# Patient Record
Sex: FEMALE | Race: WHITE | NOT HISPANIC OR LATINO | Employment: UNEMPLOYED | URBAN - METROPOLITAN AREA
[De-identification: names, ages, dates, MRNs, and addresses within clinical notes are randomized per-mention and may not be internally consistent; named-entity substitution may affect disease eponyms.]

---

## 2017-01-24 ENCOUNTER — OFFICE VISIT (OUTPATIENT)
Dept: GASTROENTEROLOGY | Facility: CLINIC | Age: 42
End: 2017-01-24

## 2017-01-24 VITALS
SYSTOLIC BLOOD PRESSURE: 122 MMHG | DIASTOLIC BLOOD PRESSURE: 80 MMHG | BODY MASS INDEX: 30.08 KG/M2 | WEIGHT: 176.2 LBS | HEIGHT: 64 IN

## 2017-01-24 DIAGNOSIS — Z72.0 TOBACCO ABUSE: ICD-10-CM

## 2017-01-24 DIAGNOSIS — I85.01 BLEEDING ESOPHAGEAL VARICES, UNSPECIFIED ESOPHAGEAL VARICES TYPE (HCC): ICD-10-CM

## 2017-01-24 DIAGNOSIS — D50.9 IRON DEFICIENCY ANEMIA, UNSPECIFIED IRON DEFICIENCY ANEMIA TYPE: ICD-10-CM

## 2017-01-24 DIAGNOSIS — K75.81 NASH (NONALCOHOLIC STEATOHEPATITIS): Primary | ICD-10-CM

## 2017-01-24 DIAGNOSIS — K76.82 HEPATIC ENCEPHALOPATHY (HCC): ICD-10-CM

## 2017-01-24 DIAGNOSIS — E87.6 HYPOKALEMIA: ICD-10-CM

## 2017-01-24 DIAGNOSIS — K74.60 ESOPHAGEAL VARICES IN CIRRHOSIS (HCC): ICD-10-CM

## 2017-01-24 DIAGNOSIS — I85.10 ESOPHAGEAL VARICES IN CIRRHOSIS (HCC): ICD-10-CM

## 2017-01-24 DIAGNOSIS — K74.60 HEPATIC CIRRHOSIS, UNSPECIFIED HEPATIC CIRRHOSIS TYPE (HCC): ICD-10-CM

## 2017-01-24 DIAGNOSIS — Z87.19 H/O: UGI BLEED: ICD-10-CM

## 2017-01-24 PROCEDURE — 99214 OFFICE O/P EST MOD 30 MIN: CPT | Performed by: NURSE PRACTITIONER

## 2017-01-25 LAB
ALBUMIN SERPL-MCNC: 2.9 G/DL (ref 3.5–5.2)
ALBUMIN/GLOB SERPL: 0.5 G/DL
ALP SERPL-CCNC: 146 U/L (ref 39–117)
ALT SERPL-CCNC: 14 U/L (ref 1–33)
AST SERPL-CCNC: 36 U/L (ref 1–32)
BASOPHILS # BLD AUTO: 0.02 10*3/MM3 (ref 0–0.2)
BASOPHILS NFR BLD AUTO: 0.8 % (ref 0–1.5)
BILIRUB SERPL-MCNC: 1.6 MG/DL (ref 0.1–1.2)
BUN SERPL-MCNC: 7 MG/DL (ref 6–20)
BUN/CREAT SERPL: 10.6 (ref 7–25)
CALCIUM SERPL-MCNC: 8.1 MG/DL (ref 8.6–10.5)
CHLORIDE SERPL-SCNC: 100 MMOL/L (ref 98–107)
CO2 SERPL-SCNC: 29.7 MMOL/L (ref 22–29)
CREAT SERPL-MCNC: 0.66 MG/DL (ref 0.57–1)
DIFFERENTIAL COMMENT: NORMAL
EOSINOPHIL # BLD AUTO: 0.1 10*3/MM3 (ref 0–0.7)
EOSINOPHIL NFR BLD AUTO: 3.9 % (ref 0.3–6.2)
ERYTHROCYTE [DISTWIDTH] IN BLOOD BY AUTOMATED COUNT: 17.8 % (ref 11.7–13)
GLOBULIN SER CALC-MCNC: 6.4 GM/DL
GLUCOSE SERPL-MCNC: 105 MG/DL (ref 65–99)
HCT VFR BLD AUTO: 32 % (ref 35.6–45.5)
HGB BLD-MCNC: 9.3 G/DL (ref 11.9–15.5)
IMM GRANULOCYTES # BLD: 0 10*3/MM3 (ref 0–0.03)
IMM GRANULOCYTES NFR BLD: 0 % (ref 0–0.5)
LYMPHOCYTES # BLD AUTO: 0.9 10*3/MM3 (ref 0.9–4.8)
LYMPHOCYTES NFR BLD AUTO: 34.7 % (ref 19.6–45.3)
MCH RBC QN AUTO: 27.8 PG (ref 26.9–32)
MCHC RBC AUTO-ENTMCNC: 29.1 G/DL (ref 32.4–36.3)
MCV RBC AUTO: 95.5 FL (ref 80.5–98.2)
MONOCYTES # BLD AUTO: 0.51 10*3/MM3 (ref 0.2–1.2)
MONOCYTES NFR BLD AUTO: 19.7 % (ref 5–12)
NEUTROPHILS # BLD AUTO: 1.06 10*3/MM3 (ref 1.9–8.1)
NEUTROPHILS NFR BLD AUTO: 40.9 % (ref 42.7–76)
PLATELET # BLD AUTO: 45 10*3/MM3 (ref 140–500)
PLATELET BLD QL SMEAR: NORMAL
POTASSIUM SERPL-SCNC: 3.4 MMOL/L (ref 3.5–5.2)
PROT SERPL-MCNC: 9.3 G/DL (ref 6–8.5)
RBC # BLD AUTO: 3.35 10*6/MM3 (ref 3.9–5.2)
RBC MORPH BLD: NORMAL
SODIUM SERPL-SCNC: 137 MMOL/L (ref 136–145)
WBC # BLD AUTO: 2.59 10*3/MM3 (ref 4.5–10.7)

## 2017-01-25 NOTE — PROGRESS NOTES
"Subjective     Jaz Gipson is a 42 y.o. female, patient of Dr. Tierney's.  History of liver cirrhosis secondary to PIEDRA.  Was recently in the hospital on 2 different occasions for acute upper GI bleed due to esophageal varices.  On first admission, she checked out AMA.  Shortly after being home her mother found her to be increasingly more confused and brought her back to the emergency room.  Found to have a markedly elevated ammonia level.  While in the ER, she vomited up blood.  There was concern of aspiration and that she wasn't known to be able to protect her airway.  She was intubated by the ER physician.  Presents today for hospital follow-up.  Admitted from 12/24/16-1/1/17 for hepatic encephalopathy, acute GI bleed secondary to esophageal varices and acute respiratory failure with mechanical ventilation.  Doing well at present.  Has had a misunderstanding of home medication schedule.  Has not taken lactulose as prescribed, taking less.  No confusion per patient and family.  Aldactone was being taken but half of what was prescribed.  Denies weight gain, NSAID and alcohol use, pedal edema, shortness of breath and chest pain.  Feels like she's \"bigger\" around her abdomen.  Liver ultrasound, while in the hospital, revealed minimal to mild abdominal ascites,  cirrhotic changes and enlargement of the liver.  No discrete liver lesion identified.  EGDs performed while in the hospital, revealed Grade II esophageal varices that were banded, and moderately severe portal gastropathy.  AFP=4.2.   Reports her sister-in-law, in [place], passed away yesterday.  Was told she had a good chance of receiving sister-in-law's liver.  Has been seen by the transplant team [place] more than 1 year ago.  Patient is requesting to talk with the physician in charge of her care with the transplant team.  She is not currently on the transplant list.  MELD score today=13.    Cirrhosis   This is a chronic problem. The current episode started " more than 1 year ago. The problem occurs constantly. The problem has been gradually worsening. Pertinent negatives include no abdominal pain, anorexia, change in bowel habit, chest pain, chills, congestion, coughing, fever, headaches, joint swelling, nausea, rash, sore throat, vertigo or vomiting. Nothing aggravates the symptoms.        Vitals:    01/24/17 1410   BP: 122/80         Current Outpatient Prescriptions:   •  calcium carbonate (OS-JARED) 600 MG tablet, Take 600 mg by mouth daily., Disp: , Rfl:   •  ferrous sulfate 325 (65 FE) MG tablet, Take 1 tablet by mouth Daily With Breakfast., Disp: 30 tablet, Rfl: 0  •  furosemide (LASIX) 40 MG tablet, TAKE ONE TABLET BY MOUTH DAILY, Disp: 30 tablet, Rfl: 3  •  lactulose (CHRONULAC) 10 GM/15ML solution, Take 45 mL by mouth Every 12 (Twelve) Hours., Disp: , Rfl:   •  levothyroxine (SYNTHROID, LEVOTHROID) 100 MCG tablet, TAKE ONE TABLET BY MOUTH DAILY, Disp: 30 tablet, Rfl: 3  •  levothyroxine (SYNTHROID, LEVOTHROID) 125 MCG tablet, TAKE TWO TABLETS BY MOUTH DAILY, Disp: 60 tablet, Rfl: 1  •  nadolol (CORGARD) 20 MG tablet, TAKE ONE TABLET BY MOUTH DAILY, Disp: 30 tablet, Rfl: 2  •  pantoprazole (PROTONIX) 40 MG EC tablet, Take 1 tablet by mouth 2 (Two) Times a Day Before Meals., Disp: 60 tablet, Rfl: 2  •  potassium chloride (K-DUR,KLOR-CON) 20 MEQ CR tablet, Take 20 mEq by mouth 2 (Two) Times a Day. Take 1 tablet 2 times a day., Disp: , Rfl:   •  sertraline (ZOLOFT) 50 MG tablet, Take 1 tablet by mouth daily., Disp: , Rfl:   •  spironolactone (ALDACTONE) 50 MG tablet, Take 2 tablets by mouth daily., Disp: 90 tablet, Rfl: 0  •  vitamin D (ERGOCALCIFEROL) 62877 UNITS capsule capsule, Take 1 capsule by mouth 1 (one) time per week., Disp: 12 capsule, Rfl: 1    Allergies   Allergen Reactions   • Latex    • Penicillins    • Tomato        Past Medical History   Diagnosis Date   • Abnormal cervical Papanicolaou smear      remote   • Acute gastric mucosal erosion      11/12  EGD   • Allergic rhinitis    • Asthma    • Cirrhosis of liver    • Duodenitis      11/12 EGD   • Enlarged lymph node      retroperitoneal lymph node enlarged, 5/12 PET CT consistent with low grade lymphoproliferative disorder (  [facility])  9/12Bone marrow bx negative    • Esophageal varices    • Fatty liver    • Folic acid deficiency    • Graves disease    • Leukocytosis    • PIEDRA (nonalcoholic steatohepatitis)    • Obstructive sleep apnea      4/10 dx, prescribed CPAP at 12 cm H2O (did not tolerate)       Past Surgical History   Procedure Laterality Date   • Liver biopsy       9/12 Liver Bx done with Mariza and Appy (cirrhosis)   • Cholecystectomy       10/9/12 Madyson: Lap Cholecystectomy and Appendectomy, Liver Bx:Cirrhosis (Biliary Dyskinesia, Cholecystitis, Chronic Appendicitis)   • Upper gastrointestinal endoscopy       11/12 EGD with Erosive gastritis, duodenitis, and portal hypertensive gastropathy (Dr. Tierney) (no esophageal varices_   • Laparoscopic appendectomy     • Colonoscopy N/A 4/22/2016     Procedure: COLONOSCOPY to cecum with cold biopsy ;  Surgeon: Annie Tierney MD;  Location: [facility] ENDOSCOPY;  Service:    • Endoscopy N/A 4/22/2016     Procedure: ESOPHAGOGASTRODUODENOSCOPY;  Surgeon: Annie Tierney MD;  Location: [facility] ENDOSCOPY;  Service:    • Endoscopy N/A 12/20/2016     Procedure: ESOPHAGOGASTRODUODENOSCOPY AT BEDSIDE;  Surgeon: Haresh Fritz MD;  Location: [facility] ENDOSCOPY;  Service:    • Endoscopy N/A 12/22/2016     Procedure: ESOPHAGOGASTRODUODENOSCOPY WITH BANDING X2;  Surgeon: Chriss Reece MD;  Location: [facility] ENDOSCOPY;  Service:    • Endoscopy N/A 12/26/2016     Procedure: ESOPHAGOGASTRODUODENOSCOPY AT BEDSIDE;  Surgeon: Vidya Norman MD;  Location: [facility] ENDOSCOPY;  Service:        Family History   Problem Relation Age of Onset   • Alcohol abuse Father    • Anxiety disorder Father    • COPD Father    • Depression Father    • Hyperlipidemia Father    •  Hypertension Father    • Asthma Brother    • Coronary artery disease Brother    • Diabetes type II Other        Social History     Social History   • Marital status: Single     Spouse name: N/A   • Number of children: N/A   • Years of education: N/A     Social History Main Topics   • Smoking status: Current Every Day Smoker     Packs/day: 0.25     Types: Cigarettes   • Smokeless tobacco: Never Used   • Alcohol use No   • Drug use: No   • Sexual activity: Defer     Other Topics Concern   • None     Social History Narrative       The following portions of the patient's history were reviewed and updated as appropriate: allergies, current medications, past family history, past medical history, past social history, past surgical history and problem list.    Review of Systems   Constitutional: Negative for appetite change (Has closely been following a low-sodium diet.), chills, fever and unexpected weight change.   HENT: Negative for congestion, sore throat and trouble swallowing.    Eyes: Negative.    Respiratory: Negative for cough and shortness of breath.    Cardiovascular: Negative for chest pain and leg swelling.   Gastrointestinal: Negative for abdominal distention, abdominal pain, anal bleeding, anorexia, blood in stool, change in bowel habit, constipation, diarrhea, nausea, rectal pain and vomiting.   Endocrine:        History of hypothyroidism   Genitourinary: Negative for dysuria.   Musculoskeletal: Negative for gait problem and joint swelling.   Skin: Negative for color change, pallor and rash.   Neurological: Negative for dizziness, vertigo, syncope, light-headedness and headaches.       Objective     Physical Exam   Constitutional: She is oriented to person, place, and time. She appears well-developed and well-nourished. No distress.   HENT:   Head: Normocephalic and atraumatic.   Mouth/Throat: Oropharynx is clear and moist.   Eyes: Conjunctivae are normal. No scleral icterus.   Neck: Neck supple.    Cardiovascular: Normal rate, regular rhythm, normal heart sounds and intact distal pulses.    Pulmonary/Chest: Effort normal and breath sounds normal. No respiratory distress.   Abdominal: Soft. Bowel sounds are normal. She exhibits no distension and no mass. There is no tenderness. There is no rebound and no guarding.   No dullness palpated.   Musculoskeletal: She exhibits no deformity.   Lymphadenopathy:     She has no cervical adenopathy.   Neurological: She is alert and oriented to person, place, and time.   Skin: Skin is warm and dry. No rash noted. No erythema. No pallor.   Psychiatric: She has a normal mood and affect. Her behavior is normal. Judgment and thought content normal.   Nursing note and vitals reviewed.      Assessment/Plan     Jaz was seen today for follow-up.    Diagnoses and all orders for this visit:    PIEDRA (nonalcoholic steatohepatitis)    Hepatic cirrhosis, unspecified hepatic cirrhosis type    H/O: UGI bleed    Bleeding esophageal varices, unspecified esophageal varices type  -     Case request  -     CBC & Differential  -     Comprehensive Metabolic Panel    Esophageal varices in cirrhosis    Iron deficiency anemia, unspecified iron deficiency anemia type    Hepatic encephalopathy    Tobacco abuse    Hypokalemia  -     Cancel: Basic Metabolic Panel; Future  -     Basic Metabolic Panel; Future    Other orders  -     Manual Differential     assessment/plan  1.  PIEDRA/hepatic cirrhosis/history of UGI bleed/esophageal varices in cirrhosis/hepatic encephalopathy - discussed in detailed and reviewed daily medicines with patient and family.  Verbalized understanding.  Schedule EGD with Dr. Tierney in early February.  Check labs and call patient with results.  Contact number for physician at transplant unit [place] given to patient. Signs and symptoms of upper GI bleed reviewed with patient, if condition deteriorates, go to emergency room.    2.  Tobacco abuse - encouraged to avoid smoking.  3.   Hypokalemia - check potassium and call patient with results.  Check BMP in 2 weeks.    Discussed plan of care with patient. Verbalizes understanding and agrees with plan.  Advised to call office for any concerns or questions regarding today's visit and if any GI problems should arise.

## 2017-01-26 RX ORDER — LACTULOSE 10 G/15ML
45 SOLUTION ORAL EVERY 12 HOURS
COMMUNITY
End: 2019-01-24 | Stop reason: SDUPTHER

## 2017-01-26 RX ORDER — POTASSIUM CHLORIDE 20 MEQ/1
20 TABLET, EXTENDED RELEASE ORAL 2 TIMES DAILY
COMMUNITY
End: 2017-02-16 | Stop reason: SDUPTHER

## 2017-02-01 ENCOUNTER — TELEPHONE (OUTPATIENT)
Dept: GASTROENTEROLOGY | Facility: CLINIC | Age: 42
End: 2017-02-01

## 2017-02-01 NOTE — TELEPHONE ENCOUNTER
Call from Oralia with [facility].  She reports that last week they received call for directed donation from sister in law.  Because pt was not on donor list, and recent labs did not reflect dire need - transplant was declined.  Oralia is checking with Dr Tierney to see if they should take another look at pt, or if there is no need for referral at this time.    Advise Oralia that Dr Tierney out of office until next week - Oralia states ok to wait until then for reply.    Oralia's # is [phone] option 3.

## 2017-02-06 LAB
BUN SERPL-MCNC: 7 MG/DL (ref 6–20)
BUN/CREAT SERPL: 12.7 (ref 7–25)
CALCIUM SERPL-MCNC: 8.2 MG/DL (ref 8.6–10.5)
CHLORIDE SERPL-SCNC: 104 MMOL/L (ref 98–107)
CO2 SERPL-SCNC: 24.1 MMOL/L (ref 22–29)
CREAT SERPL-MCNC: 0.55 MG/DL (ref 0.57–1)
GLUCOSE SERPL-MCNC: 96 MG/DL (ref 65–99)
POTASSIUM SERPL-SCNC: 3 MMOL/L (ref 3.5–5.2)
SODIUM SERPL-SCNC: 138 MMOL/L (ref 136–145)

## 2017-02-07 ENCOUNTER — RESULTS ENCOUNTER (OUTPATIENT)
Dept: GASTROENTEROLOGY | Facility: CLINIC | Age: 42
End: 2017-02-07

## 2017-02-07 DIAGNOSIS — E87.6 HYPOKALEMIA: ICD-10-CM

## 2017-02-07 DIAGNOSIS — E87.6 HYPOKALEMIA: Primary | ICD-10-CM

## 2017-02-07 NOTE — PROGRESS NOTES
Informed patient of low potassium at 3.0.  Had been taking potassium 20 meq 1 tab daily.  Wll increase to 1 tab 2 times daily.  Recheck BMP in 2 weeks.

## 2017-02-08 ENCOUNTER — HOSPITAL ENCOUNTER (OUTPATIENT)
Facility: HOSPITAL | Age: 42
Setting detail: HOSPITAL OUTPATIENT SURGERY
Discharge: HOME | End: 2017-02-08
Attending: INTERNAL MEDICINE | Admitting: INTERNAL MEDICINE

## 2017-02-08 ENCOUNTER — ANESTHESIA EVENT (OUTPATIENT)
Dept: GASTROENTEROLOGY | Facility: HOSPITAL | Age: 42
End: 2017-02-08

## 2017-02-08 ENCOUNTER — ANESTHESIA (OUTPATIENT)
Dept: GASTROENTEROLOGY | Facility: HOSPITAL | Age: 42
End: 2017-02-08

## 2017-02-08 VITALS
SYSTOLIC BLOOD PRESSURE: 101 MMHG | OXYGEN SATURATION: 98 % | BODY MASS INDEX: 28.9 KG/M2 | HEIGHT: 64 IN | RESPIRATION RATE: 16 BRPM | WEIGHT: 169.3 LBS | TEMPERATURE: 97.2 F | HEART RATE: 73 BPM | DIASTOLIC BLOOD PRESSURE: 60 MMHG

## 2017-02-08 LAB
B-HCG UR QL: NEGATIVE
INTERNAL NEGATIVE CONTROL: NEGATIVE
INTERNAL POSITIVE CONTROL: POSITIVE
Lab: NORMAL

## 2017-02-08 PROCEDURE — 43235 EGD DIAGNOSTIC BRUSH WASH: CPT | Performed by: INTERNAL MEDICINE

## 2017-02-08 PROCEDURE — 25010000002 PROPOFOL 10 MG/ML EMULSION: Performed by: ANESTHESIOLOGY

## 2017-02-08 RX ORDER — PROPOFOL 10 MG/ML
VIAL (ML) INTRAVENOUS AS NEEDED
Status: DISCONTINUED | OUTPATIENT
Start: 2017-02-08 | End: 2017-02-08 | Stop reason: SURG

## 2017-02-08 RX ORDER — PROPOFOL 10 MG/ML
VIAL (ML) INTRAVENOUS CONTINUOUS PRN
Status: DISCONTINUED | OUTPATIENT
Start: 2017-02-08 | End: 2017-02-08 | Stop reason: SURG

## 2017-02-08 RX ORDER — SODIUM CHLORIDE, SODIUM LACTATE, POTASSIUM CHLORIDE, CALCIUM CHLORIDE 600; 310; 30; 20 MG/100ML; MG/100ML; MG/100ML; MG/100ML
30 INJECTION, SOLUTION INTRAVENOUS CONTINUOUS PRN
Status: DISCONTINUED | OUTPATIENT
Start: 2017-02-08 | End: 2017-02-08 | Stop reason: HOSPADM

## 2017-02-08 RX ORDER — SPIRONOLACTONE 50 MG/1
100 TABLET, FILM COATED ORAL DAILY
Qty: 90 TABLET | Refills: 2 | Status: SHIPPED | OUTPATIENT
Start: 2017-02-08 | End: 2017-10-12 | Stop reason: SDUPTHER

## 2017-02-08 RX ADMIN — PROPOFOL 100 MCG/KG/MIN: 10 INJECTION, EMULSION INTRAVENOUS at 10:55

## 2017-02-08 RX ADMIN — SODIUM CHLORIDE, POTASSIUM CHLORIDE, SODIUM LACTATE AND CALCIUM CHLORIDE 30 ML/HR: 600; 310; 30; 20 INJECTION, SOLUTION INTRAVENOUS at 10:18

## 2017-02-08 RX ADMIN — PROPOFOL 150 MG: 10 INJECTION, EMULSION INTRAVENOUS at 10:55

## 2017-02-08 RX ADMIN — SODIUM CHLORIDE, POTASSIUM CHLORIDE, SODIUM LACTATE AND CALCIUM CHLORIDE: 600; 310; 30; 20 INJECTION, SOLUTION INTRAVENOUS at 11:00

## 2017-02-08 NOTE — H&P
[facility]  Pre Procedure History & Physical    Chief Complaint:   Varices, f/u    Subjective     HPI:   Hx bx proven PIEDRA cirrhosis with recent hospitalization for UGIB due to esophageal varices and PSE.  Banded x 2. NO gastric varices.  No prior bleed.    Past Medical History:   Past Medical History   Diagnosis Date   • Abnormal cervical Papanicolaou smear      remote   • Acute gastric mucosal erosion      11/12 EGD   • Allergic rhinitis    • Asthma    • Cirrhosis of liver    • Duodenitis      11/12 EGD   • Enlarged lymph node      retroperitoneal lymph node enlarged, 5/12 PET CT consistent with low grade lymphoproliferative disorder (  [facility])  9/12Bone marrow bx negative    • Esophageal varices    • Fatty liver    • Folic acid deficiency    • Graves disease    • Leukocytosis    • PIEDRA (nonalcoholic steatohepatitis)    • Obstructive sleep apnea      4/10 dx, prescribed CPAP at 12 cm H2O (did not tolerate)       Past Surgical History:  Past Surgical History   Procedure Laterality Date   • Liver biopsy       9/12 Liver Bx done with Mariza and Appy (cirrhosis)   • Cholecystectomy       10/9/12 Madyson: Lap Cholecystectomy and Appendectomy, Liver Bx:Cirrhosis (Biliary Dyskinesia, Cholecystitis, Chronic Appendicitis)   • Upper gastrointestinal endoscopy       11/12 EGD with Erosive gastritis, duodenitis, and portal hypertensive gastropathy (Dr. Tierney) (no esophageal varices_   • Laparoscopic appendectomy     • Colonoscopy N/A 4/22/2016     Procedure: COLONOSCOPY to cecum with cold biopsy ;  Surgeon: Annie Tierney MD;  Location: [facility] ENDOSCOPY;  Service:    • Endoscopy N/A 4/22/2016     Procedure: ESOPHAGOGASTRODUODENOSCOPY;  Surgeon: Annie Tierney MD;  Location: [facility] ENDOSCOPY;  Service:    • Endoscopy N/A 12/20/2016     Procedure: ESOPHAGOGASTRODUODENOSCOPY AT BEDSIDE;  Surgeon: Haresh Fritz MD;  Location: [facility] ENDOSCOPY;  Service:    • Endoscopy N/A 12/22/2016      Procedure: ESOPHAGOGASTRODUODENOSCOPY WITH BANDING X2;  Surgeon: Chriss Reece MD;  Location: [facility] ENDOSCOPY;  Service:    • Endoscopy N/A 12/26/2016     Procedure: ESOPHAGOGASTRODUODENOSCOPY AT BEDSIDE;  Surgeon: Vidya Norman MD;  Location: [facility] ENDOSCOPY;  Service:        Family History:  Family History   Problem Relation Age of Onset   • Alcohol abuse Father    • Anxiety disorder Father    • COPD Father    • Depression Father    • Hyperlipidemia Father    • Hypertension Father    • Asthma Brother    • Coronary artery disease Brother    • Diabetes type II Other        Social History:   reports that she has been smoking Cigarettes.  She has been smoking about 0.25 packs per day. She has never used smokeless tobacco. She reports that she does not drink alcohol or use illicit drugs.    Medications:   Prescriptions Prior to Admission   Medication Sig Dispense Refill Last Dose   • calcium carbonate (OS-JARED) 600 MG tablet Take 600 mg by mouth daily.   Past Week at Unknown time   • ferrous sulfate 325 (65 FE) MG tablet Take 1 tablet by mouth Daily With Breakfast. 30 tablet 0 2/7/2017 at Unknown time   • furosemide (LASIX) 40 MG tablet TAKE ONE TABLET BY MOUTH DAILY 30 tablet 3 2/7/2017 at Unknown time   • lactulose (CHRONULAC) 10 GM/15ML solution Take 45 mL by mouth Every 12 (Twelve) Hours.   2/7/2017 at Unknown time   • levothyroxine (SYNTHROID, LEVOTHROID) 100 MCG tablet TAKE ONE TABLET BY MOUTH DAILY 30 tablet 3 2/7/2017 at Unknown time   • levothyroxine (SYNTHROID, LEVOTHROID) 125 MCG tablet TAKE TWO TABLETS BY MOUTH DAILY 60 tablet 1 2/7/2017 at Unknown time   • nadolol (CORGARD) 20 MG tablet TAKE ONE TABLET BY MOUTH DAILY 30 tablet 2 2/7/2017 at Unknown time   • pantoprazole (PROTONIX) 40 MG EC tablet Take 1 tablet by mouth 2 (Two) Times a Day Before Meals. 60 tablet 2 2/7/2017 at Unknown time   • potassium chloride (K-DUR,KLOR-CON) 20 MEQ CR tablet Take 20 mEq by mouth 2 (Two) Times a Day. Take 1  "tablet 2 times a day.   2/7/2017 at Unknown time   • sertraline (ZOLOFT) 50 MG tablet Take 1 tablet by mouth daily.   2/7/2017 at Unknown time   • spironolactone (ALDACTONE) 50 MG tablet Take 2 tablets by mouth daily. 90 tablet 0 2/7/2017 at Unknown time   • vitamin D (ERGOCALCIFEROL) 81788 UNITS capsule capsule Take 1 capsule by mouth 1 (one) time per week. 12 capsule 1 2/5/2017 at Unknown time       Allergies:  Latex; Penicillins; and Tomato    ROS:    Pertinent items are noted in HPI, all other systems reviewed and negative     Objective     Blood pressure 112/77, pulse 77, temperature 97.2 °F (36.2 °C), temperature source Oral, resp. rate 14, height 64\" (162.6 cm), weight 169 lb 4.8 oz (76.8 kg), SpO2 97 %.    Physical Exam   Constitutional: Pt is oriented to person, place, and time and well-developed, well-nourished, and in no distress.   Mouth/Throat: Oropharynx is clear and moist.   Neck: Normal range of motion.   Cardiovascular: Normal rate, regular rhythm   Pulmonary/Chest: Effort normal  Abdominal: Soft. Nontender  Skin: Skin is warm and dry.   Psychiatric: Mood, memory, affect and judgment normal.     Assessment/Plan     Diagnosis:  Cirrhosis, f/u varices with recent bleed    Anticipated Surgical Procedure:  egd w/possible variceal banding    The risks, benefits, and alternatives of this procedure have been discussed with the patient or the responsible party- the patient understands and agrees to proceed.                                                            "

## 2017-02-08 NOTE — ANESTHESIA PREPROCEDURE EVALUATION
Anesthesia Evaluation        Airway   Dental      Pulmonary    (+) asthma, sleep apnea,   Cardiovascular         Neuro/Psych  GI/Hepatic/Renal/Endo    (+)  liver disease, hyperthyroidism    Musculoskeletal     Abdominal    Substance History      OB/GYN          Other                                  Anesthesia Plan    ASA 3     MAC     Anesthetic plan and risks discussed with patient.

## 2017-02-08 NOTE — PLAN OF CARE
Problem: Patient Care Overview (Adult)  Goal: Plan of Care Review  Outcome: Ongoing (interventions implemented as appropriate)  Goal: Adult Individualization and Mutuality  Outcome: Ongoing (interventions implemented as appropriate)  Goal: Discharge Needs Assessment  Outcome: Ongoing (interventions implemented as appropriate)    02/08/17 0955   Discharge Needs Assessment   Concerns To Be Addressed no discharge needs identified   Discharge Disposition home or self-care   Living Environment   Transportation Available car         Problem: GI Endoscopy (Adult)  Goal: Signs and Symptoms of Listed Potential Problems Will be Absent or Manageable (GI Endoscopy)  Outcome: Ongoing (interventions implemented as appropriate)

## 2017-02-08 NOTE — ANESTHESIA POSTPROCEDURE EVALUATION
Patient: Jaz Gipson    Procedure Summary     Date Anesthesia Start Anesthesia Stop Room / Location    02/08/17 1058 1114  [facility] /  [facility]       Procedure Diagnosis Surgeon Provider    ESOPHAGOGASTRODUODENOSCOPY (N/A Esophagus) Bleeding esophageal varices, unspecified esophageal varices type  (Bleeding esophageal varices, unspecified esophageal varices type [I85.01]) MD Ford Vasquez MD          Anesthesia Type: MAC  Last vitals  /63 (02/08/17 1119)    Temp      Pulse 86 (02/08/17 1119)   Resp 14 (02/08/17 1119)    SpO2 99 % (02/08/17 1119)      Post Anesthesia Care and Evaluation    Patient location during evaluation: bedside  Level of consciousness: awake  Pain score: 1  Pain management: adequate  Airway patency: patent  Anesthetic complications: No anesthetic complications    Cardiovascular status: acceptable  Respiratory status: acceptable  Hydration status: acceptable

## 2017-02-13 RX ORDER — LEVOTHYROXINE SODIUM 0.12 MG/1
TABLET ORAL
Qty: 60 TABLET | Refills: 0 | OUTPATIENT
Start: 2017-02-13

## 2017-02-13 RX ORDER — POTASSIUM CHLORIDE 1500 MG/1
TABLET, EXTENDED RELEASE ORAL
Qty: 120 TABLET | Refills: 0 | OUTPATIENT
Start: 2017-02-13

## 2017-02-16 ENCOUNTER — OFFICE VISIT (OUTPATIENT)
Dept: FAMILY MEDICINE CLINIC | Facility: CLINIC | Age: 42
End: 2017-02-16

## 2017-02-16 VITALS
DIASTOLIC BLOOD PRESSURE: 62 MMHG | HEIGHT: 64 IN | BODY MASS INDEX: 28.17 KG/M2 | WEIGHT: 165 LBS | SYSTOLIC BLOOD PRESSURE: 112 MMHG | TEMPERATURE: 97.9 F | RESPIRATION RATE: 18 BRPM | HEART RATE: 70 BPM | OXYGEN SATURATION: 99 %

## 2017-02-16 DIAGNOSIS — E03.9 ACQUIRED HYPOTHYROIDISM: ICD-10-CM

## 2017-02-16 DIAGNOSIS — D50.9 IRON DEFICIENCY ANEMIA, UNSPECIFIED IRON DEFICIENCY ANEMIA TYPE: ICD-10-CM

## 2017-02-16 DIAGNOSIS — K75.81 NASH (NONALCOHOLIC STEATOHEPATITIS): Primary | ICD-10-CM

## 2017-02-16 PROCEDURE — 99212 OFFICE O/P EST SF 10 MIN: CPT | Performed by: NURSE PRACTITIONER

## 2017-02-16 RX ORDER — LEVOTHYROXINE SODIUM 0.12 MG/1
125 TABLET ORAL DAILY
Qty: 60 TABLET | Refills: 2 | Status: SHIPPED | OUTPATIENT
Start: 2017-02-16 | End: 2017-10-12 | Stop reason: ALTCHOICE

## 2017-02-16 RX ORDER — POTASSIUM CHLORIDE 20 MEQ/1
20 TABLET, EXTENDED RELEASE ORAL 2 TIMES DAILY
Qty: 60 TABLET | Refills: 2 | Status: SHIPPED | OUTPATIENT
Start: 2017-02-16 | End: 2017-11-09 | Stop reason: SDUPTHER

## 2017-02-16 NOTE — PROGRESS NOTES
Subjective   Jaz Gipson is a 42 y.o. female.   Chief Complaint   Patient presents with   • PIEDRA     Pt was hospitalized from December 24 to January 1st at [facility] for internal bleeding as a result of Piedra. She needs to follow up on that and also medication refills     Vitals:    02/16/17 0756   BP: 112/62   Pulse: 70   Resp: 18   Temp: 97.9 °F (36.6 °C)   SpO2: 99%     Allergies   Allergen Reactions   • Latex    • Penicillins    • Tomato      HPI Comments: Had seen Anju Felder in the past but lost her job so lost insurance for awhile.     In December, had an upper GI bleed from esophageal varices with hepatic encephalopathy due to PIEDRA. Was admitted to ICU for one week. Is being followed by GI now.   Went for an EGD last week- banded varices still looks ok, has another that is being monitored.        The following portions of the patient's history were reviewed and updated as appropriate: allergies, current medications, past family history, past medical history, past social history, past surgical history and problem list.    Review of Systems   All other systems reviewed and are negative.      Objective   Physical Exam   Constitutional: She is oriented to person, place, and time. She appears well-developed and well-nourished.   HENT:   Head: Normocephalic and atraumatic.   Right Ear: External ear normal.   Left Ear: External ear normal.   Neck: Normal range of motion. Neck supple.   Cardiovascular: Normal rate, regular rhythm and normal heart sounds.    Pulmonary/Chest: Effort normal and breath sounds normal.   Abdominal: Soft. Bowel sounds are normal. There is hepatosplenomegaly.   Musculoskeletal: Normal range of motion.   Neurological: She is alert and oriented to person, place, and time.   Skin: Skin is warm and dry.   Psychiatric: She has a normal mood and affect. Her behavior is normal. Judgment and thought content normal.   Nursing note and vitals reviewed.      Assessment/Plan   Problems Addressed  this Visit        Digestive    PIEDRA (nonalcoholic steatohepatitis) - Primary       Endocrine    Hypothyroidism       Hematopoietic and Hemostatic    Iron deficiency anemia

## 2017-02-20 RX ORDER — PROMETHAZINE HYDROCHLORIDE 25 MG/1
TABLET ORAL
Qty: 10 TABLET | Refills: 0 | OUTPATIENT
Start: 2017-02-20

## 2017-02-21 ENCOUNTER — RESULTS ENCOUNTER (OUTPATIENT)
Dept: GASTROENTEROLOGY | Facility: CLINIC | Age: 42
End: 2017-02-21

## 2017-02-21 DIAGNOSIS — E87.6 HYPOKALEMIA: ICD-10-CM

## 2017-05-09 DIAGNOSIS — E55.9 VITAMIN D DEFICIENCY: ICD-10-CM

## 2017-05-09 RX ORDER — ERGOCALCIFEROL 1.25 MG/1
CAPSULE ORAL
Qty: 4 CAPSULE | Refills: 0 | OUTPATIENT
Start: 2017-05-09

## 2017-05-10 RX ORDER — FUROSEMIDE 40 MG/1
TABLET ORAL
Qty: 30 TABLET | Refills: 2 | OUTPATIENT
Start: 2017-05-10

## 2017-05-24 ENCOUNTER — TELEPHONE (OUTPATIENT)
Dept: GASTROENTEROLOGY | Facility: CLINIC | Age: 42
End: 2017-05-24

## 2017-06-05 RX ORDER — LEVOTHYROXINE SODIUM 0.1 MG/1
TABLET ORAL
Qty: 30 TABLET | Refills: 2 | OUTPATIENT
Start: 2017-06-05

## 2017-10-01 ENCOUNTER — APPOINTMENT (OUTPATIENT)
Dept: CT IMAGING | Facility: HOSPITAL | Age: 42
End: 2017-10-01

## 2017-10-01 ENCOUNTER — HOSPITAL ENCOUNTER (INPATIENT)
Facility: HOSPITAL | Age: 42
LOS: 4 days | Discharge: HOME | End: 2017-10-05
Attending: EMERGENCY MEDICINE | Admitting: INTERNAL MEDICINE

## 2017-10-01 ENCOUNTER — APPOINTMENT (OUTPATIENT)
Dept: GENERAL RADIOLOGY | Facility: HOSPITAL | Age: 42
End: 2017-10-01

## 2017-10-01 DIAGNOSIS — K76.82 HEPATIC ENCEPHALOPATHY (HCC): ICD-10-CM

## 2017-10-01 DIAGNOSIS — E87.6 HYPOKALEMIA: ICD-10-CM

## 2017-10-01 DIAGNOSIS — K92.0 GASTROINTESTINAL HEMORRHAGE WITH HEMATEMESIS: Primary | ICD-10-CM

## 2017-10-01 DIAGNOSIS — D68.9 COAGULATION DEFECT (HCC): ICD-10-CM

## 2017-10-01 DIAGNOSIS — I85.11 SECONDARY ESOPHAGEAL VARICES WITH BLEEDING (HCC): ICD-10-CM

## 2017-10-01 DIAGNOSIS — D50.0 BLOOD LOSS ANEMIA: ICD-10-CM

## 2017-10-01 DIAGNOSIS — K75.81 NASH (NONALCOHOLIC STEATOHEPATITIS): ICD-10-CM

## 2017-10-01 LAB
ABO GROUP BLD: NORMAL
ALBUMIN SERPL-MCNC: 2.4 G/DL (ref 3.5–5.2)
ALBUMIN SERPL-MCNC: 2.7 G/DL (ref 3.5–5.2)
ALBUMIN/GLOB SERPL: 0.4 G/DL
ALBUMIN/GLOB SERPL: 0.4 G/DL
ALP SERPL-CCNC: 103 U/L (ref 39–117)
ALP SERPL-CCNC: 109 U/L (ref 39–117)
ALT SERPL W P-5'-P-CCNC: 19 U/L (ref 1–33)
ALT SERPL W P-5'-P-CCNC: 20 U/L (ref 1–33)
AMMONIA BLD-SCNC: 136 UMOL/L (ref 11–51)
ANION GAP SERPL CALCULATED.3IONS-SCNC: 9.4 MMOL/L
ANION GAP SERPL CALCULATED.3IONS-SCNC: 9.8 MMOL/L
ANISOCYTOSIS BLD QL: NORMAL
APTT PPP: 37.3 SECONDS (ref 22.7–35.4)
AST SERPL-CCNC: 43 U/L (ref 1–32)
AST SERPL-CCNC: 46 U/L (ref 1–32)
BASOPHILS # BLD AUTO: 0.02 10*3/MM3 (ref 0–0.2)
BASOPHILS # BLD AUTO: 0.06 10*3/MM3 (ref 0–0.2)
BASOPHILS NFR BLD AUTO: 0.6 % (ref 0–1.5)
BASOPHILS NFR BLD AUTO: 1.3 % (ref 0–1.5)
BILIRUB SERPL-MCNC: 1.4 MG/DL (ref 0.1–1.2)
BILIRUB SERPL-MCNC: 1.5 MG/DL (ref 0.1–1.2)
BLD GP AB SCN SERPL QL: NEGATIVE
BUN BLD-MCNC: 13 MG/DL (ref 6–20)
BUN BLD-MCNC: 14 MG/DL (ref 6–20)
BUN/CREAT SERPL: 20.6 (ref 7–25)
BUN/CREAT SERPL: 21 (ref 7–25)
CALCIUM SPEC-SCNC: 7.7 MG/DL (ref 8.6–10.5)
CALCIUM SPEC-SCNC: 7.7 MG/DL (ref 8.6–10.5)
CHLORIDE SERPL-SCNC: 105 MMOL/L (ref 98–107)
CHLORIDE SERPL-SCNC: 108 MMOL/L (ref 98–107)
CO2 SERPL-SCNC: 23.2 MMOL/L (ref 22–29)
CO2 SERPL-SCNC: 24.6 MMOL/L (ref 22–29)
CREAT BLD-MCNC: 0.62 MG/DL (ref 0.57–1)
CREAT BLD-MCNC: 0.68 MG/DL (ref 0.57–1)
D-LACTATE SERPL-SCNC: 1.7 MMOL/L (ref 0.5–2)
DEPRECATED RDW RBC AUTO: 67.1 FL (ref 37–54)
DEPRECATED RDW RBC AUTO: 68.2 FL (ref 37–54)
ELLIPTOCYTES BLD QL SMEAR: NORMAL
EOSINOPHIL # BLD AUTO: 0.03 10*3/MM3 (ref 0–0.7)
EOSINOPHIL # BLD AUTO: 0.09 10*3/MM3 (ref 0–0.7)
EOSINOPHIL NFR BLD AUTO: 0.9 % (ref 0.3–6.2)
EOSINOPHIL NFR BLD AUTO: 1.9 % (ref 0.3–6.2)
ERYTHROCYTE [DISTWIDTH] IN BLOOD BY AUTOMATED COUNT: 20.5 % (ref 11.7–13)
ERYTHROCYTE [DISTWIDTH] IN BLOOD BY AUTOMATED COUNT: 21 % (ref 11.7–13)
GFR SERPL CREATININE-BSD FRML MDRD: 107 ML/MIN/1.73
GFR SERPL CREATININE-BSD FRML MDRD: 96 ML/MIN/1.73
GLOBULIN UR ELPH-MCNC: 6.6 GM/DL
GLOBULIN UR ELPH-MCNC: 6.9 GM/DL
GLUCOSE BLD-MCNC: 133 MG/DL (ref 65–99)
GLUCOSE BLD-MCNC: 84 MG/DL (ref 65–99)
GLUCOSE BLDC GLUCOMTR-MCNC: 82 MG/DL (ref 70–130)
HCG SERPL QL: NEGATIVE
HCT VFR BLD AUTO: 22.9 % (ref 35.6–45.5)
HCT VFR BLD AUTO: 25.4 % (ref 35.6–45.5)
HGB BLD-MCNC: 7 G/DL (ref 11.9–15.5)
HGB BLD-MCNC: 7.9 G/DL (ref 11.9–15.5)
HOLD SPECIMEN: NORMAL
HOLD SPECIMEN: NORMAL
HYPOCHROMIA BLD QL: NORMAL
IMM GRANULOCYTES # BLD: 0 10*3/MM3 (ref 0–0.03)
IMM GRANULOCYTES # BLD: 0 10*3/MM3 (ref 0–0.03)
IMM GRANULOCYTES NFR BLD: 0 % (ref 0–0.5)
IMM GRANULOCYTES NFR BLD: 0 % (ref 0–0.5)
INR PPP: 1.91 (ref 0.9–1.1)
LYMPHOCYTES # BLD AUTO: 0.96 10*3/MM3 (ref 0.9–4.8)
LYMPHOCYTES # BLD AUTO: 1.25 10*3/MM3 (ref 0.9–4.8)
LYMPHOCYTES NFR BLD AUTO: 27 % (ref 19.6–45.3)
LYMPHOCYTES NFR BLD AUTO: 27.4 % (ref 19.6–45.3)
MAGNESIUM SERPL-MCNC: 2.1 MG/DL (ref 1.6–2.6)
MCH RBC QN AUTO: 27.2 PG (ref 26.9–32)
MCH RBC QN AUTO: 27.7 PG (ref 26.9–32)
MCHC RBC AUTO-ENTMCNC: 30.6 G/DL (ref 32.4–36.3)
MCHC RBC AUTO-ENTMCNC: 31.1 G/DL (ref 32.4–36.3)
MCV RBC AUTO: 89.1 FL (ref 80.5–98.2)
MCV RBC AUTO: 89.1 FL (ref 80.5–98.2)
MONOCYTES # BLD AUTO: 0.34 10*3/MM3 (ref 0.2–1.2)
MONOCYTES # BLD AUTO: 0.35 10*3/MM3 (ref 0.2–1.2)
MONOCYTES NFR BLD AUTO: 7.6 % (ref 5–12)
MONOCYTES NFR BLD AUTO: 9.7 % (ref 5–12)
NEUTROPHILS # BLD AUTO: 2.16 10*3/MM3 (ref 1.9–8.1)
NEUTROPHILS # BLD AUTO: 2.88 10*3/MM3 (ref 1.9–8.1)
NEUTROPHILS NFR BLD AUTO: 61.4 % (ref 42.7–76)
NEUTROPHILS NFR BLD AUTO: 62.2 % (ref 42.7–76)
NONSPECIFIC GEL REACTION: NORMAL
NRBC BLD MANUAL-RTO: 0 /100 WBC (ref 0–0)
NRBC BLD MANUAL-RTO: 0 /100 WBC (ref 0–0)
PHOSPHATE SERPL-MCNC: 3.1 MG/DL (ref 2.5–4.5)
PLAT MORPH BLD: NORMAL
PLATELET # BLD AUTO: 34 10*3/MM3 (ref 140–500)
PLATELET # BLD AUTO: 43 10*3/MM3 (ref 140–500)
PMV BLD AUTO: ABNORMAL FL (ref 6–12)
POLYCHROMASIA BLD QL SMEAR: NORMAL
POTASSIUM BLD-SCNC: 2.4 MMOL/L (ref 3.5–5.2)
POTASSIUM BLD-SCNC: 2.7 MMOL/L (ref 3.5–5.2)
PROT SERPL-MCNC: 9 G/DL (ref 6–8.5)
PROT SERPL-MCNC: 9.6 G/DL (ref 6–8.5)
PROTHROMBIN TIME: 21.2 SECONDS (ref 11.7–14.2)
RBC # BLD AUTO: 2.57 10*6/MM3 (ref 3.9–5.2)
RBC # BLD AUTO: 2.85 10*6/MM3 (ref 3.9–5.2)
RH BLD: POSITIVE
SODIUM BLD-SCNC: 139 MMOL/L (ref 136–145)
SODIUM BLD-SCNC: 141 MMOL/L (ref 136–145)
TARGETS BLD QL SMEAR: NORMAL
WBC MORPH BLD: NORMAL
WBC NRBC COR # BLD: 3.51 10*3/MM3 (ref 4.5–10.7)
WBC NRBC COR # BLD: 4.63 10*3/MM3 (ref 4.5–10.7)
WHOLE BLOOD HOLD SPECIMEN: NORMAL
WHOLE BLOOD HOLD SPECIMEN: NORMAL

## 2017-10-01 PROCEDURE — 71010 HC CHEST PA OR AP: CPT

## 2017-10-01 PROCEDURE — 86870 RBC ANTIBODY IDENTIFICATION: CPT | Performed by: EMERGENCY MEDICINE

## 2017-10-01 PROCEDURE — 74177 CT ABD & PELVIS W/CONTRAST: CPT

## 2017-10-01 PROCEDURE — 80053 COMPREHEN METABOLIC PANEL: CPT | Performed by: INTERNAL MEDICINE

## 2017-10-01 PROCEDURE — 86902 BLOOD TYPE ANTIGEN DONOR EA: CPT

## 2017-10-01 PROCEDURE — 86900 BLOOD TYPING SEROLOGIC ABO: CPT | Performed by: EMERGENCY MEDICINE

## 2017-10-01 PROCEDURE — 86900 BLOOD TYPING SEROLOGIC ABO: CPT

## 2017-10-01 PROCEDURE — 93010 ELECTROCARDIOGRAM REPORT: CPT | Performed by: INTERNAL MEDICINE

## 2017-10-01 PROCEDURE — 86920 COMPATIBILITY TEST SPIN: CPT

## 2017-10-01 PROCEDURE — 86850 RBC ANTIBODY SCREEN: CPT | Performed by: EMERGENCY MEDICINE

## 2017-10-01 PROCEDURE — 99291 CRITICAL CARE FIRST HOUR: CPT

## 2017-10-01 PROCEDURE — 25010000002 OCTREOTIDE PER 25 MCG: Performed by: EMERGENCY MEDICINE

## 2017-10-01 PROCEDURE — 84100 ASSAY OF PHOSPHORUS: CPT | Performed by: INTERNAL MEDICINE

## 2017-10-01 PROCEDURE — 0 IOPAMIDOL 61 % SOLUTION: Performed by: EMERGENCY MEDICINE

## 2017-10-01 PROCEDURE — 86901 BLOOD TYPING SEROLOGIC RH(D): CPT | Performed by: EMERGENCY MEDICINE

## 2017-10-01 PROCEDURE — P9016 RBC LEUKOCYTES REDUCED: HCPCS

## 2017-10-01 PROCEDURE — 85730 THROMBOPLASTIN TIME PARTIAL: CPT | Performed by: EMERGENCY MEDICINE

## 2017-10-01 PROCEDURE — 84703 CHORIONIC GONADOTROPIN ASSAY: CPT | Performed by: EMERGENCY MEDICINE

## 2017-10-01 PROCEDURE — 93005 ELECTROCARDIOGRAM TRACING: CPT | Performed by: EMERGENCY MEDICINE

## 2017-10-01 PROCEDURE — 85610 PROTHROMBIN TIME: CPT | Performed by: EMERGENCY MEDICINE

## 2017-10-01 PROCEDURE — 80053 COMPREHEN METABOLIC PANEL: CPT | Performed by: EMERGENCY MEDICINE

## 2017-10-01 PROCEDURE — 86922 COMPATIBILITY TEST ANTIGLOB: CPT

## 2017-10-01 PROCEDURE — 82962 GLUCOSE BLOOD TEST: CPT

## 2017-10-01 PROCEDURE — 82140 ASSAY OF AMMONIA: CPT | Performed by: EMERGENCY MEDICINE

## 2017-10-01 PROCEDURE — 85025 COMPLETE CBC W/AUTO DIFF WBC: CPT | Performed by: EMERGENCY MEDICINE

## 2017-10-01 PROCEDURE — 85025 COMPLETE CBC W/AUTO DIFF WBC: CPT | Performed by: INTERNAL MEDICINE

## 2017-10-01 PROCEDURE — 25010000003 POTASSIUM CHLORIDE 10 MEQ/100ML SOLUTION: Performed by: EMERGENCY MEDICINE

## 2017-10-01 PROCEDURE — 85007 BL SMEAR W/DIFF WBC COUNT: CPT | Performed by: INTERNAL MEDICINE

## 2017-10-01 PROCEDURE — 83735 ASSAY OF MAGNESIUM: CPT | Performed by: INTERNAL MEDICINE

## 2017-10-01 PROCEDURE — 83605 ASSAY OF LACTIC ACID: CPT | Performed by: EMERGENCY MEDICINE

## 2017-10-01 PROCEDURE — 36430 TRANSFUSION BLD/BLD COMPNT: CPT

## 2017-10-01 PROCEDURE — 25010000003 POTASSIUM CHLORIDE 10 MEQ/100ML SOLUTION: Performed by: INTERNAL MEDICINE

## 2017-10-01 RX ORDER — POTASSIUM CHLORIDE 7.45 MG/ML
10 INJECTION INTRAVENOUS ONCE
Status: COMPLETED | OUTPATIENT
Start: 2017-10-01 | End: 2017-10-01

## 2017-10-01 RX ORDER — POTASSIUM CHLORIDE 750 MG/1
40 CAPSULE, EXTENDED RELEASE ORAL AS NEEDED
Status: DISCONTINUED | OUTPATIENT
Start: 2017-10-01 | End: 2017-10-05 | Stop reason: HOSPADM

## 2017-10-01 RX ORDER — SODIUM CHLORIDE 0.9 % (FLUSH) 0.9 %
10 SYRINGE (ML) INJECTION AS NEEDED
Status: DISCONTINUED | OUTPATIENT
Start: 2017-10-01 | End: 2017-10-05 | Stop reason: HOSPADM

## 2017-10-01 RX ORDER — PANTOPRAZOLE SODIUM 40 MG/10ML
80 INJECTION, POWDER, LYOPHILIZED, FOR SOLUTION INTRAVENOUS ONCE
Status: COMPLETED | OUTPATIENT
Start: 2017-10-01 | End: 2017-10-01

## 2017-10-01 RX ORDER — SODIUM CHLORIDE 9 MG/ML
125 INJECTION, SOLUTION INTRAVENOUS CONTINUOUS
Status: DISCONTINUED | OUTPATIENT
Start: 2017-10-01 | End: 2017-10-04

## 2017-10-01 RX ORDER — POTASSIUM CHLORIDE 7.45 MG/ML
10 INJECTION INTRAVENOUS
Status: DISCONTINUED | OUTPATIENT
Start: 2017-10-01 | End: 2017-10-05 | Stop reason: HOSPADM

## 2017-10-01 RX ORDER — OCTREOTIDE ACETATE 50 UG/ML
50 INJECTION, SOLUTION INTRAVENOUS; SUBCUTANEOUS 3 TIMES DAILY
Status: DISCONTINUED | OUTPATIENT
Start: 2017-10-01 | End: 2017-10-01

## 2017-10-01 RX ORDER — OCTREOTIDE ACETATE 50 UG/ML
50 INJECTION, SOLUTION INTRAVENOUS; SUBCUTANEOUS ONCE
Status: COMPLETED | OUTPATIENT
Start: 2017-10-01 | End: 2017-10-01

## 2017-10-01 RX ORDER — POTASSIUM CHLORIDE 1.5 G/1.77G
40 POWDER, FOR SOLUTION ORAL AS NEEDED
Status: DISCONTINUED | OUTPATIENT
Start: 2017-10-01 | End: 2017-10-05 | Stop reason: HOSPADM

## 2017-10-01 RX ADMIN — POTASSIUM CHLORIDE 10 MEQ: 10 INJECTION, SOLUTION INTRAVENOUS at 21:45

## 2017-10-01 RX ADMIN — POTASSIUM CHLORIDE 10 MEQ: 10 INJECTION, SOLUTION INTRAVENOUS at 22:37

## 2017-10-01 RX ADMIN — LACTULOSE: 10 SOLUTION ORAL at 19:19

## 2017-10-01 RX ADMIN — OCTREOTIDE ACETATE 100 MCG/HR: 500 INJECTION, SOLUTION INTRAVENOUS; SUBCUTANEOUS at 20:48

## 2017-10-01 RX ADMIN — PANTOPRAZOLE SODIUM 8 MG/HR: 40 INJECTION, POWDER, FOR SOLUTION INTRAVENOUS at 23:24

## 2017-10-01 RX ADMIN — OCTREOTIDE ACETATE 50 MCG: 50 INJECTION, SOLUTION INTRAVENOUS; SUBCUTANEOUS at 18:47

## 2017-10-01 RX ADMIN — IOPAMIDOL 85 ML: 612 INJECTION, SOLUTION INTRAVENOUS at 20:25

## 2017-10-01 RX ADMIN — SODIUM CHLORIDE 125 ML/HR: 9 INJECTION, SOLUTION INTRAVENOUS at 18:24

## 2017-10-01 RX ADMIN — PANTOPRAZOLE SODIUM 8 MG/HR: 40 INJECTION, POWDER, FOR SOLUTION INTRAVENOUS at 18:28

## 2017-10-01 RX ADMIN — POTASSIUM CHLORIDE 10 MEQ: 7.46 INJECTION, SOLUTION INTRAVENOUS at 19:33

## 2017-10-01 RX ADMIN — PANTOPRAZOLE SODIUM 80 MG: 40 INJECTION, POWDER, FOR SOLUTION INTRAVENOUS at 18:27

## 2017-10-01 RX ADMIN — POTASSIUM CHLORIDE 10 MEQ: 10 INJECTION, SOLUTION INTRAVENOUS at 23:41

## 2017-10-01 NOTE — ED TRIAGE NOTES
Pt reports vomiting blood today, has hx of non alcoholic cirrhosis, denies pain at this time. Pt reports weakness.

## 2017-10-01 NOTE — ED NOTES
Informed consent obtained from patient's mother for administration of blood products.     Sunita Mccall RN  10/01/17 4644

## 2017-10-01 NOTE — ED PROVIDER NOTES
" EMERGENCY DEPARTMENT ENCOUNTER    CHIEF COMPLAINT  Chief Complaint: hematemesis   History given by: pt and pt's family  History limited by: nothing  Room Number: 332/1  PMD: LON Elliott      HPI:  Pt is a 42 y.o. female who presents complaining of vomiting blood (x1)  which occurred around 1600 this afternoon. Pt c/o coughing. Pt denies SOA and cp. Per pt's family the pt \"vomited a lot of blood which was full of clots.\" Pt's family states that the pt was acting \"not normal\" today. They also reported that the pt was less attentive and less alert today, which is not normal.     Duration:  Since 1400 this afternoon  Onset: gradual  Timing: constant  Radiation: none  Quality: hematemesis   Intensity/Severity: moderate  Progression: unchanged  Associated Symptoms: cough  Aggravating Factors: none  Alleviating Factors: none  Previous Episodes: none  Treatment before arrival: none    PAST MEDICAL HISTORY  Active Ambulatory Problems     Diagnosis Date Noted   • PIEDRA (nonalcoholic steatohepatitis) 04/20/2016   • Hypothyroidism 04/26/2016   • Vitamin D deficiency 04/26/2016   • Hyperlipidemia 04/26/2016   • Iron deficiency anemia 04/26/2016   • Esophageal varices in cirrhosis 12/20/2016   • Hepatic encephalopathy 12/24/2016     Resolved Ambulatory Problems     Diagnosis Date Noted   • No Resolved Ambulatory Problems     Past Medical History:   Diagnosis Date   • Abnormal cervical Papanicolaou smear    • Acute gastric mucosal erosion    • Allergic rhinitis    • Asthma    • Cirrhosis of liver    • Duodenitis    • Enlarged lymph node    • Esophageal varices    • Fatty liver    • Folic acid deficiency    • Graves disease    • Leukocytosis    • PIEDRA (nonalcoholic steatohepatitis)    • Obstructive sleep apnea        PAST SURGICAL HISTORY  Past Surgical History:   Procedure Laterality Date   • CHOLECYSTECTOMY      10/9/12 [place]: Lap Cholecystectomy and Appendectomy, Liver Bx:Cirrhosis (Biliary Dyskinesia, Cholecystitis, " Chronic Appendicitis)   • COLONOSCOPY N/A 4/22/2016    Procedure: COLONOSCOPY to cecum with cold biopsy ;  Surgeon: Annie Tierney MD;  Location: [facility] ENDOSCOPY;  Service:    • ENDOSCOPY N/A 4/22/2016    Procedure: ESOPHAGOGASTRODUODENOSCOPY;  Surgeon: nAnie Tierney MD;  Location: [facility] ENDOSCOPY;  Service:    • ENDOSCOPY N/A 12/20/2016    Procedure: ESOPHAGOGASTRODUODENOSCOPY AT BEDSIDE;  Surgeon: Haresh Fritz MD;  Location[facility] END[facility]COPY;  Service:    • ENDOSCOPY N/A 12/22/2016    Procedure: ESOPHAGOGASTRODUODENOSCOPY WITH BANDING X2;  Surgeon: Chriss Reece MD;  Location[facility] ENDOSCOPY;  Service:    • ENDOSCOPY N/A 12/26/2016    Procedure: ESOPHAGOGASTRODUODENOSCOPY AT BEDSIDE;  Surgeon: Vidya Norman MD;  Location: [facility] ENDOSCOPY;  Service:    • ENDOSCOPY N/A 2/8/2017    Procedure: ESOPHAGOGASTRODUODENOSCOPY;  Surgeon: Annie Tierney MD;  Location: [facility] ENDOSCOPY;  Service:    • LAPAROSCOPIC APPENDECTOMY     • LIVER BIOPSY      9/12 Liver Bx done with Mariza and Appy (cirrhosis)   • UPPER GASTROINTESTINAL ENDOSCOPY      11/12 EGD with Erosive gastritis, duodenitis, and portal hypertensive gastropathy (Dr. Tierney) (no esophageal varices_       FAMILY HISTORY  Family History   Problem Relation Age of Onset   • Alcohol abuse Father    • Anxiety disorder Father    • COPD Father    • Depression Father    • Hyperlipidemia Father    • Hypertension Father    • Asthma Brother    • Coronary artery disease Brother    • Diabetes type II Other        SOCIAL HISTORY  Social History     Social History   • Marital status: Single     Spouse name: N/A   • Number of children: N/A   • Years of education: N/A     Occupational History   • Not on file.     Social History Main Topics   • Smoking status: Current Every Day Smoker     Packs/day: 0.25     Types: Cigarettes   • Smokeless tobacco: Never Used   • Alcohol use No   • Drug use: No   • Sexual activity: Defer     Other Topics Concern   • Not on file      Social History Narrative       ALLERGIES  Latex; Penicillins; and Tomato    REVIEW OF SYSTEMS  Review of Systems   Constitutional: Negative for fever.   HENT: Negative for sore throat.    Eyes: Negative.    Respiratory: Positive for cough. Negative for shortness of breath.    Cardiovascular: Negative for chest pain.   Gastrointestinal: Positive for vomiting (with blood). Negative for abdominal pain and diarrhea.   Genitourinary: Negative for dysuria.   Musculoskeletal: Negative for neck pain.   Skin: Negative for rash.   Allergic/Immunologic: Negative.    Neurological: Negative for weakness, numbness and headaches.   Hematological: Negative.    Psychiatric/Behavioral: Negative.    All other systems reviewed and are negative.      PHYSICAL EXAM  ED Triage Vitals   Temp Heart Rate Resp BP SpO2   10/01/17 1653 10/01/17 1653 10/01/17 1653 10/01/17 1739 10/01/17 1653   96.9 °F (36.1 °C) 99 18 111/70 98 %      Temp src Heart Rate Source Patient Position BP Location FiO2 (%)   10/01/17 1653 10/01/17 1653 -- -- --   Tympanic Monitor          Physical Exam   Constitutional: She is oriented to person, place, and time and well-developed, well-nourished, and in no distress. No distress.   Slow to respond but answers questions appropriately    HENT:   Head: Normocephalic and atraumatic.   Mouth/Throat: Oropharynx is clear and moist.   Eyes: EOM are normal. Pupils are equal, round, and reactive to light.   Neck: Normal range of motion. Neck supple.   Cardiovascular: Normal rate, regular rhythm and normal heart sounds.    Pulmonary/Chest: Effort normal and breath sounds normal. No respiratory distress.   Abdominal: Soft. Bowel sounds are normal. She exhibits no distension and no mass. There is no tenderness. There is no rebound and no guarding.   Musculoskeletal: Normal range of motion. She exhibits no edema.   No edema to extremities    Neurological: She is alert and oriented to person, place, and time. She has normal  sensation and normal strength.   Skin: Skin is warm and dry. No rash noted. No pallor.   Psychiatric: Mood and affect normal.   Nursing note and vitals reviewed.      LAB RESULTS  Lab Results (last 24 hours)     Procedure Component Value Units Date/Time    Comprehensive Metabolic Panel [[id number]]  (Abnormal) Collected:  10/01/17 1802    Specimen:  Blood Updated:  10/01/17 1918     Glucose 133 (H) mg/dL      BUN 14 mg/dL      Creatinine 0.68 mg/dL      Sodium 139 mmol/L      Potassium 2.4 (C) mmol/L      Chloride 105 mmol/L      CO2 24.6 mmol/L      Calcium 7.7 (L) mg/dL      Total Protein 9.6 (H) g/dL      Albumin 2.70 (L) g/dL      ALT (SGPT) 20 U/L      AST (SGOT) 46 (H) U/L      Alkaline Phosphatase 109 U/L      Total Bilirubin 1.5 (H) mg/dL      eGFR Non African Amer 96 mL/min/1.73      Globulin 6.9 gm/dL      A/G Ratio 0.4 g/dL      BUN/Creatinine Ratio 20.6     Anion Gap 9.4 mmol/L     hCG, Serum, Qualitative [[id number]]  (Normal) Collected:  10/01/17 1802    Specimen:  Blood Updated:  10/01/17 1844     HCG Qualitative Negative    Magnesium [[id number]] Collected:  10/01/17 1802    Specimen:  Blood Updated:  10/01/17 2112    Phosphorus [[id number]] Collected:  10/01/17 1802    Specimen:  Blood Updated:  10/01/17 2112    CBC & Differential [[id number]] Collected:  10/01/17 1814    Specimen:  Blood Updated:  10/01/17 1823    Narrative:       The following orders were created for panel order CBC & Differential.  Procedure                               Abnormality         Status                     ---------                               -----------         ------                     Scan Slide[[id number]]                                                                  CBC Auto Differential[[id number]]        Abnormal            Final result                 Please view results for these tests on the individual orders.    CBC Auto Differential [[id number]]  (Abnormal) Collected:  10/01/17 1814    Specimen:  Blood  Updated:  10/01/17 1823     WBC 4.63 10*3/mm3      RBC 2.85 (L) 10*6/mm3      Hemoglobin 7.9 (L) g/dL      Hematocrit 25.4 (L) %      MCV 89.1 fL      MCH 27.7 pg      MCHC 31.1 (L) g/dL      RDW 20.5 (H) %      RDW-SD 67.1 (H) fl      Platelets 43 (L) 10*3/mm3      Neutrophil % 62.2 %      Lymphocyte % 27.0 %      Monocyte % 7.6 %      Eosinophil % 1.9 %      Basophil % 1.3 %      Immature Grans % 0.0 %      Neutrophils, Absolute 2.88 10*3/mm3      Lymphocytes, Absolute 1.25 10*3/mm3      Monocytes, Absolute 0.35 10*3/mm3      Eosinophils, Absolute 0.09 10*3/mm3      Basophils, Absolute 0.06 10*3/mm3      Immature Grans, Absolute 0.00 10*3/mm3      nRBC 0.0 /100 WBC     Protime-INR [[id number]]  (Abnormal) Collected:  10/01/17 1817    Specimen:  Blood Updated:  10/01/17 1825     Protime 21.2 (H) Seconds      INR 1.91 (H)    Ammonia [[id number]]  (Abnormal) Collected:  10/01/17 1818    Specimen:  Blood Updated:  10/01/17 1848     Ammonia 136 (H) umol/L     Lactic Acid, Plasma [[id number]]  (Normal) Collected:  10/01/17 1818    Specimen:  Blood Updated:  10/01/17 1845     Lactate 1.7 mmol/L     aPTT [[id number]]  (Abnormal) Collected:  10/01/17 1818    Specimen:  Blood Updated:  10/01/17 1834     PTT 37.3 (H) seconds           I ordered the above labs and reviewed the results    RADIOLOGY  CT Abdomen Pelvis With Contrast   Final Result   IMPRESSION :    1. Combination of findings suggests cirrhosis and portal hypertension.   2. Stable low-density renal lesions, likely cysts.   3. Very mild colonic diverticulosis.   4. Mild ascites, improved           This report was finalized on 10/1/2017 8:39 PM by Avinash Foley MD.          XR Chest 1 View   Final Result   No evidence for active disease in the chest.       This report was finalized on 10/1/2017 6:59 PM by Dr. Jaya Nguyen MD.               I ordered the above noted radiological studies. Interpreted by radiologist.  Reviewed by me in PACS.        PROCEDURES  Critical Care  Performed by: DEVEN BUSCH  Authorized by: DEVEN BUSCH   Total critical care time: 50 minutes  Critical care was necessary to treat or prevent imminent or life-threatening deterioration of the following conditions: CNS failure or compromise, circulatory failure and metabolic crisis.  Critical care was time spent personally by me on the following activities: blood draw for specimens, development of treatment plan with patient or surrogate, discussions with consultants, evaluation of patient's response to treatment, examination of patient, obtaining history from patient or surrogate, ordering and review of laboratory studies, ordering and review of radiographic studies, pulse oximetry, re-evaluation of patient's condition and review of old charts.        EKG           EKG time: 1822  Rhythm/Rate: NSR, rate 90  P waves and MS: normal  QRS, axis: normal   ST and T waves: nonspecific ST changes     Interpreted Contemporaneously by me, independently viewed  similar compared to prior 10/5/2012      PROGRESS AND CONSULTS  ED Course     1807: Ordered labs for further evaluation. Ordered sandoStatin and protonix for GI bleed and esophageal varices.   1808: Ordered XR chest and CT abdomen for further evaluation.   1814: Ordered sandostatin for GI bleed and esophageal varices.   1852: Ordered RBC, 2 units for low Hgb levels.   1922: Rechecked pt, she was resting. Discussed plan to admit pt. She agrees with and understands plan to admit. All questions were addressed at this time.  2003: Discussed pt's case with Dr. Luna (GI) who is on call for Dr. Tierney (GI) who agrees to consult. He would like pt to have a octreotide drip at 100 mcg an hour. Ordered octreotide for GI bleed.  2043: Placed call to pulmonology.  2044: Discussed pt's case with Dr. Rubin (pulmonology) who agrees to admit pt.     MEDICAL DECISION MAKING  Results were reviewed/discussed with the patient and they were also  made aware of online access. Pt also made aware that some labs, such as cultures, will not be resulted during ER visit and follow up with PMD is necessary.     MDM  Number of Diagnoses or Management Options     Amount and/or Complexity of Data Reviewed  Clinical lab tests: ordered and reviewed (Protime: 21.2, INR: 1.91. Ammonia: 136. PTT: 37.3)  Tests in the radiology section of CPT®: ordered and reviewed (XR chest: showed nothing acute CT abd/pelvis: cirrhosis and portal hypertension. Lowe density renal lesion. colonic divertuculosis, mild ascites)  Decide to obtain previous medical records or to obtain history from someone other than the patient: yes  Review and summarize past medical records: yes (EGD in February showed gastric and esophogeal varices )  Discuss the patient with other providers: yes (Dr. Luna (GI), Dr. Rubin (pulmonology) )    Critical Care  Total time providing critical care: 30-74 minutes    Patient Progress  Patient progress: stable         DIAGNOSIS  Final diagnoses:   PIEDRA (nonalcoholic steatohepatitis)   Hepatic encephalopathy   Gastrointestinal hemorrhage with hematemesis   Blood loss anemia   Secondary esophageal varices with bleeding   Coagulation defect   Hypokalemia       DISPOSITION  ADMISSION    Discussed treatment plan and reason for admission with pt/family and admitting physician.  Pt/family voiced understanding of the plan for admission for further testing/treatment as needed.         Latest Documented Vital Signs:  As of 9:21 PM  BP- 98/75 HR- 75 Temp- 96.9 °F (36.1 °C) (Tympanic) O2 sat- 92%    --  Documentation assistance provided by jacqueline Chanel for Dr. Menendez.  Information recorded by the scribe was done at my direction and has been verified and validated by me.     Thea Chanel  10/01/17 1948       Thea Chanel  10/01/17 2121       Roe Menendez MD  10/01/17 8676

## 2017-10-02 ENCOUNTER — ANESTHESIA EVENT (OUTPATIENT)
Dept: GASTROENTEROLOGY | Facility: HOSPITAL | Age: 42
End: 2017-10-02

## 2017-10-02 ENCOUNTER — ANESTHESIA (OUTPATIENT)
Dept: GASTROENTEROLOGY | Facility: HOSPITAL | Age: 42
End: 2017-10-02

## 2017-10-02 LAB
ABO + RH BLD: NORMAL
ABO + RH BLD: NORMAL
BH BB BLOOD EXPIRATION DATE: NORMAL
BH BB BLOOD EXPIRATION DATE: NORMAL
BH BB BLOOD TYPE BARCODE: 6200
BH BB BLOOD TYPE BARCODE: 6200
BH BB DISPENSE STATUS: NORMAL
BH BB DISPENSE STATUS: NORMAL
BH BB PRODUCT CODE: NORMAL
BH BB PRODUCT CODE: NORMAL
BH BB UNIT NUMBER: NORMAL
BH BB UNIT NUMBER: NORMAL
CROSSMATCH INTERPRETATION: NORMAL
CROSSMATCH INTERPRETATION: NORMAL
GLUCOSE BLDC GLUCOMTR-MCNC: 106 MG/DL (ref 70–130)
GLUCOSE BLDC GLUCOMTR-MCNC: 117 MG/DL (ref 70–130)
GLUCOSE BLDC GLUCOMTR-MCNC: 88 MG/DL (ref 70–130)
GLUCOSE BLDC GLUCOMTR-MCNC: 98 MG/DL (ref 70–130)
HCT VFR BLD AUTO: 24.8 % (ref 35.6–45.5)
HCT VFR BLD AUTO: 25.7 % (ref 35.6–45.5)
HCT VFR BLD AUTO: 27.9 % (ref 35.6–45.5)
HGB BLD-MCNC: 7.6 G/DL (ref 11.9–15.5)
HGB BLD-MCNC: 7.9 G/DL (ref 11.9–15.5)
HGB BLD-MCNC: 8.5 G/DL (ref 11.9–15.5)
POTASSIUM BLD-SCNC: 2.7 MMOL/L (ref 3.5–5.2)
UNIT  ABO: NORMAL
UNIT  ABO: NORMAL
UNIT  RH: NORMAL
UNIT  RH: NORMAL

## 2017-10-02 PROCEDURE — 99223 1ST HOSP IP/OBS HIGH 75: CPT | Performed by: INTERNAL MEDICINE

## 2017-10-02 PROCEDURE — 36430 TRANSFUSION BLD/BLD COMPNT: CPT

## 2017-10-02 PROCEDURE — 85014 HEMATOCRIT: CPT | Performed by: INTERNAL MEDICINE

## 2017-10-02 PROCEDURE — 82962 GLUCOSE BLOOD TEST: CPT

## 2017-10-02 PROCEDURE — 25010000003 POTASSIUM CHLORIDE 10 MEQ/100ML SOLUTION: Performed by: INTERNAL MEDICINE

## 2017-10-02 PROCEDURE — 84132 ASSAY OF SERUM POTASSIUM: CPT | Performed by: INTERNAL MEDICINE

## 2017-10-02 PROCEDURE — 25010000002 PROPOFOL 10 MG/ML EMULSION: Performed by: ANESTHESIOLOGY

## 2017-10-02 PROCEDURE — 43235 EGD DIAGNOSTIC BRUSH WASH: CPT | Performed by: INTERNAL MEDICINE

## 2017-10-02 PROCEDURE — 0DJ08ZZ INSPECTION OF UPPER INTESTINAL TRACT, VIA NATURAL OR ARTIFICIAL OPENING ENDOSCOPIC: ICD-10-PCS | Performed by: INTERNAL MEDICINE

## 2017-10-02 PROCEDURE — 85018 HEMOGLOBIN: CPT | Performed by: INTERNAL MEDICINE

## 2017-10-02 PROCEDURE — 86900 BLOOD TYPING SEROLOGIC ABO: CPT

## 2017-10-02 PROCEDURE — 25010000002 METOCLOPRAMIDE PER 10 MG: Performed by: INTERNAL MEDICINE

## 2017-10-02 PROCEDURE — 25010000002 LEVOFLOXACIN PER 250 MG: Performed by: INTERNAL MEDICINE

## 2017-10-02 PROCEDURE — 25010000002 PROPOFOL 1000 MG/ML EMULSION: Performed by: ANESTHESIOLOGY

## 2017-10-02 PROCEDURE — 25010000002 OCTREOTIDE PER 25 MCG: Performed by: EMERGENCY MEDICINE

## 2017-10-02 PROCEDURE — P9016 RBC LEUKOCYTES REDUCED: HCPCS

## 2017-10-02 RX ORDER — SODIUM CHLORIDE, SODIUM LACTATE, POTASSIUM CHLORIDE, CALCIUM CHLORIDE 600; 310; 30; 20 MG/100ML; MG/100ML; MG/100ML; MG/100ML
30 INJECTION, SOLUTION INTRAVENOUS CONTINUOUS
Status: DISCONTINUED | OUTPATIENT
Start: 2017-10-02 | End: 2017-10-05 | Stop reason: HOSPADM

## 2017-10-02 RX ORDER — METOCLOPRAMIDE HYDROCHLORIDE 5 MG/ML
10 INJECTION INTRAMUSCULAR; INTRAVENOUS EVERY 6 HOURS
Status: DISCONTINUED | OUTPATIENT
Start: 2017-10-02 | End: 2017-10-03

## 2017-10-02 RX ORDER — PROPOFOL 10 MG/ML
VIAL (ML) INTRAVENOUS AS NEEDED
Status: DISCONTINUED | OUTPATIENT
Start: 2017-10-02 | End: 2017-10-02 | Stop reason: SURG

## 2017-10-02 RX ORDER — LEVOFLOXACIN 5 MG/ML
500 INJECTION, SOLUTION INTRAVENOUS EVERY 24 HOURS
Status: DISCONTINUED | OUTPATIENT
Start: 2017-10-02 | End: 2017-10-05

## 2017-10-02 RX ADMIN — EPHEDRINE SULFATE 15 MG: 50 INJECTION INTRAMUSCULAR; INTRAVENOUS; SUBCUTANEOUS at 14:24

## 2017-10-02 RX ADMIN — POTASSIUM CHLORIDE 10 MEQ: 10 INJECTION, SOLUTION INTRAVENOUS at 17:04

## 2017-10-02 RX ADMIN — SODIUM CHLORIDE 125 ML/HR: 9 INJECTION, SOLUTION INTRAVENOUS at 20:32

## 2017-10-02 RX ADMIN — OCTREOTIDE ACETATE 100 MCG/HR: 500 INJECTION, SOLUTION INTRAVENOUS; SUBCUTANEOUS at 07:01

## 2017-10-02 RX ADMIN — METOCLOPRAMIDE 10 MG: 5 INJECTION, SOLUTION INTRAMUSCULAR; INTRAVENOUS at 20:14

## 2017-10-02 RX ADMIN — PROPOFOL 140 MCG/KG/MIN: 10 INJECTION, EMULSION INTRAVENOUS at 14:19

## 2017-10-02 RX ADMIN — POTASSIUM CHLORIDE 10 MEQ: 10 INJECTION, SOLUTION INTRAVENOUS at 18:04

## 2017-10-02 RX ADMIN — POTASSIUM CHLORIDE 10 MEQ: 10 INJECTION, SOLUTION INTRAVENOUS at 19:24

## 2017-10-02 RX ADMIN — LEVOFLOXACIN 500 MG: 5 INJECTION, SOLUTION INTRAVENOUS at 10:28

## 2017-10-02 RX ADMIN — POTASSIUM CHLORIDE 10 MEQ: 10 INJECTION, SOLUTION INTRAVENOUS at 13:33

## 2017-10-02 RX ADMIN — OCTREOTIDE ACETATE 100 MCG/HR: 500 INJECTION, SOLUTION INTRAVENOUS; SUBCUTANEOUS at 12:15

## 2017-10-02 RX ADMIN — PANTOPRAZOLE SODIUM 8 MG/HR: 40 INJECTION, POWDER, FOR SOLUTION INTRAVENOUS at 14:47

## 2017-10-02 RX ADMIN — PROPOFOL 150 MG: 10 INJECTION, EMULSION INTRAVENOUS at 14:19

## 2017-10-02 RX ADMIN — OCTREOTIDE ACETATE 100 MCG/HR: 500 INJECTION, SOLUTION INTRAVENOUS; SUBCUTANEOUS at 22:11

## 2017-10-02 RX ADMIN — PANTOPRAZOLE SODIUM 8 MG/HR: 40 INJECTION, POWDER, FOR SOLUTION INTRAVENOUS at 20:13

## 2017-10-02 RX ADMIN — POTASSIUM CHLORIDE 10 MEQ: 10 INJECTION, SOLUTION INTRAVENOUS at 01:09

## 2017-10-02 RX ADMIN — OCTREOTIDE ACETATE 100 MCG/HR: 500 INJECTION, SOLUTION INTRAVENOUS; SUBCUTANEOUS at 00:58

## 2017-10-02 RX ADMIN — POTASSIUM CHLORIDE 10 MEQ: 10 INJECTION, SOLUTION INTRAVENOUS at 14:47

## 2017-10-02 RX ADMIN — OCTREOTIDE ACETATE 100 MCG/HR: 500 INJECTION, SOLUTION INTRAVENOUS; SUBCUTANEOUS at 17:17

## 2017-10-02 RX ADMIN — PANTOPRAZOLE SODIUM 8 MG/HR: 40 INJECTION, POWDER, FOR SOLUTION INTRAVENOUS at 08:41

## 2017-10-02 RX ADMIN — POTASSIUM CHLORIDE 10 MEQ: 10 INJECTION, SOLUTION INTRAVENOUS at 02:07

## 2017-10-02 RX ADMIN — PANTOPRAZOLE SODIUM 8 MG/HR: 40 INJECTION, POWDER, FOR SOLUTION INTRAVENOUS at 04:21

## 2017-10-02 RX ADMIN — POTASSIUM CHLORIDE 10 MEQ: 10 INJECTION, SOLUTION INTRAVENOUS at 16:02

## 2017-10-02 RX ADMIN — SODIUM CHLORIDE 125 ML/HR: 9 INJECTION, SOLUTION INTRAVENOUS at 12:58

## 2017-10-02 NOTE — PROGRESS NOTES
"      [place] [facility]         Dr Morocho Sayied   LOS: 1 day   Patient Care Team:  LON Elliott as PCP - General (Family Medicine)    Chief Complaint: GI bleeding with hematemesis underlying history of [place]    Interval History: Events noted chart reviewed.  No further bleeding reported.  Currently on octreotide and Protonix drip.  Nothing by mouth for EGD later this afternoon.    REVIEW OF SYSTEMS:   CARDIOVASCULAR: No chest pain, chest pressure or chest discomfort. No palpitations or edema.   RESPIRATORY: No shortness of breath, cough or sputum.   GASTROINTESTINAL: No anorexia, nausea, vomiting or diarrhea. No abdominal pain or blood.   HEMATOLOGIC: No bleeding or bruising.     Ventilator/Non-Invasive Ventilation Settings     None            Vital Signs  Temp:  [96.9 °F (36.1 °C)-98 °F (36.7 °C)] 98 °F (36.7 °C)  Heart Rate:  [61-99] 79  Resp:  [12-18] 12  BP: ()/(65-84) 121/78    Intake/Output Summary (Last 24 hours) at 10/02/17 1152  Last data filed at 10/02/17 1028   Gross per 24 hour   Intake              680 ml   Output                0 ml   Net              680 ml     Flowsheet Rows         First Filed Value    Admission Height  67\" (170.2 cm) Documented at 10/01/2017 1653    Admission Weight  166 lb (75.3 kg) Documented at 10/01/2017 1653          Physical Exam:   General Appearance:    Alert, cooperative, in no acute distress   Lungs:     Clear to auscultation,respirations regular, even and                  unlabored    Heart:    Regular rhythm and normal rate, normal S1 and S2, no            murmur, no gallop, no rub, no click   Chest Wall:    No abnormalities observed   Abdomen:     Normal bowel sounds, no masses, no organomegaly, soft        non-tender, non-distended, no guarding, no rebound                tenderness   Extremities:   Moves all extremities well, no edema, no cyanosis, no             redness     Results Review:          Results from last 7 days  Lab Units " 10/01/17  2153 10/01/17  1802   SODIUM mmol/L 141 139   POTASSIUM mmol/L 2.7* 2.4*   CHLORIDE mmol/L 108* 105   CO2 mmol/L 23.2 24.6   BUN mg/dL 13 14   CREATININE mg/dL 0.62 0.68   GLUCOSE mg/dL 84 133*   CALCIUM mg/dL 7.7* 7.7*           Results from last 7 days  Lab Units 10/02/17  0421 10/01/17  2153 10/01/17  1814   WBC 10*3/mm3  --  3.51* 4.63   HEMOGLOBIN g/dL 8.5* 7.0* 7.9*   HEMATOCRIT % 27.9* 22.9* 25.4*   PLATELETS 10*3/mm3  --  34* 43*       Results from last 7 days  Lab Units 10/01/17  1818 10/01/17  1817   INR   --  1.91*   APTT seconds 37.3*  --            Results from last 7 days  Lab Units 10/01/17  1802   MAGNESIUM mg/dL 2.1               I reviewed the patient's new clinical results.  I personally viewed and interpreted the patient's CXR        Medication Review:     levoFLOXacin 500 mg Intravenous Q24H         octreotide (SandoSTATIN) infusion 100 mcg/hr Last Rate: 100 mcg/hr (10/02/17 0701)   pantoprazole 8 mg/hr Last Rate: 8 mg/hr (10/02/17 0841)   sodium chloride 125 mL/hr Last Rate: 125 mL/hr (10/01/17 1824)       ASSESSMENT:   PCCM Problems  Hematemesis, suspect esophageal varices  Liver cirrhosis due to PIEDRA  Hepatic encephalopathy, elevated ammonia  Anemia due to acute blood loss  Acute hypokalemia  Elevated INR from liver cirrhosis  Chronic thrombocytopenia  Relevant Medical Diagnoses  Current cigarette smoker  Obstructive sleep apnea intolerant of CPAP device    PLAN:  Hemoglobin stable post 2 units PRBC.  Continue Protonix and octreotide drip  Monitor hemoglobin closely  Plans for EGD per GI  Apparently history of noncompliance with her medications  Correct electrolytes based on protocol.  Check magnesium  INR mildly elevated but no active bleeding noted currently  Discussed with patient plan of care in detail            Bailee Raines MD  10/02/17  11:52 AM

## 2017-10-02 NOTE — ANESTHESIA PREPROCEDURE EVALUATION
Anesthesia Evaluation     Patient summary reviewed   NPO Solid Status: > 8 hours  NPO Liquid Status: > 8 hours     Airway   Mallampati: II  TM distance: >3 FB  Dental      Pulmonary    (+) asthma,   Cardiovascular     Rhythm: regular  Rate: normal    (+) hyperlipidemia      Neuro/Psych  GI/Hepatic/Renal/Endo    (+)  hepatitis, liver disease, hypothyroidism, hyperthyroidism    ROS Comment: PIEDRA    Musculoskeletal     Abdominal    Substance History      OB/GYN          Other                                    Anesthesia Plan    ASA 3     MAC   total IV anesthesia  Anesthetic plan and risks discussed with patient.

## 2017-10-02 NOTE — PLAN OF CARE
Problem: Patient Care Overview (Adult)  Goal: Plan of Care Review  Outcome: Ongoing (interventions implemented as appropriate)    10/02/17 0459   Coping/Psychosocial Response Interventions   Plan Of Care Reviewed With patient   Outcome Evaluation   Outcome Summary/Follow up Plan Pt admitted to CCU for bleeding esophageal varices. 2 units prbc's given, protonix and octreotide gtts continued. Pt is drowsy, oriented, denies any pain. vitals stable.        Goal: Adult Individualization and Mutuality  Outcome: Ongoing (interventions implemented as appropriate)  Goal: Discharge Needs Assessment  Outcome: Ongoing (interventions implemented as appropriate)    Problem: Gastrointestinal Bleeding (Adult)  Goal: Signs and Symptoms of Listed Potential Problems Will be Absent or Manageable (Gastrointestinal Bleeding)  Outcome: Ongoing (interventions implemented as appropriate)    Problem: Fall Risk (Adult)  Goal: Identify Related Risk Factors and Signs and Symptoms  Outcome: Ongoing (interventions implemented as appropriate)  Goal: Absence of Falls  Outcome: Ongoing (interventions implemented as appropriate)

## 2017-10-02 NOTE — H&P
[facility]  Phone: [phone]  Ghanshyam Rubin MD      Subjective   LOS: 0 days     42-year-old female whom I had previously admitted before.  She has a diagnosis of PIEDRA.  She has been feeling unwell for about 2 days.  She denies any fever or chills.  This morning she woke up from sleep and started vomiting bright red blood.  Upon arrival in the emergency room her hemoglobin is 7.  She has a history of esophageal varices with banding in December last year.  Patient denies taking any antiplatelet or anticoagulants.  She is not on any pain medications that would potentially contribute to bleeding.  She is under the care of GI for hepatic cirrhosis and is on Xifaxan as well as lactulose.  However she is not compliant with these medications for work schedule has been changing frequently.  Patient is somnolent in the ED but is able to onset my questions and wakes up for questions.  She is appropriate otherwise.  She denies any use of alcohol.  However she is a current smoker and smokes 2-3 cigarettes a day.  She also has obstructive sleep apnea but has been intolerant of a CPAP device.  There is no strong family history of liver problems.  Patient denies any other medical issues.  Family confirms no other significant family history.     I have reviewed and edited the Past Medical History, Past Surgical History, Home Medications, Social History and Family History as of 8:50 PM on 10/01/17.      (Not in a hospital admission)  Allergies   Allergen Reactions   • Latex    • Penicillins    • Tomato        Review of Systems   Constitutional: Positive for fatigue. Negative for appetite change, chills and fever.   HENT: Negative for congestion, mouth sores, postnasal drip, rhinorrhea, trouble swallowing and voice change.    Respiratory: Negative for cough, shortness of breath and wheezing.    Cardiovascular: Negative for chest pain, palpitations and leg swelling.   Gastrointestinal: Positive for vomiting  (hematemesis). Negative for abdominal distention, abdominal pain, constipation, diarrhea and nausea.   Endocrine: Negative for cold intolerance and heat intolerance.   Genitourinary: Negative for difficulty urinating, dysuria, frequency and urgency.   Musculoskeletal: Negative for arthralgias and back pain.   Skin: Negative for pallor and rash.   Neurological: Negative for dizziness, weakness and headaches.        Somnolent   Psychiatric/Behavioral: Negative for agitation and confusion. The patient is not nervous/anxious.        Vital Signs past 24hrs  BP range: BP: ()/(65-82) 113/82  Pulse range: Heart Rate:  [77-99] 77  Resp rate range: Resp:  [18] 18  Temp range: Temp (24hrs), Av.9 °F (36.1 °C), Min:96.9 °F (36.1 °C), Max:96.9 °F (36.1 °C)    Oxygen range: SpO2:  [91 %-98 %] 94 %; Flow (L/min):  [2] 2;   O2 Device: nasal cannula  166 lb (75.3 kg); Body mass index is 26 kg/(m^2).       Yony adult female lying in bed.  She is somnolent but arouses to onset questions and ounces and appropriately.  Pupils equal reactive to light.  There is some dried blood around her mouth.  Oropharynx shows a class IV Mallampati airway.  Moist oropharynx.  Posterior pharyngeal discharge.  Nasopharynx without discharge septum midline.  JVP not elevated.  Trachea midline thyroid not enlarged.  Neck is large.  Lungs reveal bilateral air entry.  Clear to auscultation with no rales rhonchi or wheeze.  Percussion note is resonant.  No chest wall deformity or tenderness.  Heart examination S1-S2 present.  Rhythm regular no murmurs.  Patient is not tachycardic.  No edema lower extremities.  Abdomen is obese.  Cannot rule out ascites.  Soft.  Nontender.  Bowel Present.  Splenomegaly noted.  Cannot truly appreciate hepatomegaly.  No peripheral cyanosis clubbing.  Patient moves all 4 extremities and sensory motor intact somnolent as stated before.  No cervical, axillary, inguinal adenopathy.    Results Review:    I have reviewed the  laboratory and imaging data from current admission. My annotations are as noted in assessment and plan.    Medication Review:  I have reviewed the current MAR. My annotations are as noted in assessment and plan.    Plan   PCCM Problems  Hematemesis, suspect esophageal varices  Liver cirrhosis due to PIEDRA  Hepatic encephalopathy, elevated ammonia  Anemia due to acute blood loss  Acute hypokalemia  Elevated INR from liver cirrhosis  Chronic thrombocytopenia  Relevant Medical Diagnoses  Current cigarette smoker  Obstructive sleep apnea intolerant of CPAP device    Plan of Treatment  ER has discussed with GI.  Plan for ICU admission with monitoring of hemoglobin, octreotide drip, pantoprazole drip.  Will require EGD in the morning.  Keep nothing by mouth.  Continue IV fluids for now though may need to back down occurs of portal hypertension.  Patient is going to receive PRBC.  INR is only slightly elevated.  Does not require FFP per ER discussion with GI.    Hepatic encephalopathy due to elevated ammonia.  Concerned about giving by mouth medications especially with acute GI bleed from varices.  Patient received lactulose enema earlier.  She has been noncompliant with her Xifaxan and lactulose.  Will require these to be administered enterally once the GI bleed issue gets sorted.  She may need feeding tube for the same.    Acute hypokalemia possibly from vomiting.  We'll replace per protocol.  Check magnesium and replace same also.    Chronic thrombocytopenia is stable.  Likely also from liver cirrhosis and splenomegaly with sequestration.  No intervention for same at the present time.    Patient was counseled to quit smoking.  Currently not wheezing and does not require bronchodilator therapy.  Consider adding same if patient develops respiratory symptoms.    Patient will be advised to use a CPAP machine as an outpatient.    I spoke with patient's mother at bedside.    I spent +35 mins critical care time in care of this  patient outside of any procedures.     Ghanshyam Rubin MD  10/01/17  8:50 PM    Part of this note may be an electronic transcription/translation of spoken language to printed text using the Dragon Dictation System.

## 2017-10-02 NOTE — PAYOR COMM NOTE
"UR CONTACT:     EZIO    P:  [phone]  F:  [phone]        Jaz Gipson (42 y.o. Female)     Date of Birth Social Security Number Address Home Phone MRN    [id number]  [id number] [phone] [id number]    Mandaeism Marital Status          [facility] Single       Admission Date Admission Type Admitting Provider Attending Provider Department, Room/Bed    10/1/17 Emergency Ghanshyam Rubin MD Jain, Subin, MD [facility] CORONARY CARE, 332/1    Discharge Date Discharge Disposition Discharge Destination                      Attending Provider: Ghanshyam Rubin MD     Allergies:  Latex, Penicillins, Tomato    Isolation:  None   Infection:  None   Code Status:  Prior    Ht:  64\" (162.6 cm)   Wt:  166 lb (75.3 kg)    Admission Cmt:  None   Principal Problem:  None                Active Insurance as of 10/1/2017     Primary Coverage     Payor Plan Insurance Group Employer/Plan Group    ANTHEM MEDICAID ANTHEM MEDICAID [id number]     Payor Plan Address Payor Plan Phone Number Effective From Effective To    [address] [address] [phone] 12/20/2016     [place] [address]       Subscriber Name Subscriber Birth Date Member ID       JAZ GIPSON 1975 [id number]                 Emergency Contacts      (Rel.) Home Phone Work Phone Mobile Phone    Genet Gipson (Mother) [phone] -- [phone]               History & Physical      Ghanshyam Rubin MD at 10/1/2017  8:50 PM              [facility]  Phone: [phone]  Ghanshyam Rubin MD      Subjective   LOS: 0 days     42-year-old female whom I had previously admitted before.  She has a diagnosis of PIEDRA.  She has been feeling unwell for about 2 days.  She denies any fever or chills.  This morning she woke up from sleep and started vomiting bright red blood.  Upon arrival in the emergency room her hemoglobin is 7.  She has a history of esophageal varices with banding in December last " year.  Patient denies taking any antiplatelet or anticoagulants.  She is not on any pain medications that would potentially contribute to bleeding.  She is under the care of GI for hepatic cirrhosis and is on Xifaxan as well as lactulose.  However she is not compliant with these medications for work schedule has been changing frequently.  Patient is somnolent in the ED but is able to onset my questions and wakes up for questions.  She is appropriate otherwise.  She denies any use of alcohol.  However she is a current smoker and smokes 2-3 cigarettes a day.  She also has obstructive sleep apnea but has been intolerant of a CPAP device.  There is no strong family history of liver problems.  Patient denies any other medical issues.  Family confirms no other significant family history.     I have reviewed and edited the Past Medical History, Past Surgical History, Home Medications, Social History and Family History as of 8:50 PM on 10/01/17.      (Not in a hospital admission)  Allergies   Allergen Reactions   • Latex    • Penicillins    • Tomato        Review of Systems   Constitutional: Positive for fatigue. Negative for appetite change, chills and fever.   HENT: Negative for congestion, mouth sores, postnasal drip, rhinorrhea, trouble swallowing and voice change.    Respiratory: Negative for cough, shortness of breath and wheezing.    Cardiovascular: Negative for chest pain, palpitations and leg swelling.   Gastrointestinal: Positive for vomiting (hematemesis). Negative for abdominal distention, abdominal pain, constipation, diarrhea and nausea.   Endocrine: Negative for cold intolerance and heat intolerance.   Genitourinary: Negative for difficulty urinating, dysuria, frequency and urgency.   Musculoskeletal: Negative for arthralgias and back pain.   Skin: Negative for pallor and rash.   Neurological: Negative for dizziness, weakness and headaches.        Somnolent   Psychiatric/Behavioral: Negative for agitation and  confusion. The patient is not nervous/anxious.        Vital Signs past 24hrs  BP range: BP: ()/(65-82) 113/82  Pulse range: Heart Rate:  [77-99] 77  Resp rate range: Resp:  [18] 18  Temp range: Temp (24hrs), Av.9 °F (36.1 °C), Min:96.9 °F (36.1 °C), Max:96.9 °F (36.1 °C)    Oxygen range: SpO2:  [91 %-98 %] 94 %; Flow (L/min):  [2] 2;   O2 Device: nasal cannula  166 lb (75.3 kg); Body mass index is 26 kg/(m^2).       Yony adult female lying in bed.  She is somnolent but arouses to onset questions and ounces and appropriately.  Pupils equal reactive to light.  There is some dried blood around her mouth.  Oropharynx shows a class IV Mallampati airway.  Moist oropharynx.  Posterior pharyngeal discharge.  Nasopharynx without discharge septum midline.  JVP not elevated.  Trachea midline thyroid not enlarged.  Neck is large.  Lungs reveal bilateral air entry.  Clear to auscultation with no rales rhonchi or wheeze.  Percussion note is resonant.  No chest wall deformity or tenderness.  Heart examination S1-S2 present.  Rhythm regular no murmurs.  Patient is not tachycardic.  No edema lower extremities.  Abdomen is obese.  Cannot rule out ascites.  Soft.  Nontender.  Bowel Present.  Splenomegaly noted.  Cannot truly appreciate hepatomegaly.  No peripheral cyanosis clubbing.  Patient moves all 4 extremities and sensory motor intact somnolent as stated before.  No cervical, axillary, inguinal adenopathy.    Results Review:    I have reviewed the laboratory and imaging data from current admission. My annotations are as noted in assessment and plan.    Medication Review:  I have reviewed the current MAR. My annotations are as noted in assessment and plan.    Plan   PCCM Problems  Hematemesis, suspect esophageal varices  Liver cirrhosis due to PIEDRA  Hepatic encephalopathy, elevated ammonia  Anemia due to acute blood loss  Acute hypokalemia  Elevated INR from liver cirrhosis  Chronic thrombocytopenia  Relevant Medical  Diagnoses  Current cigarette smoker  Obstructive sleep apnea intolerant of CPAP device    Plan of Treatment  ER has discussed with GI.  Plan for ICU admission with monitoring of hemoglobin, octreotide drip, pantoprazole drip.  Will require EGD in the morning.  Keep nothing by mouth.  Continue IV fluids for now though may need to back down occurs of portal hypertension.  Patient is going to receive PRBC.  INR is only slightly elevated.  Does not require FFP per ER discussion with GI.    Hepatic encephalopathy due to elevated ammonia.  Concerned about giving by mouth medications especially with acute GI bleed from varices.  Patient received lactulose enema earlier.  She has been noncompliant with her Xifaxan and lactulose.  Will require these to be administered enterally once the GI bleed issue gets sorted.  She may need feeding tube for the same.    Acute hypokalemia possibly from vomiting.  We'll replace per protocol.  Check magnesium and replace same also.    Chronic thrombocytopenia is stable.  Likely also from liver cirrhosis and splenomegaly with sequestration.  No intervention for same at the present time.    Patient was counseled to quit smoking.  Currently not wheezing and does not require bronchodilator therapy.  Consider adding same if patient develops respiratory symptoms.    Patient will be advised to use a CPAP machine as an outpatient.    I spoke with patient's mother at bedside.    I spent +35 mins critical care time in care of this patient outside of any procedures.     Ghanshyam Rubin MD  10/01/17  8:50 PM    Part of this note may be an electronic transcription/translation of spoken language to printed text using the Dragon Dictation System.       Electronically signed by Ghanshyam Rubin MD at 10/1/2017  9:05 PM           Emergency Department Notes      Les Sibley RN at 10/1/2017  5:39 PM          Pt reports vomiting blood today, has hx of non alcoholic cirrhosis, denies pain at this time. Pt reports  "weakness.      Electronically signed by Les Sibley RN at 10/1/2017  5:40 PM      Roe Menendez MD at 10/1/2017  5:57 PM      Procedure Orders:    1. Critical Care [[id number]] ordered by Roe Menendez MD at 10/01/17 2046                 EMERGENCY DEPARTMENT ENCOUNTER    CHIEF COMPLAINT  Chief Complaint: hematemesis   History given by: pt and pt's family  History limited by: nothing  Room Number: 332/1  PMD: LON Elliott      HPI:  Pt is a 42 y.o. female who presents complaining of vomiting blood (x1)  which occurred around 1600 this afternoon. Pt c/o coughing. Pt denies SOA and cp. Per pt's family the pt \"vomited a lot of blood which was full of clots.\" Pt's family states that the pt was acting \"not normal\" today. They also reported that the pt was less attentive and less alert today, which is not normal.     Duration:  Since 1400 this afternoon  Onset: gradual  Timing: constant  Radiation: none  Quality: hematemesis   Intensity/Severity: moderate  Progression: unchanged  Associated Symptoms: cough  Aggravating Factors: none  Alleviating Factors: none  Previous Episodes: none  Treatment before arrival: none    PAST MEDICAL HISTORY  Active Ambulatory Problems     Diagnosis Date Noted   • PIEDRA (nonalcoholic steatohepatitis) 04/20/2016   • Hypothyroidism 04/26/2016   • Vitamin D deficiency 04/26/2016   • Hyperlipidemia 04/26/2016   • Iron deficiency anemia 04/26/2016   • Esophageal varices in cirrhosis 12/20/2016   • Hepatic encephalopathy 12/24/2016     Resolved Ambulatory Problems     Diagnosis Date Noted   • No Resolved Ambulatory Problems     Past Medical History:   Diagnosis Date   • Abnormal cervical Papanicolaou smear    • Acute gastric mucosal erosion    • Allergic rhinitis    • Asthma    • Cirrhosis of liver    • Duodenitis    • Enlarged lymph node    • Esophageal varices    • Fatty liver    • Folic acid deficiency    • Graves disease    • Leukocytosis    • PIEDRA (nonalcoholic steatohepatitis)    • " Obstructive sleep apnea        PAST SURGICAL HISTORY  Past Surgical History:   Procedure Laterality Date   • CHOLECYSTECTOMY      10/9/12 Madyson: Lap Cholecystectomy and Appendectomy, Liver Bx:Cirrhosis (Biliary Dyskinesia, Cholecystitis, Chronic Appendicitis)   • COLONOSCOPY N/A 4/22/2016    Procedure: COLONOSCOPY to cecum with cold biopsy ;  Surgeon: Annie Tierney MD;  Location: [facility] ENDOSCOPY;  Service:    • ENDOSCOPY N/A 4/22/2016    Procedure: ESOPHAGOGASTRODUODENOSCOPY;  Surgeon: Annie Tierney MD;  Location: [facility] ENDOSCOPY;  Service:    • ENDOSCOPY N/A 12/20/2016    Procedure: ESOPHAGOGASTRODUODENOSCOPY AT BEDSIDE;  Surgeon: Haresh Fritz MD;  Location: [facility] ENDOSCOPY;  Service:    • ENDOSCOPY N/A 12/22/2016    Procedure: ESOPHAGOGASTRODUODENOSCOPY WITH BANDING X2;  Surgeon: Chriss Reece MD;  Location: [facility] ENDOSCOPY;  Service:    • ENDOSCOPY N/A 12/26/2016    Procedure: ESOPHAGOGASTRODUODENOSCOPY AT BEDSIDE;  Surgeon: Vidya Norman MD;  Location: [facility] ENDOSCOPY;  Service:    • ENDOSCOPY N/A 2/8/2017    Procedure: ESOPHAGOGASTRODUODENOSCOPY;  Surgeon: Annie Tierney MD;  Location: [facility] ENDOSCOPY;  Service:    • LAPAROSCOPIC APPENDECTOMY     • LIVER BIOPSY      9/12 Liver Bx done with Mariza and Appy (cirrhosis)   • UPPER GASTROINTESTINAL ENDOSCOPY      11/12 EGD with Erosive gastritis, duodenitis, and portal hypertensive gastropathy (Dr. Tierney) (no esophageal varices_       FAMILY HISTORY  Family History   Problem Relation Age of Onset   • Alcohol abuse Father    • Anxiety disorder Father    • COPD Father    • Depression Father    • Hyperlipidemia Father    • Hypertension Father    • Asthma Brother    • Coronary artery disease Brother    • Diabetes type II Other        SOCIAL HISTORY  Social History     Social History   • Marital status: Single     Spouse name: N/A   • Number of children: N/A   • Years of education: N/A     Occupational History   • Not on file.     Social  History Main Topics   • Smoking status: Current Every Day Smoker     Packs/day: 0.25     Types: Cigarettes   • Smokeless tobacco: Never Used   • Alcohol use No   • Drug use: No   • Sexual activity: Defer     Other Topics Concern   • Not on file     Social History Narrative       ALLERGIES  Latex; Penicillins; and Tomato    REVIEW OF SYSTEMS  Review of Systems   Constitutional: Negative for fever.   HENT: Negative for sore throat.    Eyes: Negative.    Respiratory: Positive for cough. Negative for shortness of breath.    Cardiovascular: Negative for chest pain.   Gastrointestinal: Positive for vomiting (with blood). Negative for abdominal pain and diarrhea.   Genitourinary: Negative for dysuria.   Musculoskeletal: Negative for neck pain.   Skin: Negative for rash.   Allergic/Immunologic: Negative.    Neurological: Negative for weakness, numbness and headaches.   Hematological: Negative.    Psychiatric/Behavioral: Negative.    All other systems reviewed and are negative.      PHYSICAL EXAM  ED Triage Vitals   Temp Heart Rate Resp BP SpO2   10/01/17 1653 10/01/17 1653 10/01/17 1653 10/01/17 1739 10/01/17 1653   96.9 °F (36.1 °C) 99 18 111/70 98 %      Temp src Heart Rate Source Patient Position BP Location FiO2 (%)   10/01/17 1653 10/01/17 1653 -- -- --   Tympanic Monitor          Physical Exam   Constitutional: She is oriented to person, place, and time and well-developed, well-nourished, and in no distress. No distress.   Slow to respond but answers questions appropriately    HENT:   Head: Normocephalic and atraumatic.   Mouth/Throat: Oropharynx is clear and moist.   Eyes: EOM are normal. Pupils are equal, round, and reactive to light.   Neck: Normal range of motion. Neck supple.   Cardiovascular: Normal rate, regular rhythm and normal heart sounds.    Pulmonary/Chest: Effort normal and breath sounds normal. No respiratory distress.   Abdominal: Soft. Bowel sounds are normal. She exhibits no distension and no mass.  There is no tenderness. There is no rebound and no guarding.   Musculoskeletal: Normal range of motion. She exhibits no edema.   No edema to extremities    Neurological: She is alert and oriented to person, place, and time. She has normal sensation and normal strength.   Skin: Skin is warm and dry. No rash noted. No pallor.   Psychiatric: Mood and affect normal.   Nursing note and vitals reviewed.      LAB RESULTS  Lab Results (last 24 hours)     Procedure Component Value Units Date/Time    Comprehensive Metabolic Panel [[id number]]  (Abnormal) Collected:  10/01/17 1802    Specimen:  Blood Updated:  10/01/17 1918     Glucose 133 (H) mg/dL      BUN 14 mg/dL      Creatinine 0.68 mg/dL      Sodium 139 mmol/L      Potassium 2.4 (C) mmol/L      Chloride 105 mmol/L      CO2 24.6 mmol/L      Calcium 7.7 (L) mg/dL      Total Protein 9.6 (H) g/dL      Albumin 2.70 (L) g/dL      ALT (SGPT) 20 U/L      AST (SGOT) 46 (H) U/L      Alkaline Phosphatase 109 U/L      Total Bilirubin 1.5 (H) mg/dL      eGFR Non African Amer 96 mL/min/1.73      Globulin 6.9 gm/dL      A/G Ratio 0.4 g/dL      BUN/Creatinine Ratio 20.6     Anion Gap 9.4 mmol/L     hCG, Serum, Qualitative [[id number]]  (Normal) Collected:  10/01/17 1802    Specimen:  Blood Updated:  10/01/17 1844     HCG Qualitative Negative    Magnesium [[id number]] Collected:  10/01/17 1802    Specimen:  Blood Updated:  10/01/17 2112    Phosphorus [[id number]] Collected:  10/01/17 1802    Specimen:  Blood Updated:  10/01/17 2112    CBC & Differential [[id number]] Collected:  10/01/17 1814    Specimen:  Blood Updated:  10/01/17 1823    Narrative:       The following orders were created for panel order CBC & Differential.  Procedure                               Abnormality         Status                     ---------                               -----------         ------                     Scan Slide[[id number]]                                                                  CBC Auto  Differential[[id number]]        Abnormal            Final result                 Please view results for these tests on the individual orders.    CBC Auto Differential [[id number]]  (Abnormal) Collected:  10/01/17 1814    Specimen:  Blood Updated:  10/01/17 1823     WBC 4.63 10*3/mm3      RBC 2.85 (L) 10*6/mm3      Hemoglobin 7.9 (L) g/dL      Hematocrit 25.4 (L) %      MCV 89.1 fL      MCH 27.7 pg      MCHC 31.1 (L) g/dL      RDW 20.5 (H) %      RDW-SD 67.1 (H) fl      Platelets 43 (L) 10*3/mm3      Neutrophil % 62.2 %      Lymphocyte % 27.0 %      Monocyte % 7.6 %      Eosinophil % 1.9 %      Basophil % 1.3 %      Immature Grans % 0.0 %      Neutrophils, Absolute 2.88 10*3/mm3      Lymphocytes, Absolute 1.25 10*3/mm3      Monocytes, Absolute 0.35 10*3/mm3      Eosinophils, Absolute 0.09 10*3/mm3      Basophils, Absolute 0.06 10*3/mm3      Immature Grans, Absolute 0.00 10*3/mm3      nRBC 0.0 /100 WBC     Protime-INR [[id number]]  (Abnormal) Collected:  10/01/17 1817    Specimen:  Blood Updated:  10/01/17 1825     Protime 21.2 (H) Seconds      INR 1.91 (H)    Ammonia [[id number]]  (Abnormal) Collected:  10/01/17 1818    Specimen:  Blood Updated:  10/01/17 1848     Ammonia 136 (H) umol/L     Lactic Acid, Plasma [[id number]]  (Normal) Collected:  10/01/17 1818    Specimen:  Blood Updated:  10/01/17 1845     Lactate 1.7 mmol/L     aPTT [[id number]]  (Abnormal) Collected:  10/01/17 1818    Specimen:  Blood Updated:  10/01/17 1834     PTT 37.3 (H) seconds           I ordered the above labs and reviewed the results    RADIOLOGY  CT Abdomen Pelvis With Contrast   Final Result   IMPRESSION :    1. Combination of findings suggests cirrhosis and portal hypertension.   2. Stable low-density renal lesions, likely cysts.   3. Very mild colonic diverticulosis.   4. Mild ascites, improved           This report was finalized on 10/1/2017 8:39 PM by Avinash Foley MD.          XR Chest 1 View   Final Result   No evidence for active  disease in the chest.       This report was finalized on 10/1/2017 6:59 PM by Dr. Jaya Nguyen MD.               I ordered the above noted radiological studies. Interpreted by radiologist.  Reviewed by me in PACS.       PROCEDURES  Critical Care  Performed by: DEVEN BUSCH  Authorized by: DEVEN BUSCH   Total critical care time: 50 minutes  Critical care was necessary to treat or prevent imminent or life-threatening deterioration of the following conditions: CNS failure or compromise, circulatory failure and metabolic crisis.  Critical care was time spent personally by me on the following activities: blood draw for specimens, development of treatment plan with patient or surrogate, discussions with consultants, evaluation of patient's response to treatment, examination of patient, obtaining history from patient or surrogate, ordering and review of laboratory studies, ordering and review of radiographic studies, pulse oximetry, re-evaluation of patient's condition and review of old charts.        EKG           EKG time: 1822  Rhythm/Rate: NSR, rate 90  P waves and NJ: normal  QRS, axis: normal   ST and T waves: nonspecific ST changes     Interpreted Contemporaneously by me, independently viewed  similar compared to prior 10/5/2012      PROGRESS AND CONSULTS  ED Course     1807: Ordered labs for further evaluation. Ordered sandoStatin and protonix for GI bleed and esophageal varices.   1808: Ordered XR chest and CT abdomen for further evaluation.   1814: Ordered sandostatin for GI bleed and esophageal varices.   1852: Ordered RBC, 2 units for low Hgb levels.   1922: Rechecked pt, she was resting. Discussed plan to admit pt. She agrees with and understands plan to admit. All questions were addressed at this time.  2003: Discussed pt's case with Dr. Luna (GI) who is on call for Dr. Tierney (GI) who agrees to consult. He would like pt to have a octreotide drip at 100 mcg an hour. Ordered octreotide for GI  bleed.  2043: Placed call to pulmonology.  2044: Discussed pt's case with Dr. Rubin (pulmonology) who agrees to admit pt.     MEDICAL DECISION MAKING  Results were reviewed/discussed with the patient and they were also made aware of online access. Pt also made aware that some labs, such as cultures, will not be resulted during ER visit and follow up with PMD is necessary.     MDM  Number of Diagnoses or Management Options     Amount and/or Complexity of Data Reviewed  Clinical lab tests: ordered and reviewed (Protime: 21.2, INR: 1.91. Ammonia: 136. PTT: 37.3)  Tests in the radiology section of CPT®:  ordered and reviewed (XR chest: showed nothing acute CT abd/pelvis: cirrhosis and portal hypertension. Lowe density renal lesion. colonic divertuculosis, mild ascites)  Decide to obtain previous medical records or to obtain history from someone other than the patient: yes  Review and summarize past medical records: yes (EGD in February showed gastric and esophogeal varices )  Discuss the patient with other providers: yes (Dr. Luna (GI), Dr. Rubin (pulmonology) )    Critical Care  Total time providing critical care: 30-74 minutes    Patient Progress  Patient progress: stable         DIAGNOSIS  Final diagnoses:   PIEDRA (nonalcoholic steatohepatitis)   Hepatic encephalopathy   Gastrointestinal hemorrhage with hematemesis   Blood loss anemia   Secondary esophageal varices with bleeding   Coagulation defect   Hypokalemia       DISPOSITION  ADMISSION    Discussed treatment plan and reason for admission with pt/family and admitting physician.  Pt/family voiced understanding of the plan for admission for further testing/treatment as needed.         Latest Documented Vital Signs:  As of 9:21 PM  BP- 98/75 HR- 75 Temp- 96.9 °F (36.1 °C) (Tympanic) O2 sat- 92%    --  Documentation assistance provided by jacqueline Chanel for Dr. Menendez.  Information recorded by the screliana was done at my direction and has been verified  and validated by me.     Thea Chanel  10/01/17 1948       Thea Chanel  10/01/17 2121       Roe Menendez MD  10/01/17 2306       Electronically signed by Roe Menendez MD at 10/1/2017 11:06 PM      Sunita Mccall RN at 10/1/2017  6:59 PM          Informed consent obtained from patient's mother for administration of blood products.     Sunita Mccall RN  10/01/17 1859       Electronically signed by Sunita Mccall RN at 10/1/2017  6:59 PM      Sunita Mccall RN at 10/1/2017  8:06 PM          Pt done with lactulose enema. Tolerated well.     Sunita Mccall RN  10/01/17 2006       Electronically signed by Sunita Mccall RN at 10/1/2017  8:06 PM      Sunita Mccall RN at 10/1/2017  8:51 PM          Dr. Rubin at bedside.     Sunita Mccall RN  10/01/17 2051       Electronically signed by Sunita Mccall RN at 10/1/2017  8:51 PM        Lines, Drains & Airways    Active LDAs     Name:   Placement date:   Placement time:   Site:   Days:    Peripheral IV Line - Single Lumen 10/01/17 1800  20 gauge  10/01/17    1800      less than 1    Peripheral IV Line - Single Lumen 10/01/17 1801  20 gauge  10/01/17    1801      less than 1    Peripheral IV Line - Single Lumen 10/01/17 2048  20 gauge  10/01/17    2048      less than 1         Inactive LDAs     None                Hospital Medications (all)       Dose Frequency Start End    iopamidol (ISOVUE-300) 61 % injection 100 mL 100 mL Once in Imaging 10/1/2017 10/1/2017    Sig - Route: Infuse 100 mL into a venous catheter Once. - Intravenous    lactulose enema  Once 10/1/2017 10/1/2017    Sig - Route: Insert  into the rectum 1 (One) Time. - Rectal    levoFLOXacin (LEVAQUIN) 500 mg/100 mL D5W (premix) (LEVAQUIN) 500 mg 500 mg Every 24 Hours 10/2/2017     Sig - Route: Infuse 100 mL into a venous catheter Daily. - Intravenous    octreotide (sandoSTATIN) 500 mcg in sodium chloride 0.9 % 100 mL (5 mcg/mL) infusion 100 mcg/hr Continuous 10/1/2017     Sig - Route: Infuse 100 mcg/hr into a venous  "catheter Continuous. - Intravenous    octreotide (sandoSTATIN) injection 50 mcg 50 mcg Once 10/1/2017 10/1/2017    Sig - Route: Infuse 1 mL into a venous catheter 1 (One) Time. - Intravenous    pantoprazole (PROTONIX) 40 mg/100 mL (0.4 mg/mL) in 0.9% NS IVPB 8 mg/hr Continuous 10/1/2017     Sig - Route: Infuse 8 mg/hr into a venous catheter Continuous. - Intravenous    Linked Group 1:  \"And\" Linked Group Details        pantoprazole (PROTONIX) injection 80 mg 80 mg Once 10/1/2017 10/1/2017    Sig - Route: Infuse 20 mL into a venous catheter 1 (One) Time. - Intravenous    Linked Group 1:  \"And\" Linked Group Details        potassium chloride (KLOR-CON) packet 40 mEq 40 mEq As Needed 10/1/2017     Sig - Route: Take 40 mEq by mouth As Needed (potassium replacement, see admin instructions). - Oral    Linked Group 2:  \"Or\" Linked Group Details        potassium chloride (MICRO-K) CR capsule 40 mEq 40 mEq As Needed 10/1/2017     Sig - Route: Take 4 capsules by mouth As Needed (potassium replacement.  see admin instructions). - Oral    Linked Group 2:  \"Or\" Linked Group Details        potassium chloride 10 mEq in 100 mL IVPB 10 mEq Once 10/1/2017 10/1/2017    Sig - Route: Infuse 100 mL into a venous catheter 1 (One) Time. - Intravenous    potassium chloride 10 mEq in 100 mL IVPB 10 mEq Every 1 Hour PRN 10/1/2017     Sig - Route: Infuse 100 mL into a venous catheter Every 1 (One) Hour As Needed (potassium protocol PERIPHERAL - see admin instructions). - Intravenous    Linked Group 2:  \"Or\" Linked Group Details        sodium chloride 0.9 % flush 10 mL 10 mL As Needed 10/1/2017     Sig - Route: Infuse 10 mL into a venous catheter As Needed for Line Care. - Intravenous    Cosign for Ordering: Accepted by Roe Menendez MD on 10/1/2017  6:04 PM    sodium chloride 0.9 % flush 10 mL 10 mL As Needed 10/1/2017     Sig - Route: Infuse 10 mL into a venous catheter As Needed for Line Care. - Intravenous    sodium chloride 0.9 % infusion " 125 mL/hr Continuous 10/1/2017     Sig - Route: Infuse 125 mL/hr into a venous catheter Continuous. - Intravenous    octreotide (sandoSTATIN) injection 50 mcg (Discontinued) 50 mcg 3 Times Daily 10/1/2017 10/1/2017    Sig - Route: Infuse 1 mL into a venous catheter 3 (Three) Times a Day. - Intravenous           Consult Notes       Vidya Norman MD at 10/2/2017  7:56 AM  Version 2 of 2     Consult Orders:    1. Gastroenterology (on-call MD unless specified) [[id number]] ordered by Roe Menendez MD at 10/01/17 1947                [facility], [place], operated by [facility]  Initial Inpatient Consult Note    Referring Provider: Dr Menendez    Reason for Consultation: GI bleed    Subjective     History of present illness:  43 yo W with a history of PIEDRA cirrhosis complicated by a history of bleeding esophageal varices and encephalopathy admitted for hematemesis occurring yesterday.  She reports dark maroon emesis with blood clots.  She had been feeling poorly for a few days with malaise and decreased appetite.  She endorses family stress.  She had some associated encephalopathy.  She is followed by Dr Tierney for her cirrhosis.  She was hospitalized in December 2016 for similar and had an EGD notable for 2 varices with stigmata of bleeding that were banded.  She followed up with Dr Tierney in February and had an EGD with evidence of F1 varices with scarring.  She was to followup with Dr Tierney later in the spring and then repeat EGD in August but she has not followed up.     She has received lactulose in the ER and her encephalopathy has improved.  She has had no further hematemesis nor melenic stools.  She says she has not been compliant with her lactulose due to varying hours with her work schedule.    Past Medical History:  Past Medical History:   Diagnosis Date   • Abnormal cervical Papanicolaou smear     remote   • Acute gastric mucosal erosion     11/12 EGD   • Allergic rhinitis    • Asthma    • Cirrhosis of liver    •  Duodenitis     11/12 EGD   • Enlarged lymph node     retroperitoneal lymph node enlarged, 5/12 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)  9/12Bone marrow bx negative    • Esophageal varices    • Fatty liver    • Folic acid deficiency    • Graves disease    • Leukocytosis    • PIEDRA (nonalcoholic steatohepatitis)    • Obstructive sleep apnea     4/10 dx, prescribed CPAP at 12 cm H2O (did not tolerate)       Past Surgical History:  Past Surgical History:   Procedure Laterality Date   • CHOLECYSTECTOMY      10/9/12 Madyson: Lap Cholecystectomy and Appendectomy, Liver Bx:Cirrhosis (Biliary Dyskinesia, Cholecystitis, Chronic Appendicitis)   • COLONOSCOPY N/A 4/22/2016    Procedure: COLONOSCOPY to cecum with cold biopsy ;  Surgeon: Annie Tierney MD;  Location: [facility] ENDOSCOPY;  Service:    • ENDOSCOPY N/A 4/22/2016    Procedure: ESOPHAGOGASTRODUODENOSCOPY;  Surgeon: Annie Tierney MD;  Location: [facility] ENDOSCOPY;  Service:    • ENDOSCOPY N/A 12/20/2016    Procedure: ESOPHAGOGASTRODUODENOSCOPY AT BEDSIDE;  Surgeon: Haresh Fritz MD;  [facility];  Service:    • ENDOSCOPY N/A 12/22/2016    Procedure: ESOPHAGOGASTRODUODENOSCOPY WITH BANDING X2;  Surgeon: Chriss Reece MD;  Location: [facility] ENDOSCOPY;  Service:    • ENDOSCOPY N/A 12/26/2016    Procedure: ESOPHAGOGASTRODUODENOSCOPY AT BEDSIDE;  Surgeon: Vidya Norman MD;  Location: [facility] ENDOSCOPY;  Service:    • ENDOSCOPY N/A 2/8/2017    Procedure: ESOPHAGOGASTRODUODENOSCOPY;  Surgeon: Annie Tierney MD;  Location: [facility] ENDOSCOPY;  Service:    • LAPAROSCOPIC APPENDECTOMY     • LIVER BIOPSY      9/12 Liver Bx done with Mariza and Appy (cirrhosis)   • UPPER GASTROINTESTINAL ENDOSCOPY      11/12 EGD with Erosive gastritis, duodenitis, and portal hypertensive gastropathy (Dr. Tierney) (no esophageal varices_        Social History:   Social History   Substance Use Topics   • Smoking status: Current Every Day  Smoker     Packs/day: 0.25     Types: Cigarettes   • Smokeless tobacco: Never Used   • Alcohol use No        Family History:  Family History   Problem Relation Age of Onset   • Alcohol abuse Father    • Anxiety disorder Father    • COPD Father    • Depression Father    • Hyperlipidemia Father    • Hypertension Father    • Asthma Brother    • Coronary artery disease Brother    • Diabetes type II Other        Home Meds:  Prescriptions Prior to Admission   Medication Sig Dispense Refill Last Dose   • calcium carbonate (OS-JARED) 600 MG tablet Take 600 mg by mouth daily.   Taking   • ferrous sulfate 325 (65 FE) MG tablet Take 1 tablet by mouth Daily With Breakfast. 30 tablet 0 Taking   • furosemide (LASIX) 40 MG tablet TAKE ONE TABLET BY MOUTH DAILY 30 tablet 3 Taking   • lactulose (CHRONULAC) 10 GM/15ML solution Take 45 mL by mouth Every 12 (Twelve) Hours.   10/1/2017 at Unknown time   • levothyroxine (SYNTHROID, LEVOTHROID) 100 MCG tablet TAKE ONE TABLET BY MOUTH DAILY 30 tablet 3 Taking   • levothyroxine (SYNTHROID, LEVOTHROID) 125 MCG tablet Take 1 tablet by mouth Daily. 60 tablet 2    • nadolol (CORGARD) 20 MG tablet TAKE ONE TABLET BY MOUTH DAILY 30 tablet 2 Taking   • pantoprazole (PROTONIX) 40 MG EC tablet Take 1 tablet by mouth 2 (Two) Times a Day Before Meals. 60 tablet 2 Taking   • potassium chloride (K-DUR,KLOR-CON) 20 MEQ CR tablet Take 1 tablet by mouth 2 (Two) Times a Day. Take 1 tablet 2 times a day. 60 tablet 2    • sertraline (ZOLOFT) 50 MG tablet TAKE ONE TABLET BY MOUTH DAILY 30 tablet 0    • spironolactone (ALDACTONE) 50 MG tablet Take 2 tablets by mouth Daily. 90 tablet 2 Taking   • vitamin D (ERGOCALCIFEROL) 33541 UNITS capsule capsule Take 1 capsule by mouth 1 (one) time per week. 12 capsule 1 Taking       Current Meds:        Allergies:  Allergies   Allergen Reactions   • Latex    • Penicillins    • Tomato        Review of Systems  All systems were reviewed and negative except for:  Constitution:   positive for anorexia and malaise  Gastrointestinal: postitive for  hematemesis     Objective     Vital Signs  Temp:  [96.9 °F (36.1 °C)-98 °F (36.7 °C)] 98 °F (36.7 °C)  Heart Rate:  [61-99] 79  Resp:  [12-18] 12  BP: ()/(65-84) 121/78    Physical Exam:  Constitutional:    Alert, cooperative, in no acute distress, appears stated age   Eyes:            Lids and lashes normal, conjunctivae and sclerae normal, no   icterus   Ears, nose, mouth and throat:   Normal appearance of external ears and nose, no oral lesions, no thrush, oral mucosa moist   Respiratory:     Clear to auscultation, respirations regular, even and                   unlabored    Cardiovascular:    Regular rhythm and normal rate, normal S1 and S2, no            murmur, no gallop, palpable distal pulses, no lower extremity edema   Gastrointestinal:    Soft, non-distended, non-tender to palpation, no guarding, no rebound tenderness, normal bowel sounds, no palpable masses or organomegaly  Rectal exam: deferred   Musculoskeletal:   Normal station, no atrophy, no tenderness to palpation, normal digits and nails   Skin:   Normal color, no bleeding, bruising, rashes or lesions   Lymphatics:   No palpable cervical or supraclavicular adenopathy   Psychiatric:  Judgement and insight: normal   Orientation to person, place and time: normal   Mood and affect: normal       Results Review:   I reviewed the patient's new clinical results.      Results from last 7 days  Lab Units 10/02/17  0421 10/01/17  2153 10/01/17  1814   WBC 10*3/mm3  --  3.51* 4.63   HEMOGLOBIN g/dL 8.5* 7.0* 7.9*   HEMATOCRIT % 27.9* 22.9* 25.4*   PLATELETS 10*3/mm3  --  34* 43*         Results from last 7 days  Lab Units 10/01/17  2153 10/01/17  1802   SODIUM mmol/L 141 139   POTASSIUM mmol/L 2.7* 2.4*   CHLORIDE mmol/L 108* 105   CO2 mmol/L 23.2 24.6   BUN mg/dL 13 14   CREATININE mg/dL 0.62 0.68   CALCIUM mg/dL 7.7* 7.7*   BILIRUBIN mg/dL 1.4* 1.5*   ALK PHOS U/L 103 109   ALT (SGPT)  U/L 19 20   AST (SGOT) U/L 43* 46*   GLUCOSE mg/dL 84 133*         Results from last 7 days  Lab Units 10/01/17  1817   INR  1.91*         Lab Results  Lab Value Date/Time   LIPASE 31 12/20/2016 1355   LIPASE 20 03/04/2015 1846       Radiology:  Imaging Results (last 72 hours)     Procedure Component Value Units Date/Time    XR Chest 1 View [[id number]] Collected:  10/01/17 1852     Updated:  10/01/17 1902    Narrative:       AP CHEST     HISTORY: GI bleeding.  History of asthma.     COMPARISON: AP chest 12/28/2016.     FINDINGS: Lung volumes are diminished. The patient's chin partially  overlaps the right apex. Heart size appears normal. There is some  limitation due to patient's body type.  There is no evidence for  pulmonary edema or pleural effusion or definite infiltrate.       Impression:       No evidence for active disease in the chest.     This report was finalized on 10/1/2017 6:59 PM by Dr. Jaya Nguyen MD.       CT Abdomen Pelvis With Contrast [[id number]] Collected:  10/01/17 2035     Updated:  10/01/17 2042    Narrative:       CT SCANS ABDOMEN AND PELVIS WITH IV CONTRAST.     HISTORY: gi bleed and varices     COMPARISON: 03/04/2015.     TECHNIQUE: Radiation dose reduction techniques were utilized, including  automated exposure control and exposure modulation based on body size.  Axial images were obtained from the lung bases to the symphysis pubis  with IV contrast only.. Oral contrast was not administered per request.     FINDINGS ABDOMEN CT:  Left pleural effusion has resolved. The liver is  heterogeneous and lobular in configuration suggesting cirrhosis. Spleen  is enlarged at 17.1 cm. There are paraGastric varices. There is a  recanalized periumbilical vein, these findings suggest portal  hypertension. Gallbladder is surgically absent. There are stable  low-density renal lesions felt to reflect tiny cysts. The remaining  solid organs have an unremarkable appearance.     FINDINGS PELVIS CT:   There is mild ascites without loculated fluid  collection. A few colonic diverticula are noted. The GI tract was not  opacified for assessment but is non obstructive in appearance. The  appendix is not identified with certainty on this exam without oral  contrast. The encompassed aorta is without evidence of aneurysmal  dilatation.                Impression:       IMPRESSION :   1. Combination of findings suggests cirrhosis and portal hypertension.  2. Stable low-density renal lesions, likely cysts.  3. Very mild colonic diverticulosis.  4. Mild ascites, improved        This report was finalized on 10/1/2017 8:39 PM by Avinash Foley MD.             Assessment/Plan     Active Problems:    Gastrointestinal hemorrhage with hematemesis    1. Hematemesis- differential of variceal bleed vs portal htn gastropathy  2. PIEDRA cirrhosis- MELD 15  3. Hepatic encephalopathy- not adherent to lactulose, worsened due to acute GI bleed  4. Acute blood loss anemia- hb 7 at admission  5. History of bleeding esophageal varices- banded 12/2016    Plan  -EGD this afternoon  -continue octreotide and pantop gtts  -follow Hb, transfuse only for Hb <7  -continue lactulose  -levaquin for GI bleed in a cirrhotic (pcn allergic)    I discussed the patients findings and my recommendations with patient and nursing staff    Dictated utilizing Dragon dictation       Electronically signed by Vidya Norman MD at 10/2/2017  8:51 AM      Vidya Norman MD at 10/2/2017  7:56 AM  Version 1 of 2         [facility] [place] [facility]  Initial Inpatient Consult Note    Referring Provider: Dr Menendez    Reason for Consultation: GI bleed    Subjective     History of present illness:  43 yo W with a history of PIEDRA cirrhosis complicated by a history of bleeding esophageal varices and encephalopathy admitted for hematemesis occurring yesterday.  She reports dark maroon emesis with blood clots.  She had been feeling poorly for a few days with malaise  and decreased appetite.  She endorses family stress.  She had some associated encephalopathy.  She is followed by Dr Tierney for her cirrhosis.  She was hospitalized in December 2016 for similar and had an EGD notable for 2 varices with stigmata of bleeding that were banded.  She followed up with Dr Tierney in February and had an EGD with evidence of F1 varices with scarring.  She was to followup with Dr Tierney later in the spring and then repeat EGD in August but she has not followed up.     She has received lactulose in the ER and her encephalopathy has improved.  She has had no further hematemesis nor melenic stools.  She says she has not been compliant with her lactulose due to varying hours with her work schedule.    Past Medical History:  Past Medical History:   Diagnosis Date   • Abnormal cervical Papanicolaou smear     remote   • Acute gastric mucosal erosion     11/12 EGD   • Allergic rhinitis    • Asthma    • Cirrhosis of liver    • Duodenitis     11/12 EGD   • Enlarged lymph node     retroperitoneal lymph node enlarged, 5/12 PET CT consistent with low grade lymphoproliferative disorder (  [facility])  9/12Bone marrow bx negative    • Esophageal varices    • Fatty liver    • Folic acid deficiency    • Graves disease    • Leukocytosis    • PIEDRA (nonalcoholic steatohepatitis)    • Obstructive sleep apnea     4/10 dx, prescribed CPAP at 12 cm H2O (did not tolerate)       Past Surgical History:  Past Surgical History:   Procedure Laterality Date   • CHOLECYSTECTOMY      10/9/12 [place]: Lap Cholecystectomy and Appendectomy, Liver Bx:Cirrhosis (Biliary Dyskinesia, Cholecystitis, Chronic Appendicitis)   • COLONOSCOPY N/A 4/22/2016    Procedure: COLONOSCOPY to cecum with cold biopsy ;  Surgeon: Annie Tierney MD;  Location: [facility] ENDOSCOPY;  Service:    • ENDOSCOPY N/A 4/22/2016    Procedure: ESOPHAGOGASTRODUODENOSCOPY;  Surgeon: Annie Tierney MD;  Location: [facility] ENDOSCOPY;  Service:    • ENDOSCOPY N/A  12/20/2016    Procedure: ESOPHAGOGASTRODUODENOSCOPY AT BEDSIDE;  Surgeon: Haresh Fritz MD;  Location: [facility] ENDOSCOPY;  Service:    • ENDOSCOPY N/A 12/22/2016    Procedure: ESOPHAGOGASTRODUODENOSCOPY WITH BANDING X2;  Surgeon: Chriss Reece MD;  Location: [facility] ENDOSCOPY;  Service:    • ENDOSCOPY N/A 12/26/2016    Procedure: ESOPHAGOGASTRODUODENOSCOPY AT BEDSIDE;  Surgeon: Vidya Norman MD;  [facility];  Service:    • ENDOSCOPY N/A 2/8/2017    Procedure: ESOPHAGOGASTRODUODENOSCOPY;  Surgeon: Annie Tierney MD;  Location: [facility] ENDOSCOPY;  Service:    • LAPAROSCOPIC APPENDECTOMY     • LIVER BIOPSY      9/12 Liver Bx done with Mariza and Appy (cirrhosis)   • UPPER GASTROINTESTINAL ENDOSCOPY      11/12 EGD with Erosive gastritis, duodenitis, and portal hypertensive gastropathy (Dr. Tierney) (no esophageal varices_        Social History:   Social History   Substance Use Topics   • Smoking status: Current Every Day Smoker     Packs/day: 0.25     Types: Cigarettes   • Smokeless tobacco: Never Used   • Alcohol use No        Family History:  Family History   Problem Relation Age of Onset   • Alcohol abuse Father    • Anxiety disorder Father    • COPD Father    • Depression Father    • Hyperlipidemia Father    • Hypertension Father    • Asthma Brother    • Coronary artery disease Brother    • Diabetes type II Other        Home Meds:  Prescriptions Prior to Admission   Medication Sig Dispense Refill Last Dose   • calcium carbonate (OS-JARED) 600 MG tablet Take 600 mg by mouth daily.   Taking   • ferrous sulfate 325 (65 FE) MG tablet Take 1 tablet by mouth Daily With Breakfast. 30 tablet 0 Taking   • furosemide (LASIX) 40 MG tablet TAKE ONE TABLET BY MOUTH DAILY 30 tablet 3 Taking   • lactulose (CHRONULAC) 10 GM/15ML solution Take 45 mL by mouth Every 12 (Twelve) Hours.   10/1/2017 at Unknown time   • levothyroxine (SYNTHROID, LEVOTHROID) 100 MCG tablet TAKE ONE TABLET BY MOUTH DAILY 30 tablet 3  Taking   • levothyroxine (SYNTHROID, LEVOTHROID) 125 MCG tablet Take 1 tablet by mouth Daily. 60 tablet 2    • nadolol (CORGARD) 20 MG tablet TAKE ONE TABLET BY MOUTH DAILY 30 tablet 2 Taking   • pantoprazole (PROTONIX) 40 MG EC tablet Take 1 tablet by mouth 2 (Two) Times a Day Before Meals. 60 tablet 2 Taking   • potassium chloride (K-DUR,KLOR-CON) 20 MEQ CR tablet Take 1 tablet by mouth 2 (Two) Times a Day. Take 1 tablet 2 times a day. 60 tablet 2    • sertraline (ZOLOFT) 50 MG tablet TAKE ONE TABLET BY MOUTH DAILY 30 tablet 0    • spironolactone (ALDACTONE) 50 MG tablet Take 2 tablets by mouth Daily. 90 tablet 2 Taking   • vitamin D (ERGOCALCIFEROL) 00784 UNITS capsule capsule Take 1 capsule by mouth 1 (one) time per week. 12 capsule 1 Taking       Current Meds:        Allergies:  Allergies   Allergen Reactions   • Latex    • Penicillins    • Tomato        Review of Systems  All systems were reviewed and negative except for:  Constitution:  positive for anorexia and malaise  Gastrointestinal: postitive for  hematemesis     Objective     Vital Signs  Temp:  [96.9 °F (36.1 °C)-98 °F (36.7 °C)] 98 °F (36.7 °C)  Heart Rate:  [61-99] 79  Resp:  [12-18] 12  BP: ()/(65-84) 121/78    Physical Exam:  Constitutional:    Alert, cooperative, in no acute distress, appears stated age   Eyes:            Lids and lashes normal, conjunctivae and sclerae normal, no   icterus   Ears, nose, mouth and throat:   Normal appearance of external ears and nose, no oral lesions, no thrush, oral mucosa moist   Respiratory:     Clear to auscultation, respirations regular, even and                   unlabored    Cardiovascular:    Regular rhythm and normal rate, normal S1 and S2, no            murmur, no gallop, palpable distal pulses, no lower extremity edema   Gastrointestinal:    Soft, non-distended, non-tender to palpation, no guarding, no rebound tenderness, normal bowel sounds, no palpable masses or organomegaly  Rectal exam:  deferred   Musculoskeletal:   Normal station, no atrophy, no tenderness to palpation, normal digits and nails   Skin:   Normal color, no bleeding, bruising, rashes or lesions   Lymphatics:   No palpable cervical or supraclavicular adenopathy   Psychiatric:  Judgement and insight: normal   Orientation to person, place and time: normal   Mood and affect: normal       Results Review:   I reviewed the patient's new clinical results.      Results from last 7 days  Lab Units 10/02/17  0421 10/01/17  2153 10/01/17  1814   WBC 10*3/mm3  --  3.51* 4.63   HEMOGLOBIN g/dL 8.5* 7.0* 7.9*   HEMATOCRIT % 27.9* 22.9* 25.4*   PLATELETS 10*3/mm3  --  34* 43*         Results from last 7 days  Lab Units 10/01/17  2153 10/01/17  1802   SODIUM mmol/L 141 139   POTASSIUM mmol/L 2.7* 2.4*   CHLORIDE mmol/L 108* 105   CO2 mmol/L 23.2 24.6   BUN mg/dL 13 14   CREATININE mg/dL 0.62 0.68   CALCIUM mg/dL 7.7* 7.7*   BILIRUBIN mg/dL 1.4* 1.5*   ALK PHOS U/L 103 109   ALT (SGPT) U/L 19 20   AST (SGOT) U/L 43* 46*   GLUCOSE mg/dL 84 133*         Results from last 7 days  Lab Units 10/01/17  1817   INR  1.91*         Lab Results  Lab Value Date/Time   LIPASE 31 12/20/2016 1355   LIPASE 20 03/04/2015 1846       Radiology:  Imaging Results (last 72 hours)     Procedure Component Value Units Date/Time    XR Chest 1 View [[id number]] Collected:  10/01/17 1852     Updated:  10/01/17 1902    Narrative:       AP CHEST     HISTORY: GI bleeding.  History of asthma.     COMPARISON: AP chest 12/28/2016.     FINDINGS: Lung volumes are diminished. The patient's chin partially  overlaps the right apex. Heart size appears normal. There is some  limitation due to patient's body type.  There is no evidence for  pulmonary edema or pleural effusion or definite infiltrate.       Impression:       No evidence for active disease in the chest.     This report was finalized on 10/1/2017 6:59 PM by Dr. Jaya Nguyen MD.       CT Abdomen Pelvis With Contrast [[id number]]  Collected:  10/01/17 2035     Updated:  10/01/17 2042    Narrative:       CT SCANS ABDOMEN AND PELVIS WITH IV CONTRAST.     HISTORY: gi bleed and varices     COMPARISON: 03/04/2015.     TECHNIQUE: Radiation dose reduction techniques were utilized, including  automated exposure control and exposure modulation based on body size.  Axial images were obtained from the lung bases to the symphysis pubis  with IV contrast only.. Oral contrast was not administered per request.     FINDINGS ABDOMEN CT:  Left pleural effusion has resolved. The liver is  heterogeneous and lobular in configuration suggesting cirrhosis. Spleen  is enlarged at 17.1 cm. There are paraGastric varices. There is a  recanalized periumbilical vein, these findings suggest portal  hypertension. Gallbladder is surgically absent. There are stable  low-density renal lesions felt to reflect tiny cysts. The remaining  solid organs have an unremarkable appearance.     FINDINGS PELVIS CT:  There is mild ascites without loculated fluid  collection. A few colonic diverticula are noted. The GI tract was not  opacified for assessment but is non obstructive in appearance. The  appendix is not identified with certainty on this exam without oral  contrast. The encompassed aorta is without evidence of aneurysmal  dilatation.                Impression:       IMPRESSION :   1. Combination of findings suggests cirrhosis and portal hypertension.  2. Stable low-density renal lesions, likely cysts.  3. Very mild colonic diverticulosis.  4. Mild ascites, improved        This report was finalized on 10/1/2017 8:39 PM by Avinash Foley MD.             Assessment/Plan     Active Problems:    Gastrointestinal hemorrhage with hematemesis    1. Hematemesis- differential of variceal bleed vs portal htn gastropathy  2. PIEDRA cirrhosis- MELD 15  3. Hepatic encephalopathy- not adherent to lactulose, worsened due to acute GI bleed  4. Acute blood loss anemia- hb 7 at  admission    Plan  -EGD this afternoon  -continue octreotide and pantop gtts  -follow Hb, transfuse only for Hb <7  -continue lactulose  -ceftriaxone for GI bleed in a cirrhotic    I discussed the patients findings and my recommendations with patient and nursing staff    Dictated utilizing Dragon dictation       Electronically signed by Vidya Norman MD at 10/2/2017  8:48 AM

## 2017-10-02 NOTE — BRIEF OP NOTE
ESOPHAGOGASTRODUODENOSCOPY AT BEDSIDE  Progress Note    Jaz Gipson  10/1/2017 - 10/2/2017    Pre-op Diagnosis:   Gastrointestinal hemorrhage with hematemesis [K92.0]    Post-op Diagnosis:     Post-Op Diagnosis Codes:     * Gastrointestinal hemorrhage with hematemesis [K92.0]    Procedure/CPT® Codes:      Procedure(s):  ESOPHAGOGASTRODUODENOSCOPY     Surgeon(s):  Chriss Reece MD    Anesthesia: Monitor Anesthesia Care    Staff:   Endo Technician: Genet Campovered  Endo Nurse: Atiya Swan RN    Estimated Blood Loss: none    Urine Voided: * No values recorded between 10/2/2017  2:10 PM and 10/2/2017  2:30 PM *    Specimens:                None      Drains:           Findings:  F 1-2 varices in distal esophagus without active bleeding.  Few small red spots  Moderate amount of clotted blood in gastric fundus/body precluding adequate visualization of these areas - no obvious gastric varices  Moderate/severe portal gastropathy with some oozing  Normal duodenum    Recommendations:  Will plan repeat EGD tomorrow - if no lesions identified in gastric fundus/body, will band distal esophageal varices  Reglan 10mg IV q6hr  Clear liquid diet  Continue octreotide    Complications: None      Chriss Reece MD     Date: 10/2/2017  Time: 6:11 PM

## 2017-10-02 NOTE — CONSULTS
[facility]  Initial Inpatient Consult Note    Referring Provider: Dr Menendez    Reason for Consultation: GI bleed    Subjective     History of present illness:  41 yo W with a history of PIEDRA cirrhosis complicated by a history of bleeding esophageal varices and encephalopathy admitted for hematemesis occurring yesterday.  She reports dark maroon emesis with blood clots.  She had been feeling poorly for a few days with malaise and decreased appetite.  She endorses family stress.  She had some associated encephalopathy.  She is followed by Dr Tierney for her cirrhosis.  She was hospitalized in December 2016 for similar and had an EGD notable for 2 varices with stigmata of bleeding that were banded.  She followed up with Dr Tierney in February and had an EGD with evidence of F1 varices with scarring.  She was to followup with Dr Tierney later in the spring and then repeat EGD in August but she has not followed up.     She has received lactulose in the ER and her encephalopathy has improved.  She has had no further hematemesis nor melenic stools.  She says she has not been compliant with her lactulose due to varying hours with her work schedule.    Past Medical History:  Past Medical History:   Diagnosis Date   • Abnormal cervical Papanicolaou smear     remote   • Acute gastric mucosal erosion     11/12 EGD   • Allergic rhinitis    • Asthma    • Cirrhosis of liver    • Duodenitis     11/12 EGD   • Enlarged lymph node     retroperitoneal lymph node enlarged, 5/12 PET CT consistent with low grade lymphoproliferative disorder (  [facility])  9/12Bone marrow bx negative    • Esophageal varices    • Fatty liver    • Folic acid deficiency    • Graves disease    • Leukocytosis    • PIEDRA (nonalcoholic steatohepatitis)    • Obstructive sleep apnea     4/10 dx, prescribed CPAP at 12 cm H2O (did not tolerate)       Past Surgical History:  Past Surgical History:   Procedure Laterality Date   •  CHOLECYSTECTOMY      10/9/12 [place]: Lap Cholecystectomy and Appendectomy, Liver Bx:Cirrhosis (Biliary Dyskinesia, Cholecystitis, Chronic Appendicitis)   • COLONOSCOPY N/A 4/22/2016    Procedure: COLONOSCOPY to cecum with cold biopsy ;  Surgeon: Annie Tierney MD;  Location: [facility] ENDOSCOPY;  Service:    • ENDOSCOPY N/A 4/22/2016    Procedure: ESOPHAGOGASTRODUODENOSCOPY;  Surgeon: Anine Tierney MD;  Location[facility] END[facility];  Service:    • ENDOSCOPY N/A 12/20/2016    Procedure: ESOPHAGOGASTRODUODENOSCOPY AT BEDSIDE;  Surgeon: Haresh Fritz MD;  [facility];  Service:    • ENDOSCOPY N/A 12/22/2016    Procedure: ESOPHAGOGASTRODUODENOSCOPY WITH BANDING X2;  Surgeon: Chriss Reece MD;  [facility];  Service:    • ENDOSCOPY N/A 12/26/2016    Procedure: ESOPHAGOGASTRODUODENOSCOPY AT BEDSIDE;  Surgeon: Vidya Norman MD;  Location: [facility] ENDOSCOPY;  Service:    • ENDOSCOPY N/A 2/8/2017    Procedure: ESOPHAGOGASTRODUODENOSCOPY;  Surgeon: Annie Tierney MD;  Location: [facility] ENDOSCOPY;  Service:    • LAPAROSCOPIC APPENDECTOMY     • LIVER BIOPSY      9/12 Liver Bx done with Mariza and Appy (cirrhosis)   • UPPER GASTROINTESTINAL ENDOSCOPY      11/12 EGD with Erosive gastritis, duodenitis, and portal hypertensive gastropathy (Dr. Tierney) (no esophageal varices_        Social History:   Social History   Substance Use Topics   • Smoking status: Current Every Day Smoker     Packs/day: 0.25     Types: Cigarettes   • Smokeless tobacco: Never Used   • Alcohol use No        Family History:  Family History   Problem Relation Age of Onset   • Alcohol abuse Father    • Anxiety disorder Father    • COPD Father    • Depression Father    • Hyperlipidemia Father    • Hypertension Father    • Asthma Brother    • Coronary artery disease Brother    • Diabetes type II Other        Home Meds:  Prescriptions Prior to Admission   Medication Sig Dispense Refill Last Dose   • calcium carbonate  (OS-JARED) 600 MG tablet Take 600 mg by mouth daily.   Taking   • ferrous sulfate 325 (65 FE) MG tablet Take 1 tablet by mouth Daily With Breakfast. 30 tablet 0 Taking   • furosemide (LASIX) 40 MG tablet TAKE ONE TABLET BY MOUTH DAILY 30 tablet 3 Taking   • lactulose (CHRONULAC) 10 GM/15ML solution Take 45 mL by mouth Every 12 (Twelve) Hours.   10/1/2017 at Unknown time   • levothyroxine (SYNTHROID, LEVOTHROID) 100 MCG tablet TAKE ONE TABLET BY MOUTH DAILY 30 tablet 3 Taking   • levothyroxine (SYNTHROID, LEVOTHROID) 125 MCG tablet Take 1 tablet by mouth Daily. 60 tablet 2    • nadolol (CORGARD) 20 MG tablet TAKE ONE TABLET BY MOUTH DAILY 30 tablet 2 Taking   • pantoprazole (PROTONIX) 40 MG EC tablet Take 1 tablet by mouth 2 (Two) Times a Day Before Meals. 60 tablet 2 Taking   • potassium chloride (K-DUR,KLOR-CON) 20 MEQ CR tablet Take 1 tablet by mouth 2 (Two) Times a Day. Take 1 tablet 2 times a day. 60 tablet 2    • sertraline (ZOLOFT) 50 MG tablet TAKE ONE TABLET BY MOUTH DAILY 30 tablet 0    • spironolactone (ALDACTONE) 50 MG tablet Take 2 tablets by mouth Daily. 90 tablet 2 Taking   • vitamin D (ERGOCALCIFEROL) 31510 UNITS capsule capsule Take 1 capsule by mouth 1 (one) time per week. 12 capsule 1 Taking       Current Meds:        Allergies:  Allergies   Allergen Reactions   • Latex    • Penicillins    • Tomato        Review of Systems  All systems were reviewed and negative except for:  Constitution:  positive for anorexia and malaise  Gastrointestinal: postitive for  hematemesis     Objective     Vital Signs  Temp:  [96.9 °F (36.1 °C)-98 °F (36.7 °C)] 98 °F (36.7 °C)  Heart Rate:  [61-99] 79  Resp:  [12-18] 12  BP: ()/(65-84) 121/78    Physical Exam:  Constitutional:    Alert, cooperative, in no acute distress, appears stated age   Eyes:            Lids and lashes normal, conjunctivae and sclerae normal, no   icterus   Ears, nose, mouth and throat:   Normal appearance of external ears and nose, no oral  lesions, no thrush, oral mucosa moist   Respiratory:     Clear to auscultation, respirations regular, even and                   unlabored    Cardiovascular:    Regular rhythm and normal rate, normal S1 and S2, no            murmur, no gallop, palpable distal pulses, no lower extremity edema   Gastrointestinal:    Soft, non-distended, non-tender to palpation, no guarding, no rebound tenderness, normal bowel sounds, no palpable masses or organomegaly  Rectal exam: deferred   Musculoskeletal:   Normal station, no atrophy, no tenderness to palpation, normal digits and nails   Skin:   Normal color, no bleeding, bruising, rashes or lesions   Lymphatics:   No palpable cervical or supraclavicular adenopathy   Psychiatric:  Judgement and insight: normal   Orientation to person, place and time: normal   Mood and affect: normal       Results Review:   I reviewed the patient's new clinical results.      Results from last 7 days  Lab Units 10/02/17  0421 10/01/17  2153 10/01/17  1814   WBC 10*3/mm3  --  3.51* 4.63   HEMOGLOBIN g/dL 8.5* 7.0* 7.9*   HEMATOCRIT % 27.9* 22.9* 25.4*   PLATELETS 10*3/mm3  --  34* 43*         Results from last 7 days  Lab Units 10/01/17  2153 10/01/17  1802   SODIUM mmol/L 141 139   POTASSIUM mmol/L 2.7* 2.4*   CHLORIDE mmol/L 108* 105   CO2 mmol/L 23.2 24.6   BUN mg/dL 13 14   CREATININE mg/dL 0.62 0.68   CALCIUM mg/dL 7.7* 7.7*   BILIRUBIN mg/dL 1.4* 1.5*   ALK PHOS U/L 103 109   ALT (SGPT) U/L 19 20   AST (SGOT) U/L 43* 46*   GLUCOSE mg/dL 84 133*         Results from last 7 days  Lab Units 10/01/17  1817   INR  1.91*         Lab Results  Lab Value Date/Time   LIPASE 31 12/20/2016 1355   LIPASE 20 03/04/2015 1846       Radiology:  Imaging Results (last 72 hours)     Procedure Component Value Units Date/Time    XR Chest 1 View [[id number]] Collected:  10/01/17 1852     Updated:  10/01/17 1902    Narrative:       AP CHEST     HISTORY: GI bleeding.  History of asthma.     COMPARISON: AP chest  12/28/2016.     FINDINGS: Lung volumes are diminished. The patient's chin partially  overlaps the right apex. Heart size appears normal. There is some  limitation due to patient's body type.  There is no evidence for  pulmonary edema or pleural effusion or definite infiltrate.       Impression:       No evidence for active disease in the chest.     This report was finalized on 10/1/2017 6:59 PM by Dr. Jaya Nguyen MD.       CT Abdomen Pelvis With Contrast [[id number]] Collected:  10/01/17 2035     Updated:  10/01/17 2042    Narrative:       CT SCANS ABDOMEN AND PELVIS WITH IV CONTRAST.     HISTORY: gi bleed and varices     COMPARISON: 03/04/2015.     TECHNIQUE: Radiation dose reduction techniques were utilized, including  automated exposure control and exposure modulation based on body size.  Axial images were obtained from the lung bases to the symphysis pubis  with IV contrast only.. Oral contrast was not administered per request.     FINDINGS ABDOMEN CT:  Left pleural effusion has resolved. The liver is  heterogeneous and lobular in configuration suggesting cirrhosis. Spleen  is enlarged at 17.1 cm. There are paraGastric varices. There is a  recanalized periumbilical vein, these findings suggest portal  hypertension. Gallbladder is surgically absent. There are stable  low-density renal lesions felt to reflect tiny cysts. The remaining  solid organs have an unremarkable appearance.     FINDINGS PELVIS CT:  There is mild ascites without loculated fluid  collection. A few colonic diverticula are noted. The GI tract was not  opacified for assessment but is non obstructive in appearance. The  appendix is not identified with certainty on this exam without oral  contrast. The encompassed aorta is without evidence of aneurysmal  dilatation.                Impression:       IMPRESSION :   1. Combination of findings suggests cirrhosis and portal hypertension.  2. Stable low-density renal lesions, likely cysts.  3. Very  mild colonic diverticulosis.  4. Mild ascites, improved        This report was finalized on 10/1/2017 8:39 PM by Avinash Foley MD.             Assessment/Plan     Active Problems:    Gastrointestinal hemorrhage with hematemesis    1. Hematemesis- differential of variceal bleed vs portal htn gastropathy  2. PIEDRA cirrhosis- MELD 15  3. Hepatic encephalopathy- not adherent to lactulose, worsened due to acute GI bleed  4. Acute blood loss anemia- hb 7 at admission  5. History of bleeding esophageal varices- banded 12/2016    Plan  -EGD this afternoon  -continue octreotide and pantop gtts  -follow Hb, transfuse only for Hb <7  -continue lactulose  -levaquin for GI bleed in a cirrhotic (pcn allergic)    I discussed the patients findings and my recommendations with patient and nursing staff    Dictated utilizing Dragon dictation

## 2017-10-02 NOTE — ANESTHESIA POSTPROCEDURE EVALUATION
"Patient: Jaz Gipson    Procedure Summary     Date Anesthesia Start Anesthesia Stop Room / Location    10/02/17 1412 1442  [facility] /  [facility]       Procedure Diagnosis Surgeon Provider    ESOPHAGOGASTRODUODENOSCOPY  (N/A Esophagus) Gastrointestinal hemorrhage with hematemesis  (Gastrointestinal hemorrhage with hematemesis [K92.0]) MD Thea Recinos MD          Anesthesia Type: No value filed.  Last vitals  BP        Temp        Pulse       Resp        SpO2          Post Anesthesia Care and Evaluation    Patient location during evaluation: bedside  Patient participation: complete - patient participated  Level of consciousness: awake and alert  Pain management: adequate  Airway patency: patent  Anesthetic complications: No anesthetic complications  PONV Status: none  Cardiovascular status: acceptable  Respiratory status: acceptable  Hydration status: acceptable    Comments: /78  Pulse 79  Temp 36.7 °C (98 °F) (Oral)   Resp 12  Ht 64\" (162.6 cm)  Wt 166 lb (75.3 kg)  LMP 09/24/2017  SpO2 96%  BMI 28.49 kg/m2        "

## 2017-10-02 NOTE — ANESTHESIA POSTPROCEDURE EVALUATION
"Patient: Jaz Gipson    Procedure Summary     Date Anesthesia Start Anesthesia Stop Room / Location    10/02/17 1412 1442  [facility] /  [facility]       Procedure Diagnosis Surgeon Provider    ESOPHAGOGASTRODUODENOSCOPY  (N/A Esophagus) Gastrointestinal hemorrhage with hematemesis  (Gastrointestinal hemorrhage with hematemesis [K92.0]) MD Thea Recinos MD          Anesthesia Type: MAC  Last vitals  BP        Temp        Pulse       Resp        SpO2          Post Anesthesia Care and Evaluation    Patient location during evaluation: bedside  Patient participation: complete - patient participated  Level of consciousness: awake and alert  Pain management: adequate  Airway patency: patent  Anesthetic complications: No anesthetic complications  PONV Status: none  Cardiovascular status: acceptable  Respiratory status: acceptable  Hydration status: acceptable    Comments: /78  Pulse 79  Temp 36.7 °C (98 °F) (Oral)   Resp 12  Ht 64\" (162.6 cm)  Wt 166 lb (75.3 kg)  LMP 09/24/2017  SpO2 96%  BMI 28.49 kg/m2        "

## 2017-10-03 ENCOUNTER — ANESTHESIA (OUTPATIENT)
Dept: GASTROENTEROLOGY | Facility: HOSPITAL | Age: 42
End: 2017-10-03

## 2017-10-03 ENCOUNTER — ANESTHESIA EVENT (OUTPATIENT)
Dept: GASTROENTEROLOGY | Facility: HOSPITAL | Age: 42
End: 2017-10-03

## 2017-10-03 VITALS — OXYGEN SATURATION: 93 % | SYSTOLIC BLOOD PRESSURE: 96 MMHG | DIASTOLIC BLOOD PRESSURE: 71 MMHG

## 2017-10-03 LAB
HCT VFR BLD AUTO: 24.9 % (ref 35.6–45.5)
HCT VFR BLD AUTO: 25.8 % (ref 35.6–45.5)
HCT VFR BLD AUTO: 26.2 % (ref 35.6–45.5)
HCT VFR BLD AUTO: 28.6 % (ref 35.6–45.5)
HGB BLD-MCNC: 7.7 G/DL (ref 11.9–15.5)
HGB BLD-MCNC: 7.9 G/DL (ref 11.9–15.5)
HGB BLD-MCNC: 8 G/DL (ref 11.9–15.5)
HGB BLD-MCNC: 8.8 G/DL (ref 11.9–15.5)
MAGNESIUM SERPL-MCNC: 1.9 MG/DL (ref 1.6–2.6)
POTASSIUM BLD-SCNC: 2.9 MMOL/L (ref 3.5–5.2)
POTASSIUM BLD-SCNC: 3 MMOL/L (ref 3.5–5.2)
POTASSIUM BLD-SCNC: 3.1 MMOL/L (ref 3.5–5.2)

## 2017-10-03 PROCEDURE — 25010000002 LEVOFLOXACIN PER 250 MG: Performed by: INTERNAL MEDICINE

## 2017-10-03 PROCEDURE — 43244 EGD VARICES LIGATION: CPT | Performed by: INTERNAL MEDICINE

## 2017-10-03 PROCEDURE — 25010000002 PROPOFOL 10 MG/ML EMULSION: Performed by: ANESTHESIOLOGY

## 2017-10-03 PROCEDURE — 06L38CZ OCCLUSION OF ESOPHAGEAL VEIN WITH EXTRALUMINAL DEVICE, VIA NATURAL OR ARTIFICIAL OPENING ENDOSCOPIC: ICD-10-PCS | Performed by: INTERNAL MEDICINE

## 2017-10-03 PROCEDURE — 84132 ASSAY OF SERUM POTASSIUM: CPT | Performed by: INTERNAL MEDICINE

## 2017-10-03 PROCEDURE — 25010000003 POTASSIUM CHLORIDE 10 MEQ/100ML SOLUTION: Performed by: INTERNAL MEDICINE

## 2017-10-03 PROCEDURE — 25010000002 METOCLOPRAMIDE PER 10 MG: Performed by: INTERNAL MEDICINE

## 2017-10-03 PROCEDURE — 85018 HEMOGLOBIN: CPT | Performed by: INTERNAL MEDICINE

## 2017-10-03 PROCEDURE — 85014 HEMATOCRIT: CPT | Performed by: INTERNAL MEDICINE

## 2017-10-03 PROCEDURE — 83735 ASSAY OF MAGNESIUM: CPT | Performed by: INTERNAL MEDICINE

## 2017-10-03 PROCEDURE — 25010000002 OCTREOTIDE PER 25 MCG: Performed by: EMERGENCY MEDICINE

## 2017-10-03 RX ORDER — PANTOPRAZOLE SODIUM 40 MG/1
40 TABLET, DELAYED RELEASE ORAL
Status: DISCONTINUED | OUTPATIENT
Start: 2017-10-04 | End: 2017-10-05 | Stop reason: HOSPADM

## 2017-10-03 RX ORDER — PROPOFOL 10 MG/ML
VIAL (ML) INTRAVENOUS AS NEEDED
Status: DISCONTINUED | OUTPATIENT
Start: 2017-10-03 | End: 2017-10-03 | Stop reason: SURG

## 2017-10-03 RX ORDER — LIDOCAINE HYDROCHLORIDE 20 MG/ML
INJECTION, SOLUTION INFILTRATION; PERINEURAL AS NEEDED
Status: DISCONTINUED | OUTPATIENT
Start: 2017-10-03 | End: 2017-10-03 | Stop reason: SURG

## 2017-10-03 RX ORDER — LACTULOSE 10 G/15ML
20 SOLUTION ORAL 2 TIMES DAILY
Status: DISCONTINUED | OUTPATIENT
Start: 2017-10-03 | End: 2017-10-05 | Stop reason: HOSPADM

## 2017-10-03 RX ADMIN — LIDOCAINE HYDROCHLORIDE 50 MG: 20 INJECTION, SOLUTION INFILTRATION; PERINEURAL at 13:50

## 2017-10-03 RX ADMIN — SODIUM CHLORIDE 125 ML/HR: 9 INJECTION, SOLUTION INTRAVENOUS at 03:35

## 2017-10-03 RX ADMIN — POTASSIUM CHLORIDE 10 MEQ: 10 INJECTION, SOLUTION INTRAVENOUS at 07:21

## 2017-10-03 RX ADMIN — PROPOFOL 150 MG: 10 INJECTION, EMULSION INTRAVENOUS at 13:51

## 2017-10-03 RX ADMIN — OCTREOTIDE ACETATE 100 MCG/HR: 500 INJECTION, SOLUTION INTRAVENOUS; SUBCUTANEOUS at 09:33

## 2017-10-03 RX ADMIN — PANTOPRAZOLE SODIUM 8 MG/HR: 40 INJECTION, POWDER, FOR SOLUTION INTRAVENOUS at 00:38

## 2017-10-03 RX ADMIN — METOCLOPRAMIDE 10 MG: 5 INJECTION, SOLUTION INTRAMUSCULAR; INTRAVENOUS at 06:04

## 2017-10-03 RX ADMIN — POTASSIUM CHLORIDE 40 MEQ: 750 CAPSULE, EXTENDED RELEASE ORAL at 23:56

## 2017-10-03 RX ADMIN — POTASSIUM CHLORIDE 10 MEQ: 10 INJECTION, SOLUTION INTRAVENOUS at 13:11

## 2017-10-03 RX ADMIN — OCTREOTIDE ACETATE 100 MCG/HR: 500 INJECTION, SOLUTION INTRAVENOUS; SUBCUTANEOUS at 20:32

## 2017-10-03 RX ADMIN — METOCLOPRAMIDE 10 MG: 5 INJECTION, SOLUTION INTRAMUSCULAR; INTRAVENOUS at 13:13

## 2017-10-03 RX ADMIN — POTASSIUM CHLORIDE 10 MEQ: 10 INJECTION, SOLUTION INTRAVENOUS at 14:25

## 2017-10-03 RX ADMIN — PANTOPRAZOLE SODIUM 8 MG/HR: 40 INJECTION, POWDER, FOR SOLUTION INTRAVENOUS at 05:07

## 2017-10-03 RX ADMIN — OCTREOTIDE ACETATE 100 MCG/HR: 500 INJECTION, SOLUTION INTRAVENOUS; SUBCUTANEOUS at 14:55

## 2017-10-03 RX ADMIN — METOCLOPRAMIDE 10 MG: 5 INJECTION, SOLUTION INTRAMUSCULAR; INTRAVENOUS at 00:39

## 2017-10-03 RX ADMIN — SODIUM CHLORIDE 125 ML/HR: 9 INJECTION, SOLUTION INTRAVENOUS at 15:01

## 2017-10-03 RX ADMIN — POTASSIUM CHLORIDE 10 MEQ: 10 INJECTION, SOLUTION INTRAVENOUS at 06:04

## 2017-10-03 RX ADMIN — POTASSIUM CHLORIDE 10 MEQ: 10 INJECTION, SOLUTION INTRAVENOUS at 01:35

## 2017-10-03 RX ADMIN — SODIUM CHLORIDE 125 ML/HR: 9 INJECTION, SOLUTION INTRAVENOUS at 21:43

## 2017-10-03 RX ADMIN — POTASSIUM CHLORIDE 10 MEQ: 10 INJECTION, SOLUTION INTRAVENOUS at 03:35

## 2017-10-03 RX ADMIN — LEVOFLOXACIN 500 MG: 5 INJECTION, SOLUTION INTRAVENOUS at 09:34

## 2017-10-03 RX ADMIN — PROPOFOL 25 MG: 10 INJECTION, EMULSION INTRAVENOUS at 14:00

## 2017-10-03 RX ADMIN — POTASSIUM CHLORIDE 40 MEQ: 750 CAPSULE, EXTENDED RELEASE ORAL at 20:36

## 2017-10-03 RX ADMIN — POTASSIUM CHLORIDE 10 MEQ: 10 INJECTION, SOLUTION INTRAVENOUS at 05:00

## 2017-10-03 RX ADMIN — POTASSIUM CHLORIDE 10 MEQ: 10 INJECTION, SOLUTION INTRAVENOUS at 02:31

## 2017-10-03 RX ADMIN — LACTULOSE 20 G: 20 SOLUTION ORAL at 18:48

## 2017-10-03 RX ADMIN — PANTOPRAZOLE SODIUM 8 MG/HR: 40 INJECTION, POWDER, FOR SOLUTION INTRAVENOUS at 09:33

## 2017-10-03 RX ADMIN — POTASSIUM CHLORIDE 40 MEQ: 750 CAPSULE, EXTENDED RELEASE ORAL at 15:46

## 2017-10-03 RX ADMIN — OCTREOTIDE ACETATE 100 MCG/HR: 500 INJECTION, SOLUTION INTRAVENOUS; SUBCUTANEOUS at 03:34

## 2017-10-03 RX ADMIN — PROPOFOL 25 MG: 10 INJECTION, EMULSION INTRAVENOUS at 13:55

## 2017-10-03 NOTE — PROGRESS NOTES
"      [facility]         Dr Morocho Sayied   LOS: 2 days   Patient Care Team:  LON Elliott as PCP - General (Family Medicine)    Chief Complaint: GI bleeding with hematemesis underlying history of [place]    Interval History: Currently undergoing EGD.  Discussed with the rounding team.  No further episodes of bleeding reported.    REVIEW OF SYSTEMS:   CARDIOVASCULAR: No chest pain, chest pressure or chest discomfort. No palpitations or edema.   RESPIRATORY: No shortness of breath, cough or sputum.   GASTROINTESTINAL: No anorexia, nausea, vomiting or diarrhea. No abdominal pain or blood.   HEMATOLOGIC: No bleeding or bruising.     Ventilator/Non-Invasive Ventilation Settings     None            Vital Signs  Temp:  [98 °F (36.7 °C)-98.4 °F (36.9 °C)] 98.1 °F (36.7 °C)  Heart Rate:  [68-89] 72  BP: ()/(57-75) 93/62    Intake/Output Summary (Last 24 hours) at 10/03/17 1404  Last data filed at 10/03/17 0700   Gross per 24 hour   Intake             4995 ml   Output             1250 ml   Net             3745 ml     Flowsheet Rows         First Filed Value    Admission Height  67\" (170.2 cm) Documented at 10/01/2017 1653    Admission Weight  166 lb (75.3 kg) Documented at 10/01/2017 1653          Physical Exam:   General Appearance:    Alert, cooperative, in no acute distress   Lungs:     Clear to auscultation,respirations regular, even and                  unlabored    Heart:    Regular rhythm and normal rate, normal S1 and S2, no            murmur, no gallop, no rub, no click   Chest Wall:    No abnormalities observed   Abdomen:     Normal bowel sounds, no masses, no organomegaly, soft        non-tender, non-distended, no guarding, no rebound                tenderness   Extremities:   Moves all extremities well, no edema, no cyanosis, no             redness     Results Review:          Results from last 7 days  Lab Units 10/03/17  1208 10/03/17  0047 10/02/17  1229 10/01/17  2153 10/01/17  1802 "   SODIUM mmol/L  --   --   --  141 139   POTASSIUM mmol/L 3.1* 2.9* 2.7* 2.7* 2.4*   CHLORIDE mmol/L  --   --   --  108* 105   CO2 mmol/L  --   --   --  23.2 24.6   BUN mg/dL  --   --   --  13 14   CREATININE mg/dL  --   --   --  0.62 0.68   GLUCOSE mg/dL  --   --   --  84 133*   CALCIUM mg/dL  --   --   --  7.7* 7.7*           Results from last 7 days  Lab Units 10/03/17  1208 10/03/17  0610 10/03/17  0047  10/01/17  2153 10/01/17  1814   WBC 10*3/mm3  --   --   --   --  3.51* 4.63   HEMOGLOBIN g/dL 8.0* 7.9* 7.7*  < > 7.0* 7.9*   HEMATOCRIT % 25.8* 26.2* 24.9*  < > 22.9* 25.4*   PLATELETS 10*3/mm3  --   --   --   --  34* 43*   < > = values in this interval not displayed.    Results from last 7 days  Lab Units 10/01/17  1818 10/01/17  1817   INR   --  1.91*   APTT seconds 37.3*  --            Results from last 7 days  Lab Units 10/03/17  0047   MAGNESIUM mg/dL 1.9               I reviewed the patient's new clinical results.  I personally viewed and interpreted the patient's CXR        Medication Review:     levoFLOXacin 500 mg Intravenous Q24H   metoclopramide 10 mg Intravenous Q6H         lactated ringers 30 mL/hr    octreotide (SandoSTATIN) infusion 100 mcg/hr Last Rate: 100 mcg/hr (10/03/17 0933)   pantoprazole 8 mg/hr Last Rate: 8 mg/hr (10/03/17 0933)   sodium chloride 125 mL/hr Last Rate: 125 mL/hr (10/03/17 0335)       ASS[facility]MENT:   [facility]  Hematemesis, suspect esophageal varices  Liver cirrhosis due to PIEDRA  Hepatic encephalopathy, elevated ammonia  Anemia due to acute blood loss  Acute hypokalemia  Elevated INR from liver cirrhosis  Chronic thrombocytopenia  Relevant Medical Diagnoses  Current cigarette smoker  Obstructive sleep apnea intolerant of CPAP device    PLAN:  Hemoglobin stable post 2 units PRBC.  Continue Protonix and octreotide drip  Monitor hemoglobin closely  Plans for Repeat EGD per GI  Apparently history of noncompliance with her medications  Correct electrolytes based on protocol.     INR mildly elevated but no active bleeding noted currently  Transfer out of the ICU once cleared by BOUCHRA Raines MD  10/03/17  2:04 PM

## 2017-10-03 NOTE — ANESTHESIA PREPROCEDURE EVALUATION
Anesthesia Evaluation     Patient summary reviewed and Nursing notes reviewed   NPO Solid Status: > 8 hours  NPO Liquid Status: > 8 hours     Airway   Mallampati: II  TM distance: <3 FB  Neck ROM: limited  possible difficult intubation  Dental - normal exam     Pulmonary - normal exam    breath sounds clear to auscultation  (+) a smoker Current, asthma, sleep apnea,   Cardiovascular - normal exam    Rhythm: regular  Rate: normal    (+) hyperlipidemia      Neuro/Psych  GI/Hepatic/Renal/Endo    (+)  hepatitis, liver disease, hypothyroidism,   (-) hyperthyroidism    Musculoskeletal (-) negative ROS    Abdominal  - normal exam  (-) obese   Substance History - negative use     OB/GYN negative ob/gyn ROS         Other                                        Anesthesia Plan    ASA 3     MAC     intravenous induction   Anesthetic plan and risks discussed with patient.

## 2017-10-03 NOTE — ANESTHESIA POSTPROCEDURE EVALUATION
"Patient: Jaz Gipson    Procedure Summary     Date Anesthesia Start Anesthesia Stop Room / Location    10/03/17 1351 1315  [facility] /  [facility]       Procedure Diagnosis Surgeon Provider    ESOPHAGOGASTRODUODENOSCOPY (N/A Esophagus) PIEDRA (nonalcoholic steatohepatitis); Gastrointestinal hemorrhage with hematemesis  (PIEDRA (nonalcoholic steatohepatitis) [K75.81]; Gastrointestinal hemorrhage with hematemesis [K92.0]) MD Pauline Recinos MD          Anesthesia Type: MAC  Last vitals  BP        Temp        Pulse       Resp        SpO2          Post Anesthesia Care and Evaluation    Patient location during evaluation: ICU  Patient participation: complete - patient participated  Level of consciousness: awake  Pain management: adequate  Airway patency: patent  Anesthetic complications: No anesthetic complications  PONV Status: none  Cardiovascular status: acceptable  Respiratory status: acceptable  Hydration status: acceptable    Comments: BP 93/62  Pulse 72  Temp 36.7 °C (98.1 °F) (Oral)   Resp 12  Ht 64\" (162.6 cm)  Wt 166 lb (75.3 kg)  LMP 09/24/2017  SpO2 94%  BMI 28.49 kg/m2        "

## 2017-10-03 NOTE — ADDENDUM NOTE
Addendum  created 10/03/17 1417 by Pauline Chung MD    Anesthesia Event edited, Procedure Event Log accessed

## 2017-10-03 NOTE — PLAN OF CARE
Problem: Patient Care Overview (Adult)  Goal: Plan of Care Review  Outcome: Ongoing (interventions implemented as appropriate)    10/03/17 0554   Coping/Psychosocial Response Interventions   Plan Of Care Reviewed With patient   Outcome Evaluation   Outcome Summary/Follow up Plan VSS- no vomiting through out the night, slept well, no complaints, hgb maintaining aroun ~7.5, potassium continues to be low, replacing per protocol.    Patient Care Overview   Progress improving         Problem: Gastrointestinal Bleeding (Adult)  Goal: Signs and Symptoms of Listed Potential Problems Will be Absent or Manageable (Gastrointestinal Bleeding)  Outcome: Ongoing (interventions implemented as appropriate)    10/03/17 0554   Gastrointestinal Bleeding   Problems Assessed (GI Bleeding) all   Problems Present (GI Bleeding) hypoxia/hypoxemia;situational response         Problem: Fall Risk (Adult)  Goal: Identify Related Risk Factors and Signs and Symptoms  Outcome: Ongoing (interventions implemented as appropriate)    10/03/17 0583   Fall Risk   Fall Risk: Related Risk Factors fatigue/slow reaction;inadequate lighting;polypharmacy;sleep pattern alteration;environment unfamiliar   Fall Risk: Signs and Symptoms presence of risk factors       Goal: Absence of Falls  Outcome: Ongoing (interventions implemented as appropriate)    10/03/17 0554   Fall Risk (Adult)   Absence of Falls making progress toward outcome

## 2017-10-04 LAB
ALBUMIN SERPL-MCNC: 2.2 G/DL (ref 3.5–5.2)
ALBUMIN/GLOB SERPL: 0.4 G/DL
ALP SERPL-CCNC: 98 U/L (ref 39–117)
ALT SERPL W P-5'-P-CCNC: 17 U/L (ref 1–33)
ANION GAP SERPL CALCULATED.3IONS-SCNC: 6.3 MMOL/L
AST SERPL-CCNC: 40 U/L (ref 1–32)
BILIRUB SERPL-MCNC: 1.3 MG/DL (ref 0.1–1.2)
BUN BLD-MCNC: 5 MG/DL (ref 6–20)
BUN/CREAT SERPL: 6.5 (ref 7–25)
CALCIUM SPEC-SCNC: 7.5 MG/DL (ref 8.6–10.5)
CHLORIDE SERPL-SCNC: 107 MMOL/L (ref 98–107)
CO2 SERPL-SCNC: 24.7 MMOL/L (ref 22–29)
CREAT BLD-MCNC: 0.77 MG/DL (ref 0.57–1)
GFR SERPL CREATININE-BSD FRML MDRD: 83 ML/MIN/1.73
GLOBULIN UR ELPH-MCNC: 6 GM/DL
GLUCOSE BLD-MCNC: 147 MG/DL (ref 65–99)
HCT VFR BLD AUTO: 26.8 % (ref 35.6–45.5)
HCT VFR BLD AUTO: 28.3 % (ref 35.6–45.5)
HGB BLD-MCNC: 8.1 G/DL (ref 11.9–15.5)
HGB BLD-MCNC: 8.5 G/DL (ref 11.9–15.5)
POTASSIUM BLD-SCNC: 3.9 MMOL/L (ref 3.5–5.2)
PROT SERPL-MCNC: 8.2 G/DL (ref 6–8.5)
SODIUM BLD-SCNC: 138 MMOL/L (ref 136–145)

## 2017-10-04 PROCEDURE — 25010000002 OCTREOTIDE PER 25 MCG: Performed by: EMERGENCY MEDICINE

## 2017-10-04 PROCEDURE — 99232 SBSQ HOSP IP/OBS MODERATE 35: CPT | Performed by: INTERNAL MEDICINE

## 2017-10-04 PROCEDURE — 85018 HEMOGLOBIN: CPT | Performed by: INTERNAL MEDICINE

## 2017-10-04 PROCEDURE — 25010000002 OCTREOTIDE PER 25 MCG: Performed by: INTERNAL MEDICINE

## 2017-10-04 PROCEDURE — 85014 HEMATOCRIT: CPT | Performed by: INTERNAL MEDICINE

## 2017-10-04 PROCEDURE — 25010000002 LEVOFLOXACIN PER 250 MG: Performed by: INTERNAL MEDICINE

## 2017-10-04 PROCEDURE — 80053 COMPREHEN METABOLIC PANEL: CPT | Performed by: INTERNAL MEDICINE

## 2017-10-04 RX ORDER — NADOLOL 20 MG/1
20 TABLET ORAL
Status: DISCONTINUED | OUTPATIENT
Start: 2017-10-04 | End: 2017-10-05 | Stop reason: HOSPADM

## 2017-10-04 RX ORDER — SPIRONOLACTONE 100 MG/1
100 TABLET, FILM COATED ORAL DAILY
Status: DISCONTINUED | OUTPATIENT
Start: 2017-10-04 | End: 2017-10-05 | Stop reason: HOSPADM

## 2017-10-04 RX ORDER — LEVOTHYROXINE SODIUM 0.12 MG/1
125 TABLET ORAL DAILY
Status: DISCONTINUED | OUTPATIENT
Start: 2017-10-04 | End: 2017-10-05 | Stop reason: HOSPADM

## 2017-10-04 RX ADMIN — SERTRALINE 50 MG: 50 TABLET, FILM COATED ORAL at 11:30

## 2017-10-04 RX ADMIN — OCTREOTIDE ACETATE 100 MCG/HR: 500 INJECTION, SOLUTION INTRAVENOUS; SUBCUTANEOUS at 17:27

## 2017-10-04 RX ADMIN — SODIUM CHLORIDE 125 ML/HR: 9 INJECTION, SOLUTION INTRAVENOUS at 06:27

## 2017-10-04 RX ADMIN — LEVOTHYROXINE SODIUM 125 MCG: 125 TABLET ORAL at 11:30

## 2017-10-04 RX ADMIN — LEVOFLOXACIN 500 MG: 5 INJECTION, SOLUTION INTRAVENOUS at 08:49

## 2017-10-04 RX ADMIN — LACTULOSE 20 G: 20 SOLUTION ORAL at 18:55

## 2017-10-04 RX ADMIN — POTASSIUM CHLORIDE 40 MEQ: 750 CAPSULE, EXTENDED RELEASE ORAL at 05:19

## 2017-10-04 RX ADMIN — PANTOPRAZOLE SODIUM 40 MG: 40 TABLET, DELAYED RELEASE ORAL at 05:19

## 2017-10-04 RX ADMIN — OCTREOTIDE ACETATE 100 MCG/HR: 500 INJECTION, SOLUTION INTRAVENOUS; SUBCUTANEOUS at 06:27

## 2017-10-04 RX ADMIN — SPIRONOLACTONE 100 MG: 100 TABLET, FILM COATED ORAL at 11:30

## 2017-10-04 RX ADMIN — OCTREOTIDE ACETATE 100 MCG/HR: 500 INJECTION, SOLUTION INTRAVENOUS; SUBCUTANEOUS at 01:31

## 2017-10-04 RX ADMIN — LACTULOSE 20 G: 20 SOLUTION ORAL at 08:49

## 2017-10-04 RX ADMIN — OCTREOTIDE ACETATE 100 MCG/HR: 500 INJECTION, SOLUTION INTRAVENOUS; SUBCUTANEOUS at 11:38

## 2017-10-04 RX ADMIN — NADOLOL 20 MG: 20 TABLET ORAL at 11:30

## 2017-10-04 NOTE — PROGRESS NOTES
[facility]  Inpatient Progress Note    Reason for Follow Up:  Cirrhosis, hematemesis    Subjective     Interval History:   Repeat EGD yesterday with non bleeding moderate sized distal esopahgeal varices - banded x 4.  Pt w/o complaints.  No further hematemesis since admission    Current Facility-Administered Medications:   •  lactated ringers infusion, 30 mL/hr, Intravenous, Continuous, Vidya Norman MD  •  lactulose (CHRONULAC) 10 GM/15ML solution 20 g, 20 g, Oral, BID, Chriss Reece MD, 20 g at 10/03/17 1848  •  levoFLOXacin (LEVAQUIN) 500 mg/100 mL D5W (premix) (LEVAQUIN) 500 mg, 500 mg, Intravenous, Q24H, Vidya Norman MD, 500 mg at 10/03/17 0934  •  octreotide (sandoSTATIN) 500 mcg in sodium chloride 0.9 % 100 mL (5 mcg/mL) infusion, 100 mcg/hr, Intravenous, Continuous, Roe Menendez MD, Last Rate: 20 mL/hr at 10/04/17 0627, 100 mcg/hr at 10/04/17 0627  •  pantoprazole (PROTONIX) EC tablet 40 mg, 40 mg, Oral, Q AM, Chriss Reece MD, 40 mg at 10/04/17 0519  •  potassium chloride (MICRO-K) CR capsule 40 mEq, 40 mEq, Oral, PRN, 40 mEq at 10/04/17 0519 **OR** potassium chloride (KLOR-CON) packet 40 mEq, 40 mEq, Oral, PRN **OR** potassium chloride 10 mEq in 100 mL IVPB, 10 mEq, Intravenous, Q1H PRN, Ghanshyam Rubin MD, Last Rate: 100 mL/hr at 10/03/17 1425, 10 mEq at 10/03/17 1425  •  sodium chloride 0.9 % flush 10 mL, 10 mL, Intravenous, PRN, Roe Menendez MD  •  sodium chloride 0.9 % flush 10 mL, 10 mL, Intravenous, PRN, Roe Menendez MD  •  sodium chloride 0.9 % infusion, 125 mL/hr, Intravenous, Continuous, Roe Menendez MD, Last Rate: 125 mL/hr at 10/04/17 0627, 125 mL/hr at 10/04/17 0627     Review of Systems:    The following systems were reviewed and negative;  constitution, cardiovascular and gastrointestinal    Objective     Vital Signs  Temp:  [98.1 °F (36.7 °C)-99.6 °F (37.6 °C)] 98.3 °F (36.8 °C)  Heart Rate:  [64-91] 81  BP: ()/(46-77) 108/72  Body mass  index is 28.49 kg/(m^2).    Intake/Output Summary (Last 24 hours) at 10/04/17 0808  Last data filed at 10/04/17 0524   Gross per 24 hour   Intake             4283 ml   Output             4150 ml   Net              133 ml           Physical Exam:   General: patient awake, alert and cooperative   Eyes: Normal lids and lashes, no scleral icterus   Neck: supple, normal ROM   Skin: warm and dry, not jaundiced   Cardiovascular: regular rhythm and rate, no murmurs auscultated   Pulm: clear to auscultation bilaterally, regular and unlabored   Abdomen: soft, nontender, nondistended; normal bowel sounds   Rectal: deferred   Extremities: no rash or edema   Psychiatric: Normal mood and behavior; memory intact     Results Review:     I reviewed the patient's new clinical results.      Results from last 7 days  Lab Units 10/04/17  0524 10/04/17  0004 10/03/17  1909  10/01/17  2153 10/01/17  1814   WBC 10*3/mm3  --   --   --   --  3.51* 4.63   HEMOGLOBIN g/dL 8.5* 8.1* 8.8*  < > 7.0* 7.9*   HEMATOCRIT % 28.3* 26.8* 28.6*  < > 22.9* 25.4*   PLATELETS 10*3/mm3  --   --   --   --  34* 43*   < > = values in this interval not displayed.    Results from last 7 days  Lab Units 10/03/17  1909 10/03/17  1208 10/03/17  0047  10/01/17  2153 10/01/17  1802   SODIUM mmol/L  --   --   --   --  141 139   POTASSIUM mmol/L 3.0* 3.1* 2.9*  < > 2.7* 2.4*   CHLORIDE mmol/L  --   --   --   --  108* 105   CO2 mmol/L  --   --   --   --  23.2 24.6   BUN mg/dL  --   --   --   --  13 14   CREATININE mg/dL  --   --   --   --  0.62 0.68   CALCIUM mg/dL  --   --   --   --  7.7* 7.7*   BILIRUBIN mg/dL  --   --   --   --  1.4* 1.5*   ALK PHOS U/L  --   --   --   --  103 109   ALT (SGPT) U/L  --   --   --   --  19 20   AST (SGOT) U/L  --   --   --   --  43* 46*   GLUCOSE mg/dL  --   --   --   --  84 133*   < > = values in this interval not displayed.    Results from last 7 days  Lab Units 10/01/17  1817   INR  1.91*       Lab Results  Lab Value Date/Time    LIPASE 31 12/20/2016 1355   LIPASE 20 03/04/2015 1846       Assessment/Plan   Assessment:   1.  Upper GI bleed - likely secondary from distal esophageal varices, now POD #1 EBL  2.  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 Tierney to ensure variceal eradication  Could consider addition of non-selective BB once she recovers from acute issues due to her severe portal gastropathy on EGD  Ok for transfer out of ICU from GI standpoint    I discussed the patients findings and my recommendations with patient and nursing staff.         Chriss Reece M.D.  [facility]  [address] [place] [address]  Office: [phone]

## 2017-10-04 NOTE — PROGRESS NOTES
Discharge Planning Assessment  [facility]     Patient Name: Jaz Gipson  MRN: [id number]  Today's Date: 10/4/2017    Admit Date: 10/1/2017          Discharge Needs Assessment       10/04/17 1439    Living Environment    Lives With parent(s)    Living Arrangements house    Home Accessibility no concerns    Stair Railings at Home inside, present at both sides    Type of Financial/Environmental Concern none    Transportation Available car;family or friend will provide    Living Environment    Provides Primary Care For no one    Able to Return to Prior Living Arrangements yes            Discharge Plan       10/04/17 1436    Case Management/Social Work Plan    Plan Home w/o needs    Additional Comments Pt confirmed the address, PCP and pharmacy are correct. Pt said she and her mother Genet Gipson [phone]) live together, pt said she is IADL, uses no DME, Medicaid pays for her medications, has never had home health or been to a SNF. Pt said she plans to return home at discharge and does not anticipate any discharge needs. Pt said she drives and has transportation.        Discharge Placement     No information found                Demographic Summary       10/04/17 1439    Referral Information    Admission Type inpatient    Arrived From admitted as an inpatient;home or self-care    Referral Source admission list    Record Reviewed medical record    Contact Information    Permission Granted to Share Information With family/designee            Functional Status       10/04/17 1439    Functional Status Current    Ambulation 0-->independent    Transferring 0-->independent    Toileting 0-->independent    Bathing 0-->independent    Dressing 0-->independent    Eating 0-->independent    Communication 0-->understands/communicates without difficulty    Swallowing (if score 2 or more for any item, consult Rehab Services) 0-->swallows foods/liquids without difficulty    Change in Functional Status Since Onset of Current  Illness/Injury no         Patient Forms       10/04/17 1440    Patient Forms    Provider Choice List Delivered    Delivered to Patient    Method of delivery In person          Marya Henson, RN

## 2017-10-04 NOTE — PLAN OF CARE
Problem: Patient Care Overview (Adult)  Goal: Plan of Care Review  Outcome: Ongoing (interventions implemented as appropriate)    10/04/17 0545   Coping/Psychosocial Response Interventions   Plan Of Care Reviewed With patient   Outcome Evaluation   Outcome Summary/Follow up Plan vss, hgb stable in the 8.5 range, no complaints of pain or [place]. treating potassium per protocol.    Patient Care Overview   Progress improving         Problem: Gastrointestinal Bleeding (Adult)  Goal: Signs and Symptoms of Listed Potential Problems Will be Absent or Manageable (Gastrointestinal Bleeding)  Outcome: Ongoing (interventions implemented as appropriate)    10/04/17 0545   Gastrointestinal Bleeding   Problems Assessed (GI Bleeding) all   Problems Present (GI Bleeding) hypoxia/hypoxemia;situational response         Problem: Fall Risk (Adult)  Goal: Identify Related Risk Factors and Signs and Symptoms  Outcome: Ongoing (interventions implemented as appropriate)    10/04/17 0545   Fall Risk   Fall Risk: Related Risk Factors polypharmacy;inadequate lighting;environment unfamiliar   Fall Risk: Signs and Symptoms presence of risk factors       Goal: Absence of Falls  Outcome: Ongoing (interventions implemented as appropriate)    10/04/17 0545   Fall Risk (Adult)   Absence of Falls making progress toward outcome

## 2017-10-04 NOTE — PROGRESS NOTES
"      [facility]         Dr Morocho Sayied   LOS: 3 days   Patient Care Team:  LON Elliott as PCP - General (Family Medicine)    Chief Complaint: GI bleeding with hematemesis underlying history of [place]    Interval History: Sitting up in a chair tolerating her breakfast well.  No overt bleeding reported.  Denies any chest pain or shortness of breath.    REVIEW OF SYSTEMS:   CARDIOVASCULAR: No chest pain, chest pressure or chest discomfort. No palpitations or edema.   RESPIRATORY: No shortness of breath, cough or sputum.   GASTROINTESTINAL: No anorexia, nausea, vomiting or diarrhea. No abdominal pain or blood.   HEMATOLOGIC: No bleeding or bruising.     Ventilator/Non-Invasive Ventilation Settings     None            Vital Signs  Temp:  [98.1 °F (36.7 °C)-99.6 °F (37.6 °C)] 98.3 °F (36.8 °C)  Heart Rate:  [64-91] 79  BP: ()/(46-77) 107/73    Intake/Output Summary (Last 24 hours) at 10/04/17 0937  Last data filed at 10/04/17 0524   Gross per 24 hour   Intake             4283 ml   Output             4150 ml   Net              133 ml     Flowsheet Rows         First Filed Value    Admission Height  67\" (170.2 cm) Documented at 10/01/2017 1653    Admission Weight  166 lb (75.3 kg) Documented at 10/01/2017 1653          Physical Exam:   General Appearance:    Alert, cooperative, in no acute distress   Lungs:     Clear to auscultation,respirations regular, even and                  unlabored    Heart:    Regular rhythm and normal rate, normal S1 and S2, no            murmur, no gallop, no rub, no click   Chest Wall:    No abnormalities observed   Abdomen:     Normal bowel sounds, no masses, no organomegaly, soft        non-tender, non-distended, no guarding, no rebound                tenderness   Extremities:   Moves all extremities well, no edema, no cyanosis, no             redness     Results Review:          Results from last 7 days  Lab Units 10/03/17  1909 10/03/17  1208 10/03/17  0047  " 10/01/17  2153 10/01/17  1802   SODIUM mmol/L  --   --   --   --  141 139   POTASSIUM mmol/L 3.0* 3.1* 2.9*  < > 2.7* 2.4*   CHLORIDE mmol/L  --   --   --   --  108* 105   CO2 mmol/L  --   --   --   --  23.2 24.6   BUN mg/dL  --   --   --   --  13 14   CREATININE mg/dL  --   --   --   --  0.62 0.68   GLUCOSE mg/dL  --   --   --   --  84 133*   CALCIUM mg/dL  --   --   --   --  7.7* 7.7*   < > = values in this interval not displayed.        Results from last 7 days  Lab Units 10/04/17  0524 10/04/17  0004 10/03/17  1909  10/01/17  2153 10/01/17  1814   WBC 10*3/mm3  --   --   --   --  3.51* 4.63   HEMOGLOBIN g/dL 8.5* 8.1* 8.8*  < > 7.0* 7.9*   HEMATOCRIT % 28.3* 26.8* 28.6*  < > 22.9* 25.4*   PLATELETS 10*3/mm3  --   --   --   --  34* 43*   < > = values in this interval not displayed.    Results from last 7 days  Lab Units 10/01/17  1818 10/01/17  1817   INR   --  1.91*   APTT seconds 37.3*  --            Results from last 7 days  Lab Units 10/03/17  0047   MAGNESIUM mg/dL 1.9               I reviewed the patient's new clinical results.  I personally viewed and interpreted the patient's CXR        Medication Review:     lactulose 20 g Oral BID   levoFLOXacin 500 mg Intravenous Q24H   pantoprazole 40 mg Oral Q AM         lactated ringers 30 mL/hr    octreotide (SandoSTATIN) infusion 100 mcg/hr Last Rate: 100 mcg/hr (10/04/17 0627)   sodium chloride 125 mL/hr Last Rate: 125 mL/hr (10/04/17 0627)       ASSESSMENT:   [facility] Problems  Hematemesis, esophageal varices/Portal gastropathy  Liver cirrhosis due to PIEDRA  Hepatic encephalopathy, elevated ammonia  Anemia due to acute blood loss  Acute hypokalemia  Elevated INR from liver cirrhosis  Chronic thrombocytopenia  Relevant Medical Diagnoses  Current cigarette smoker  Obstructive sleep apnea intolerant of CPAP device    PLAN:  Hemoglobin stable post 2 units PRBC.  Protonix switched to oral per GI  Continue octreotide drip for another 24 hours  Monitor hemoglobin in the  morning.  No overt bleeding reported  IV antibiotics for 5 days  Continue lactulose  Apparently history of noncompliance with her medications  Correct electrolytes based on protocol.    INR mildly elevated but no active bleeding noted currently  Transfer out of the ICU             Bailee Raines MD  10/04/17  9:37 AM

## 2017-10-05 VITALS
WEIGHT: 166 LBS | HEIGHT: 64 IN | BODY MASS INDEX: 28.34 KG/M2 | DIASTOLIC BLOOD PRESSURE: 69 MMHG | TEMPERATURE: 98.7 F | SYSTOLIC BLOOD PRESSURE: 100 MMHG | RESPIRATION RATE: 16 BRPM | OXYGEN SATURATION: 95 % | HEART RATE: 72 BPM

## 2017-10-05 LAB
ANION GAP SERPL CALCULATED.3IONS-SCNC: 6.9 MMOL/L
BUN BLD-MCNC: 4 MG/DL (ref 6–20)
BUN/CREAT SERPL: 5.9 (ref 7–25)
CALCIUM SPEC-SCNC: 7.6 MG/DL (ref 8.6–10.5)
CHLORIDE SERPL-SCNC: 105 MMOL/L (ref 98–107)
CO2 SERPL-SCNC: 26.1 MMOL/L (ref 22–29)
CREAT BLD-MCNC: 0.68 MG/DL (ref 0.57–1)
DEPRECATED RDW RBC AUTO: 70.7 FL (ref 37–54)
ERYTHROCYTE [DISTWIDTH] IN BLOOD BY AUTOMATED COUNT: 22.4 % (ref 11.7–13)
GFR SERPL CREATININE-BSD FRML MDRD: 96 ML/MIN/1.73
GLUCOSE BLD-MCNC: 112 MG/DL (ref 65–99)
HCT VFR BLD AUTO: 28 % (ref 35.6–45.5)
HGB BLD-MCNC: 8.3 G/DL (ref 11.9–15.5)
MCH RBC QN AUTO: 26.4 PG (ref 26.9–32)
MCHC RBC AUTO-ENTMCNC: 29.6 G/DL (ref 32.4–36.3)
MCV RBC AUTO: 89.2 FL (ref 80.5–98.2)
PLATELET # BLD AUTO: 42 10*3/MM3 (ref 140–500)
PMV BLD AUTO: ABNORMAL FL (ref 6–12)
POTASSIUM BLD-SCNC: 3.1 MMOL/L (ref 3.5–5.2)
RBC # BLD AUTO: 3.14 10*6/MM3 (ref 3.9–5.2)
SODIUM BLD-SCNC: 138 MMOL/L (ref 136–145)
WBC NRBC COR # BLD: 4.71 10*3/MM3 (ref 4.5–10.7)

## 2017-10-05 PROCEDURE — 25010000002 LEVOFLOXACIN PER 250 MG: Performed by: INTERNAL MEDICINE

## 2017-10-05 PROCEDURE — 25010000002 OCTREOTIDE PER 25 MCG: Performed by: INTERNAL MEDICINE

## 2017-10-05 PROCEDURE — 80048 BASIC METABOLIC PNL TOTAL CA: CPT | Performed by: INTERNAL MEDICINE

## 2017-10-05 PROCEDURE — 99232 SBSQ HOSP IP/OBS MODERATE 35: CPT | Performed by: INTERNAL MEDICINE

## 2017-10-05 PROCEDURE — 85027 COMPLETE CBC AUTOMATED: CPT | Performed by: INTERNAL MEDICINE

## 2017-10-05 RX ORDER — PANTOPRAZOLE SODIUM 40 MG/1
40 TABLET, DELAYED RELEASE ORAL DAILY
Qty: 30 TABLET | Refills: 1 | Status: ON HOLD | OUTPATIENT
Start: 2017-10-05 | End: 2017-11-07

## 2017-10-05 RX ADMIN — SPIRONOLACTONE 100 MG: 100 TABLET, FILM COATED ORAL at 09:25

## 2017-10-05 RX ADMIN — SERTRALINE 50 MG: 50 TABLET, FILM COATED ORAL at 09:25

## 2017-10-05 RX ADMIN — POTASSIUM CHLORIDE 40 MEQ: 750 CAPSULE, EXTENDED RELEASE ORAL at 09:32

## 2017-10-05 RX ADMIN — LACTULOSE 20 G: 20 SOLUTION ORAL at 09:25

## 2017-10-05 RX ADMIN — LEVOFLOXACIN 500 MG: 5 INJECTION, SOLUTION INTRAVENOUS at 09:25

## 2017-10-05 RX ADMIN — OCTREOTIDE ACETATE 100 MCG/HR: 500 INJECTION, SOLUTION INTRAVENOUS; SUBCUTANEOUS at 05:39

## 2017-10-05 RX ADMIN — LEVOTHYROXINE SODIUM 125 MCG: 125 TABLET ORAL at 09:25

## 2017-10-05 RX ADMIN — NADOLOL 20 MG: 20 TABLET ORAL at 09:25

## 2017-10-05 RX ADMIN — OCTREOTIDE ACETATE 100 MCG/HR: 500 INJECTION, SOLUTION INTRAVENOUS; SUBCUTANEOUS at 01:14

## 2017-10-05 RX ADMIN — PANTOPRAZOLE SODIUM 40 MG: 40 TABLET, DELAYED RELEASE ORAL at 05:39

## 2017-10-05 RX ADMIN — POTASSIUM CHLORIDE 40 MEQ: 750 CAPSULE, EXTENDED RELEASE ORAL at 06:01

## 2017-10-05 NOTE — PLAN OF CARE
Problem: Patient Care Overview (Adult)  Goal: Plan of Care Review  Outcome: Ongoing (interventions implemented as appropriate)    10/05/17 1988   Coping/Psychosocial Response Interventions   Plan Of Care Reviewed With patient   Outcome Evaluation   Outcome Summary/Follow up Plan patient no report of pain, VSS, and no complaint of N/V/D. antibx and octreotide d/c. Compliant with medications. will cont to monitor to d/c.   Patient Care Overview   Progress improving         Problem: Fall Risk (Adult)  Goal: Absence of Falls  Outcome: Ongoing (interventions implemented as appropriate)

## 2017-10-05 NOTE — DISCHARGE SUMMARY
PHYSICIAN DISCHARGE SUMMARY                                                                        [facility]    Patient Identification:  Name: Jaz Gipson  Age: 42 y.o.  Sex: female  :  1975  MRN: [id number]  Primary Care Physician: LON Elliott    Admit date: 10/1/2017  Discharge date and time: No discharge date for patient encounter.   Discharged Condition: stable    Discharge Diagnoses:Active Problems:    Gastrointestinal hemorrhage with hematemesis   PCC Problems  Hematemesis, esophageal varices/Portal gastropathy  Liver cirrhosis due to PIEDRA  Hepatic encephalopathy, elevated ammonia  Anemia due to acute blood loss  Acute hypokalemia  Elevated INR from liver cirrhosis  Chronic thrombocytopenia  Relevant Medical Diagnoses  Current cigarette smoker  Obstructive sleep apnea intolerant of CPAP device    Hospital Course: Jaz Gipson presented to [facility]    Patient admitted with acute GI bleed.  Please refer to history and physical by Dr. Andino.  Briefly she has history of an PIEDRA and presented with acute GI bleed.  She was noted to have low hemoglobin and with a history of esophageal dialysis a variceal bleed was suspected.   Grade II varices were found in the lower third of the esophagus.  They were medium in size. There was no active bleeding but  multiple red spots were noted suggestive of recent stigmata of  bleeding. Four bands were successfully placed with complete  eradication, resulting in deflation of varices. There was no  bleeding during, and at the end, of the procedure.  She was transitioned from Protonix and octreotide drip and placed on by mouth Protonix.  Her hemoglobin has remained stable.  GI has cleared for her to be discharged home.  She will continue Protonix daily.  She will follow-up with GI as per the recommendations.  Med reconciliation was completed and it's  accurate.  Consults:   IP CONSULT TO GASTROENTEROLOGY  IP CONSULT TO PULMONOLOGY    Significant Diagnostic Studies:   [unfilled]    Discharge Exam:  Alert and oriented x 4, in NAD  Supple neck, midline trach  RRR, no m/r/g, no edema  Clear bilaterally, no wheezing, nonlabored  No clubbing or cyanosis     Disposition:  Home    Patient Instructions:   [unfilled]    [unfilled]     Medication Reconciliation: Please see electronically completed Med Rec.    Total time spent discharging patient including evaluation, medication reconciliation, arranging follow up, and post hospitalization instructions and education total time exceeds 30 minutes.    Signed:  Bailee Raines MD  10/5/2017  3:18 PM

## 2017-10-05 NOTE — PROGRESS NOTES
[facility]  Inpatient Progress Note    Reason for Follow Up:  Cirrhosis, hematemesis    Subjective     Interval History:   No further bleeding.  She again voiced concerns about whether or not stress caused her issues and I explained that this is related to her liver disease.  Hb remains stable    Current Facility-Administered Medications:   •  lactated ringers infusion, 30 mL/hr, Intravenous, Continuous, Vidya Norman MD  •  lactulose (CHRONULAC) 10 GM/15ML solution 20 g, 20 g, Oral, BID, Chriss Reece MD, 20 g at 10/04/17 1855  •  levoFLOXacin (LEVAQUIN) 500 mg/100 mL D5W (premix) (LEVAQUIN) 500 mg, 500 mg, Intravenous, Q24H, Bailee Raines MD, 500 mg at 10/04/17 0849  •  levothyroxine (SYNTHROID, LEVOTHROID) tablet 125 mcg, 125 mcg, Oral, Daily, Bailee Raines MD, 125 mcg at 10/04/17 1130  •  nadolol (CORGARD) tablet 20 mg, 20 mg, Oral, Q24H, Bailee Raines MD, 20 mg at 10/04/17 1130  •  octreotide (sandoSTATIN) 500 mcg in sodium chloride 0.9 % 100 mL (5 mcg/mL) infusion, 100 mcg/hr, Intravenous, Continuous, Bailee Raines MD, Last Rate: 20 mL/hr at 10/05/17 0539, 100 mcg/hr at 10/05/17 0539  •  pantoprazole (PROTONIX) EC tablet 40 mg, 40 mg, Oral, Q AM, Chriss Reece MD, 40 mg at 10/05/17 0539  •  potassium chloride (MICRO-K) CR capsule 40 mEq, 40 mEq, Oral, PRN, 40 mEq at 10/05/17 0601 **OR** potassium chloride (KLOR-CON) packet 40 mEq, 40 mEq, Oral, PRN **OR** potassium chloride 10 mEq in 100 mL IVPB, 10 mEq, Intravenous, Q1H PRN, Ghanshyam Rubin MD, Last Rate: 100 mL/hr at 10/03/17 1425, 10 mEq at 10/03/17 1425  •  sertraline (ZOLOFT) tablet 50 mg, 50 mg, Oral, Daily, Bailee Raines MD, 50 mg at 10/04/17 1130  •  sodium chloride 0.9 % flush 10 mL, 10 mL, Intravenous, PRN, Roe Menendez MD  •  sodium chloride 0.9 % flush 10 mL, 10 mL, Intravenous, PRN, Roe Menendez MD  •  spironolactone (ALDACTONE) tablet 100 mg, 100 mg, Oral, Daily, Bailee Raines MD, 100 mg at  10/04/17 1130     Review of Systems:    The following systems were reviewed and negative;  constitution, cardiovascular and gastrointestinal    Objective     Vital Signs  Temp:  [97.5 °F (36.4 °C)-98.4 °F (36.9 °C)] 98.1 °F (36.7 °C)  Heart Rate:  [63-77] 67  BP: ()/(63-85) 96/63  Body mass index is 28.49 kg/(m^2).    Intake/Output Summary (Last 24 hours) at 10/05/17 0929  Last data filed at 10/04/17 2300   Gross per 24 hour   Intake             1122 ml   Output             2975 ml   Net            -1853 ml           Physical Exam:   General: patient awake, alert and cooperative   Eyes: Normal lids and lashes, no scleral icterus   Neck: supple, normal ROM   Skin: warm and dry, not jaundiced   Cardiovascular: regular rhythm and rate, no murmurs auscultated   Pulm: clear to auscultation bilaterally, regular and unlabored   Abdomen: soft and obese, nontender, nondistended; normal bowel sounds   Rectal: deferred   Extremities: no rash or edema   Psychiatric: Normal mood and behavior; memory intact     Results Review:     I reviewed the patient's new clinical results.      Results from last 7 days  Lab Units 10/05/17  0405 10/04/17  0524 10/04/17  0004  10/01/17  2153 10/01/17  1814   WBC 10*3/mm3 4.71  --   --   --  3.51* 4.63   HEMOGLOBIN g/dL 8.3* 8.5* 8.1*  < > 7.0* 7.9*   HEMATOCRIT % 28.0* 28.3* 26.8*  < > 22.9* 25.4*   PLATELETS 10*3/mm3 42*  --   --   --  34* 43*   < > = values in this interval not displayed.    Results from last 7 days  Lab Units 10/05/17  0405 10/04/17  1030 10/03/17  1909  10/01/17  2153 10/01/17  1802   SODIUM mmol/L 138 138  --   --  141 139   POTASSIUM mmol/L 3.1* 3.9 3.0*  < > 2.7* 2.4*   CHLORIDE mmol/L 105 107  --   --  108* 105   CO2 mmol/L 26.1 24.7  --   --  23.2 24.6   BUN mg/dL 4* 5*  --   --  13 14   CREATININE mg/dL 0.68 0.77  --   --  0.62 0.68   CALCIUM mg/dL 7.6* 7.5*  --   --  7.7* 7.7*   BILIRUBIN mg/dL  --  1.3*  --   --  1.4* 1.5*   ALK PHOS U/L  --  98  --   --  103  109   ALT (SGPT) U/L  --  17  --   --  19 20   AST (SGOT) U/L  --  40*  --   --  43* 46*   GLUCOSE mg/dL 112* 147*  --   --  84 133*   < > = values in this interval not displayed.    Results from last 7 days  Lab Units 10/01/17  1817   INR  1.91*       Lab Results  Lab Value Date/Time   LIPASE 31 12/20/2016 1355   LIPASE 20 03/04/2015 1846       Assessment/Plan   Assessment:   1.  Upper GI bleed - due to esophageal varices, now banded, no further bleeding and Hb stable  2.  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 Esophageal varices- s/p banding    Plan:   Will stop octreotide  5 days total antibiotics  Continue lactulose  Repeat EGD in 3-4 weeks with Dr. Tierney to ensure variceal eradication  On nadolol currently-- continue  We discussed the importance of follow up given the severity of her liver disease and complications from it    I discussed the patients findings and my recommendations with patient and nursing staff.    Vidya Norman MD  [facility] [place], [facility]

## 2017-10-05 NOTE — PLAN OF CARE
Problem: Patient Care Overview (Adult)  Goal: Plan of Care Review  Outcome: Ongoing (interventions implemented as appropriate)    10/05/17 0555   Coping/Psychosocial Response Interventions   Plan Of Care Reviewed With patient   Outcome Evaluation   Outcome Summary/Follow up Plan Pt transferred from CCU last night. No c/o pain, nausea or vomiting. Octreotide drip continued. Will continue to monitor.   Patient Care Overview   Progress improving       Goal: Adult Individualization and Mutuality  Outcome: Ongoing (interventions implemented as appropriate)  Goal: Discharge Needs Assessment  Outcome: Ongoing (interventions implemented as appropriate)    Problem: Gastrointestinal Bleeding (Adult)  Goal: Signs and Symptoms of Listed Potential Problems Will be Absent or Manageable (Gastrointestinal Bleeding)  Outcome: Ongoing (interventions implemented as appropriate)    Problem: Fall Risk (Adult)  Goal: Identify Related Risk Factors and Signs and Symptoms  Outcome: Ongoing (interventions implemented as appropriate)  Goal: Absence of Falls  Outcome: Ongoing (interventions implemented as appropriate)

## 2017-10-09 DIAGNOSIS — E55.9 VITAMIN D DEFICIENCY: ICD-10-CM

## 2017-10-09 RX ORDER — ERGOCALCIFEROL 1.25 MG/1
CAPSULE ORAL
Qty: 4 CAPSULE | Refills: 0 | OUTPATIENT
Start: 2017-10-09

## 2017-10-09 NOTE — PROGRESS NOTES
Case Management Discharge Note    Final Note: Home no needs. Review of AVS and DC summary    Discharge Placement     No information found        Other: Other (car)    Discharge Codes: 01  Discharge to home

## 2017-10-10 DIAGNOSIS — E55.9 VITAMIN D DEFICIENCY: ICD-10-CM

## 2017-10-10 RX ORDER — FUROSEMIDE 40 MG/1
TABLET ORAL
Qty: 30 TABLET | Refills: 2 | Status: SHIPPED | OUTPATIENT
Start: 2017-10-10 | End: 2017-11-06

## 2017-10-10 RX ORDER — NADOLOL 20 MG/1
TABLET ORAL
Qty: 30 TABLET | Refills: 1 | Status: SHIPPED | OUTPATIENT
Start: 2017-10-10 | End: 2017-11-06

## 2017-10-10 RX ORDER — ERGOCALCIFEROL 1.25 MG/1
CAPSULE ORAL
Qty: 4 CAPSULE | Refills: 0 | OUTPATIENT
Start: 2017-10-10

## 2017-10-12 ENCOUNTER — OFFICE VISIT (OUTPATIENT)
Dept: FAMILY MEDICINE CLINIC | Facility: CLINIC | Age: 42
End: 2017-10-12

## 2017-10-12 VITALS
OXYGEN SATURATION: 93 % | DIASTOLIC BLOOD PRESSURE: 62 MMHG | HEIGHT: 64 IN | RESPIRATION RATE: 18 BRPM | BODY MASS INDEX: 31.92 KG/M2 | HEART RATE: 74 BPM | SYSTOLIC BLOOD PRESSURE: 90 MMHG | WEIGHT: 187 LBS

## 2017-10-12 DIAGNOSIS — E03.9 HYPOTHYROIDISM, UNSPECIFIED TYPE: Primary | ICD-10-CM

## 2017-10-12 DIAGNOSIS — E55.9 VITAMIN D DEFICIENCY: ICD-10-CM

## 2017-10-12 DIAGNOSIS — K75.81 NASH (NONALCOHOLIC STEATOHEPATITIS): ICD-10-CM

## 2017-10-12 DIAGNOSIS — I85.11 SECONDARY ESOPHAGEAL VARICES WITH BLEEDING (HCC): ICD-10-CM

## 2017-10-12 DIAGNOSIS — R60.9 FLUID RETENTION: ICD-10-CM

## 2017-10-12 LAB
BASOPHILS # BLD AUTO: 0.09 10*3/MM3 (ref 0–0.2)
BASOPHILS NFR BLD AUTO: 2.4 % (ref 0–2)
DIFFERENTIAL COMMENT: NORMAL
EOSINOPHIL # BLD AUTO: 0.19 10*3/MM3 (ref 0.1–0.3)
EOSINOPHIL NFR BLD AUTO: 5.1 % (ref 0–4)
ERYTHROCYTE [DISTWIDTH] IN BLOOD BY AUTOMATED COUNT: 21.7 % (ref 11.5–14.5)
HCT VFR BLD AUTO: 28.9 % (ref 37–47)
HGB BLD-MCNC: 9 G/DL (ref 12–16)
IMM GRANULOCYTES # BLD: 0.02 10*3/MM3 (ref 0–0.03)
IMM GRANULOCYTES NFR BLD: 0.5 % (ref 0–0.5)
LYMPHOCYTES # BLD AUTO: 1.08 10*3/MM3 (ref 0.6–4.8)
LYMPHOCYTES NFR BLD AUTO: 29.3 % (ref 20–45)
MCH RBC QN AUTO: 27.2 PG (ref 27–31)
MCHC RBC AUTO-ENTMCNC: 31.1 G/DL (ref 31–37)
MCV RBC AUTO: 87.3 FL (ref 81–99)
MONOCYTES # BLD AUTO: 0.53 10*3/MM3 (ref 0–1)
MONOCYTES NFR BLD AUTO: 14.4 % (ref 3–8)
NEUTROPHILS # BLD AUTO: 1.78 10*3/MM3 (ref 1.5–8.3)
NEUTROPHILS NFR BLD AUTO: 48.3 % (ref 45–70)
NRBC BLD AUTO-RTO: 0 /100 WBC (ref 0–0)
PLATELET # BLD AUTO: 56 10*3/MM3 (ref 140–500)
PLATELET BLD QL SMEAR: NORMAL
RBC # BLD AUTO: 3.31 10*6/MM3 (ref 4.2–5.4)
RBC MORPH BLD: NORMAL
TSH SERPL DL<=0.005 MIU/L-ACNC: 44.42 MIU/ML (ref 0.27–4.2)
WBC # BLD AUTO: 3.69 10*3/MM3 (ref 4.8–10.8)

## 2017-10-12 PROCEDURE — 99213 OFFICE O/P EST LOW 20 MIN: CPT | Performed by: NURSE PRACTITIONER

## 2017-10-12 RX ORDER — LEVOTHYROXINE SODIUM 0.2 MG/1
200 TABLET ORAL DAILY
Qty: 90 TABLET | Refills: 3 | Status: SHIPPED | OUTPATIENT
Start: 2017-10-12 | End: 2018-05-08 | Stop reason: SDUPTHER

## 2017-10-12 RX ORDER — SPIRONOLACTONE 50 MG/1
100 TABLET, FILM COATED ORAL DAILY
Qty: 90 TABLET | Refills: 2 | Status: ON HOLD | OUTPATIENT
Start: 2017-10-12 | End: 2018-05-23

## 2017-10-12 RX ORDER — ERGOCALCIFEROL 1.25 MG/1
50000 CAPSULE ORAL WEEKLY
Qty: 12 CAPSULE | Refills: 1 | Status: SHIPPED | OUTPATIENT
Start: 2017-10-12 | End: 2018-05-04 | Stop reason: SDUPTHER

## 2017-10-12 RX ORDER — LEVOTHYROXINE SODIUM 0.03 MG/1
25 TABLET ORAL DAILY
Qty: 90 TABLET | Refills: 3 | Status: SHIPPED | OUTPATIENT
Start: 2017-10-12 | End: 2019-02-15 | Stop reason: SDUPTHER

## 2017-10-12 NOTE — PROGRESS NOTES
Subjective   Jaz Gipson is a 42 y.o. female.   Chief Complaint   Patient presents with   • hospital follow up     hospital follow and needs refills on levothyroxine 100,  spironolatone and vit d      Vitals:    10/12/17 0921   BP: 90/62   Pulse: 74   Resp: 18   SpO2: 93%     Allergies   Allergen Reactions   • Latex    • Penicillins    • Tomato        HPI Comments: Here for a follow-up after hospital stay as well as med refills.   Esophageal varices- was admitted for 5 days for bleeding. Got 2 blood transfusions and banded   Feeling much better now- daughter is helping a lot.   She has an appointment with new GI doc but it will not be until the beginning of the new year.          The following portions of the patient's history were reviewed and updated as appropriate: allergies, current medications, past family history, past medical history, past social history, past surgical history and problem list.    Review of Systems   Constitutional: Positive for fatigue.   HENT: Negative.    Respiratory: Negative.    Gastrointestinal: Negative.    Skin: Negative.    Neurological: Negative.    Psychiatric/Behavioral: Negative.    All other systems reviewed and are negative.      Objective   Physical Exam   Constitutional: She is oriented to person, place, and time. She appears well-developed and well-nourished.   Cardiovascular: Normal rate.    Pulmonary/Chest: Effort normal.   Neurological: She is alert and oriented to person, place, and time.   Skin: Skin is warm and dry.   Psychiatric: She has a normal mood and affect. Her behavior is normal. Judgment and thought content normal.   Nursing note and vitals reviewed.      Assessment/Plan   Problems Addressed this Visit        Digestive    PIEDRA (nonalcoholic steatohepatitis)    Relevant Medications    spironolactone (ALDACTONE) 50 MG tablet    Vitamin D deficiency    Relevant Medications    vitamin D (ERGOCALCIFEROL) 60508 units capsule capsule       Endocrine     Hypothyroidism - Primary    Relevant Medications    levothyroxine (SYNTHROID, LEVOTHROID) 25 MCG tablet    levothyroxine (SYNTHROID, LEVOTHROID) 200 MCG tablet    Other Relevant Orders    TSH      Other Visit Diagnoses     Fluid retention        Relevant Medications    spironolactone (ALDACTONE) 50 MG tablet    Secondary esophageal varices with bleeding        Relevant Orders    CBC Auto Differential

## 2017-10-13 RX ORDER — FERROUS SULFATE 325(65) MG
325 TABLET ORAL
Qty: 30 TABLET | Refills: 3 | Status: SHIPPED | OUTPATIENT
Start: 2017-10-13 | End: 2019-01-24 | Stop reason: SDUPTHER

## 2017-11-01 ENCOUNTER — OFFICE VISIT (OUTPATIENT)
Dept: GASTROENTEROLOGY | Facility: CLINIC | Age: 42
End: 2017-11-01

## 2017-11-01 VITALS
TEMPERATURE: 98.2 F | DIASTOLIC BLOOD PRESSURE: 64 MMHG | HEIGHT: 64 IN | SYSTOLIC BLOOD PRESSURE: 112 MMHG | BODY MASS INDEX: 30.46 KG/M2 | WEIGHT: 178.4 LBS

## 2017-11-01 DIAGNOSIS — D50.0 IRON DEFICIENCY ANEMIA DUE TO CHRONIC BLOOD LOSS: ICD-10-CM

## 2017-11-01 DIAGNOSIS — K74.69 OTHER CIRRHOSIS OF LIVER (HCC): ICD-10-CM

## 2017-11-01 DIAGNOSIS — K75.81 NASH (NONALCOHOLIC STEATOHEPATITIS): Primary | ICD-10-CM

## 2017-11-01 DIAGNOSIS — I85.11 SECONDARY ESOPHAGEAL VARICES WITH BLEEDING (HCC): ICD-10-CM

## 2017-11-01 PROCEDURE — 99214 OFFICE O/P EST MOD 30 MIN: CPT | Performed by: INTERNAL MEDICINE

## 2017-11-01 NOTE — PROGRESS NOTES
Chief Complaint   Patient presents with   • GI Bleeding       Jaz Gipson is a  42 y.o. female here for a follow up visit for PIEDRA cirrhosis with history bleeding esophageal varices and encephalopathy.  Recent hospitalization for UGIB -  One cord of F2 varices was banded x 4.  Compliant with nadolol.  No pain or difficulty swallowing.   She reports she is sleeping a lot.  She sleeps well at night but she also naps.  She has rcvd flu vaccine and she has gotten 2/3 hep B vaccines  HPI  Past Medical History:   Diagnosis Date   • Abnormal cervical Papanicolaou smear     remote   • Acute gastric mucosal erosion     11/12 EGD   • Allergic rhinitis    • Asthma    • Cirrhosis of liver    • Duodenitis     11/12 EGD   • Enlarged lymph node     retroperitoneal lymph node enlarged, 5/12 PET CT consistent with low grade lymphoproliferative disorder (  [facility])  9/12Bone marrow bx negative    • Esophageal varices    • Fatty liver    • Folic acid deficiency    • Graves disease    • Leukocytosis    • PIEDRA (nonalcoholic steatohepatitis)    • Obstructive sleep apnea     4/10 dx, prescribed CPAP at 12 cm H2O (did not tolerate)     Past Surgical History:   Procedure Laterality Date   • CHOLECYSTECTOMY      10/9/12 Madyson: Lap Cholecystectomy and Appendectomy, Liver Bx:Cirrhosis (Biliary Dyskinesia, Cholecystitis, Chronic Appendicitis)   • COLONOSCOPY N/A 4/22/2016    NBIH   • ENDOSCOPY N/A 4/22/2016    Procedure: ESOPHAGOGASTRODUODENOSCOPY;  Surgeon: Annie Tierney MD;  Location: [facility] ENDOSCOPY;  Service:    • ENDOSCOPY N/A 12/20/2016    Procedure: ESOPHAGOGASTRODUODENOSCOPY AT BEDSIDE;  Surgeon: Haresh Fritz MD;  Location: [facility] ENDOSCOPY;  Service:    • ENDOSCOPY N/A 12/22/2016    Procedure: ESOPHAGOGASTRODUODENOSCOPY WITH BANDING X2;  Surgeon: Chriss Reece MD;  Location: [facility] ENDOSCOPY;  Service:    • ENDOSCOPY N/A 12/26/2016    Procedure: ESOPHAGOGASTRODUODENOSCOPY AT BEDSIDE;  Surgeon: Vidya Norman MD;   Location:  [facility];  Service:    • ENDOSCOPY N/A 2/8/2017    Procedure: ESOPHAGOGASTRODUODENOSCOPY;  Surgeon: Annie Tierney MD;  Location: [facility] ENDOSCOPY;  Service:    • ENDOSCOPY N/A 10/2/2017    Procedure: ESOPHAGOGASTRODUODENOSCOPY ;  Surgeon: Chriss Reece MD;  Location: [facility] ENDOSCOPY;  Service:    • ENDOSCOPY N/A 10/3/2017    Grade II esophageal varices, completely eradicated, portal hypertensive gastropathy   • LAPAROSCOPIC APPENDECTOMY     • LIVER BIOPSY      9/12 Liver Bx done with Mariza and Appy (cirrhosis)   • UPPER GASTROINTESTINAL ENDOSCOPY      11/12 EGD with Erosive gastritis, duodenitis, and portal hypertensive gastropathy (Dr. Tierney) (no esophageal varices_       Current Outpatient Prescriptions:   •  ferrous sulfate 325 (65 FE) MG tablet, Take 1 tablet by mouth Daily With Breakfast., Disp: 30 tablet, Rfl: 3  •  furosemide (LASIX) 40 MG tablet, TAKE ONE TABLET BY MOUTH DAILY, Disp: 30 tablet, Rfl: 2  •  lactulose (CHRONULAC) 10 GM/15ML solution, Take 45 mL by mouth Every 12 (Twelve) Hours., Disp: , Rfl:   •  levothyroxine (SYNTHROID, LEVOTHROID) 200 MCG tablet, Take 1 tablet by mouth Daily., Disp: 90 tablet, Rfl: 3  •  levothyroxine (SYNTHROID, LEVOTHROID) 25 MCG tablet, Take 1 tablet by mouth Daily., Disp: 90 tablet, Rfl: 3  •  nadolol (CORGARD) 20 MG tablet, TAKE ONE TABLET BY MOUTH DAILY, Disp: 30 tablet, Rfl: 1  •  pantoprazole (PROTONIX) 40 MG EC tablet, Take 1 tablet by mouth Daily., Disp: 30 tablet, Rfl: 1  •  potassium chloride (K-DUR,KLOR-CON) 20 MEQ CR tablet, Take 1 tablet by mouth 2 (Two) Times a Day. Take 1 tablet 2 times a day., Disp: 60 tablet, Rfl: 2  •  sertraline (ZOLOFT) 50 MG tablet, TAKE ONE TABLET BY MOUTH DAILY, Disp: 30 tablet, Rfl: 0  •  spironolactone (ALDACTONE) 50 MG tablet, Take 2 tablets by mouth Daily., Disp: 90 tablet, Rfl: 2  •  vitamin D (ERGOCALCIFEROL) 42714 units capsule capsule, Take 1 capsule by mouth 1 (One) Time Per Week., Disp: 12  capsule, Rfl: 1  •  calcium carbonate (OS-JARED) 600 MG tablet, Take 600 mg by mouth daily., Disp: , Rfl:   PRN Meds:.  Allergies   Allergen Reactions   • Latex    • Penicillins    • Tomato      Social History     Social History   • Marital status: Single     Spouse name: N/A   • Number of children: N/A   • Years of education: N/A     Occupational History   • Not on file.     Social History Main Topics   • Smoking status: Current Every Day Smoker     Packs/day: 0.25     Types: Cigarettes   • Smokeless tobacco: Never Used   • Alcohol use No   • Drug use: No   • Sexual activity: Defer     Other Topics Concern   • Not on file     Social History Narrative     Family History   Problem Relation Age of Onset   • Alcohol abuse Father    • Anxiety disorder Father    • COPD Father    • Depression Father    • Hyperlipidemia Father    • Hypertension Father    • Asthma Brother    • Coronary artery disease Brother    • Diabetes type II Other      Review of Systems   Constitutional: Positive for fatigue. Negative for appetite change and unexpected weight change.   Gastrointestinal: Negative for abdominal distention, abdominal pain and blood in stool.   All other systems reviewed and are negative.    Vitals:    11/01/17 1413   BP: 112/64   Temp: 98.2 °F (36.8 °C)     Last Weight    11/01/17  1413   Weight: 178 lb 6.4 oz (80.9 kg)     Physical Exam   Constitutional: She appears well-developed and well-nourished.   HENT:   Head: Normocephalic and atraumatic.   Eyes: No scleral icterus.   Abdominal: Soft. She exhibits no distension and no mass. There is no tenderness.   No palpable ascites   Neurological: She is alert.   Skin: Skin is warm and dry.   Psychiatric: She has a normal mood and affect.     No images are attached to the encounter.  Diagnoses and all orders for this visit:    PIEDRA (nonalcoholic steatohepatitis)    Other cirrhosis of liver  -     CBC & Differential  -     Comprehensive Metabolic Panel  -     Protime-INR  -      Case Request; Standing  -     Case Request  -     AFP Tumor Marker    Iron deficiency anemia due to chronic blood loss    Secondary esophageal varices with bleeding    Other orders  -     Implement Anesthesia orders day of procedure.; Standing  -     Obtain informed consent; Standing    Plan:  - continue nadolol  - schedule egd to reassess varices  - call  to get appt with transplant program  - u/s due 4/18

## 2017-11-02 LAB
AFP-TM SERPL-MCNC: 1.2 NG/ML (ref 0–8.3)
ALBUMIN SERPL-MCNC: 2.8 G/DL (ref 3.5–5.2)
ALBUMIN/GLOB SERPL: 0.4 G/DL
ALP SERPL-CCNC: 146 U/L (ref 39–117)
ALT SERPL-CCNC: 16 U/L (ref 1–33)
AST SERPL-CCNC: 39 U/L (ref 1–32)
BASOPHILS # BLD AUTO: 0.04 10*3/MM3 (ref 0–0.2)
BASOPHILS NFR BLD AUTO: 1.6 % (ref 0–1.5)
BILIRUB SERPL-MCNC: 1.8 MG/DL (ref 0.1–1.2)
BUN SERPL-MCNC: 7 MG/DL (ref 6–20)
BUN/CREAT SERPL: 10 (ref 7–25)
CALCIUM SERPL-MCNC: 8.4 MG/DL (ref 8.6–10.5)
CHLORIDE SERPL-SCNC: 101 MMOL/L (ref 98–107)
CO2 SERPL-SCNC: 23.9 MMOL/L (ref 22–29)
CREAT SERPL-MCNC: 0.7 MG/DL (ref 0.57–1)
EOSINOPHIL # BLD AUTO: 0.15 10*3/MM3 (ref 0–0.7)
EOSINOPHIL NFR BLD AUTO: 6.1 % (ref 0.3–6.2)
ERYTHROCYTE [DISTWIDTH] IN BLOOD BY AUTOMATED COUNT: 22.2 % (ref 11.7–13)
GFR SERPLBLD CREATININE-BSD FMLA CKD-EPI: 112 ML/MIN/1.73
GFR SERPLBLD CREATININE-BSD FMLA CKD-EPI: 93 ML/MIN/1.73
GLOBULIN SER CALC-MCNC: 6.8 GM/DL
GLUCOSE SERPL-MCNC: 83 MG/DL (ref 65–99)
HCT VFR BLD AUTO: 33.8 % (ref 35.6–45.5)
HGB BLD-MCNC: 10 G/DL (ref 11.9–15.5)
IMM GRANULOCYTES # BLD: 0 10*3/MM3 (ref 0–0.03)
IMM GRANULOCYTES NFR BLD: 0 % (ref 0–0.5)
INR PPP: 1.55 (ref 0.9–1.1)
LYMPHOCYTES # BLD AUTO: 0.79 10*3/MM3 (ref 0.9–4.8)
LYMPHOCYTES NFR BLD AUTO: 32.4 % (ref 19.6–45.3)
MCH RBC QN AUTO: 27.9 PG (ref 26.9–32)
MCHC RBC AUTO-ENTMCNC: 29.6 G/DL (ref 32.4–36.3)
MCV RBC AUTO: 94.4 FL (ref 80.5–98.2)
MONOCYTES # BLD AUTO: 0.35 10*3/MM3 (ref 0.2–1.2)
MONOCYTES NFR BLD AUTO: 14.3 % (ref 5–12)
NEUTROPHILS # BLD AUTO: 1.11 10*3/MM3 (ref 1.9–8.1)
NEUTROPHILS NFR BLD AUTO: 45.6 % (ref 42.7–76)
PLATELET # BLD AUTO: 45 10*3/MM3 (ref 140–500)
POTASSIUM SERPL-SCNC: 4.1 MMOL/L (ref 3.5–5.2)
PROT SERPL-MCNC: 9.6 G/DL (ref 6–8.5)
PROTHROMBIN TIME: 18 SECONDS (ref 11.7–14.2)
RBC # BLD AUTO: 3.58 10*6/MM3 (ref 3.9–5.2)
SODIUM SERPL-SCNC: 135 MMOL/L (ref 136–145)
WBC # BLD AUTO: 2.44 10*3/MM3 (ref 4.5–10.7)

## 2017-11-03 ENCOUNTER — TELEPHONE (OUTPATIENT)
Dept: GASTROENTEROLOGY | Facility: CLINIC | Age: 42
End: 2017-11-03

## 2017-11-03 NOTE — TELEPHONE ENCOUNTER
Call to pt.  Advise per DR Tierney that labs are stable - Hgb has improved.  Pt verb understanding.

## 2017-11-03 NOTE — TELEPHONE ENCOUNTER
----- Message from Annie Tierney MD sent at 11/3/2017 11:35 AM EDT -----  Labs are stable - hemoglobin has improved

## 2017-11-06 RX ORDER — NADOLOL 20 MG/1
20 TABLET ORAL DAILY
COMMUNITY
End: 2018-05-08 | Stop reason: SDUPTHER

## 2017-11-06 RX ORDER — FUROSEMIDE 40 MG/1
40 TABLET ORAL DAILY
COMMUNITY
End: 2018-05-08 | Stop reason: SDUPTHER

## 2017-11-07 ENCOUNTER — TELEPHONE (OUTPATIENT)
Dept: GASTROENTEROLOGY | Facility: CLINIC | Age: 42
End: 2017-11-07

## 2017-11-07 ENCOUNTER — HOSPITAL ENCOUNTER (OUTPATIENT)
Facility: HOSPITAL | Age: 42
Setting detail: HOSPITAL OUTPATIENT SURGERY
Discharge: HOME | End: 2017-11-07
Attending: INTERNAL MEDICINE | Admitting: INTERNAL MEDICINE

## 2017-11-07 ENCOUNTER — ANESTHESIA EVENT (OUTPATIENT)
Dept: GASTROENTEROLOGY | Facility: HOSPITAL | Age: 42
End: 2017-11-07

## 2017-11-07 ENCOUNTER — ANESTHESIA (OUTPATIENT)
Dept: GASTROENTEROLOGY | Facility: HOSPITAL | Age: 42
End: 2017-11-07

## 2017-11-07 VITALS
DIASTOLIC BLOOD PRESSURE: 69 MMHG | WEIGHT: 178 LBS | BODY MASS INDEX: 30.39 KG/M2 | OXYGEN SATURATION: 98 % | HEIGHT: 64 IN | HEART RATE: 75 BPM | SYSTOLIC BLOOD PRESSURE: 103 MMHG | RESPIRATION RATE: 18 BRPM | TEMPERATURE: 98.2 F

## 2017-11-07 LAB
B-HCG UR QL: NEGATIVE
BASOPHILS # BLD AUTO: 0.04 10*3/MM3 (ref 0–0.2)
BASOPHILS NFR BLD AUTO: 1.7 % (ref 0–1.5)
DEPRECATED RDW RBC AUTO: 71.1 FL (ref 37–54)
EOSINOPHIL # BLD AUTO: 0.12 10*3/MM3 (ref 0–0.7)
EOSINOPHIL NFR BLD AUTO: 5.2 % (ref 0.3–6.2)
ERYTHROCYTE [DISTWIDTH] IN BLOOD BY AUTOMATED COUNT: 20.8 % (ref 11.7–13)
HCT VFR BLD AUTO: 31 % (ref 35.6–45.5)
HGB BLD-MCNC: 9.4 G/DL (ref 11.9–15.5)
IMM GRANULOCYTES # BLD: 0 10*3/MM3 (ref 0–0.03)
IMM GRANULOCYTES NFR BLD: 0 % (ref 0–0.5)
INTERNAL NEGATIVE CONTROL: NEGATIVE
INTERNAL POSITIVE CONTROL: POSITIVE
LYMPHOCYTES # BLD AUTO: 0.57 10*3/MM3 (ref 0.9–4.8)
LYMPHOCYTES NFR BLD AUTO: 24.7 % (ref 19.6–45.3)
Lab: NORMAL
MCH RBC QN AUTO: 28.4 PG (ref 26.9–32)
MCHC RBC AUTO-ENTMCNC: 30.3 G/DL (ref 32.4–36.3)
MCV RBC AUTO: 93.7 FL (ref 80.5–98.2)
MONOCYTES # BLD AUTO: 0.48 10*3/MM3 (ref 0.2–1.2)
MONOCYTES NFR BLD AUTO: 20.8 % (ref 5–12)
NEUTROPHILS # BLD AUTO: 1.1 10*3/MM3 (ref 1.9–8.1)
NEUTROPHILS NFR BLD AUTO: 47.6 % (ref 42.7–76)
NRBC BLD MANUAL-RTO: 0.9 /100 WBC (ref 0–0)
PLATELET # BLD AUTO: 48 10*3/MM3 (ref 140–500)
PMV BLD AUTO: 11.9 FL (ref 6–12)
RBC # BLD AUTO: 3.31 10*6/MM3 (ref 3.9–5.2)
WBC NRBC COR # BLD: 2.31 10*3/MM3 (ref 4.5–10.7)

## 2017-11-07 PROCEDURE — 43255 EGD CONTROL BLEEDING ANY: CPT | Performed by: INTERNAL MEDICINE

## 2017-11-07 PROCEDURE — 25010000002 EPINEPHRINE PER 0.1 MG: Performed by: INTERNAL MEDICINE

## 2017-11-07 PROCEDURE — S0260 H&P FOR SURGERY: HCPCS | Performed by: INTERNAL MEDICINE

## 2017-11-07 PROCEDURE — 85025 COMPLETE CBC W/AUTO DIFF WBC: CPT | Performed by: INTERNAL MEDICINE

## 2017-11-07 PROCEDURE — 25010000002 PROPOFOL 10 MG/ML EMULSION: Performed by: NURSE ANESTHETIST, CERTIFIED REGISTERED

## 2017-11-07 RX ORDER — LIDOCAINE HYDROCHLORIDE 20 MG/ML
INJECTION, SOLUTION INFILTRATION; PERINEURAL AS NEEDED
Status: DISCONTINUED | OUTPATIENT
Start: 2017-11-07 | End: 2017-11-07 | Stop reason: SURG

## 2017-11-07 RX ORDER — PROPOFOL 10 MG/ML
VIAL (ML) INTRAVENOUS AS NEEDED
Status: DISCONTINUED | OUTPATIENT
Start: 2017-11-07 | End: 2017-11-07 | Stop reason: SURG

## 2017-11-07 RX ORDER — SODIUM CHLORIDE, SODIUM LACTATE, POTASSIUM CHLORIDE, CALCIUM CHLORIDE 600; 310; 30; 20 MG/100ML; MG/100ML; MG/100ML; MG/100ML
1000 INJECTION, SOLUTION INTRAVENOUS CONTINUOUS PRN
Status: DISCONTINUED | OUTPATIENT
Start: 2017-11-07 | End: 2017-11-07 | Stop reason: HOSPADM

## 2017-11-07 RX ORDER — SUCRALFATE ORAL 1 G/10ML
1 SUSPENSION ORAL 4 TIMES DAILY
Qty: 1200 ML | Refills: 2 | Status: SHIPPED | OUTPATIENT
Start: 2017-11-07 | End: 2018-02-18 | Stop reason: SDUPTHER

## 2017-11-07 RX ORDER — PROPOFOL 10 MG/ML
VIAL (ML) INTRAVENOUS CONTINUOUS PRN
Status: DISCONTINUED | OUTPATIENT
Start: 2017-11-07 | End: 2017-11-07 | Stop reason: SURG

## 2017-11-07 RX ORDER — PANTOPRAZOLE SODIUM 40 MG/1
40 TABLET, DELAYED RELEASE ORAL
Qty: 60 TABLET | Refills: 2 | Status: SHIPPED | OUTPATIENT
Start: 2017-11-07 | End: 2018-02-18 | Stop reason: SDUPTHER

## 2017-11-07 RX ADMIN — PROPOFOL 20 MG: 10 INJECTION, EMULSION INTRAVENOUS at 08:04

## 2017-11-07 RX ADMIN — SODIUM CHLORIDE, POTASSIUM CHLORIDE, SODIUM LACTATE AND CALCIUM CHLORIDE 1000 ML: 600; 310; 30; 20 INJECTION, SOLUTION INTRAVENOUS at 07:33

## 2017-11-07 RX ADMIN — PROPOFOL 300 MCG/KG/MIN: 10 INJECTION, EMULSION INTRAVENOUS at 08:03

## 2017-11-07 RX ADMIN — PROPOFOL 30 MG: 10 INJECTION, EMULSION INTRAVENOUS at 08:06

## 2017-11-07 RX ADMIN — LIDOCAINE HYDROCHLORIDE 60 MG: 20 INJECTION, SOLUTION INFILTRATION; PERINEURAL at 08:02

## 2017-11-07 RX ADMIN — PROPOFOL 70 MG: 10 INJECTION, EMULSION INTRAVENOUS at 08:02

## 2017-11-07 NOTE — DISCHARGE INSTRUCTIONS
For the next 24 hours patient needs to be with a responsible adult.    For 24 hours DO NOT drive, operate machinery, appliances, drink alcohol, make important decisions or sign legal documents.    Start with a light or bland diet and advance to regular diet as tolerated.    Follow recommendations on procedure report provided by your doctor.    Call Dr Tierney for problems [phone]. Resume all medications as instructed.     Problems may include but not limited to: large amounts of bleeding, trouble breathing, repeated vomiting, severe unrelieved pain, fever or chills.

## 2017-11-07 NOTE — ANESTHESIA POSTPROCEDURE EVALUATION
Patient: Jaz Gipson    Procedure Summary     Date Anesthesia Start Anesthesia Stop Room / Location    11/07/17 0800 0829  [facility] /  [facility]       Procedure Diagnosis Surgeon Provider    ESOPHAGOGASTRODUODENOSCOPY WITH EPI INJECTION AND CAUTERY (N/A Esophagus) Other cirrhosis of liver  (Other cirrhosis of liver [K74.69]) MD Zenaida Vasquez MD          Anesthesia Type: MAC  Last vitals  BP   103/69 (11/07/17 0847)   Temp   36.8 °C (98.2 °F) (11/07/17 0837)   Pulse   75 (11/07/17 0847)   Resp   18 (11/07/17 0847)     SpO2   98 % (11/07/17 0847)     Post Anesthesia Care and Evaluation    Patient location during evaluation: PHASE II  Patient participation: complete - patient participated  Level of consciousness: awake and alert  Pain management: adequate  Airway patency: patent  Anesthetic complications: No anesthetic complications  PONV Status: none  Cardiovascular status: acceptable  Respiratory status: acceptable  Hydration status: acceptable

## 2017-11-07 NOTE — PLAN OF CARE
Problem: Patient Care Overview (Adult)  Goal: Plan of Care Review  Outcome: Ongoing (interventions implemented as appropriate)    11/07/17 0710   Coping/Psychosocial Response Interventions   Plan Of Care Reviewed With patient   Patient Care Overview   Progress no change       Goal: Adult Individualization and Mutuality  Outcome: Ongoing (interventions implemented as appropriate)  Goal: Discharge Needs Assessment  Outcome: Ongoing (interventions implemented as appropriate)    11/07/17 0710   Discharge Needs Assessment   Concerns To Be Addressed no discharge needs identified   Living Environment   Transportation Available car;family or friend will provide         Problem: GI Endoscopy (Adult)  Goal: Signs and Symptoms of Listed Potential Problems Will be Absent or Manageable (GI Endoscopy)  Outcome: Ongoing (interventions implemented as appropriate)    11/07/17 0710   GI Endoscopy   Problems Assessed (GI Endoscopy) all   Problems Present (GI Endoscopy) none

## 2017-11-07 NOTE — TELEPHONE ENCOUNTER
Called pt and advised per Dr Tierney that her hgb was stable from last week and repeat cbc in 4 wks.  Pt verb understanding and made lab appt for 12/07 at 10am and pt made f/u appt for 01/17/1pm with Dr Tierney.

## 2017-11-07 NOTE — TELEPHONE ENCOUNTER
----- Message from Annie Tierney MD sent at 11/7/2017  9:51 AM EST -----  HB stable from check last week - rec repeat cbc in 4 weeks

## 2017-11-07 NOTE — ANESTHESIA PREPROCEDURE EVALUATION
Anesthesia Evaluation     Patient summary reviewed and Nursing notes reviewed   NPO Solid Status: > 8 hours  NPO Liquid Status: > 2 hours     Airway   Mallampati: III  TM distance: <3 FB  Neck ROM: limited  possible difficult intubation  Dental - normal exam     Pulmonary - normal exam   (+) a smoker Current Smoked day of surgery,   Cardiovascular - normal exam    Patient on routine beta blocker and Beta blocker given within 24 hours of surgery    (+) hyperlipidemia      Neuro/Psych  GI/Hepatic/Renal/Endo    (+) obesity,  hepatitis (PIEDRA), liver disease,     Musculoskeletal     Abdominal  - normal exam    Bowel sounds: normal.   Substance History      OB/GYN          Other                                        Anesthesia Plan    ASA 3     MAC     Anesthetic plan and risks discussed with patient.

## 2017-11-08 ENCOUNTER — DOCUMENTATION (OUTPATIENT)
Dept: GASTROENTEROLOGY | Facility: CLINIC | Age: 42
End: 2017-11-08

## 2017-11-09 RX ORDER — POTASSIUM CHLORIDE 20 MEQ/1
TABLET, EXTENDED RELEASE ORAL
Qty: 60 TABLET | Refills: 1 | Status: SHIPPED | OUTPATIENT
Start: 2017-11-09 | End: 2019-02-06 | Stop reason: SDUPTHER

## 2017-12-06 ENCOUNTER — TELEPHONE (OUTPATIENT)
Dept: GASTROENTEROLOGY | Facility: CLINIC | Age: 42
End: 2017-12-06

## 2017-12-06 DIAGNOSIS — D64.9 ANEMIA, UNSPECIFIED TYPE: Primary | ICD-10-CM

## 2017-12-15 RX ORDER — NADOLOL 20 MG/1
TABLET ORAL
Qty: 30 TABLET | Refills: 3 | Status: SHIPPED | OUTPATIENT
Start: 2017-12-15 | End: 2019-07-19 | Stop reason: SDUPTHER

## 2018-02-20 RX ORDER — PANTOPRAZOLE SODIUM 40 MG/1
TABLET, DELAYED RELEASE ORAL
Qty: 60 TABLET | Refills: 1 | Status: SHIPPED | OUTPATIENT
Start: 2018-02-20 | End: 2018-03-20 | Stop reason: SDUPTHER

## 2018-02-20 RX ORDER — SUCRALFATE 1 G/10ML
SUSPENSION ORAL
Qty: 600 ML | Refills: 1 | Status: SHIPPED | OUTPATIENT
Start: 2018-02-20 | End: 2019-01-24 | Stop reason: SDUPTHER

## 2018-02-20 NOTE — TELEPHONE ENCOUNTER
"Escribe request received for pantoprazole and carafate (see op note of 11/7/17 - carafate for 6 weeks).  Pt has been \"no show\" for 2 appts.      Message to DR Tierney.  "

## 2018-02-28 ENCOUNTER — DOCUMENTATION (OUTPATIENT)
Dept: GASTROENTEROLOGY | Facility: CLINIC | Age: 43
End: 2018-02-28

## 2018-02-28 ENCOUNTER — TELEPHONE (OUTPATIENT)
Dept: GASTROENTEROLOGY | Facility: CLINIC | Age: 43
End: 2018-02-28

## 2018-02-28 NOTE — TELEPHONE ENCOUNTER
----- Message from Rafia Gates sent at 2/28/2018 10:12 AM EST -----  Regarding: PT MOM DAVID NAVARRO CALLED - #FOR UL   Contact: [phone]  PT HAS PIEDRA. DR BLANCHARD SUGGESTED SHE CALL AND MAKE APPT WITH [facility] TRANSPLANT. DOES PT NEED REFERRAL? ALSO WHAT IS THE PHONE#?

## 2018-02-28 NOTE — TELEPHONE ENCOUNTER
Called pt's mother Genet who is on the hipaa form. Advised her that Ne in scheduling is going to check with [facility] and we will call her back.  She verb understanding.

## 2018-03-06 ENCOUNTER — TELEPHONE (OUTPATIENT)
Dept: GASTROENTEROLOGY | Facility: CLINIC | Age: 43
End: 2018-03-06

## 2018-03-06 NOTE — TELEPHONE ENCOUNTER
Robbi Whitaker MA at 2/28/2018  2:36 PM        Status: Signed            PA for Pantoprazole 40MG has been denied        Robbi HUNTER.    Above message from Robbi HUNTER noted.  Per denial letter (under Media Tab), pt must have tried and failed two of the following: Lansoprazole OTC, Prevacid 24 Hr OTC, Omeprazole OTC, Nexium OTC.    Message to DR Tierney.

## 2018-03-12 NOTE — TELEPHONE ENCOUNTER
Please update patient on the situation.  See if she is taking something for acid reflux and how her symptoms are.  Please also asked whether she's tried any of the over-the-counter remedies.  If she has not and she is still symptomatic I would recommend trying omeprazole over-the-counter.

## 2018-03-16 ENCOUNTER — TELEPHONE (OUTPATIENT)
Dept: GASTROENTEROLOGY | Facility: CLINIC | Age: 43
End: 2018-03-16

## 2018-03-16 ENCOUNTER — RESULTS ENCOUNTER (OUTPATIENT)
Dept: GASTROENTEROLOGY | Facility: CLINIC | Age: 43
End: 2018-03-16

## 2018-03-16 DIAGNOSIS — K75.81 NASH (NONALCOHOLIC STEATOHEPATITIS): Primary | ICD-10-CM

## 2018-03-16 DIAGNOSIS — K75.81 NASH (NONALCOHOLIC STEATOHEPATITIS): ICD-10-CM

## 2018-03-19 LAB
BASOPHILS # BLD AUTO: 0.05 10*3/MM3 (ref 0–0.2)
BASOPHILS NFR BLD AUTO: 1.2 % (ref 0–1.5)
EOSINOPHIL # BLD AUTO: 0.24 10*3/MM3 (ref 0–0.7)
EOSINOPHIL NFR BLD AUTO: 5.6 % (ref 0.3–6.2)
ERYTHROCYTE [DISTWIDTH] IN BLOOD BY AUTOMATED COUNT: 16.5 % (ref 11.7–13)
HCT VFR BLD AUTO: 38.1 % (ref 35.6–45.5)
HGB BLD-MCNC: 12.3 G/DL (ref 11.9–15.5)
IMM GRANULOCYTES # BLD: 0 10*3/MM3 (ref 0–0.03)
IMM GRANULOCYTES NFR BLD: 0 % (ref 0–0.5)
LYMPHOCYTES # BLD AUTO: 0.95 10*3/MM3 (ref 0.9–4.8)
LYMPHOCYTES NFR BLD AUTO: 22.2 % (ref 19.6–45.3)
MCH RBC QN AUTO: 32.6 PG (ref 26.9–32)
MCHC RBC AUTO-ENTMCNC: 32.3 G/DL (ref 32.4–36.3)
MCV RBC AUTO: 101.1 FL (ref 80.5–98.2)
MONOCYTES # BLD AUTO: 0.68 10*3/MM3 (ref 0.2–1.2)
MONOCYTES NFR BLD AUTO: 15.9 % (ref 5–12)
NEUTROPHILS # BLD AUTO: 2.35 10*3/MM3 (ref 1.9–8.1)
NEUTROPHILS NFR BLD AUTO: 55.1 % (ref 42.7–76)
PLATELET # BLD AUTO: 63 10*3/MM3 (ref 140–500)
RBC # BLD AUTO: 3.77 10*6/MM3 (ref 3.9–5.2)
WBC # BLD AUTO: 4.27 10*3/MM3 (ref 4.5–10.7)

## 2018-03-19 NOTE — TELEPHONE ENCOUNTER
Call to pt.  Advise per Dr Tierney that PA for Pantoprazole has been denied.      Pt verb understanding.  States Pantoprazole 40 mg is ordered twice a day.  Insurance will cover daily.  Pt has been paying OOP ($7) for additional dosing.  States this is affordable, and managing symptoms.    Update to DR Tierney.

## 2018-03-20 ENCOUNTER — TELEPHONE (OUTPATIENT)
Dept: GASTROENTEROLOGY | Facility: CLINIC | Age: 43
End: 2018-03-20

## 2018-03-20 RX ORDER — PANTOPRAZOLE SODIUM 40 MG/1
40 TABLET, DELAYED RELEASE ORAL
Qty: 60 TABLET | Refills: 4 | Status: SHIPPED | OUTPATIENT
Start: 2018-03-20 | End: 2019-01-24 | Stop reason: SDUPTHER

## 2018-03-22 ENCOUNTER — DOCUMENTATION (OUTPATIENT)
Dept: GASTROENTEROLOGY | Facility: CLINIC | Age: 43
End: 2018-03-22

## 2018-03-23 NOTE — TELEPHONE ENCOUNTER
Called pt and advised per Dr Aldrich that her hgb has normalized - wbc ct/plt count low but stable. Pt verb understanding.

## 2018-04-02 ENCOUNTER — OFFICE VISIT (OUTPATIENT)
Dept: GASTROENTEROLOGY | Facility: CLINIC | Age: 43
End: 2018-04-02

## 2018-04-02 VITALS
DIASTOLIC BLOOD PRESSURE: 78 MMHG | SYSTOLIC BLOOD PRESSURE: 118 MMHG | WEIGHT: 195.2 LBS | HEIGHT: 64 IN | TEMPERATURE: 98.3 F | BODY MASS INDEX: 33.32 KG/M2

## 2018-04-02 DIAGNOSIS — I85.10 SECONDARY ESOPHAGEAL VARICES WITHOUT BLEEDING (HCC): ICD-10-CM

## 2018-04-02 DIAGNOSIS — K74.69 OTHER CIRRHOSIS OF LIVER (HCC): Primary | ICD-10-CM

## 2018-04-02 DIAGNOSIS — K75.81 NASH (NONALCOHOLIC STEATOHEPATITIS): ICD-10-CM

## 2018-04-02 PROCEDURE — 99214 OFFICE O/P EST MOD 30 MIN: CPT | Performed by: INTERNAL MEDICINE

## 2018-04-02 NOTE — PROGRESS NOTES
Chief Complaint   Patient presents with   • PIEDRA   • Cirrhosis   • Heartburn       Jaz Gipson is a  43 y.o. female here for a follow up visit for PIEDRA cirrhosis.  She is doing ok - completed Hep B vaccination series.  Has been playing phone tag with  transplant center.    She reports increasing abdominal distention and compliance with her diuretics.  She's been watching the salt in her diet.  She has not seen any blood in her stool or black tarry stool.  No nausea vomiting.  She will occasionally have heartburn if she eats something spicy.  No confusion.  She is working full-time.  She do recent CBC with improvement in her hemoglobin since discharge.  Vocal was in the normal range.  HPI  Past Medical History:   Diagnosis Date   • Abnormal cervical Papanicolaou smear     remote   • Acute gastric mucosal erosion     11/12 EGD   • Allergic rhinitis    • Asthma    • Cirrhosis of liver    • Duodenitis     11/12 EGD   • Enlarged lymph node     retroperitoneal lymph node enlarged, 5/12 PET CT consistent with low grade lymphoproliferative disorder (  [facility])  9/12Bone marrow bx negative    • Esophageal varices    • Fatty liver    • Folic acid deficiency    • Graves disease    • Leukocytosis    • PIEDRA (nonalcoholic steatohepatitis)    • Obstructive sleep apnea     4/10 dx, prescribed CPAP at 12 cm H2O (did not tolerate)     Past Surgical History:   Procedure Laterality Date   • CHOLECYSTECTOMY      10/9/12 [facility]: Lap Cholecystectomy and Appendectomy, Liver Bx:Cirrhosis (Biliary Dyskinesia, Cholecystitis, Chronic Appendicitis)   • COLONOSCOPY N/A 4/22/2016    [facility]   • ENDOSCOPY N/A 4/22/2016    Procedure: ESOPHAGOGASTRODUODENOSCOPY;  Surgeon: Annie Tierney MD;  Location: [facility] ENDOSCOPY;  Service:    • ENDOSCOPY N/A 12/20/2016    Procedure: ESOPHAGOGASTRODUODENOSCOPY AT BEDSIDE;  Surgeon: Haresh Fritz MD;  Location: [facility] ENDOSCOPY;  Service:    • ENDOSCOPY N/A 12/22/2016    Procedure:  ESOPHAGOGASTRODUODENOSCOPY WITH BANDING X2;  Surgeon: Chriss Reece MD;  Location: [facility] ENDOSCOPY;  Service:    • ENDOSCOPY N/A 12/26/2016    Procedure: ESOPHAGOGASTRODUODENOSCOPY AT BEDSIDE;  Surgeon: Vidya Norman MD;  Location: [facility] ENDOSCOPY;  Service:    • ENDOSCOPY N/A 2/8/2017    Procedure: ESOPHAGOGASTRODUODENOSCOPY;  Surgeon: Annie Tierney MD;  Location: [facility] ENDOSCOPY;  Service:    • ENDOSCOPY N/A 10/2/2017    Procedure: ESOPHAGOGASTRODUODENOSCOPY ;  Surgeon: Chriss Reece MD;  Location: [facility] ENDOSCOPY;  Service:    • ENDOSCOPY N/A 10/3/2017    Grade II esophageal varices, completely eradicated, portal hypertensive gastropathy   • ENDOSCOPY N/A 11/7/2017    Grade I esophageal varices.Scar in the lower third of the esophagus. Erosive gastritis with hemorrhage.Portal hypertensive gastropathy.A few bleeding angioectasias in the stomach. Injected. Treated with argon plasma coagulation (APC). No specimans collected.      • LAPAROSCOPIC APPENDECTOMY     • LIVER BIOPSY      9/12 Liver Bx done with Mariza and Appy (cirrhosis)   • UPPER GASTROINTESTINAL ENDOSCOPY      11/12 EGD with Erosive gastritis, duodenitis, and portal hypertensive gastropathy (Dr. Tierney) (no esophageal varices_       Current Outpatient Prescriptions:   •  CARAFATE 1 GM/10ML suspension, TAKE 10 MLS BY MOUTH FOUR TIMES A DAY, Disp: 600 mL, Rfl: 1  •  ferrous sulfate 325 (65 FE) MG tablet, Take 1 tablet by mouth Daily With Breakfast., Disp: 30 tablet, Rfl: 3  •  furosemide (LASIX) 40 MG tablet, Take 40 mg by mouth Daily., Disp: , Rfl:   •  lactulose (CHRONULAC) 10 GM/15ML solution, Take 45 mL by mouth Every 12 (Twelve) Hours., Disp: , Rfl:   •  levothyroxine (SYNTHROID, LEVOTHROID) 200 MCG tablet, Take 1 tablet by mouth Daily., Disp: 90 tablet, Rfl: 3  •  levothyroxine (SYNTHROID, LEVOTHROID) 25 MCG tablet, Take 1 tablet by mouth Daily., Disp: 90 tablet, Rfl: 3  •  nadolol (CORGARD) 20 MG tablet, Take 20 mg by mouth  Daily., Disp: , Rfl:   •  nadolol (CORGARD) 20 MG tablet, TAKE ONE TABLET BY MOUTH DAILY, Disp: 30 tablet, Rfl: 3  •  pantoprazole (PROTONIX) 40 MG EC tablet, Take 1 tablet by mouth 2 (Two) Times a Day Before Meals., Disp: 60 tablet, Rfl: 4  •  potassium chloride (K-DUR,KLOR-CON) 20 MEQ CR tablet, TAKE ONE TABLET BY MOUTH TWICE A DAY, Disp: 60 tablet, Rfl: 1  •  sertraline (ZOLOFT) 50 MG tablet, Take 50 mg by mouth Daily., Disp: , Rfl:   •  sertraline (ZOLOFT) 50 MG tablet, TAKE ONE TABLET BY MOUTH DAILY, Disp: 30 tablet, Rfl: 5  •  spironolactone (ALDACTONE) 50 MG tablet, Take 2 tablets by mouth Daily., Disp: 90 tablet, Rfl: 2  •  vitamin D (ERGOCALCIFEROL) 59195 units capsule capsule, Take 1 capsule by mouth 1 (One) Time Per Week., Disp: 12 capsule, Rfl: 1  PRN Meds:.  Allergies   Allergen Reactions   • Penicillins Swelling   • Latex Rash   • Tomato Rash     Social History     Social History   • Marital status: Single     Spouse name: N/A   • Number of children: N/A   • Years of education: N/A     Occupational History   • Not on file.     Social History Main Topics   • Smoking status: Current Every Day Smoker     Packs/day: 0.25     Types: Cigarettes   • Smokeless tobacco: Never Used   • Alcohol use No   • Drug use: No   • Sexual activity: Defer     Other Topics Concern   • Not on file     Social History Narrative   • No narrative on file     Family History   Problem Relation Age of Onset   • Alcohol abuse Father    • Anxiety disorder Father    • COPD Father    • Depression Father    • Hyperlipidemia Father    • Hypertension Father    • Asthma Brother    • Coronary artery disease Brother    • Diabetes type II Other    • Malig Hyperthermia Neg Hx      Review of Systems   Constitutional: Negative for appetite change and unexpected weight change.   Gastrointestinal: Positive for abdominal distention. Negative for blood in stool, constipation, diarrhea and nausea.   All other systems reviewed and are  negative.    Vitals:    04/02/18 1031   BP: 118/78   Temp: 98.3 °F (36.8 °C)     1    04/02/18  1031   Weight: 88.5 kg (195 lb 3.2 oz)     Physical Exam   Constitutional: She appears well-developed and well-nourished.   HENT:   Head: Normocephalic and atraumatic.   Eyes: No scleral icterus.   Pulmonary/Chest: Effort normal.   Abdominal: Soft. She exhibits distension. She exhibits no mass. There is no tenderness.   Neurological: She is alert.   Skin: Skin is warm and dry.   Psychiatric: She has a normal mood and affect.     No images are attached to the encounter.  Diagnoses and all orders for this visit:    Other cirrhosis of liver  -     US liver; Future  -     Case Request; Standing  -     Case Request  -     Comprehensive Metabolic Panel  -     Protime-INR  -     AFP Tumor Marker    Secondary esophageal varices without bleeding  -     Case Request; Standing  -     Case Request  -     Comprehensive Metabolic Panel  -     Protime-INR  -     AFP Tumor Marker    PIEDRA (nonalcoholic steatohepatitis)  -     Case Request; Standing  -     Case Request  -     Comprehensive Metabolic Panel  -     Protime-INR  -     AFP Tumor Marker    Other orders  -     Implement Anesthesia orders day of procedure.; Standing  -     Obtain informed consent; Standing    Plan-  - Encourage the patient to contact the transplant center today as her referral is only good until April 16  - Check INR, AFP and CMP today-she had a recent stable CBC  - She is due for abdominal imaging for HCC surveillance-we will get ultrasound  - She is due for repeat EGD for variceal surveillance-we'll schedule in May  - Continue current medications  - She completed hepatitis B vaccination and will discuss hepatitis A with her primary care physician  - Office follow-up in 6 months

## 2018-04-03 ENCOUNTER — TELEPHONE (OUTPATIENT)
Dept: GASTROENTEROLOGY | Facility: CLINIC | Age: 43
End: 2018-04-03

## 2018-04-03 LAB
AFP-TM SERPL-MCNC: 1.8 NG/ML (ref 0–8.3)
ALBUMIN SERPL-MCNC: 2.3 G/DL (ref 3.5–5.2)
ALBUMIN/GLOB SERPL: 0.4 G/DL
ALP SERPL-CCNC: 145 U/L (ref 39–117)
ALT SERPL-CCNC: 15 U/L (ref 1–33)
AST SERPL-CCNC: 42 U/L (ref 1–32)
BILIRUB SERPL-MCNC: 2 MG/DL (ref 0.1–1.2)
BUN SERPL-MCNC: 4 MG/DL (ref 6–20)
BUN/CREAT SERPL: 6.7 (ref 7–25)
CALCIUM SERPL-MCNC: 7.5 MG/DL (ref 8.6–10.5)
CHLORIDE SERPL-SCNC: 105 MMOL/L (ref 98–107)
CO2 SERPL-SCNC: 24.2 MMOL/L (ref 22–29)
CREAT SERPL-MCNC: 0.6 MG/DL (ref 0.57–1)
GFR SERPLBLD CREATININE-BSD FMLA CKD-EPI: 110 ML/MIN/1.73
GFR SERPLBLD CREATININE-BSD FMLA CKD-EPI: 134 ML/MIN/1.73
GLOBULIN SER CALC-MCNC: 6.5 GM/DL
GLUCOSE SERPL-MCNC: 76 MG/DL (ref 65–99)
INR PPP: 1.64 (ref 0.9–1.1)
POTASSIUM SERPL-SCNC: 3.3 MMOL/L (ref 3.5–5.2)
PROT SERPL-MCNC: 8.8 G/DL (ref 6–8.5)
PROTHROMBIN TIME: 19.1 SECONDS (ref 11.7–14.2)
SODIUM SERPL-SCNC: 137 MMOL/L (ref 136–145)

## 2018-04-10 ENCOUNTER — DOCUMENTATION (OUTPATIENT)
Dept: GASTROENTEROLOGY | Facility: CLINIC | Age: 43
End: 2018-04-10

## 2018-04-10 ENCOUNTER — HOSPITAL ENCOUNTER (OUTPATIENT)
Dept: ULTRASOUND IMAGING | Facility: HOSPITAL | Age: 43
Discharge: HOME | End: 2018-04-10
Attending: INTERNAL MEDICINE | Admitting: INTERNAL MEDICINE

## 2018-04-10 DIAGNOSIS — K74.69 OTHER CIRRHOSIS OF LIVER (HCC): ICD-10-CM

## 2018-04-10 PROCEDURE — 76705 ECHO EXAM OF ABDOMEN: CPT

## 2018-04-12 ENCOUNTER — TELEPHONE (OUTPATIENT)
Dept: GASTROENTEROLOGY | Facility: CLINIC | Age: 43
End: 2018-04-12

## 2018-04-12 DIAGNOSIS — K74.60 CIRRHOSIS OF LIVER WITH ASCITES, UNSPECIFIED HEPATIC CIRRHOSIS TYPE (HCC): Primary | ICD-10-CM

## 2018-04-12 DIAGNOSIS — R18.8 CIRRHOSIS OF LIVER WITH ASCITES, UNSPECIFIED HEPATIC CIRRHOSIS TYPE (HCC): Primary | ICD-10-CM

## 2018-04-12 NOTE — TELEPHONE ENCOUNTER
Please let her know that her ultrasound shows some ascites otherwise no change.  I would like for her to increase her spironolactone to 2 pills in the morning and 1 pill in the afternoon.  Total of 3 pills daily.  Repeat BMP in 2 weeks

## 2018-04-13 NOTE — TELEPHONE ENCOUNTER
Called pt and advised per Dr Tierney that her us shows some ascites otherwise no change.  She would like her to increase her spironlactone to 2 pills in the am and one pill in the afternoon.  For a total of 3 pills daily.  She would also like her to have a bmp in 2 wks.  Pt verb understanding and made lab appt for 04/25 at 8am.

## 2018-05-01 RX ORDER — FUROSEMIDE 40 MG/1
TABLET ORAL
Qty: 30 TABLET | Refills: 2 | Status: SHIPPED | OUTPATIENT
Start: 2018-05-01 | End: 2019-01-24 | Stop reason: SDUPTHER

## 2018-05-01 NOTE — TELEPHONE ENCOUNTER
Charlene request received for Lasix 40 mg 1 tab po daily, #30, R2.  See note of 4/12 - pt to have repeat lab work.  No show for lab appt of 4/25.  Message to Dr Tierney.

## 2018-05-04 DIAGNOSIS — E55.9 VITAMIN D DEFICIENCY: ICD-10-CM

## 2018-05-04 RX ORDER — ERGOCALCIFEROL 1.25 MG/1
50000 CAPSULE ORAL WEEKLY
Qty: 12 CAPSULE | Refills: 1 | Status: SHIPPED | OUTPATIENT
Start: 2018-05-04 | End: 2019-01-24 | Stop reason: SDUPTHER

## 2018-05-08 ENCOUNTER — TRANSCRIBE ORDERS (OUTPATIENT)
Dept: ADMINISTRATIVE | Facility: HOSPITAL | Age: 43
End: 2018-05-08

## 2018-05-08 DIAGNOSIS — K76.0 NONALCOHOLIC FATTY LIVER DISEASE WITHOUT NONALCOHOLIC STEATOHEPATITIS (NASH): Primary | ICD-10-CM

## 2018-05-11 ENCOUNTER — HOSPITAL ENCOUNTER (OUTPATIENT)
Dept: ULTRASOUND IMAGING | Facility: HOSPITAL | Age: 43
Discharge: HOME | End: 2018-05-11
Admitting: INTERNAL MEDICINE

## 2018-05-11 VITALS
HEIGHT: 64 IN | BODY MASS INDEX: 32.95 KG/M2 | RESPIRATION RATE: 16 BRPM | DIASTOLIC BLOOD PRESSURE: 82 MMHG | HEART RATE: 79 BPM | WEIGHT: 193 LBS | OXYGEN SATURATION: 99 % | TEMPERATURE: 99 F | SYSTOLIC BLOOD PRESSURE: 126 MMHG

## 2018-05-11 DIAGNOSIS — K76.0 NONALCOHOLIC FATTY LIVER DISEASE WITHOUT NONALCOHOLIC STEATOHEPATITIS (NASH): ICD-10-CM

## 2018-05-11 LAB
INR PPP: 1.1 (ref 0.8–1.2)
PROTHROMBIN TIME: 12.7 SECONDS (ref 12.8–15.2)

## 2018-05-11 PROCEDURE — 76942 ECHO GUIDE FOR BIOPSY: CPT

## 2018-05-11 RX ORDER — LIDOCAINE HYDROCHLORIDE 10 MG/ML
20 INJECTION, SOLUTION INFILTRATION; PERINEURAL ONCE
Status: COMPLETED | OUTPATIENT
Start: 2018-05-11 | End: 2018-05-11

## 2018-05-11 RX ADMIN — LIDOCAINE HYDROCHLORIDE 13 ML: 10 INJECTION, SOLUTION INFILTRATION; PERINEURAL at 12:16

## 2018-05-11 NOTE — DISCHARGE INSTRUCTIONS
EDUCATION /DISCHARGE INSTRUCTIONS  Paracentesis:  A needle is inserted into the space between your abdominal organs and the membrane that surrounds them (peritoneal space).  It is done for the diagnosis and treatment of fluid that is resistant to other therapies.  It helps determine the cause of the fluid and at the relieves pressure created by the fluid.  A sample is obtained and sent to the laboratory for study.    During the procedure:  You will lie on a bed on your back with your legs drawn up.  Your abdomen will be exposed from the chest to the pelvis.  You will otherwise be covered to maintain comfort.  A physician will clean your abdomen with antiseptic soap, place a sterile towel around the site and administer a local anesthetic to numb the area.  The physician will insert a needle into your abdominal wall.  There may be a popping sound which signifies the needle has pierced the abdominal wall. Next, the physician will attach tubing to transfer a sample into a collection bottle.  After the fluid is obtained the needle will be removed.  A pressure dressing is applied to the site.    Risks of the procedure include but are not limited to:   *  Bleeding    *  Wound infection   *  Low blood pressure   *  Decreased urination   *  Low sodium if a large amount of fluid is removed   *  Puncture of abdominal organs by the needle    Benefits of the procedure:  Benefits include the removal of fluid from the abdomen, relief of abdominal pressure and facilitation of a diagnosis.    Alternatives to the procedure:  Possible alternatives are diuretic drug therapy or surgery to place a shunt to drain fluid.  Risks of diuretic drug therapy include possible dehydration and renal failure.  The benefit of drug therapy is that it can be done at home under physician supervision.  Risks of shunt placement include exposure to anesthesia, infection, excessive bleeding and injury to abdominal organs.  The benefit of a shunt is that it  can be used to drain fluid over a longer period of time.  THIS EDUCATION INFORMATION WAS REVIEWED PRIOR TO THE PROCEDURE AND CONSENT. Patient initials__________________Time___1035______________    Post procedure:    *  Weigh yourself daily.   *  Follow your doctors dietary instructions.   *  Rest today (no pushing pulling, straining or heavy lifting).   *  Slowly increase activity tomorow.   *  If you received sedation do not drive for 24 hours.              * Skin affix applied to puncture site. Do not try to remove, scratch or apply lotion   * Skin affix will fall off on it's own   *  You may shower tomorrow    Call your doctor if experiencing:   *  Signs of infection such as redness, swelling, excessive pain and / or foul       smelling drainage from the puncture site.   *  Chills or fever over 101 degrees (by mouth).   *  Fainting.   *  Rapid weight gain / loss.   *  Unrelieved pain.   *  Any new or severe symptoms.    Following the procedure:      Follow-up with the ordering physician as directed.   Continue to take other medications as directed by your physician unless    otherwise instructed.   If applicable, resume taking your blood thinners or Aspirin in 24 hours.              Skin Affix instructions     If you have any concerns please call the Radiology Nurses Desk at [phone].  You are the most important factor in your recovery.  Follow the above instructions carefully.

## 2018-05-12 ENCOUNTER — TELEPHONE (OUTPATIENT)
Dept: INTERVENTIONAL RADIOLOGY/VASCULAR | Facility: HOSPITAL | Age: 43
End: 2018-05-12

## 2018-05-23 ENCOUNTER — HOSPITAL ENCOUNTER (OUTPATIENT)
Facility: HOSPITAL | Age: 43
Setting detail: HOSPITAL OUTPATIENT SURGERY
Discharge: HOME | End: 2018-05-23
Attending: INTERNAL MEDICINE | Admitting: INTERNAL MEDICINE

## 2018-05-23 ENCOUNTER — ANESTHESIA EVENT (OUTPATIENT)
Dept: GASTROENTEROLOGY | Facility: HOSPITAL | Age: 43
End: 2018-05-23

## 2018-05-23 ENCOUNTER — ANESTHESIA (OUTPATIENT)
Dept: GASTROENTEROLOGY | Facility: HOSPITAL | Age: 43
End: 2018-05-23

## 2018-05-23 VITALS
HEART RATE: 79 BPM | SYSTOLIC BLOOD PRESSURE: 116 MMHG | RESPIRATION RATE: 18 BRPM | OXYGEN SATURATION: 97 % | WEIGHT: 190.44 LBS | TEMPERATURE: 97.8 F | DIASTOLIC BLOOD PRESSURE: 77 MMHG | BODY MASS INDEX: 32.67 KG/M2

## 2018-05-23 DIAGNOSIS — R60.9 FLUID RETENTION: ICD-10-CM

## 2018-05-23 DIAGNOSIS — K75.81 NASH (NONALCOHOLIC STEATOHEPATITIS): ICD-10-CM

## 2018-05-23 PROCEDURE — 43235 EGD DIAGNOSTIC BRUSH WASH: CPT | Performed by: INTERNAL MEDICINE

## 2018-05-23 PROCEDURE — 25010000002 PROPOFOL 10 MG/ML EMULSION: Performed by: ANESTHESIOLOGY

## 2018-05-23 PROCEDURE — S0260 H&P FOR SURGERY: HCPCS | Performed by: INTERNAL MEDICINE

## 2018-05-23 PROCEDURE — 86677 HELICOBACTER PYLORI ANTIBODY: CPT | Performed by: INTERNAL MEDICINE

## 2018-05-23 RX ORDER — LIDOCAINE HYDROCHLORIDE 20 MG/ML
INJECTION, SOLUTION INFILTRATION; PERINEURAL AS NEEDED
Status: DISCONTINUED | OUTPATIENT
Start: 2018-05-23 | End: 2018-05-23 | Stop reason: SURG

## 2018-05-23 RX ORDER — SUCRALFATE 1 G/1
1 TABLET ORAL 2 TIMES DAILY
Qty: 60 TABLET | Refills: 5 | Status: SHIPPED | OUTPATIENT
Start: 2018-05-23 | End: 2019-02-06 | Stop reason: SDUPTHER

## 2018-05-23 RX ORDER — PROPOFOL 10 MG/ML
VIAL (ML) INTRAVENOUS AS NEEDED
Status: DISCONTINUED | OUTPATIENT
Start: 2018-05-23 | End: 2018-05-23 | Stop reason: SURG

## 2018-05-23 RX ORDER — SPIRONOLACTONE 50 MG/1
150 TABLET, FILM COATED ORAL DAILY
Qty: 90 TABLET | Refills: 5 | Status: SHIPPED | OUTPATIENT
Start: 2018-05-23 | End: 2019-01-24 | Stop reason: SDUPTHER

## 2018-05-23 RX ORDER — SODIUM CHLORIDE, SODIUM LACTATE, POTASSIUM CHLORIDE, CALCIUM CHLORIDE 600; 310; 30; 20 MG/100ML; MG/100ML; MG/100ML; MG/100ML
1000 INJECTION, SOLUTION INTRAVENOUS CONTINUOUS
Status: DISCONTINUED | OUTPATIENT
Start: 2018-05-23 | End: 2018-05-23 | Stop reason: HOSPADM

## 2018-05-23 RX ADMIN — LIDOCAINE HYDROCHLORIDE 50 MG: 20 INJECTION, SOLUTION INFILTRATION; PERINEURAL at 10:01

## 2018-05-23 RX ADMIN — SODIUM CHLORIDE, POTASSIUM CHLORIDE, SODIUM LACTATE AND CALCIUM CHLORIDE 1000 ML: 600; 310; 30; 20 INJECTION, SOLUTION INTRAVENOUS at 09:24

## 2018-05-23 RX ADMIN — PROPOFOL 140 MG: 10 INJECTION, EMULSION INTRAVENOUS at 10:01

## 2018-05-23 RX ADMIN — PROPOFOL 20 MG: 10 INJECTION, EMULSION INTRAVENOUS at 10:04

## 2018-05-23 NOTE — DISCHARGE INSTRUCTIONS
For the next 24 hours patient needs to be with a responsible adult.    For 24 hours DO NOT drive, operate machinery, appliances, drink alcohol, make important decisions or sign legal documents.    Start with a light or bland diet and advance to regular diet as tolerated.    Follow recommendations on procedure report provided by your doctor.    Call Dr BLANCHARD for problems [phone]    Problems may include but not limited to: large amounts of bleeding, trouble breathing, repeated vomiting, severe unrelieved pain, fever or chills.

## 2018-05-23 NOTE — H&P
[facility]  Pre Procedure History & Physical    Chief Complaint:   Cirrhosis, hx varices    Subjective     HPI: Jaz Gipson is a  43 y.o. female here for a follow up visit for PIEDRA cirrhosis.  She is doing ok - completed Hep B vaccination series.  Has been playing phone tag with  transplant center.     She reports increasing abdominal distention and compliance with her diuretics.  She's been watching the salt in her diet.  She has not seen any blood in her stool or black tarry stool.  No nausea vomiting.  She will occasionally have heartburn if she eats something spicy.  No confusion.  She is working full-time.  She do recent CBC with improvement in her hemoglobin since discharge.       Past Medical History:   Past Medical History:   Diagnosis Date   • Abnormal cervical Papanicolaou smear     remote   • Acute gastric mucosal erosion     11/12 EGD   • Allergic rhinitis    • Asthma    • Cirrhosis of liver    • Duodenitis     11/12 EGD   • Enlarged lymph node     retroperitoneal lymph node enlarged, 5/12 PET CT consistent with low grade lymphoproliferative disorder (  [facility])  9/12Bone marrow bx negative    • Esophageal varices    • Fatty liver    • Folic acid deficiency    • Graves disease    • Leukocytosis    • PIEDRA (nonalcoholic steatohepatitis)    • Obstructive sleep apnea     4/10 dx, prescribed CPAP at 12 cm H2O (did not tolerate)       Past Surgical History:  Past Surgical History:   Procedure Laterality Date   • CHOLECYSTECTOMY      10/9/12 Madyson: Lap Cholecystectomy and Appendectomy, Liver Bx:Cirrhosis (Biliary Dyskinesia, Cholecystitis, Chronic Appendicitis)   • COLONOSCOPY N/A 4/22/2016    [facility]   • ENDOSCOPY N/A 4/22/2016    Procedure: ESOPHAGOGASTRODUODENOSCOPY;  Surgeon: Annie Tierney MD;  Location: [facility] ENDOSCOPY;  Service:    • ENDOSCOPY N/A 12/20/2016    Procedure: ESOPHAGOGASTRODUODENOSCOPY AT BEDSIDE;  Surgeon: Haresh Fritz MD;  Location: [facility] ENDOSCOPY;   Service:    • ENDOSCOPY N/A 12/22/2016    Procedure: ESOPHAGOGASTRODUODENOSCOPY WITH BANDING X2;  Surgeon: Chriss Reece MD;  Location: [facility] ENDOSCOPY;  Service:    • ENDOSCOPY N/A 12/26/2016    Procedure: ESOPHAGOGASTRODUODENOSCOPY AT BEDSIDE;  Surgeon: Vidya Norman MD;  Location: [facility] ENDOSCOPY;  Service:    • ENDOSCOPY N/A 2/8/2017    Procedure: ESOPHAGOGASTRODUODENOSCOPY;  Surgeon: Annie Tierney MD;  Location: [facility] ENDOSCOPY;  Service:    • ENDOSCOPY N/A 10/2/2017    Procedure: ESOPHAGOGASTRODUODENOSCOPY ;  Surgeon: Chriss Reece MD;  Location: [facility] ENDOSCOPY;  Service:    • ENDOSCOPY N/A 10/3/2017    Grade II esophageal varices, completely eradicated, portal hypertensive gastropathy   • ENDOSCOPY N/A 11/7/2017    Grade I esophageal varices.Scar in the lower third of the esophagus. Erosive gastritis with hemorrhage.Portal hypertensive gastropathy.A few bleeding angioectasias in the stomach. Injected. Treated with argon plasma coagulation (APC). No specimans collected.      • LAPAROSCOPIC APPENDECTOMY     • LIVER BIOPSY      9/12 Liver Bx done with Mariza and Appy (cirrhosis)   • UPPER GASTROINTESTINAL ENDOSCOPY      11/12 EGD with Erosive gastritis, duodenitis, and portal hypertensive gastropathy (Dr. Tierney) (no esophageal varices_       Family History:  Family History   Problem Relation Age of Onset   • Alcohol abuse Father    • Anxiety disorder Father    • COPD Father    • Depression Father    • Hyperlipidemia Father    • Hypertension Father    • Asthma Brother    • Coronary artery disease Brother    • Diabetes type II Other    • Malig Hyperthermia Neg Hx        Social History:   reports that she has been smoking Cigarettes.  She has been smoking about 0.25 packs per day. She has never used smokeless tobacco. She reports that she does not drink alcohol or use drugs.    Medications:   Prescriptions Prior to Admission   Medication Sig Dispense Refill Last Dose   • CARAFATE 1  GM/10ML suspension TAKE 10 MLS BY MOUTH FOUR TIMES A  mL 1 5/22/2018 at Unknown time   • ferrous sulfate 325 (65 FE) MG tablet Take 1 tablet by mouth Daily With Breakfast. 30 tablet 3 5/22/2018 at Unknown time   • furosemide (LASIX) 40 MG tablet TAKE ONE TABLET BY MOUTH DAILY 30 tablet 2 5/22/2018 at Unknown time   • lactulose (CHRONULAC) 10 GM/15ML solution Take 45 mL by mouth Every 12 (Twelve) Hours.   5/22/2018 at Unknown time   • levothyroxine (SYNTHROID, LEVOTHROID) 25 MCG tablet Take 1 tablet by mouth Daily. (Patient taking differently: Take 225 mcg by mouth Daily.) 90 tablet 3 5/22/2018 at Unknown time   • nadolol (CORGARD) 20 MG tablet TAKE ONE TABLET BY MOUTH DAILY 30 tablet 3 5/22/2018 at Unknown time   • pantoprazole (PROTONIX) 40 MG EC tablet Take 1 tablet by mouth 2 (Two) Times a Day Before Meals. 60 tablet 4 5/22/2018 at Unknown time   • potassium chloride (K-DUR,KLOR-CON) 20 MEQ CR tablet TAKE ONE TABLET BY MOUTH TWICE A DAY 60 tablet 1 5/22/2018 at Unknown time   • sertraline (ZOLOFT) 50 MG tablet TAKE ONE TABLET BY MOUTH DAILY 30 tablet 5 5/22/2018 at Unknown time   • spironolactone (ALDACTONE) 50 MG tablet Take 2 tablets by mouth Daily. (Patient taking differently: Take 150 mg by mouth Daily.) 90 tablet 2 5/22/2018 at Unknown time   • vitamin D (ERGOCALCIFEROL) 92233 units capsule capsule Take 1 capsule by mouth 1 (One) Time Per Week. 12 capsule 1 Past Week at Unknown time       Allergies:  Penicillins; Latex; and Tomato    ROS:    Pertinent items are noted in HPI, all other systems reviewed and negative     Objective     Blood pressure 118/78, pulse 83, temperature 98 °F (36.7 °C), temperature source Oral, resp. rate 16, weight 86.4 kg (190 lb 7 oz), SpO2 98 %.    Physical Exam   Constitutional: Pt is oriented to person, place, and time and well-developed, well-nourished, and in no distress.   Mouth/Throat: Oropharynx is clear and moist.   Neck: Normal range of motion.   Cardiovascular:  Normal rate, regular rhythm and normal heart sounds.    Pulmonary/Chest: Effort normal and breath sounds normal.   Abdominal: Soft. Nontender  Skin: Skin is warm and dry.   Psychiatric: Mood, memory, affect and judgment normal.     Assessment/Plan     Diagnosis:  Cirrhosis, hx varices    Anticipated Surgical Procedure:  egd    The risks, benefits, and alternatives of this procedure have been discussed with the patient or the responsible party- the patient understands and agrees to proceed.

## 2018-05-23 NOTE — ANESTHESIA POSTPROCEDURE EVALUATION
Patient: Jaz Gipson    Procedure Summary     Date:  05/23/18 Room / Location:  [facility] ENDOSCOPY 6 /  [facility]    Anesthesia Start:  0957 Anesthesia Stop:  1014    Procedure:  ESOPHAGOGASTRODUODENOSCOPY (N/A Esophagus) Diagnosis:       PIEDRA (nonalcoholic steatohepatitis)      Secondary esophageal varices without bleeding      Other cirrhosis of liver      (PIEDRA (nonalcoholic steatohepatitis) [K75.81])      (Secondary esophageal varices without bleeding [I85.10])      (Other cirrhosis of liver [K74.69])    Surgeon:  Annie Tierney MD Provider:  Pauline Chung MD    Anesthesia Type:  MAC ASA Status:  3          Anesthesia Type: MAC  Last vitals  BP   116/77 (05/23/18 1039)   Temp   36.6 °C (97.8 °F) (05/23/18 1011)   Pulse   79 (05/23/18 1039)   Resp   18 (05/23/18 1039)     SpO2   97 % (05/23/18 1039)     Post Anesthesia Care and Evaluation    Patient location during evaluation: PACU  Patient participation: complete - patient participated  Level of consciousness: awake and alert  Pain management: adequate  Airway patency: patent  Anesthetic complications: No anesthetic complications  PONV Status: none  Cardiovascular status: acceptable  Respiratory status: acceptable  Hydration status: acceptable

## 2018-05-23 NOTE — ANESTHESIA PREPROCEDURE EVALUATION
Anesthesia Evaluation     Patient summary reviewed and Nursing notes reviewed   no history of anesthetic complications:  NPO Solid Status: > 8 hours  NPO Liquid Status: > 2 hours           Airway   Mallampati: II  TM distance: >3 FB  Neck ROM: full  No difficulty expected  Dental - normal exam     Pulmonary - normal exam    breath sounds clear to auscultation  (+) a smoker Current Smoked day of surgery, asthma, sleep apnea,   Cardiovascular - normal exam    Rhythm: regular  Rate: normal    (+) hyperlipidemia,       Neuro/Psych  GI/Hepatic/Renal/Endo    (+)  GI bleeding, liver disease (PIEDRA), hypothyroidism,   (-) hepatitis, hyperthyroidism    ROS Comment: ESOPHAGEAL VARICES    Musculoskeletal (-) negative ROS    Abdominal    Substance History - negative use     OB/GYN negative ob/gyn ROS         Other   (+) blood dyscrasia (IRON DEFICIENCY ANEMIA)         Phys Exam Other: DISTENDED ABDOMEN              Anesthesia Plan    ASA 3     MAC     intravenous induction   Anesthetic plan and risks discussed with patient.

## 2018-05-25 LAB
H PYLORI IGA SER IA-ACNC: <9 UNITS (ref 0–8.9)
H PYLORI IGG SER IA-ACNC: 0.36 INDEX VALUE (ref 0–0.79)
H PYLORI IGM SER-ACNC: 12 UNITS (ref 0–8.9)

## 2018-06-11 ENCOUNTER — TELEPHONE (OUTPATIENT)
Dept: GASTROENTEROLOGY | Facility: CLINIC | Age: 43
End: 2018-06-11

## 2018-06-11 RX ORDER — CLARITHROMYCIN 500 MG/1
500 TABLET, COATED ORAL 2 TIMES DAILY
Qty: 28 TABLET | Refills: 0 | Status: SHIPPED | OUTPATIENT
Start: 2018-06-11 | End: 2019-02-06

## 2018-06-11 RX ORDER — METRONIDAZOLE 500 MG/1
500 TABLET ORAL 3 TIMES DAILY
Qty: 42 TABLET | Refills: 0 | Status: SHIPPED | OUTPATIENT
Start: 2018-06-11 | End: 2018-06-25

## 2018-06-11 NOTE — TELEPHONE ENCOUNTER
h pylori antibodies were elevated and given inflammation seen on recent egd would treat -    rx for clarithromycin and flagyl sent to pharmacy - take pantoprazole bid while taking these - f/u as scheduled

## 2018-06-12 NOTE — TELEPHONE ENCOUNTER
Call from pt.  Advise per Dr Tierney that h pylori antibodies were elevated and given inflammation seen on recent egd would tx.    Rx for clarithromycin and flagyl sent to pharmacy.  Take pantoprazole twice a day while taking these - f/u as scheduled.

## 2018-11-27 DIAGNOSIS — E03.9 HYPOTHYROIDISM, UNSPECIFIED TYPE: ICD-10-CM

## 2018-11-27 RX ORDER — LEVOTHYROXINE SODIUM 0.2 MG/1
200 TABLET ORAL DAILY
Qty: 90 TABLET | Refills: 1 | Status: CANCELLED | OUTPATIENT
Start: 2018-11-27

## 2018-11-27 RX ORDER — LEVOTHYROXINE SODIUM 0.03 MG/1
25 TABLET ORAL DAILY
Qty: 90 TABLET | Refills: 1 | Status: CANCELLED | OUTPATIENT
Start: 2018-11-27

## 2019-01-23 RX ORDER — FUROSEMIDE 40 MG/1
TABLET ORAL
Qty: 30 TABLET | Refills: 1 | OUTPATIENT
Start: 2019-01-23

## 2019-01-23 NOTE — TELEPHONE ENCOUNTER
She needs labs for this can be refilled-CBC, CMP, AFP, INR-please schedule office appointment as well

## 2019-01-24 ENCOUNTER — OFFICE VISIT (OUTPATIENT)
Dept: GASTROENTEROLOGY | Facility: CLINIC | Age: 44
End: 2019-01-24

## 2019-01-24 VITALS
TEMPERATURE: 98.1 F | SYSTOLIC BLOOD PRESSURE: 118 MMHG | DIASTOLIC BLOOD PRESSURE: 72 MMHG | HEIGHT: 64 IN | BODY MASS INDEX: 30.83 KG/M2 | WEIGHT: 180.6 LBS

## 2019-01-24 DIAGNOSIS — R60.9 FLUID RETENTION: ICD-10-CM

## 2019-01-24 DIAGNOSIS — E55.9 VITAMIN D DEFICIENCY: ICD-10-CM

## 2019-01-24 DIAGNOSIS — K75.81 NASH (NONALCOHOLIC STEATOHEPATITIS): ICD-10-CM

## 2019-01-24 DIAGNOSIS — I85.10 SECONDARY ESOPHAGEAL VARICES WITHOUT BLEEDING (HCC): ICD-10-CM

## 2019-01-24 DIAGNOSIS — K76.82 HEPATIC ENCEPHALOPATHY (HCC): ICD-10-CM

## 2019-01-24 DIAGNOSIS — K74.69 OTHER CIRRHOSIS OF LIVER (HCC): Primary | ICD-10-CM

## 2019-01-24 LAB
ALBUMIN SERPL-MCNC: 2.9 G/DL (ref 3.5–5.2)
ALBUMIN/GLOB SERPL: 0.4 G/DL
ALP SERPL-CCNC: 149 U/L (ref 39–117)
ALT SERPL-CCNC: 13 U/L (ref 1–33)
AMMONIA PLAS-MCNC: 108 UMOL/L (ref 11–51)
AST SERPL-CCNC: 35 U/L (ref 1–32)
BASOPHILS # BLD AUTO: 0.08 10*3/MM3 (ref 0–0.2)
BASOPHILS NFR BLD AUTO: 2.5 % (ref 0–1.5)
BILIRUB SERPL-MCNC: 1.9 MG/DL (ref 0.1–1.2)
BUN SERPL-MCNC: 10 MG/DL (ref 6–20)
BUN/CREAT SERPL: 11.9 (ref 7–25)
CALCIUM SERPL-MCNC: 8.5 MG/DL (ref 8.6–10.5)
CHLORIDE SERPL-SCNC: 104 MMOL/L (ref 98–107)
CO2 SERPL-SCNC: 22.3 MMOL/L (ref 22–29)
CREAT SERPL-MCNC: 0.84 MG/DL (ref 0.57–1)
EOSINOPHIL # BLD AUTO: 0.12 10*3/MM3 (ref 0–0.7)
EOSINOPHIL NFR BLD AUTO: 3.8 % (ref 0.3–6.2)
ERYTHROCYTE [DISTWIDTH] IN BLOOD BY AUTOMATED COUNT: 16.8 % (ref 11.7–13)
GLOBULIN SER CALC-MCNC: 7.6 GM/DL
GLUCOSE SERPL-MCNC: 87 MG/DL (ref 65–99)
HCT VFR BLD AUTO: 35.8 % (ref 35.6–45.5)
HGB BLD-MCNC: 11.1 G/DL (ref 11.9–15.5)
IMM GRANULOCYTES # BLD AUTO: 0 10*3/MM3 (ref 0–0.03)
IMM GRANULOCYTES NFR BLD AUTO: 0 % (ref 0–0.5)
INR PPP: 1.61 (ref 0.9–1.1)
LYMPHOCYTES # BLD AUTO: 0.81 10*3/MM3 (ref 0.9–4.8)
LYMPHOCYTES NFR BLD AUTO: 25.8 % (ref 19.6–45.3)
MCH RBC QN AUTO: 29.5 PG (ref 26.9–32)
MCHC RBC AUTO-ENTMCNC: 31 G/DL (ref 32.4–36.3)
MCV RBC AUTO: 95.2 FL (ref 80.5–98.2)
MONOCYTES # BLD AUTO: 0.33 10*3/MM3 (ref 0.2–1.2)
MONOCYTES NFR BLD AUTO: 10.5 % (ref 5–12)
NEUTROPHILS # BLD AUTO: 1.8 10*3/MM3 (ref 1.9–8.1)
NEUTROPHILS NFR BLD AUTO: 57.4 % (ref 42.7–76)
PLATELET # BLD AUTO: 67 10*3/MM3 (ref 140–500)
POTASSIUM SERPL-SCNC: 3.4 MMOL/L (ref 3.5–5.2)
PROT SERPL-MCNC: 10.5 G/DL (ref 6–8.5)
PROTHROMBIN TIME: 18.8 SECONDS (ref 11.7–14.2)
RBC # BLD AUTO: 3.76 10*6/MM3 (ref 3.9–5.2)
SODIUM SERPL-SCNC: 137 MMOL/L (ref 136–145)
WBC # BLD AUTO: 3.14 10*3/MM3 (ref 4.5–10.7)

## 2019-01-24 PROCEDURE — 99214 OFFICE O/P EST MOD 30 MIN: CPT | Performed by: NURSE PRACTITIONER

## 2019-01-24 RX ORDER — PANTOPRAZOLE SODIUM 40 MG/1
40 TABLET, DELAYED RELEASE ORAL
Qty: 60 TABLET | Refills: 11 | Status: SHIPPED | OUTPATIENT
Start: 2019-01-24 | End: 2020-03-04

## 2019-01-24 RX ORDER — ERGOCALCIFEROL 1.25 MG/1
50000 CAPSULE ORAL WEEKLY
Qty: 12 CAPSULE | Refills: 3 | Status: SHIPPED | OUTPATIENT
Start: 2019-01-24 | End: 2020-02-06

## 2019-01-24 RX ORDER — LACTULOSE 10 G/15ML
45 SOLUTION ORAL EVERY 12 HOURS
Qty: 946 ML | Refills: 3 | Status: SHIPPED | OUTPATIENT
Start: 2019-01-24 | End: 2019-12-02 | Stop reason: SDUPTHER

## 2019-01-24 RX ORDER — SPIRONOLACTONE 50 MG/1
150 TABLET, FILM COATED ORAL DAILY
Qty: 90 TABLET | Refills: 11 | Status: SHIPPED | OUTPATIENT
Start: 2019-01-24 | End: 2019-12-02 | Stop reason: SDUPTHER

## 2019-01-24 RX ORDER — FERROUS SULFATE 325(65) MG
325 TABLET ORAL
Qty: 30 TABLET | Refills: 11 | Status: SHIPPED | OUTPATIENT
Start: 2019-01-24 | End: 2019-11-01 | Stop reason: SINTOL

## 2019-01-24 RX ORDER — FUROSEMIDE 40 MG/1
40 TABLET ORAL DAILY
Qty: 30 TABLET | Refills: 11 | Status: SHIPPED | OUTPATIENT
Start: 2019-01-24 | End: 2019-12-02 | Stop reason: SDUPTHER

## 2019-01-24 NOTE — PROGRESS NOTES
Chief Complaint   Patient presents with   • Cirrhosis   • Abdominal Pain   • Nausea       Jaz Gipson is a  44 y.o. female here for a follow up visit for cirrhosis.     HPI  45 yo f presents accompanied by her daughters for follow up visit for PIEDRA cirrhosis, esophageal varices, hepatic encephalopathy and GERD. She is a patient of Dr. Tierney. She was last seen in the office on 4/2/18. She has hx PIEDRA cirrhosis and normally does well on lasix and aldactone. She has hx esophageal varices GRADE 1 and does take nadolol. She has hx HE and is prescribed lactulose but she admits lately she hasn't been taking it regularly enough because it causes loose stools and with her job she cannot just leave and go to the bathroom whenever she wants. So with her not taking it routinely she has been having more episodes of fatigue, day time drowsiness and some confusion. She is not on xifaxan. She does take daily iron. She has been seeing the  transplant center but she reports she is not bad enough yet for a transplant so they only want to see her yearly. She admits her appt with them is coming up. Her last paracentesis for ascites was done 5/11/18 and she had 3.25 liters removed. She admits her belly does seem to be putting more fluid back on. She denies any swelling in her legs or feet. She reports her bowels are normal. She is worried about her left sided abd pain. She admits the pain has been there for about 6 weeks. Feels like a pressure. Nothing makes it better or worse. She had her last EGD done 5/2018 and she had grade 1 esophageal varices and severe portal HTN noted. Path + H-pylori. She was treated for it. She does have hx GERD and takes protonix. She denies any dysphagia, reflux, N&V, diarrhea, constipation, rectal bleeding or melena. She admits her appetite is ok. Her weight has dropped from 195 on 4/2018 to 180 today.     Past Medical History:   Diagnosis Date   • Abnormal cervical Papanicolaou smear     remote   •  Acute gastric mucosal erosion     11/12 EGD   • Allergic rhinitis    • Asthma    • Cirrhosis of liver (CMS/HCC)    • Duodenitis     11/12 EGD   • Enlarged lymph node     retroperitoneal lymph node enlarged, 5/12 PET CT consistent with low grade lymphoproliferative disorder (  [facility])  9/12Bone marrow bx negative    • Esophageal varices (CMS/HCC)    • Fatty liver    • Folic acid deficiency    • Graves disease    • Leukocytosis    • PIEDRA (nonalcoholic steatohepatitis)    • Obstructive sleep apnea     4/10 dx, prescribed CPAP at 12 cm H2O (did not tolerate)       Past Surgical History:   Procedure Laterality Date   • CHOLECYSTECTOMY      10/9/12 Mad[facility]: Lap Cholecystectomy and Appendectomy, Liver Bx:Cirrhosis (Biliary Dyskinesia, Cholecystitis, Chronic Appendicitis)   • COLONOSCOPY N/A 4/22/2016    [facility]   • ENDOSCOPY N/A 4/22/2016    Procedure: ESOPHAGOGASTRODUODENOSCOPY;  Surgeon: Annie Tierney MD;  Location: [facility] ENDOSCOPY;  Service:    • ENDOSCOPY N/A 12/20/2016    Procedure: ESOPHAGOGASTRODUODENOSCOPY AT BEDSIDE;  Surgeon: Haresh Fritz MD;  Location: [facility] ENDOSCOPY;  Service:    • ENDOSCOPY N/A 12/22/2016    Procedure: ESOPHAGOGASTRODUODENOSCOPY WITH BANDING X2;  Surgeon: Chriss Reece MD;  Location: [facility] ENDOSCOPY;  Service:    • ENDOSCOPY N/A 12/26/2016    Procedure: ESOPHAGOGASTRODUODENOSCOPY AT BEDSIDE;  Surgeon: Vidya Norman MD;  Location: [facility] ENDOSCOPY;  Service:    • ENDOSCOPY N/A 2/8/2017    Grade I esophageal varices, scar in the lower third of the esophagus, Portal hypertensive gastropathy.     • ENDOSCOPY N/A 10/2/2017    Grade II esophageal varices, clotted blood in the gastric fundus and in the gastric body, portal hypertensive gastropathy.  No specimens collected.    • ENDOSCOPY N/A 10/3/2017    Grade II esophageal varices, completely eradicated, portal hypertensive gastropathy   • ENDOSCOPY N/A 11/7/2017    Grade I esophageal varices.Scar in the lower third of the  esophagus. Erosive gastritis with hemorrhage.Portal hypertensive gastropathy.A few bleeding angioectasias in the stomach. Injected. Treated with argon plasma coagulation (APC). No specimans collected.      • ENDOSCOPY N/A 5/23/2018    Grade I esoph varices, severe portal HTN gastropathy in fundus and body, scattered inflamm and erosions in antrum,    • LAPAROSCOPIC APPENDECTOMY     • LIVER BIOPSY      9/12 Liver Bx done with Mariza and Appy (cirrhosis)   • UPPER GASTROINTESTINAL ENDOSCOPY      11/12 EGD with Erosive gastritis, duodenitis, and portal hypertensive gastropathy (Dr. Tierney) (no esophageal varices_       Scheduled Meds:    Continuous Infusions:  No current facility-administered medications for this visit.     PRN Meds:.    Allergies   Allergen Reactions   • Penicillins Swelling   • Latex Rash   • Tomato Rash       Social History     Socioeconomic History   • Marital status: Single     Spouse name: Not on file   • Number of children: Not on file   • Years of education: Not on file   • Highest education level: Not on file   Social Needs   • Financial resource strain: Not on file   • Food insecurity - worry: Not on file   • Food insecurity - inability: Not on file   • Transportation needs - medical: Not on file   • Transportation needs - non-medical: Not on file   Occupational History   • Not on file   Tobacco Use   • Smoking status: Current Every Day Smoker     Packs/day: 0.25     Types: Cigarettes   • Smokeless tobacco: Never Used   Substance and Sexual Activity   • Alcohol use: No   • Drug use: No   • Sexual activity: Defer   Other Topics Concern   • Not on file   Social History Narrative   • Not on file       Family History   Problem Relation Age of Onset   • Alcohol abuse Father    • Anxiety disorder Father    • COPD Father    • Depression Father    • Hyperlipidemia Father    • Hypertension Father    • Asthma Brother    • Coronary artery disease Brother    • Diabetes type II Other    • Wilber  Hyperthermia Neg Hx        Review of Systems   Constitutional: Positive for fatigue and unexpected weight change. Negative for appetite change, chills, diaphoresis and fever.   HENT: Negative for nosebleeds, postnasal drip, sore throat, trouble swallowing and voice change.    Respiratory: Negative for cough, choking, chest tightness, shortness of breath and wheezing.    Cardiovascular: Negative for chest pain, palpitations and leg swelling.   Gastrointestinal: Positive for abdominal distention, abdominal pain and nausea. Negative for anal bleeding, blood in stool, constipation, diarrhea, rectal pain and vomiting.   Endocrine: Negative for polydipsia, polyphagia and polyuria.   Musculoskeletal: Negative for gait problem.   Skin: Negative for rash and wound.   Allergic/Immunologic: Negative for food allergies.   Neurological: Negative for dizziness, speech difficulty and light-headedness.   Psychiatric/Behavioral: Negative for confusion, self-injury, sleep disturbance and suicidal ideas.       Vitals:    01/24/19 1028   BP: 118/72   Temp: 98.1 °F (36.7 °C)       Physical Exam   Constitutional: She is oriented to person, place, and time. She appears well-developed and well-nourished. She does not appear ill. No distress.   HENT:   Head: Normocephalic.   Eyes: Pupils are equal, round, and reactive to light.   Cardiovascular: Normal rate, regular rhythm and normal heart sounds.   Pulmonary/Chest: Effort normal and breath sounds normal.   Abdominal: Soft. Bowel sounds are normal. She exhibits distension and ascites. She exhibits no mass. There is no hepatosplenomegaly. There is tenderness. There is no rebound and no guarding. No hernia.       Musculoskeletal: Normal range of motion.   Neurological: She is alert and oriented to person, place, and time.   Skin: Skin is warm and dry.   Psychiatric: She has a normal mood and affect. Her speech is normal and behavior is normal. Judgment normal.       No images are attached to  the encounter.    Assessment & Plan     1. Other cirrhosis of liver (CMS/HCC)  - CBC & Differential  - Comprehensive Metabolic Panel  - Ammonia  - Protime-INR  - AFP Tumor Marker  - CT Abdomen Pelvis With Contrast; Future  - furosemide (LASIX) 40 MG tablet; Take 1 tablet by mouth Daily.  Dispense: 30 tablet; Refill: 11  - Case Request; Standing  - Case Request    2. PIEDRA (nonalcoholic steatohepatitis)  - Comprehensive Metabolic Panel  - CT Abdomen Pelvis With Contrast; Future  - spironolactone (ALDACTONE) 50 MG tablet; Take 3 tablets by mouth Daily.  Dispense: 90 tablet; Refill: 11  - Case Request; Standing  - Case Request    3. Secondary esophageal varices without bleeding (CMS/HCC)  - CT Abdomen Pelvis With Contrast; Future  - Case Request; Standing  - Case Request    4. Hepatic encephalopathy (CMS/HCC)  - Comprehensive Metabolic Panel  - Ammonia  - AFP Tumor Marker  - rifaximin (XIFAXAN) 550 MG tablet; Take 1 tablet by mouth Every 12 (Twelve) Hours.  Dispense: 60 tablet; Refill: 11  - lactulose (CHRONULAC) 10 GM/15ML solution; Take 45 mL by mouth Every 12 (Twelve) Hours.  Dispense: 946 mL; Refill: 3  - Case Request; Standing  - Case Request    5. Fluid retention  - spironolactone (ALDACTONE) 50 MG tablet; Take 3 tablets by mouth Daily.  Dispense: 90 tablet; Refill: 11    6. Vitamin D deficiency  - vitamin D (ERGOCALCIFEROL) 29164 units capsule capsule; Take 1 capsule by mouth 1 (One) Time Per Week.  Dispense: 12 capsule; Refill: 3      I am not sure what is causing her left sided pain. Sounds more like a musculoskeletal issue or maybe even a back issue. But given hx and current symptoms will check labs and CT scan abd/pelvis for further evaluation. She is due for EGD as well. Will have her schedule that with Dr. Tierney soon. She is also scheduled for yearly follow up with  transplant center. Given her hx hepatic encephalopathy with her having more issues with fatigue and confusion will start her on some  xifaxan. Discussed her getting back on a routine regimen of lactulose. Continue same meds for now. She does have some minimal ascites on exam today. Will wait for CT scan to see how much. NO dependent edema was noted today. Will have her follow up with Dr. Tierney after the EGD. Continue a 2 gram sodium diet. Continue to drink lots of water. Call office with any issues.

## 2019-01-25 ENCOUNTER — TELEPHONE (OUTPATIENT)
Dept: GASTROENTEROLOGY | Facility: CLINIC | Age: 44
End: 2019-01-25

## 2019-01-25 LAB — AFP-TM SERPL-MCNC: 2.4 NG/ML (ref 0–8.3)

## 2019-01-28 ENCOUNTER — PRIOR AUTHORIZATION (OUTPATIENT)
Dept: GASTROENTEROLOGY | Facility: CLINIC | Age: 44
End: 2019-01-28

## 2019-01-28 NOTE — TELEPHONE ENCOUNTER
PA for Pantoprazole sent to [facility] with approval. Faxed to pharmacy for dispensing

## 2019-02-01 ENCOUNTER — HOSPITAL ENCOUNTER (OUTPATIENT)
Dept: CT IMAGING | Facility: HOSPITAL | Age: 44
Discharge: HOME | End: 2019-02-01
Admitting: NURSE PRACTITIONER

## 2019-02-01 DIAGNOSIS — I85.10 SECONDARY ESOPHAGEAL VARICES WITHOUT BLEEDING (HCC): ICD-10-CM

## 2019-02-01 DIAGNOSIS — K75.81 NASH (NONALCOHOLIC STEATOHEPATITIS): ICD-10-CM

## 2019-02-01 DIAGNOSIS — K74.69 OTHER CIRRHOSIS OF LIVER (HCC): ICD-10-CM

## 2019-02-01 PROCEDURE — 0 DIATRIZOATE MEGLUMINE & SODIUM PER 1 ML: Performed by: NURSE PRACTITIONER

## 2019-02-01 PROCEDURE — 74177 CT ABD & PELVIS W/CONTRAST: CPT

## 2019-02-01 PROCEDURE — 25010000002 IOPAMIDOL 61 % SOLUTION: Performed by: NURSE PRACTITIONER

## 2019-02-01 RX ADMIN — IOPAMIDOL 85 ML: 612 INJECTION, SOLUTION INTRAVENOUS at 09:59

## 2019-02-01 RX ADMIN — DIATRIZOATE MEGLUMINE AND DIATRIZOATE SODIUM 30 ML: 660; 100 LIQUID ORAL; RECTAL at 09:00

## 2019-02-04 ENCOUNTER — TELEPHONE (OUTPATIENT)
Dept: GASTROENTEROLOGY | Facility: CLINIC | Age: 44
End: 2019-02-04

## 2019-02-04 NOTE — TELEPHONE ENCOUNTER
"Pt called and advised per Stephy NP that her ct scan showed liver cirrhosis and numberous enlarged lymph nodes that are unchanged.  It also showed a sm amount of asicities.     Pt verb understanding and reports that she is still having left sided pain.  She reports she feels like she has \"way too much fluid \" on her abdomen.  She states it is very uncomfortable for her to bend over and tie her shoes.  She is asking if the lasix should be increased or does she need a paracentesis.  Pt reports she see Dr Tierney tomorrow at her scope and her mother will talk to her.  Advised pt will update Dr Tierney. She verb understanding.   "

## 2019-02-04 NOTE — TELEPHONE ENCOUNTER
----- Message from LON Rojas sent at 2/4/2019 11:22 AM EST -----  Impression    1. Hepatic cirrhosis with sequela of portal hypertension to include  splenomegaly and portosystemic collaterals. Numerous enlarged abdominal  lymph nodes are unchanged and likely related to patient's liver disease.  Low attenuating areas of the liver secondary to steatosis and/or  fibrosis. No suspicious lesion seen on this single phase study.  2. Small ascites. Mild peritoneal enhancement is seen along with ascites  which is nonspecific and may be secondary to recent paracentesis,  although an infectious etiology cannot be excluded.    Please call patient and see how she is doing? Hows her abdomen? Does she feel she has more ascites or she is doing ok right now? Thanks.

## 2019-02-05 ENCOUNTER — HOSPITAL ENCOUNTER (OUTPATIENT)
Facility: HOSPITAL | Age: 44
Setting detail: HOSPITAL OUTPATIENT SURGERY
Discharge: HOME | End: 2019-02-05
Attending: INTERNAL MEDICINE | Admitting: INTERNAL MEDICINE

## 2019-02-05 ENCOUNTER — ANESTHESIA (OUTPATIENT)
Dept: GASTROENTEROLOGY | Facility: HOSPITAL | Age: 44
End: 2019-02-05

## 2019-02-05 ENCOUNTER — PREP FOR SURGERY (OUTPATIENT)
Dept: OTHER | Facility: HOSPITAL | Age: 44
End: 2019-02-05

## 2019-02-05 ENCOUNTER — ANESTHESIA EVENT (OUTPATIENT)
Dept: GASTROENTEROLOGY | Facility: HOSPITAL | Age: 44
End: 2019-02-05

## 2019-02-05 VITALS
TEMPERATURE: 98.2 F | RESPIRATION RATE: 14 BRPM | HEART RATE: 89 BPM | OXYGEN SATURATION: 97 % | SYSTOLIC BLOOD PRESSURE: 115 MMHG | WEIGHT: 181.56 LBS | DIASTOLIC BLOOD PRESSURE: 75 MMHG | BODY MASS INDEX: 31.17 KG/M2

## 2019-02-05 DIAGNOSIS — I85.10 SECONDARY ESOPHAGEAL VARICES WITHOUT BLEEDING (HCC): Primary | ICD-10-CM

## 2019-02-05 PROCEDURE — 43235 EGD DIAGNOSTIC BRUSH WASH: CPT | Performed by: INTERNAL MEDICINE

## 2019-02-05 PROCEDURE — S0260 H&P FOR SURGERY: HCPCS | Performed by: INTERNAL MEDICINE

## 2019-02-05 PROCEDURE — 25010000002 PROPOFOL 10 MG/ML EMULSION: Performed by: ANESTHESIOLOGY

## 2019-02-05 PROCEDURE — 81025 URINE PREGNANCY TEST: CPT | Performed by: INTERNAL MEDICINE

## 2019-02-05 RX ORDER — METOCLOPRAMIDE 10 MG/1
10 TABLET ORAL 4 TIMES DAILY
Qty: 28 TABLET | Refills: 0 | Status: SHIPPED | OUTPATIENT
Start: 2019-02-05 | End: 2019-02-12

## 2019-02-05 RX ORDER — GLYCOPYRROLATE 0.2 MG/ML
INJECTION INTRAMUSCULAR; INTRAVENOUS AS NEEDED
Status: DISCONTINUED | OUTPATIENT
Start: 2019-02-05 | End: 2019-02-05 | Stop reason: SURG

## 2019-02-05 RX ORDER — PROPOFOL 10 MG/ML
VIAL (ML) INTRAVENOUS AS NEEDED
Status: DISCONTINUED | OUTPATIENT
Start: 2019-02-05 | End: 2019-02-05 | Stop reason: SURG

## 2019-02-05 RX ORDER — LIDOCAINE HYDROCHLORIDE 20 MG/ML
INJECTION, SOLUTION INFILTRATION; PERINEURAL AS NEEDED
Status: DISCONTINUED | OUTPATIENT
Start: 2019-02-05 | End: 2019-02-05 | Stop reason: SURG

## 2019-02-05 RX ORDER — SODIUM CHLORIDE, SODIUM LACTATE, POTASSIUM CHLORIDE, CALCIUM CHLORIDE 600; 310; 30; 20 MG/100ML; MG/100ML; MG/100ML; MG/100ML
1000 INJECTION, SOLUTION INTRAVENOUS CONTINUOUS
Status: DISCONTINUED | OUTPATIENT
Start: 2019-02-05 | End: 2019-02-05 | Stop reason: HOSPADM

## 2019-02-05 RX ADMIN — PROPOFOL 50 MG: 10 INJECTION, EMULSION INTRAVENOUS at 08:52

## 2019-02-05 RX ADMIN — LIDOCAINE HYDROCHLORIDE 40 MG: 20 INJECTION, SOLUTION INFILTRATION; PERINEURAL at 08:46

## 2019-02-05 RX ADMIN — SODIUM CHLORIDE, POTASSIUM CHLORIDE, SODIUM LACTATE AND CALCIUM CHLORIDE 1000 ML: 600; 310; 30; 20 INJECTION, SOLUTION INTRAVENOUS at 08:40

## 2019-02-05 RX ADMIN — SODIUM CHLORIDE, POTASSIUM CHLORIDE, SODIUM LACTATE AND CALCIUM CHLORIDE: 600; 310; 30; 20 INJECTION, SOLUTION INTRAVENOUS at 08:44

## 2019-02-05 RX ADMIN — GLYCOPYRROLATE 0.1 MCG: 0.2 INJECTION INTRAMUSCULAR; INTRAVENOUS at 08:44

## 2019-02-05 RX ADMIN — PROPOFOL 50 MG: 10 INJECTION, EMULSION INTRAVENOUS at 08:50

## 2019-02-05 NOTE — ANESTHESIA POSTPROCEDURE EVALUATION
Patient: Jaz Gipson    Procedure Summary     Date:  02/05/19 Room / Location:  [facility] ENDOSCOPY 6 /  [facility]    Anesthesia Start:  0844 Anesthesia Stop:  0858    Procedure:  ESOPHAGOGASTRODUODENOSCOPY (N/A Esophagus) Diagnosis:       Other cirrhosis of liver (CMS/HCC)      PIEDRA (nonalcoholic steatohepatitis)      Secondary esophageal varices without bleeding (CMS/HCC)      Hepatic encephalopathy (CMS/HCC)      (Other cirrhosis of liver (CMS/HCC) [K74.69])      (PIEDRA (nonalcoholic steatohepatitis) [K75.81])      (Secondary esophageal varices without bleeding (CMS/HCC) [I85.10])      (Hepatic encephalopathy (CMS/HCC) [K72.90])    Surgeon:  Annie Tierney MD Provider:  Jean Jurado MD    Anesthesia Type:  MAC ASA Status:  3          Anesthesia Type: MAC  Last vitals  BP   125/83 (02/05/19 0830)   Temp   36.7 °C (98.1 °F) (02/05/19 0830)   Pulse   95 (02/05/19 0830)   Resp   14 (02/05/19 0830)     SpO2   98 % (02/05/19 0830)     Post Anesthesia Care and Evaluation    Patient location during evaluation: bedside  Patient participation: complete - patient participated  Level of consciousness: awake  Pain score: 2  Pain management: adequate  Airway patency: patent  Anesthetic complications: No anesthetic complications  PONV Status: none  Cardiovascular status: acceptable  Respiratory status: acceptable  Hydration status: acceptable    Comments: /67 (BP Location: Left arm, Patient Position: Lying)   Pulse 95   Temp 36.7 °C (98.1 °F) (Oral)   Resp 14   Wt 82.4 kg (181 lb 9 oz)   SpO2 98%   BMI 31.17 kg/m²

## 2019-02-05 NOTE — ANESTHESIA PREPROCEDURE EVALUATION
Anesthesia Evaluation     Patient summary reviewed and Nursing notes reviewed   NPO Solid Status: > 8 hours  NPO Liquid Status: > 8 hours           Airway   Mallampati: II  TM distance: >3 FB  Neck ROM: full  Dental - normal exam     Pulmonary - normal exam   (+) a smoker Current Smoked day of surgery, asthma, sleep apnea,   Cardiovascular - normal exam    (+) hyperlipidemia,       Neuro/Psych- negative ROS  GI/Hepatic/Renal/Endo    (+)  GI bleeding, liver disease, hypothyroidism, hyperthyroidism    ROS Comment:  cirrhosis     Musculoskeletal (-) negative ROS    Abdominal    Substance History - negative use     OB/GYN negative ob/gyn ROS         Other                        Anesthesia Plan    ASA 3     MAC     Anesthetic plan, all risks, benefits, and alternatives have been provided, discussed and informed consent has been obtained with: patient.

## 2019-02-06 RX ORDER — SUCRALFATE 1 G/1
1 TABLET ORAL 2 TIMES DAILY
Qty: 60 TABLET | Refills: 2 | Status: SHIPPED | OUTPATIENT
Start: 2019-02-06 | End: 2020-01-23

## 2019-02-06 RX ORDER — POTASSIUM CHLORIDE 20 MEQ/1
20 TABLET, EXTENDED RELEASE ORAL 2 TIMES DAILY
Qty: 60 TABLET | Refills: 1 | Status: SHIPPED | OUTPATIENT
Start: 2019-02-06 | End: 2019-12-02 | Stop reason: SDUPTHER

## 2019-02-06 NOTE — H&P
[facility]  Pre Procedure History & Physical    Chief Complaint:   luq pain    Subjective     HPI:   45 yo with hx angulo cirrhosis, esophageal varices w/prior bleeding for egd due to luq pain.  CT with no significant ascites - some peritoneal enhancement noted.  No fever, no leukocytosis    Past Medical History:   Past Medical History:   Diagnosis Date   • Abnormal cervical Papanicolaou smear     remote   • Acute gastric mucosal erosion     11/12 EGD   • Allergic rhinitis    • Asthma    • Cirrhosis of liver (CMS/HCC)    • Duodenitis     11/12 EGD   • Enlarged lymph node     retroperitoneal lymph node enlarged, 5/12 PET CT consistent with low grade lymphoproliferative disorder (  [facility])  9/12Bone marrow bx negative    • Esophageal varices (CMS/HCC)    • Fatty liver    • Folic acid deficiency    • Graves disease    • Leukocytosis    • ANGULO (nonalcoholic steatohepatitis)    • Obstructive sleep apnea     4/10 dx, prescribed CPAP at 12 cm H2O (did not tolerate)       Past Surgical History:  Past Surgical History:   Procedure Laterality Date   • CHOLECYSTECTOMY      10/9/12 Madyson: Lap Cholecystectomy and Appendectomy, Liver Bx:Cirrhosis (Biliary Dyskinesia, Cholecystitis, Chronic Appendicitis)   • COLONOSCOPY N/A 4/22/2016    [facility]   • ENDOSCOPY N/A 4/22/2016    Procedure: ESOPHAGOGASTRODUODENOSCOPY;  Surgeon: Annie Tierney MD;  Location: [facility] ENDOSCOPY;  Service:    • ENDOSCOPY N/A 12/20/2016    Procedure: ESOPHAGOGASTRODUODENOSCOPY AT BEDSIDE;  Surgeon: Haresh Fritz MD;  Location: [facility] ENDOSCOPY;  Service:    • ENDOSCOPY N/A 12/22/2016    Procedure: ESOPHAGOGASTRODUODENOSCOPY WITH BANDING X2;  Surgeon: Chriss Reece MD;  Location: [facility] ENDOSCOPY;  Service:    • ENDOSCOPY N/A 12/26/2016    Procedure: ESOPHAGOGASTRODUODENOSCOPY AT BEDSIDE;  Surgeon: Vidya Norman MD;  Location: [facility] ENDOSCOPY;  Service:    • ENDOSCOPY N/A 2/8/2017    Grade I esophageal varices, scar in  the lower third of the esophagus, Portal hypertensive gastropathy.     • ENDOSCOPY N/A 10/2/2017    Grade II esophageal varices, clotted blood in the gastric fundus and in the gastric body, portal hypertensive gastropathy.  No specimens collected.    • ENDOSCOPY N/A 10/3/2017    Grade II esophageal varices, completely eradicated, portal hypertensive gastropathy   • ENDOSCOPY N/A 11/7/2017    Grade I esophageal varices.Scar in the lower third of the esophagus. Erosive gastritis with hemorrhage.Portal hypertensive gastropathy.A few bleeding angioectasias in the stomach. Injected. Treated with argon plasma coagulation (APC). No specimans collected.      • ENDOSCOPY N/A 5/23/2018    Grade I esoph varices, severe portal HTN gastropathy in fundus and body, scattered inflamm and erosions in antrum,    • ENDOSCOPY N/A 2/5/2019    Procedure: ESOPHAGOGASTRODUODENOSCOPY;  Surgeon: Annie Tierney MD;  Location: [facility] ENDOSCOPY;  Service: Gastroenterology   • LAPAROSCOPIC APPENDECTOMY     • LIVER BIOPSY      9/12 Liver Bx done with Mariza and Appy (cirrhosis)   • UPPER GASTROINTESTINAL ENDOSCOPY      11/12 EGD with Erosive gastritis, duodenitis, and portal hypertensive gastropathy (Dr. Tierney) (no esophageal varices_       Family History:  Family History   Problem Relation Age of Onset   • Alcohol abuse Father    • Anxiety disorder Father    • COPD Father    • Depression Father    • Hyperlipidemia Father    • Hypertension Father    • Asthma Brother    • Coronary artery disease Brother    • Diabetes type II Other    • Malig Hyperthermia Neg Hx        Social History:   reports that she has been smoking cigarettes.  She has been smoking about 0.25 packs per day. she has never used smokeless tobacco. She reports that she does not drink alcohol or use drugs.    Medications:   No medications prior to admission.       Allergies:  Penicillins; Latex; and Tomato    ROS:    Pertinent items are noted in HPI, all other systems reviewed  and negative     Objective     Blood pressure 115/75, pulse 89, temperature 98.2 °F (36.8 °C), temperature source Oral, resp. rate 14, weight 82.4 kg (181 lb 9 oz), SpO2 97 %.    Physical Exam   Constitutional: Pt is oriented to person, place, and time and well-developed, well-nourished, and in no distress.   Mouth/Throat: Oropharynx is clear and moist.   Neck: Normal range of motion.   Cardiovascular: Normal rate, regular rhythm   Pulmonary/Chest: Effort normal  Abdominal: Soft. Nontender  Skin: Skin is warm and dry.   Psychiatric: Mood, memory, affect and judgment normal.     Assessment/Plan     Diagnosis:  luq pain, cirrhosis w/hx of varices    Anticipated Surgical Procedure:  egd    The risks, benefits, and alternatives of this procedure have been discussed with the patient or the responsible party- the patient understands and agrees to proceed.

## 2019-02-12 ENCOUNTER — ANESTHESIA (OUTPATIENT)
Dept: GASTROENTEROLOGY | Facility: HOSPITAL | Age: 44
End: 2019-02-12

## 2019-02-12 ENCOUNTER — HOSPITAL ENCOUNTER (OUTPATIENT)
Facility: HOSPITAL | Age: 44
Setting detail: HOSPITAL OUTPATIENT SURGERY
Discharge: HOME | End: 2019-02-12
Attending: INTERNAL MEDICINE | Admitting: INTERNAL MEDICINE

## 2019-02-12 ENCOUNTER — ANESTHESIA EVENT (OUTPATIENT)
Dept: GASTROENTEROLOGY | Facility: HOSPITAL | Age: 44
End: 2019-02-12

## 2019-02-12 VITALS
BODY MASS INDEX: 31.41 KG/M2 | RESPIRATION RATE: 16 BRPM | WEIGHT: 184 LBS | HEIGHT: 64 IN | HEART RATE: 84 BPM | DIASTOLIC BLOOD PRESSURE: 76 MMHG | SYSTOLIC BLOOD PRESSURE: 114 MMHG | TEMPERATURE: 98.2 F | OXYGEN SATURATION: 98 %

## 2019-02-12 PROCEDURE — 25010000002 PROPOFOL 10 MG/ML EMULSION: Performed by: ANESTHESIOLOGY

## 2019-02-12 PROCEDURE — S0260 H&P FOR SURGERY: HCPCS | Performed by: INTERNAL MEDICINE

## 2019-02-12 PROCEDURE — 43244 EGD VARICES LIGATION: CPT | Performed by: INTERNAL MEDICINE

## 2019-02-12 PROCEDURE — 81025 URINE PREGNANCY TEST: CPT | Performed by: INTERNAL MEDICINE

## 2019-02-12 RX ORDER — LIDOCAINE HYDROCHLORIDE 20 MG/ML
INJECTION, SOLUTION INFILTRATION; PERINEURAL AS NEEDED
Status: DISCONTINUED | OUTPATIENT
Start: 2019-02-12 | End: 2019-02-12 | Stop reason: SURG

## 2019-02-12 RX ORDER — SODIUM CHLORIDE 0.9 % (FLUSH) 0.9 %
3-10 SYRINGE (ML) INJECTION AS NEEDED
Status: DISCONTINUED | OUTPATIENT
Start: 2019-02-12 | End: 2019-02-12 | Stop reason: HOSPADM

## 2019-02-12 RX ORDER — SODIUM CHLORIDE 0.9 % (FLUSH) 0.9 %
3 SYRINGE (ML) INJECTION EVERY 12 HOURS SCHEDULED
Status: DISCONTINUED | OUTPATIENT
Start: 2019-02-12 | End: 2019-02-12 | Stop reason: HOSPADM

## 2019-02-12 RX ORDER — SODIUM CHLORIDE, SODIUM LACTATE, POTASSIUM CHLORIDE, CALCIUM CHLORIDE 600; 310; 30; 20 MG/100ML; MG/100ML; MG/100ML; MG/100ML
30 INJECTION, SOLUTION INTRAVENOUS CONTINUOUS PRN
Status: DISCONTINUED | OUTPATIENT
Start: 2019-02-12 | End: 2019-02-12 | Stop reason: HOSPADM

## 2019-02-12 RX ORDER — PROPOFOL 10 MG/ML
VIAL (ML) INTRAVENOUS AS NEEDED
Status: DISCONTINUED | OUTPATIENT
Start: 2019-02-12 | End: 2019-02-12 | Stop reason: SURG

## 2019-02-12 RX ORDER — PROPOFOL 10 MG/ML
VIAL (ML) INTRAVENOUS CONTINUOUS PRN
Status: DISCONTINUED | OUTPATIENT
Start: 2019-02-12 | End: 2019-02-12 | Stop reason: SURG

## 2019-02-12 RX ADMIN — SODIUM CHLORIDE, POTASSIUM CHLORIDE, SODIUM LACTATE AND CALCIUM CHLORIDE 30 ML/HR: 600; 310; 30; 20 INJECTION, SOLUTION INTRAVENOUS at 09:32

## 2019-02-12 RX ADMIN — LIDOCAINE HYDROCHLORIDE 60 MG: 20 INJECTION, SOLUTION INFILTRATION; PERINEURAL at 09:57

## 2019-02-12 RX ADMIN — PROPOFOL 120 MG: 10 INJECTION, EMULSION INTRAVENOUS at 09:57

## 2019-02-12 RX ADMIN — PROPOFOL 300 MCG/KG/MIN: 10 INJECTION, EMULSION INTRAVENOUS at 09:57

## 2019-02-12 RX ADMIN — SODIUM CHLORIDE, POTASSIUM CHLORIDE, SODIUM LACTATE AND CALCIUM CHLORIDE: 600; 310; 30; 20 INJECTION, SOLUTION INTRAVENOUS at 09:55

## 2019-02-12 NOTE — H&P
[facility]  Pre Procedure History & Physical    Chief Complaint:   Hx varices    Subjective     HPI: 45 yo with hx angulo cirrhosis, esophageal varices w/prior bleeding for egd due to luq pain.  CT with no significant ascites - some peritoneal enhancement noted.  No fever, no leukocytosis     EGD last week with retained gastric contents      Past Medical History:   Past Medical History:   Diagnosis Date   • Abnormal cervical Papanicolaou smear     remote   • Acute gastric mucosal erosion     11/12 EGD   • Allergic rhinitis    • Asthma    • Cirrhosis of liver (CMS/HCC)    • Duodenitis     11/12 EGD   • Enlarged lymph node     retroperitoneal lymph node enlarged, 5/12 PET CT consistent with low grade lymphoproliferative disorder (  [facility])  9/12Bone marrow bx negative    • Esophageal varices (CMS/HCC)    • Fatty liver    • Folic acid deficiency    • Graves disease    • Leukocytosis    • ANGULO (nonalcoholic steatohepatitis)    • Obstructive sleep apnea     4/10 dx, prescribed CPAP at 12 cm H2O (did not tolerate)       Past Surgical History:  Past Surgical History:   Procedure Laterality Date   • CHOLECYSTECTOMY      10/9/12 [place]: Lap Cholecystectomy and Appendectomy, Liver Bx:Cirrhosis (Biliary Dyskinesia, Cholecystitis, Chronic Appendicitis)   • COLONOSCOPY N/A 4/22/2016    [facility]   • ENDOSCOPY N/A 4/22/2016    Procedure: ESOPHAGOGASTRODUODENOSCOPY;  Surgeon: Annie Tierney MD;  Location: [facility] ENDOSCOPY;  Service:    • ENDOSCOPY N/A 12/20/2016    Procedure: ESOPHAGOGASTRODUODENOSCOPY AT BEDSIDE;  Surgeon: Haresh Fritz MD;  Location: [facility] ENDOSCOPY;  Service:    • ENDOSCOPY N/A 12/22/2016    Procedure: ESOPHAGOGASTRODUODENOSCOPY WITH BANDING X2;  Surgeon: Chriss Reece MD;  Location: [facility] ENDOSCOPY;  Service:    • ENDOSCOPY N/A 12/26/2016    Procedure: ESOPHAGOGASTRODUODENOSCOPY AT BEDSIDE;  Surgeon: Vidya Norman MD;  Location: [facility] ENDOSCOPY;  Service:    • ENDOSCOPY  N/A 2/8/2017    Grade I esophageal varices, scar in the lower third of the esophagus, Portal hypertensive gastropathy.     • ENDOSCOPY N/A 10/2/2017    Grade II esophageal varices, clotted blood in the gastric fundus and in the gastric body, portal hypertensive gastropathy.  No specimens collected.    • ENDOSCOPY N/A 10/3/2017    Grade II esophageal varices, completely eradicated, portal hypertensive gastropathy   • ENDOSCOPY N/A 11/7/2017    Grade I esophageal varices.Scar in the lower third of the esophagus. Erosive gastritis with hemorrhage.Portal hypertensive gastropathy.A few bleeding angioectasias in the stomach. Injected. Treated with argon plasma coagulation (APC). No specimans collected.      • ENDOSCOPY N/A 5/23/2018    Grade I esoph varices, severe portal HTN gastropathy in fundus and body, scattered inflamm and erosions in antrum,    • ENDOSCOPY N/A 2/5/2019    Procedure: ESOPHAGOGASTRODUODENOSCOPY;  Surgeon: Annie Tierney MD;  Location: [facility] ENDOSCOPY;  Service: Gastroenterology   • LAPAROSCOPIC APPENDECTOMY     • LIVER BIOPSY      9/12 Liver Bx done with Mariza and Appy (cirrhosis)   • UPPER GASTROINTESTINAL ENDOSCOPY      11/12 EGD with Erosive gastritis, duodenitis, and portal hypertensive gastropathy (Dr. Tierney) (no esophageal varices_       Family History:  Family History   Problem Relation Age of Onset   • Alcohol abuse Father    • Anxiety disorder Father    • COPD Father    • Depression Father    • Hyperlipidemia Father    • Hypertension Father    • Asthma Brother    • Coronary artery disease Brother    • Diabetes type II Other    • Malig Hyperthermia Neg Hx        Social History:   reports that she has been smoking cigarettes.  She has been smoking about 0.25 packs per day. she has never used smokeless tobacco. She reports that she does not drink alcohol or use drugs.    Medications:   Medications Prior to Admission   Medication Sig Dispense Refill Last Dose   • ferrous sulfate 325 (65  "FE) MG tablet Take 1 tablet by mouth Daily With Breakfast. 30 tablet 11 2/11/2019 at Unknown time   • furosemide (LASIX) 40 MG tablet Take 1 tablet by mouth Daily. 30 tablet 11 2/11/2019 at Unknown time   • lactulose (CHRONULAC) 10 GM/15ML solution Take 45 mL by mouth Every 12 (Twelve) Hours. 946 mL 3 Past Month at Unknown time   • levothyroxine (SYNTHROID, LEVOTHROID) 25 MCG tablet Take 1 tablet by mouth Daily. (Patient taking differently: Take 225 mcg by mouth Daily.) 90 tablet 3 2/11/2019 at Unknown time   • metoclopramide (REGLAN) 10 MG tablet Take 1 tablet by mouth 4 (Four) Times a Day for 7 days. 28 tablet 0 2/11/2019 at Unknown time   • nadolol (CORGARD) 20 MG tablet TAKE ONE TABLET BY MOUTH DAILY 30 tablet 3 2/11/2019 at Unknown time   • pantoprazole (PROTONIX) 40 MG EC tablet Take 1 tablet by mouth 2 (Two) Times a Day Before Meals. 60 tablet 11 2/11/2019 at Unknown time   • potassium chloride (K-DUR,KLOR-CON) 20 MEQ CR tablet Take 1 tablet by mouth 2 (Two) Times a Day. 60 tablet 1 2/11/2019 at Unknown time   • rifaximin (XIFAXAN) 550 MG tablet Take 1 tablet by mouth Every 12 (Twelve) Hours. 60 tablet 11 2/11/2019 at Unknown time   • spironolactone (ALDACTONE) 50 MG tablet Take 3 tablets by mouth Daily. 90 tablet 11 2/11/2019 at Unknown time   • sucralfate (CARAFATE) 1 g tablet Take 1 tablet by mouth 2 (Two) Times a Day. 60 tablet 2 2/11/2019 at Unknown time   • vitamin D (ERGOCALCIFEROL) 95083 units capsule capsule Take 1 capsule by mouth 1 (One) Time Per Week. 12 capsule 3 Past Week at Unknown time       Allergies:  Penicillins; Latex; and Tomato    ROS:    Pertinent items are noted in HPI, all other systems reviewed and negative     Objective     Blood pressure 114/77, pulse 94, temperature 98.2 °F (36.8 °C), temperature source Oral, resp. rate 18, height 162.6 cm (64\"), weight 83.5 kg (184 lb), SpO2 96 %.    Physical Exam   Constitutional: Pt is oriented to person, place, and time and well-developed, " well-nourished, and in no distress.   Mouth/Throat: Oropharynx is clear and moist.   Neck: Normal range of motion.   Cardiovascular: Normal rate, regular rhythm    Pulmonary/Chest: Effort normal   Abdominal: Soft. Nontender  Skin: Skin is warm and dry.   Psychiatric: Mood, memory, affect and judgment normal.     Assessment/Plan     Diagnosis:  Hx varices    Anticipated Surgical Procedure:  egd with banding    The risks, benefits, and alternatives of this procedure have been discussed with the patient or the responsible party- the patient understands and agrees to proceed.

## 2019-02-12 NOTE — ANESTHESIA POSTPROCEDURE EVALUATION
Patient: Jaz Gipson    Procedure Summary     Date:  02/12/19 Room / Location:   [facility] /  [facility]    Anesthesia Start:  0955 Anesthesia Stop:  1013    Procedure:  ESOPHAGOGASTRODUODENOSCOPY with banding (x2) (N/A Esophagus) Diagnosis:       Secondary esophageal varices without bleeding (CMS/HCC)      (Secondary esophageal varices without bleeding (CMS/HCC) [I85.10])    Surgeon:  Annie Tierney MD Provider:  Elvis Del Valle MD    Anesthesia Type:  MAC ASA Status:  3          Anesthesia Type: MAC  Last vitals  BP   114/77 (02/12/19 0921)   Temp   36.8 °C (98.2 °F) (02/12/19 0921)   Pulse   94 (02/12/19 0921)   Resp   18 (02/12/19 0921)     SpO2   96 % (02/12/19 0921)     Post Anesthesia Care and Evaluation    Patient location during evaluation: PHASE II  Patient participation: complete - patient participated  Level of consciousness: awake and alert  Pain management: adequate  Airway patency: patent  Anesthetic complications: No anesthetic complications  PONV Status: none  Cardiovascular status: acceptable  Respiratory status: acceptable  Hydration status: acceptable

## 2019-02-12 NOTE — DISCHARGE INSTRUCTIONS
Dr. Tierney   [phone]    Esophagogastroduodenoscopy, Care After    Refer to this sheet in the next few weeks. These instructions provide you with information about caring for yourself after your procedure. Your health care provider may also give you more specific instructions. Your treatment has been planned according to current medical practices, but problems sometimes occur. Call your health care provider if you have any problems or questions after your procedure.  What can I expect after the procedure?  After the procedure, it is common to have:  · A sore throat.  · Nausea.  · Bloating.  · Dizziness.  · Fatigue.    Follow these instructions at home:  · Do not eat or drink anything until the numbing medicine (local anesthetic) has worn off and your gag reflex has returned. You will know that the local anesthetic has worn off when you can swallow comfortably.  · Do not drive for 24 hours if you received a medicine to help you relax (sedative).  · If your health care provider took a tissue sample for testing during the procedure, make sure to get your test results. This is your responsibility. Ask your health care provider or the department performing the test when your results will be ready.  · Keep all follow-up visits as told by your health care provider. This is important.  Contact a health care provider if:  · You cannot stop coughing.  · You are not urinating.  · You are urinating less than usual.  Get help right away if:  · You have trouble swallowing.  · You cannot eat or drink.  · You have throat or chest pain that gets worse.  · You are dizzy or light-headed.  · You faint.  · You have nausea or vomiting.  · You have chills.  · You have a fever.  · You have severe abdominal pain.  · You have black, tarry, or bloody stools.  This information is not intended to replace advice given to you by your health care provider. Make sure you discuss any questions you have with your health care provider.  Document  Released: 12/04/2013 Document Revised: 05/25/2017 Document Reviewed: 11/10/2016  [employer] Interactive Patient Education © 2018 [employer]    Esophageal Varices    The esophagus is the passage that connects the throat to the stomach. Esophageal varices are blood vessels in the esophagus that have become enlarged. They develop when extra blood is forced to flow through them because the blood's normal pathway is blocked. Without treatment these blood vessels eventually break and bleed (hemorrhage). A hemorrhage is life-threatening.  What are the causes?  This condition may be caused by:  · Scarring of the liver (cirrhosis) due to alcoholism. This is the most common cause.  · Liver disease.  · Severe heart failure.  · A blood clot in the portal vein.  · Sarcoidosis. This is an inflammatory disease that can affect the liver.  · Schistosomiasis. This is a parasitic infection that can cause liver damage.    What are the signs or symptoms?  Usually there are no symptoms unless the esophageal varices bleed. Symptoms of bleeding esophageal varices include:  · Vomiting material that is bright red or that is black and looks like coffee grounds.  · Coughing up blood.  · Black, tarry stools.  · Dizziness or lightheadedness.  · Low blood pressure.  · Loss of consciousness.    How is this diagnosed?  This condition is diagnosed with tests, such as:  · Endoscopy. During this test a thin, lighted tube is inserted through the mouth and into the esophagus.  · Blood tests. These may be done to check liver function, blood counts, and the body's ability to form blood clots.    How is this treated?  This condition may be treated with:  · Medicines that reduce pressure in the esophageal varices and reduce the risk of bleeding.  · Procedures to reduce pressure in the esophageal varices and reduce the risk of bleeding or stop bleeding. These include:  ? Variceal ligation. In this procedure, a rubber band is placed around the esophageal  varices to keep them from bleeding.  ? Injection therapy. This treatment involves an injection that causes the esophageal varices to shrink and close (sclerotherapy). Medicines that tighten blood vessels or alter blood flow may also be used.  ? Balloon tamponade. In this procedure, a tube is put into the esophagus and a balloon is passed through it and inflated.  ? Transjugular intrahepatic portosystemic shunt (TIPS) placement. In this procedure, a small tube is placed within the liver veins. This decreases blood flow and pressure to the esophageal varices.  · A liver transplant. This may be done if other treatments do not work.    Follow these instructions at home:  · Take medicines only as directed by your health care provider.  · Follow your health care provider's instructions about rest and physical activity.  Get help right away if:  · You have any symptoms of this condition after treatment.  · You are unable to eat or drink.  · You have chest pain.  This information is not intended to replace advice given to you by your health care provider. Make sure you discuss any questions you have with your health care provider.  Document Released: 03/09/2005 Document Revised: 05/25/2017 Document Reviewed: 12/14/2015  [employer] Interactive Patient Education © 2018 [employer]

## 2019-02-14 ENCOUNTER — HOSPITAL ENCOUNTER (OUTPATIENT)
Dept: GENERAL RADIOLOGY | Facility: HOSPITAL | Age: 44
Discharge: HOME | End: 2019-02-14
Admitting: NURSE PRACTITIONER

## 2019-02-14 ENCOUNTER — OFFICE VISIT (OUTPATIENT)
Dept: FAMILY MEDICINE CLINIC | Facility: CLINIC | Age: 44
End: 2019-02-14

## 2019-02-14 VITALS
DIASTOLIC BLOOD PRESSURE: 70 MMHG | TEMPERATURE: 98.8 F | OXYGEN SATURATION: 98 % | HEART RATE: 94 BPM | BODY MASS INDEX: 31.41 KG/M2 | WEIGHT: 184 LBS | HEIGHT: 64 IN | RESPIRATION RATE: 16 BRPM | SYSTOLIC BLOOD PRESSURE: 110 MMHG

## 2019-02-14 DIAGNOSIS — I85.10 SECONDARY ESOPHAGEAL VARICES WITHOUT BLEEDING (HCC): ICD-10-CM

## 2019-02-14 DIAGNOSIS — E78.2 MIXED HYPERLIPIDEMIA: ICD-10-CM

## 2019-02-14 DIAGNOSIS — E87.6 HYPOKALEMIA: ICD-10-CM

## 2019-02-14 DIAGNOSIS — E55.9 VITAMIN D DEFICIENCY: ICD-10-CM

## 2019-02-14 DIAGNOSIS — E03.9 HYPOTHYROIDISM, UNSPECIFIED TYPE: Primary | ICD-10-CM

## 2019-02-14 DIAGNOSIS — R07.9 CHEST PAIN, UNSPECIFIED TYPE: ICD-10-CM

## 2019-02-14 DIAGNOSIS — D50.9 IRON DEFICIENCY ANEMIA, UNSPECIFIED IRON DEFICIENCY ANEMIA TYPE: ICD-10-CM

## 2019-02-14 DIAGNOSIS — J45.20 MILD INTERMITTENT ASTHMA WITHOUT COMPLICATION: ICD-10-CM

## 2019-02-14 DIAGNOSIS — K75.81 NASH (NONALCOHOLIC STEATOHEPATITIS): ICD-10-CM

## 2019-02-14 PROCEDURE — 93000 ELECTROCARDIOGRAM COMPLETE: CPT | Performed by: NURSE PRACTITIONER

## 2019-02-14 PROCEDURE — 71046 X-RAY EXAM CHEST 2 VIEWS: CPT

## 2019-02-14 PROCEDURE — 99214 OFFICE O/P EST MOD 30 MIN: CPT | Performed by: NURSE PRACTITIONER

## 2019-02-14 RX ORDER — FLUTICASONE PROPIONATE 44 UG/1
1 AEROSOL, METERED RESPIRATORY (INHALATION)
Qty: 1 INHALER | Refills: 1 | Status: ON HOLD | OUTPATIENT
Start: 2019-02-14 | End: 2019-10-17

## 2019-02-14 NOTE — PROGRESS NOTES
Patient ID: Jaz Gipson is a 44 y.o. female     Subjective     Chief Complaint   Patient presents with   • Hypothyroidism       History of Present Illness    Jaz Gipson was a patient of LON Elliott who is no longer with our practice. She has not been seen in our office since October 2017.  I will be taking over this patient's primary care.  This is the first time patient is seeing me.  She presents to the office today for follow-up on hypothyroidism.  Most recent TSH level was 44.420 in October 2017.   Her primary problems include PIEDRA cirrhosis, esophageal varices, hepatic encephalopathy and GERD. This is being followed by Dr. Tierney.  She has also been having left sided abdomen pain in which CT abdomen has been completed.  She is awaiting results. Underwent recent EGD with esophageal varice banding 2 days ago. Continue to have chest pressure/tightness sensation.  This usually occurs after banding however only last about a day in the past.  Daughter told her she was breathing heavy over the phone.  No c/o cough.  Hx of asthma.  No inhalers on regular basis.    She is also followed by [facility] transplant team.  Currently not a candidate for liver transplant.          She denies any complaints of fever, chills, cough, chest pain, shortness of air, abdominal pain, nausea, or any other concerns.     The following portions of the patient's history were reviewed and updated as appropriate: allergies, current medications, past family history, past medical history, past social history, past surgical history and problem list.       Review of Systems   Constitution: Negative.   HENT: Negative.    Eyes: Negative.    Cardiovascular: Positive for chest pain.   Respiratory: Positive for shortness of breath. Negative for cough.    Endocrine: Negative.    Hematologic/Lymphatic: Negative.    Skin: Negative.    Musculoskeletal: Negative.    Gastrointestinal: Positive for abdominal pain.   Genitourinary: Negative.     Neurological: Negative.    Psychiatric/Behavioral: Negative.         Vitals:    02/14/19 1358   BP: 110/70   Pulse: 94   Resp: 16   Temp: 98.8 °F (37.1 °C)   SpO2: 98%       Documented weights    02/14/19 1358   Weight: 83.5 kg (184 lb)       Results for orders placed or performed during the hospital encounter of 02/12/19   POC Pregnancy, Urine   Result Value Ref Range    HCG, Urine, QL Negative Negative    Lot Number [id number]     Internal Positive Control Positive     Internal Negative Control Negative        Objective     Physical Exam   Constitutional: She is oriented to person, place, and time. Vital signs are normal. She appears well-developed.   HENT:   Head: Normocephalic and atraumatic.   Right Ear: Tympanic membrane normal.   Left Ear: Tympanic membrane normal.   Mouth/Throat: Oropharynx is clear and moist.   Eyes: EOM are normal. Pupils are equal, round, and reactive to light.   Neck: Normal range of motion. Neck supple.   Cardiovascular: Normal rate, regular rhythm, normal heart sounds and intact distal pulses.   No murmur heard.  Pulmonary/Chest: Effort normal and breath sounds normal. She has no wheezes. She has no rhonchi. She has no rales.   Abdominal: Soft. Bowel sounds are normal. There is no hepatosplenomegaly. There is tenderness in the right upper quadrant.   Left lateral abdomen tenderness   Musculoskeletal: Normal range of motion. She exhibits no edema or tenderness.   Neurological: She is alert and oriented to person, place, and time. She has normal strength.   Skin: Skin is warm and dry. No rash noted. No cyanosis or erythema.   Psychiatric: She has a normal mood and affect. Her behavior is normal.       ECG 12 Lead  Date/Time: 2/14/2019 2:48 PM  Performed by: Cheli Noonan APRN  Authorized by: Cheli Noonan APRN   Comparison: compared with previous ECG from 10/1/2017  Comparison to previous ECG: Previous sinus rhythm with non-specific T-wave abnormalities  Rhythm: sinus rhythm  Rate:  normal  Conduction: conduction normal  ST Segments: ST segments normal  T Waves: T waves normal  QRS axis: normal  Other findings: prolonged QTc interval  Clinical impression comment: Borderline EKG  Comments: No quinidine drugs.  Will check calcium level today.              Assessment/Plan     Assessment/Plan   Jaz was seen today for hypothyroidism.    Diagnoses and all orders for this visit:    Hypothyroidism, unspecified type  -     TSH  -     T4, Free  -     T3, Free    Hypokalemia  -     Comprehensive Metabolic Panel    PIEDRA (nonalcoholic steatohepatitis)  -     Calcium, Ionized    Mixed hyperlipidemia  -     Lipid Panel    Secondary esophageal varices without bleeding (CMS/HCC)    Vitamin D deficiency  -     Vitamin D 25 hydroxy    Iron deficiency anemia, unspecified iron deficiency anemia type  -     CBC & Differential    Chest pain, unspecified type  -     ECG 12 Lead  -     XR Chest PA & Lateral    Mild intermittent asthma without complication  -     fluticasone (FLOVENT HFA) 44 MCG/ACT inhaler; Inhale 1 puff 2 (Two) Times a Day.  -     XR Chest PA & Lateral        Summary:  Jaz Gipson has multiple medical problems.  Has been awhile since she was seen in our office.  She primarily came in today for follow-up on her hypothyroidism.  Last level checked in 2017 was 44.42.  She is followed by gastroenterology for cirrhosis.  Will obtain CXR due to chest tightness.  Start using inhaler as directed for control of asthma.  She takes vitamin D.  Will check level.  Will obtain fasting blood work and call results.      Patient requests lab results be called to her mother due to her trouble remembering things.      In the meantime, instructed to contact us sooner for any problems or concerns.    Cheli Noonan, APRN  Family Medicine  [facility] [place]  02/15/19  2:01 PM

## 2019-02-15 DIAGNOSIS — E03.9 HYPOTHYROIDISM, UNSPECIFIED TYPE: ICD-10-CM

## 2019-02-15 DIAGNOSIS — D50.9 IRON DEFICIENCY ANEMIA, UNSPECIFIED IRON DEFICIENCY ANEMIA TYPE: ICD-10-CM

## 2019-02-15 DIAGNOSIS — D69.6 THROMBOCYTOPENIA (HCC): Primary | ICD-10-CM

## 2019-02-15 LAB
25(OH)D3+25(OH)D2 SERPL-MCNC: 16.4 NG/ML
ALBUMIN SERPL-MCNC: 2.8 G/DL (ref 3.5–5.2)
ALBUMIN/GLOB SERPL: 0.4 G/DL
ALP SERPL-CCNC: 172 U/L (ref 40–129)
ALT SERPL-CCNC: 15 U/L (ref 5–33)
AST SERPL-CCNC: 37 U/L (ref 5–32)
BASOPHILS # BLD AUTO: 0.04 10*3/MM3 (ref 0–0.2)
BASOPHILS NFR BLD AUTO: 1.5 % (ref 0–1.5)
BILIRUB SERPL-MCNC: 1.6 MG/DL (ref 0.2–1.2)
BUN SERPL-MCNC: 7 MG/DL (ref 6–20)
BUN/CREAT SERPL: 11.5 (ref 7–25)
CA-I SERPL ISE-MCNC: 5.1 MG/DL (ref 4.5–5.6)
CALCIUM SERPL-MCNC: 8.1 MG/DL (ref 8.6–10.5)
CHLORIDE SERPL-SCNC: 104 MMOL/L (ref 98–107)
CHOLEST SERPL-MCNC: 105 MG/DL (ref 0–200)
CO2 SERPL-SCNC: 25.1 MMOL/L (ref 22–29)
CREAT SERPL-MCNC: 0.61 MG/DL (ref 0.57–1)
DIFFERENTIAL COMMENT: NORMAL
EOSINOPHIL # BLD AUTO: 0.17 10*3/MM3 (ref 0–0.4)
EOSINOPHIL NFR BLD AUTO: 6.2 % (ref 0.3–6.2)
ERYTHROCYTE [DISTWIDTH] IN BLOOD BY AUTOMATED COUNT: 15.9 % (ref 12.3–15.4)
GLOBULIN SER CALC-MCNC: 6.5 GM/DL
GLUCOSE SERPL-MCNC: 80 MG/DL (ref 65–99)
HCT VFR BLD AUTO: 34.1 % (ref 34–46.6)
HDLC SERPL-MCNC: 32 MG/DL (ref 40–60)
HGB BLD-MCNC: 10.5 G/DL (ref 12–15.9)
IMM GRANULOCYTES # BLD AUTO: 0.01 10*3/MM3 (ref 0–0.05)
IMM GRANULOCYTES NFR BLD AUTO: 0.4 % (ref 0–0.5)
LDLC SERPL CALC-MCNC: 61 MG/DL (ref 0–100)
LYMPHOCYTES # BLD AUTO: 0.63 10*3/MM3 (ref 0.7–3.1)
LYMPHOCYTES NFR BLD AUTO: 22.9 % (ref 19.6–45.3)
MCH RBC QN AUTO: 28.3 PG (ref 26.6–33)
MCHC RBC AUTO-ENTMCNC: 30.8 G/DL (ref 31.5–35.7)
MCV RBC AUTO: 91.9 FL (ref 79–97)
MONOCYTES # BLD AUTO: 0.41 10*3/MM3 (ref 0.1–0.9)
MONOCYTES NFR BLD AUTO: 14.9 % (ref 5–12)
NEUTROPHILS # BLD AUTO: 1.49 10*3/MM3 (ref 1.4–7)
NEUTROPHILS NFR BLD AUTO: 54.1 % (ref 42.7–76)
NRBC BLD AUTO-RTO: 0 /100 WBC (ref 0–0)
PLATELET # BLD AUTO: 42 10*3/MM3 (ref 140–450)
PLATELET BLD QL SMEAR: NORMAL
POTASSIUM SERPL-SCNC: 3.5 MMOL/L (ref 3.5–5.2)
PROT SERPL-MCNC: 9.3 G/DL (ref 6–8.5)
RBC # BLD AUTO: 3.71 10*6/MM3 (ref 3.77–5.28)
RBC MORPH BLD: NORMAL
SODIUM SERPL-SCNC: 136 MMOL/L (ref 136–145)
T3FREE SERPL-MCNC: 2.1 PG/ML (ref 2–4.4)
T4 FREE SERPL-MCNC: 1.09 NG/DL (ref 0.93–1.7)
TRIGL SERPL-MCNC: 60 MG/DL (ref 0–150)
TSH SERPL DL<=0.005 MIU/L-ACNC: 4.02 MIU/ML (ref 0.27–4.2)
VLDLC SERPL CALC-MCNC: 12 MG/DL (ref 7–27)
WBC # BLD AUTO: 2.75 10*3/MM3 (ref 3.4–10.8)

## 2019-02-15 RX ORDER — LEVOTHYROXINE SODIUM 0.03 MG/1
25 TABLET ORAL DAILY
Qty: 90 TABLET | Refills: 3 | Status: SHIPPED | OUTPATIENT
Start: 2019-02-15 | End: 2020-02-07

## 2019-02-15 RX ORDER — LEVOTHYROXINE SODIUM 0.2 MG/1
200 TABLET ORAL DAILY
Qty: 90 TABLET | Refills: 1 | Status: SHIPPED | OUTPATIENT
Start: 2019-02-15 | End: 2019-12-01 | Stop reason: SDUPTHER

## 2019-03-18 NOTE — PROGRESS NOTES
.     REASON FOR CONSULTATION:   Iron deficiency anemia and thrombocytopenia  Provide an opinion on any further workup or treatment                             REQUESTING PHYSICIAN: Cheli Noonan, LON  RECORDS OBTAINED:  Records of the patients history including those obtained from the referring provider were reviewed and summarized in detail.    HISTORY OF PRESENT ILLNESS:  The patient is a 44 y.o. year old female  who is here for follow-up with the above-mentioned history.    Patient was seen by Dr. Becerra from our office 9/28/12 for retroperitoneal LAD, splenomegaly, thrombocytopenia, leukocytosis.    When she establish care with her PCP, Cheli Noonan, she was asked to return to see us to reestablish care.    Denies any recent bleeding other than some occasional nosebleeds.  States she has had 2 episodes in the past of variceal bleeds.    Denies chest pain, shortness of breath, dizziness.    Past Medical History:   Diagnosis Date   • Abnormal cervical Papanicolaou smear     remote   • Acute gastric mucosal erosion     11/12 EGD   • Allergic rhinitis    • Anemia    • Asthma    • Cirrhosis of liver (CMS/HCC)    • Duodenitis     11/12 EGD   • Enlarged lymph node     retroperitoneal lymph node enlarged, 5/12 PET CT consistent with low grade lymphoproliferative disorder (  [facility])  9/12Bone marrow bx negative    • Esophageal varices (CMS/HCC)    • Fatty liver    • Folic acid deficiency    • Graves disease    • Hyperlipidemia    • Hypertension    • Leukocytosis    • PIEDRA (nonalcoholic steatohepatitis) 2012   • Obstructive sleep apnea     4/10 dx, prescribed CPAP at 12 cm H2O (did not tolerate)     Past Surgical History:   Procedure Laterality Date   • APPENDECTOMY     • CHOLECYSTECTOMY      10/9/12 [place]: Lap Cholecystectomy and Appendectomy, Liver Bx:Cirrhosis (Biliary Dyskinesia, Cholecystitis, Chronic Appendicitis)   • COLONOSCOPY N/A 4/22/2016    [facility]   • ENDOSCOPY N/A 4/22/2016    Procedure:  ESOPHAGOGASTRODUODENOSCOPY;  Surgeon: Annie Tierney MD;  Location: [facility] ENDOSCOPY;  Service:    • ENDOSCOPY N/A 12/20/2016    Procedure: ESOPHAGOGASTRODUODENOSCOPY AT BEDSIDE;  Surgeon: Haresh Fritz MD;  Location: [facility] ENDOSCOPY;  Service:    • ENDOSCOPY N/A 12/22/2016    Procedure: ESOPHAGOGASTRODUODENOSCOPY WITH BANDING X2;  Surgeon: Chriss Reece MD;  Location: [facility] ENDOSCOPY;  Service:    • ENDOSCOPY N/A 12/26/2016    Procedure: ESOPHAGOGASTRODUODENOSCOPY AT BEDSIDE;  Surgeon: Vidya Norman MD;  Location: [facility] ENDOSCOPY;  Service:    • ENDOSCOPY N/A 2/8/2017    Grade I esophageal varices, scar in the lower third of the esophagus, Portal hypertensive gastropathy.     • ENDOSCOPY N/A 10/2/2017    Grade II esophageal varices, clotted blood in the gastric fundus and in the gastric body, portal hypertensive gastropathy.  No specimens collected.    • ENDOSCOPY N/A 10/3/2017    Grade II esophageal varices, completely eradicated, portal hypertensive gastropathy   • ENDOSCOPY N/A 11/7/2017    Grade I esophageal varices.Scar in the lower third of the esophagus. Erosive gastritis with hemorrhage.Portal hypertensive gastropathy.A few bleeding angioectasias in the stomach. Injected. Treated with argon plasma coagulation (APC). No specimans collected.      • ENDOSCOPY N/A 5/23/2018    Grade I esoph varices, severe portal HTN gastropathy in fundus and body, scattered inflamm and erosions in antrum,    • ENDOSCOPY N/A 2/5/2019    Procedure: ESOPHAGOGASTRODUODENOSCOPY;  Surgeon: Annie Tierney MD;  Location: [facility] ENDOSCOPY;  Service: Gastroenterology   • ENDOSCOPY N/A 2/12/2019    Procedure: ESOPHAGOGASTRODUODENOSCOPY with banding (x2);  Surgeon: Annie Tierney MD;  Location: [facility];  Service: Gastroenterology   • LAPAROSCOPIC APPENDECTOMY  10/09/2012   • LIVER BIOPSY      9/12 Liver Bx done with Mariza and Appy (cirrhosis)   • UPPER GASTROINTESTINAL ENDOSCOPY      11/12 EGD with Erosive  gastritis, duodenitis, and portal hypertensive gastropathy (Dr. Tierney) (no esophageal varices_       HEMATOLOGIC/ONCOLOGIC HISTORY:  (History from previous dates can be found in the separate document.)    MEDICATIONS    Current Outpatient Medications:   •  fluticasone (FLOVENT HFA) 44 MCG/ACT inhaler, Inhale 1 puff 2 (Two) Times a Day., Disp: 1 inhaler, Rfl: 1  •  furosemide (LASIX) 40 MG tablet, Take 1 tablet by mouth Daily., Disp: 30 tablet, Rfl: 11  •  lactulose (CHRONULAC) 10 GM/15ML solution, Take 45 mL by mouth Every 12 (Twelve) Hours., Disp: 946 mL, Rfl: 3  •  levothyroxine (SYNTHROID) 200 MCG tablet, Take 1 tablet by mouth Daily., Disp: 90 tablet, Rfl: 1  •  levothyroxine (SYNTHROID, LEVOTHROID) 25 MCG tablet, Take 1 tablet by mouth Daily., Disp: 90 tablet, Rfl: 3  •  nadolol (CORGARD) 20 MG tablet, TAKE ONE TABLET BY MOUTH DAILY, Disp: 30 tablet, Rfl: 3  •  pantoprazole (PROTONIX) 40 MG EC tablet, Take 1 tablet by mouth 2 (Two) Times a Day Before Meals., Disp: 60 tablet, Rfl: 11  •  potassium chloride (K-DUR,KLOR-CON) 20 MEQ CR tablet, Take 1 tablet by mouth 2 (Two) Times a Day., Disp: 60 tablet, Rfl: 1  •  rifaximin (XIFAXAN) 550 MG tablet, Take 1 tablet by mouth Every 12 (Twelve) Hours., Disp: 60 tablet, Rfl: 11  •  spironolactone (ALDACTONE) 50 MG tablet, Take 3 tablets by mouth Daily., Disp: 90 tablet, Rfl: 11  •  sucralfate (CARAFATE) 1 g tablet, Take 1 tablet by mouth 2 (Two) Times a Day., Disp: 60 tablet, Rfl: 2  •  vitamin D (ERGOCALCIFEROL) 00483 units capsule capsule, Take 1 capsule by mouth 1 (One) Time Per Week., Disp: 12 capsule, Rfl: 3  •  ferrous sulfate 325 (65 FE) MG tablet, Take 1 tablet by mouth Daily With Breakfast., Disp: 30 tablet, Rfl: 11    ALLERGIES:     Allergies   Allergen Reactions   • Penicillins Swelling   • Latex Rash   • Tomato Rash       SOCIAL HISTORY:       Social History     Socioeconomic History   • Marital status: Single     Spouse name: Not on file   • Number of  "children: 2   • Years of education: Not on file   • Highest education level: Not on file   Social Needs   • Financial resource strain: Not on file   • Food insecurity - worry: Not on file   • Food insecurity - inability: Not on file   • Transportation needs - medical: Not on file   • Transportation needs - non-medical: Not on file   Occupational History     Employer: [employer]   Tobacco Use   • Smoking status: Current Every Day Smoker     Packs/day: 0.25     Types: Cigarettes   • Smokeless tobacco: Never Used   Substance and Sexual Activity   • Alcohol use: No   • Drug use: No   • Sexual activity: Defer   Other Topics Concern   • Not on file   Social History Narrative   • Not on file         FAMILY HISTORY:  Family History   Problem Relation Age of Onset   • Alcohol abuse Father    • Anxiety disorder Father    • COPD Father    • Depression Father    • Hyperlipidemia Father    • Hypertension Father    • Asthma Brother    • Coronary artery disease Brother    • Diabetes type II Other    • Early death Brother         Age 1   • Malig Hyperthermia Neg Hx        REVIEW OF SYSTEMS:  Review of Systems   Constitutional: Negative for activity change.   HENT: Negative for nosebleeds and trouble swallowing.    Respiratory: Negative for shortness of breath and wheezing.    Cardiovascular: Negative for chest pain and palpitations.   Gastrointestinal: Negative for constipation, diarrhea and nausea.   Genitourinary: Negative for dysuria and hematuria.   Musculoskeletal: Negative for arthralgias and myalgias.   Skin: Negative for rash and wound.   Neurological: Negative for seizures and syncope.   Hematological: Negative for adenopathy. Does not bruise/bleed easily.   Psychiatric/Behavioral: Negative for confusion.              Vitals:    03/19/19 1427   BP: 115/74   Pulse: 80   Resp: 14   Temp: 98 °F (36.7 °C)   SpO2: 99%   Weight: 83.8 kg (184 lb 12.8 oz)   Height: 162.6 cm (64.02\")   PainSc: 0-No pain     Current Status " 3/19/2019   ECOG score 0      PHYSICAL EXAM:      CONSTITUTIONAL:  Vital signs reviewed.  No distress, looks comfortable.  Overweight  EYES:  Conjunctiva and lids unremarkable.  PERRLA  EARS,NOSE,MOUTH,THROAT:  Ears and nose appear unremarkable.  Lips, teeth, gums appear unremarkable.  RESPIRATORY:  Normal respiratory effort.  Lungs clear to auscultation bilaterally.  CARDIOVASCULAR:  Normal S1, S2.  No murmurs rubs or gallops.  No significant lower extremity edema.  GASTROINTESTINAL: Abdomen appears unremarkable.  Nontender.  No hepatomegaly.  No splenomegaly.  LYMPHATIC:  No cervical, supraclavicular, axillary lymphadenopathy.  SKIN:  Warm.  No rashes.  PSYCHIATRIC:  Normal judgment and insight.  Normal mood and affect.    RECENT LABS:        WBC   Date Value Ref Range Status   03/19/2019 2.37 (L) 3.40 - 10.80 10*3/mm3 Final   02/14/2019 2.75 (L) 3.40 - 10.80 10*3/mm3 Final   01/24/2019 3.14 (L) 4.50 - 10.70 10*3/mm3 Final   03/19/2018 4.27 (L) 4.50 - 10.70 10*3/mm3 Final   11/07/2017 2.31 (L) 4.50 - 10.70 10*3/mm3 Final   11/01/2017 2.44 (L) 4.50 - 10.70 10*3/mm3 Final   10/12/2017 3.69 (L) 4.80 - 10.80 10*3/mm3 Final   10/05/2017 4.71 4.50 - 10.70 10*3/mm3 Final   10/01/2017 3.51 (L) 4.50 - 10.70 10*3/mm3 Final   10/01/2017 4.63 4.50 - 10.70 10*3/mm3 Final   01/24/2017 2.59 (L) 4.50 - 10.70 10*3/mm3 Final   01/01/2017 3.24 (L) 4.50 - 10.70 10*3/mm3 Final   12/31/2016 4.12 (L) 4.50 - 10.70 10*3/mm3 Final   12/31/2016 4.56 4.50 - 10.70 10*3/mm3 Final   12/30/2016 6.00 4.50 - 10.70 10*3/mm3 Final   12/30/2016 4.72 4.50 - 10.70 10*3/mm3 Final   12/29/2016 4.63 4.50 - 10.70 10*3/mm3 Final   12/29/2016 4.84 4.50 - 10.70 10*3/mm3 Final   12/29/2016 3.86 (L) 4.50 - 10.70 10*3/mm3 Final   12/28/2016 2.33 (L) 4.50 - 10.70 10*3/mm3 Final   12/27/2016 4.25 (L) 4.50 - 10.70 10*3/mm3 Final   12/25/2016 3.98 (L) 4.50 - 10.70 10*3/mm3 Final   12/24/2016 4.38 (L) 4.50 - 10.70 10*3/mm3 Final   12/22/2016 2.27 (L) 4.50 - 10.70  10*3/mm3 Final   12/22/2016 2.41 (L) 4.50 - 10.70 10*3/mm3 Final   12/20/2016 5.46 4.50 - 10.70 10*3/mm3 Final   05/10/2016 3.30 (L) 4.50 - 10.70 10*3/mm3 Final   04/26/2016 3.30 (L) 4.80 - 10.80 10*3/mm3 Final   04/22/2016 3.71 (L) 4.50 - 10.70 10*3/mm3 Final   04/21/2016 3.46 (L) 4.50 - 10.70 10*3/mm3 Final   04/20/2016 3.32 (L) 4.50 - 10.70 10*3/mm3 Final   04/20/2016 2.98 (L) 4.50 - 10.70 10*3/mm3 Final   07/10/2015 4.51 (L) 4.80 - 10.80 K/cumm Final   03/09/2015 7.07 4.50 - 10.70 K/Cumm Final   03/09/2015 5.60 4.80 - 10.80 K/cumm Final   03/05/2015 5.01 4.50 - 10.70 K/Cumm Final   03/04/2015 6.41 4.50 - 10.70 K/Cumm Final     Hemoglobin   Date Value Ref Range Status   03/19/2019 9.9 (L) 12.0 - 15.9 g/dL Final   02/14/2019 10.5 (L) 12.0 - 15.9 g/dL Final   01/24/2019 11.1 (L) 11.9 - 15.5 g/dL Final   03/19/2018 12.3 11.9 - 15.5 g/dL Final   11/07/2017 9.4 (L) 11.9 - 15.5 g/dL Final   11/01/2017 10.0 (L) 11.9 - 15.5 g/dL Final   10/12/2017 9.0 (L) 12.0 - 16.0 g/dL Final   10/05/2017 8.3 (L) 11.9 - 15.5 g/dL Final   10/04/2017 8.5 (L) 11.9 - 15.5 g/dL Final   10/04/2017 8.1 (L) 11.9 - 15.5 g/dL Final   10/03/2017 8.8 (L) 11.9 - 15.5 g/dL Final   10/03/2017 8.0 (L) 11.9 - 15.5 g/dL Final   10/03/2017 7.9 (L) 11.9 - 15.5 g/dL Final   10/03/2017 7.7 (L) 11.9 - 15.5 g/dL Final   10/02/2017 7.6 (L) 11.9 - 15.5 g/dL Final   10/02/2017 7.9 (L) 11.9 - 15.5 g/dL Final   10/02/2017 8.5 (L) 11.9 - 15.5 g/dL Final   10/01/2017 7.0 (L) 11.9 - 15.5 g/dL Final   10/01/2017 7.9 (L) 11.9 - 15.5 g/dL Final   01/24/2017 9.3 (L) 11.9 - 15.5 g/dL Final   01/01/2017 8.5 (L) 11.9 - 15.5 g/dL Final   12/31/2016 8.9 (L) 11.9 - 15.5 g/dL Final   12/31/2016 8.5 (L) 11.9 - 15.5 g/dL Final   12/30/2016 9.5 (L) 11.9 - 15.5 g/dL Final   12/30/2016 8.5 (L) 11.9 - 15.5 g/dL Final   12/29/2016 8.5 (L) 11.9 - 15.5 g/dL Final   12/29/2016 8.6 (L) 11.9 - 15.5 g/dL Final   12/29/2016 9.1 (L) 11.9 - 15.5 g/dL Final   12/28/2016 8.6 (L) 11.9 - 15.5 g/dL  Final   12/27/2016 8.9 (L) 11.9 - 15.5 g/dL Final   12/27/2016 8.9 (L) 11.9 - 15.5 g/dL Final   12/26/2016 8.7 (L) 11.9 - 15.5 g/dL Final   12/26/2016 8.6 (L) 11.9 - 15.5 g/dL Final   12/26/2016 8.9 (L) 11.9 - 15.5 g/dL Final   12/25/2016 8.9 (L) 11.9 - 15.5 g/dL Final   12/25/2016 8.7 (L) 11.9 - 15.5 g/dL Final   12/25/2016 9.0 (L) 11.9 - 15.5 g/dL Final   12/25/2016 8.2 (L) 11.9 - 15.5 g/dL Final   12/24/2016 8.9 (L) 11.9 - 15.5 g/dL Final   12/22/2016 8.0 (L) 11.9 - 15.5 g/dL Final   12/22/2016 8.3 (L) 11.9 - 15.5 g/dL Final   12/21/2016 8.3 (L) 11.9 - 15.5 g/dL Final   12/21/2016 8.3 (L) 11.9 - 15.5 g/dL Final   12/21/2016 8.4 (L) 11.9 - 15.5 g/dL Final   12/21/2016 8.0 (L) 11.9 - 15.5 g/dL Final   12/20/2016 7.2 (L) 11.9 - 15.5 g/dL Final   12/20/2016 7.0 (L) 11.9 - 15.5 g/dL Final   12/20/2016 7.5 (L) 11.9 - 15.5 g/dL Final   12/20/2016 8.3 (L) 11.9 - 15.5 g/dL Final   05/10/2016 9.0 (L) 11.9 - 15.5 g/dL Final   04/26/2016 8.3 (L) 12.0 - 16.0 g/dL Final   04/22/2016 9.5 (L) 11.9 - 15.5 g/dL Final   04/21/2016 7.7 (L) 11.9 - 15.5 g/dL Final   04/20/2016 6.2 (C) 11.9 - 15.5 g/dL Final   04/20/2016 6.7 (C) 11.9 - 15.5 g/dL Final   07/10/2015 10.7 (L) 12.0 - 16.0 g/dL Final   03/09/2015 13.0 11.9 - 15.5 g/dL Final   03/09/2015 12.6 12.0 - 16.0 g/dL Final   03/05/2015 11.2 (L) 11.9 - 15.5 g/dL Final   03/04/2015 11.8 (L) 11.9 - 15.5 g/dL Final     Platelets   Date Value Ref Range Status   03/19/2019 41 (C) 140 - 450 10*3/mm3 Final   03/19/2019 41 (C) 140 - 450 10*3/mm3 Final   02/14/2019 42 (C) 140 - 450 10*3/mm3 Final   01/24/2019 67 (L) 140 - 500 10*3/mm3 Final   03/19/2018 63 (L) 140 - 500 10*3/mm3 Final   11/07/2017 48 (L) 140 - 500 10*3/mm3 Final   11/01/2017 45 (L) 140 - 500 10*3/mm3 Final   10/12/2017 56 (L) 140 - 500 10*3/mm3 Final   10/05/2017 42 (L) 140 - 500 10*3/mm3 Final   10/01/2017 34 (C) 140 - 500 10*3/mm3 Final   10/01/2017 43 (L) 140 - 500 10*3/mm3 Final   01/24/2017 45 (L) 140 - 500 10*3/mm3  Final   01/01/2017 43 (L) 140 - 500 10*3/mm3 Final   12/31/2016 41 (L) 140 - 500 10*3/mm3 Final   12/31/2016 40 (C) 140 - 500 10*3/mm3 Final   12/30/2016 51 (L) 140 - 500 10*3/mm3 Final   12/30/2016 47 (L) 140 - 500 10*3/mm3 Final   12/29/2016 41 (L) 140 - 500 10*3/mm3 Final   12/29/2016 47 (L) 140 - 500 10*3/mm3 Final   12/29/2016 42 (L) 140 - 500 10*3/mm3 Final   12/28/2016 29 (C) 140 - 500 10*3/mm3 Final   12/27/2016 37 (C) 140 - 500 10*3/mm3 Final   12/25/2016 48 (L) 140 - 500 10*3/mm3 Final   12/24/2016 61 (L) 140 - 500 10*3/mm3 Final   12/22/2016 57 (L) 140 - 500 10*3/mm3 Final   12/22/2016 41 (L) 140 - 500 10*3/mm3 Final   12/20/2016 66 (L) 140 - 500 10*3/mm3 Final   05/10/2016 52 (L) 140 - 500 10*3/mm3 Final   04/26/2016 62 (L) 140 - 500 10*3/mm3 Final   04/22/2016 78 (L) 140 - 500 10*3/mm3 Final   04/21/2016 78 (L) 140 - 500 10*3/mm3 Final   04/20/2016 56 (L) 140 - 500 10*3/mm3 Final   04/20/2016 59 (L) 140 - 500 10*3/mm3 Final   07/10/2015 59 (L) 140 - 500 K/cumm Final   03/09/2015 97 (L) 140 - 500 K/Cumm Final   03/09/2015 71 (L) 140 - 500 K/cumm Final   03/05/2015 77 (L) 140 - 500 K/Cumm Final   03/04/2015 93 (L) 140 - 500 K/Cumm Final       Assessment/Plan   Iron deficiency anemia, unspecified iron deficiency anemia type  - Immature Platelet Fraction  - Protime-INR  - aPTT  - Fibrinogen  - Vitamin B12  - Folate RBC  - Ferritin  - Iron Profile  - Retic With IRF & RET-He  - CBC & Differential  - Ferritin  - Iron Profile  - Retic With IRF & RET-He  - CBC & Differential    Thrombocytopenia (CMS/HCC)  - Immature Platelet Fraction  - Protime-INR  - aPTT  - Fibrinogen  - Vitamin B12  - Folate RBC  - Ferritin  - Iron Profile  - Retic With IRF & RET-He  - Ferritin  - Iron Profile  - Retic With IRF & RET-He  - CBC & Differential      *Iron deficiency anemia.  Iron labs were last checked December 2016 with ferritin 23.  Does not tolerate oral iron due to nausea.  I explained IV iron as a possibility of  life-threatening allergic reactions.  Patient understands this and wants to proceed.   2 doses of Injectafer.    *Thrombocytopenia.  Likely due to cirrhosis.  Transfusion support as needed.    *Cirrhosis, due to Frazier  She states she does not yet qualify to be on the transplant list but is following with the transplant team.    *Retroperitoneal lymphadenopathy and splenomegaly.  Was previously seeing Dr. Becerra from our office, last saw him 9/12/12.  He did a bone marrow biopsy which he reports was negative for a lymphoproliferative process.  Imaging on 2/1/19 included changes compatible with cirrhosis.  The radiologist felt the numerous enlarged abdominal nodes which were unchanged were likely related to the cirrhosis.    *Overweight.  Obesity and poor food choices likely lead to Frazier which led to cirrhosis.   I recommended she lose weight and clean of her diet.    Plan  2 doses of Injectafer  MD 2 months.  Iron labs 1 week prior  Await additional lab work from today.  Doubt I will have anything more to add other than intermittent IV iron replacement.  Transfusion support as needed.  I suggested cleaning up her diet and losing weight.    Mother assisted with history.    Peripheral smear was personally reviewed by me and looks unremarkable, other than cytopenias.

## 2019-03-19 ENCOUNTER — LAB (OUTPATIENT)
Dept: OTHER | Facility: HOSPITAL | Age: 44
End: 2019-03-19

## 2019-03-19 ENCOUNTER — CONSULT (OUTPATIENT)
Dept: ONCOLOGY | Facility: CLINIC | Age: 44
End: 2019-03-19

## 2019-03-19 VITALS
DIASTOLIC BLOOD PRESSURE: 74 MMHG | HEIGHT: 64 IN | SYSTOLIC BLOOD PRESSURE: 115 MMHG | TEMPERATURE: 98 F | OXYGEN SATURATION: 99 % | WEIGHT: 184.8 LBS | BODY MASS INDEX: 31.55 KG/M2 | RESPIRATION RATE: 14 BRPM | HEART RATE: 80 BPM

## 2019-03-19 DIAGNOSIS — D50.9 IRON DEFICIENCY ANEMIA, UNSPECIFIED IRON DEFICIENCY ANEMIA TYPE: Primary | ICD-10-CM

## 2019-03-19 DIAGNOSIS — D69.6 THROMBOCYTOPENIA (HCC): ICD-10-CM

## 2019-03-19 DIAGNOSIS — D50.9 IRON DEFICIENCY ANEMIA, UNSPECIFIED IRON DEFICIENCY ANEMIA TYPE: ICD-10-CM

## 2019-03-19 PROBLEM — IMO0001 ADVERSE EFFECT OF IRON OR ITS COMPOUND, SUBSEQUENT ENCOUNTER: Status: ACTIVE | Noted: 2019-03-19

## 2019-03-19 LAB
APTT PPP: 40.9 SECONDS (ref 22.7–35.4)
BASOPHILS # BLD AUTO: 0.03 10*3/MM3 (ref 0–0.2)
BASOPHILS NFR BLD AUTO: 1.3 % (ref 0–1.5)
DEPRECATED RDW RBC AUTO: 52.7 FL (ref 37–54)
EOSINOPHIL # BLD AUTO: 0.22 10*3/MM3 (ref 0–0.4)
EOSINOPHIL NFR BLD AUTO: 9.3 % (ref 0.3–6.2)
ERYTHROCYTE [DISTWIDTH] IN BLOOD BY AUTOMATED COUNT: 16.3 % (ref 12.3–15.4)
FERRITIN SERPL-MCNC: 35.1 NG/ML (ref 13–150)
FIBRINOGEN PPP-MCNC: 197 MG/DL (ref 219–464)
HCT VFR BLD AUTO: 32.1 % (ref 34–46.6)
HGB BLD-MCNC: 9.9 G/DL (ref 12–15.9)
HGB RETIC QN AUTO: 26.3 PG (ref 29.8–36.1)
IMM GRANULOCYTES # BLD AUTO: 0.01 10*3/MM3 (ref 0–0.05)
IMM GRANULOCYTES NFR BLD AUTO: 0.4 % (ref 0–0.5)
IMM RETICS NFR: 7.1 % (ref 3–15.8)
INR PPP: 1.63 (ref 0.9–1.1)
IRON 24H UR-MRATE: 38 MCG/DL (ref 37–145)
IRON SATN MFR SERPL: 13 % (ref 20–50)
LYMPHOCYTES # BLD AUTO: 0.7 10*3/MM3 (ref 0.7–3.1)
LYMPHOCYTES NFR BLD AUTO: 29.5 % (ref 19.6–45.3)
MCH RBC QN AUTO: 27.1 PG (ref 26.6–33)
MCHC RBC AUTO-ENTMCNC: 30.8 G/DL (ref 31.5–35.7)
MCV RBC AUTO: 87.9 FL (ref 79–97)
MONOCYTES # BLD AUTO: 0.32 10*3/MM3 (ref 0.1–0.9)
MONOCYTES NFR BLD AUTO: 13.5 % (ref 5–12)
NEUTROPHILS # BLD AUTO: 1.09 10*3/MM3 (ref 1.4–7)
NEUTROPHILS NFR BLD AUTO: 46 % (ref 42.7–76)
NRBC BLD AUTO-RTO: 0 /100 WBC (ref 0–0)
OVALOCYTES BLD QL SMEAR: NORMAL
PLAT MORPH BLD: NORMAL
PLATELET # BLD AUTO: 41 10*3/MM3 (ref 140–450)
PLATELET # BLD AUTO: 41 10*3/MM3 (ref 140–450)
PLATELETS.RETICULATED NFR BLD AUTO: 4.9 % (ref 0.9–6.5)
PMV BLD AUTO: 13.3 FL (ref 6–12)
PROTHROMBIN TIME: 19 SECONDS (ref 11.7–14.2)
RBC # BLD AUTO: 3.65 10*6/MM3 (ref 3.77–5.28)
RETICS/RBC NFR AUTO: 1.26 % (ref 0.5–1.5)
TARGETS BLD QL SMEAR: NORMAL
TIBC SERPL-MCNC: 292 MCG/DL (ref 298–536)
TRANSFERRIN SERPL-MCNC: 196 MG/DL (ref 200–360)
VIT B12 BLD-MCNC: 634 PG/ML (ref 211–946)
WBC MORPH BLD: NORMAL
WBC NRBC COR # BLD: 2.37 10*3/MM3 (ref 3.4–10.8)

## 2019-03-19 PROCEDURE — 85007 BL SMEAR W/DIFF WBC COUNT: CPT | Performed by: INTERNAL MEDICINE

## 2019-03-19 PROCEDURE — 85046 RETICYTE/HGB CONCENTRATE: CPT | Performed by: INTERNAL MEDICINE

## 2019-03-19 PROCEDURE — 99205 OFFICE O/P NEW HI 60 MIN: CPT | Performed by: INTERNAL MEDICINE

## 2019-03-19 PROCEDURE — 82607 VITAMIN B-12: CPT | Performed by: INTERNAL MEDICINE

## 2019-03-19 PROCEDURE — 85014 HEMATOCRIT: CPT | Performed by: INTERNAL MEDICINE

## 2019-03-19 PROCEDURE — 85384 FIBRINOGEN ACTIVITY: CPT | Performed by: INTERNAL MEDICINE

## 2019-03-19 PROCEDURE — 83540 ASSAY OF IRON: CPT | Performed by: INTERNAL MEDICINE

## 2019-03-19 PROCEDURE — 82747 ASSAY OF FOLIC ACID RBC: CPT | Performed by: INTERNAL MEDICINE

## 2019-03-19 PROCEDURE — 84466 ASSAY OF TRANSFERRIN: CPT | Performed by: INTERNAL MEDICINE

## 2019-03-19 PROCEDURE — 85610 PROTHROMBIN TIME: CPT | Performed by: INTERNAL MEDICINE

## 2019-03-19 PROCEDURE — 36415 COLL VENOUS BLD VENIPUNCTURE: CPT | Performed by: INTERNAL MEDICINE

## 2019-03-19 PROCEDURE — 82728 ASSAY OF FERRITIN: CPT | Performed by: INTERNAL MEDICINE

## 2019-03-19 PROCEDURE — 85730 THROMBOPLASTIN TIME PARTIAL: CPT | Performed by: INTERNAL MEDICINE

## 2019-03-19 PROCEDURE — 85055 RETICULATED PLATELET ASSAY: CPT | Performed by: INTERNAL MEDICINE

## 2019-03-19 PROCEDURE — 85025 COMPLETE CBC W/AUTO DIFF WBC: CPT | Performed by: INTERNAL MEDICINE

## 2019-03-19 RX ORDER — PROCHLORPERAZINE MALEATE 10 MG
10 TABLET ORAL ONCE
Status: CANCELLED
Start: 2019-03-21 | End: 2019-03-21

## 2019-03-19 RX ORDER — SODIUM CHLORIDE 9 MG/ML
250 INJECTION, SOLUTION INTRAVENOUS ONCE
Status: CANCELLED | OUTPATIENT
Start: 2019-03-21

## 2019-03-21 ENCOUNTER — INFUSION (OUTPATIENT)
Dept: ONCOLOGY | Facility: HOSPITAL | Age: 44
End: 2019-03-21

## 2019-03-21 VITALS
DIASTOLIC BLOOD PRESSURE: 71 MMHG | HEIGHT: 64 IN | TEMPERATURE: 98.2 F | OXYGEN SATURATION: 99 % | HEART RATE: 80 BPM | SYSTOLIC BLOOD PRESSURE: 110 MMHG | WEIGHT: 188 LBS | BODY MASS INDEX: 32.1 KG/M2 | RESPIRATION RATE: 16 BRPM

## 2019-03-21 DIAGNOSIS — D50.9 IRON DEFICIENCY ANEMIA, UNSPECIFIED IRON DEFICIENCY ANEMIA TYPE: ICD-10-CM

## 2019-03-21 DIAGNOSIS — IMO0001 ADVERSE EFFECT OF IRON OR ITS COMPOUND, SUBSEQUENT ENCOUNTER: Primary | ICD-10-CM

## 2019-03-21 LAB
FOLATE BLD-MCNC: 326.3 NG/ML
FOLATE RBC-MCNC: 1049 NG/ML
HCT VFR BLD AUTO: 31.1 % (ref 34–46.6)

## 2019-03-21 PROCEDURE — 96374 THER/PROPH/DIAG INJ IV PUSH: CPT | Performed by: INTERNAL MEDICINE

## 2019-03-21 PROCEDURE — 63710000001 PROCHLORPERAZINE MALEATE PER 10 MG: Performed by: INTERNAL MEDICINE

## 2019-03-21 PROCEDURE — 25010000002 FERRIC CARBOXYMALTOSE 750 MG/15ML SOLUTION 15 ML VIAL: Performed by: INTERNAL MEDICINE

## 2019-03-21 RX ORDER — SODIUM CHLORIDE 9 MG/ML
250 INJECTION, SOLUTION INTRAVENOUS ONCE
Status: COMPLETED | OUTPATIENT
Start: 2019-03-21 | End: 2019-03-21

## 2019-03-21 RX ORDER — PROCHLORPERAZINE MALEATE 10 MG
10 TABLET ORAL ONCE
Status: COMPLETED | OUTPATIENT
Start: 2019-03-21 | End: 2019-03-21

## 2019-03-21 RX ADMIN — SODIUM CHLORIDE 250 ML: 900 INJECTION, SOLUTION INTRAVENOUS at 09:20

## 2019-03-21 RX ADMIN — PROCHLORPERAZINE MALEATE 10 MG: 10 TABLET, FILM COATED ORAL at 09:20

## 2019-03-21 RX ADMIN — FERRIC CARBOXYMALTOSE INJECTION 750 MG: 50 INJECTION, SOLUTION INTRAVENOUS at 09:50

## 2019-03-28 ENCOUNTER — INFUSION (OUTPATIENT)
Dept: ONCOLOGY | Facility: HOSPITAL | Age: 44
End: 2019-03-28

## 2019-03-28 VITALS
SYSTOLIC BLOOD PRESSURE: 124 MMHG | BODY MASS INDEX: 31.31 KG/M2 | HEIGHT: 64 IN | TEMPERATURE: 98.4 F | DIASTOLIC BLOOD PRESSURE: 76 MMHG | OXYGEN SATURATION: 98 % | HEART RATE: 94 BPM | RESPIRATION RATE: 16 BRPM | WEIGHT: 183.4 LBS

## 2019-03-28 DIAGNOSIS — D50.9 IRON DEFICIENCY ANEMIA, UNSPECIFIED IRON DEFICIENCY ANEMIA TYPE: ICD-10-CM

## 2019-03-28 DIAGNOSIS — IMO0001 ADVERSE EFFECT OF IRON OR ITS COMPOUND, SUBSEQUENT ENCOUNTER: Primary | ICD-10-CM

## 2019-03-28 PROCEDURE — 25010000002 FERRIC CARBOXYMALTOSE 750 MG/15ML SOLUTION 15 ML VIAL: Performed by: INTERNAL MEDICINE

## 2019-03-28 PROCEDURE — 96374 THER/PROPH/DIAG INJ IV PUSH: CPT | Performed by: INTERNAL MEDICINE

## 2019-03-28 RX ORDER — SODIUM CHLORIDE 9 MG/ML
250 INJECTION, SOLUTION INTRAVENOUS ONCE
Status: COMPLETED | OUTPATIENT
Start: 2019-03-28 | End: 2019-03-28

## 2019-03-28 RX ORDER — PROCHLORPERAZINE MALEATE 10 MG
10 TABLET ORAL ONCE
Status: DISCONTINUED | OUTPATIENT
Start: 2019-03-28 | End: 2019-03-28 | Stop reason: HOSPADM

## 2019-03-28 RX ADMIN — SODIUM CHLORIDE 250 ML: 900 INJECTION, SOLUTION INTRAVENOUS at 09:38

## 2019-03-28 RX ADMIN — FERRIC CARBOXYMALTOSE INJECTION 750 MG: 50 INJECTION, SOLUTION INTRAVENOUS at 09:53

## 2019-04-11 ENCOUNTER — DOCUMENTATION (OUTPATIENT)
Dept: GASTROENTEROLOGY | Facility: CLINIC | Age: 44
End: 2019-04-11

## 2019-04-11 ENCOUNTER — OFFICE VISIT (OUTPATIENT)
Dept: GASTROENTEROLOGY | Facility: CLINIC | Age: 44
End: 2019-04-11

## 2019-04-11 VITALS
DIASTOLIC BLOOD PRESSURE: 70 MMHG | HEIGHT: 64 IN | BODY MASS INDEX: 30.7 KG/M2 | WEIGHT: 179.8 LBS | TEMPERATURE: 98.7 F | SYSTOLIC BLOOD PRESSURE: 120 MMHG

## 2019-04-11 DIAGNOSIS — I85.10 SECONDARY ESOPHAGEAL VARICES WITHOUT BLEEDING (HCC): ICD-10-CM

## 2019-04-11 DIAGNOSIS — K74.69 OTHER CIRRHOSIS OF LIVER (HCC): Primary | ICD-10-CM

## 2019-04-11 LAB
ALBUMIN SERPL-MCNC: 2.7 G/DL (ref 3.5–5.2)
ALBUMIN/GLOB SERPL: 0.4 G/DL
ALP SERPL-CCNC: 164 U/L (ref 39–117)
ALT SERPL-CCNC: 19 U/L (ref 1–33)
AST SERPL-CCNC: 47 U/L (ref 1–32)
BASOPHILS # BLD AUTO: (no result) 10*3/UL
BASOPHILS # BLD MANUAL: 0.09 10*3/MM3 (ref 0–0.2)
BASOPHILS NFR BLD MANUAL: 4 % (ref 0–1.5)
BILIRUB SERPL-MCNC: 2.1 MG/DL (ref 0.2–1.2)
BUN SERPL-MCNC: 9 MG/DL (ref 6–20)
BUN/CREAT SERPL: 17 (ref 7–25)
CALCIUM SERPL-MCNC: 8 MG/DL (ref 8.6–10.5)
CHLORIDE SERPL-SCNC: 103 MMOL/L (ref 98–107)
CO2 SERPL-SCNC: 27.5 MMOL/L (ref 22–29)
CREAT SERPL-MCNC: 0.53 MG/DL (ref 0.57–1)
DIFFERENTIAL COMMENT: ABNORMAL
EOSINOPHIL # BLD AUTO: (no result) 10*3/UL
EOSINOPHIL # BLD MANUAL: 0.02 10*3/MM3 (ref 0–0.4)
EOSINOPHIL NFR BLD AUTO: (no result) %
EOSINOPHIL NFR BLD MANUAL: 1 % (ref 0.3–6.2)
ERYTHROCYTE [DISTWIDTH] IN BLOOD BY AUTOMATED COUNT: 25.1 % (ref 12.3–15.4)
GLOBULIN SER CALC-MCNC: 6.6 GM/DL
GLUCOSE SERPL-MCNC: 96 MG/DL (ref 65–99)
HCT VFR BLD AUTO: 31.9 % (ref 34–46.6)
HGB BLD-MCNC: 9.8 G/DL (ref 12–15.9)
INR PPP: 1.45 (ref 0.9–1.1)
LYMPHOCYTES # BLD AUTO: (no result) 10*3/UL
LYMPHOCYTES # BLD MANUAL: 0.5 10*3/MM3 (ref 0.7–3.1)
LYMPHOCYTES NFR BLD AUTO: (no result) %
LYMPHOCYTES NFR BLD MANUAL: 22.2 % (ref 19.6–45.3)
MCH RBC QN AUTO: 29.7 PG (ref 26.6–33)
MCHC RBC AUTO-ENTMCNC: 30.7 G/DL (ref 31.5–35.7)
MCV RBC AUTO: 96.7 FL (ref 79–97)
MONOCYTES # BLD MANUAL: 0.18 10*3/MM3 (ref 0.1–0.9)
MONOCYTES NFR BLD AUTO: (no result) %
MONOCYTES NFR BLD MANUAL: 8.1 % (ref 5–12)
NEUTROPHILS # BLD MANUAL: 1.46 10*3/MM3 (ref 1.4–7)
NEUTROPHILS NFR BLD AUTO: (no result) %
NEUTROPHILS NFR BLD MANUAL: 64.6 % (ref 42.7–76)
PLATELET # BLD AUTO: 45 10*3/MM3 (ref 140–450)
PLATELET BLD QL SMEAR: ABNORMAL
POTASSIUM SERPL-SCNC: 2.6 MMOL/L (ref 3.5–5.2)
PROT SERPL-MCNC: 9.3 G/DL (ref 6–8.5)
PROTHROMBIN TIME: 17.3 SECONDS (ref 11.7–14.2)
RBC # BLD AUTO: 3.3 10*6/MM3 (ref 3.77–5.28)
RBC MORPH BLD: ABNORMAL
SODIUM SERPL-SCNC: 137 MMOL/L (ref 136–145)
WBC # BLD AUTO: 2.26 10*3/MM3 (ref 3.4–10.8)

## 2019-04-11 PROCEDURE — 99214 OFFICE O/P EST MOD 30 MIN: CPT | Performed by: INTERNAL MEDICINE

## 2019-04-11 NOTE — PROGRESS NOTES
Called by lab re: critical potassium of 2.6. Call to pt, her mother answers her calls for her as pt at work, advised 2 doses of her potassium now and before bed for a total of 80meq. Her mother verbalized understanding and was going to pass the message on

## 2019-04-11 NOTE — PROGRESS NOTES
Chief Complaint   Patient presents with   • Liver Follow-up       Jaz Gipson is a  44 y.o. female here for a follow up visit for Frazier cirrhosis.     EGD in February 2019 showed 2 cords of varices with red well signs.  2 bands were placed with complete deflation of varices.  Procedure had been attempted 2 weeks prior and was aborted due to the presence of food in the stomach despite fasting.    AFP was normal in January.  CT of the abdomen and pelvis in January showed no focal liver lesions and small ascites.    No blood in stool or confusion. Stools moving adequately with lactulose.  HPI  Past Medical History:   Diagnosis Date   • Abnormal cervical Papanicolaou smear     remote   • Acute gastric mucosal erosion     11/12 EGD   • Allergic rhinitis    • Anemia    • Asthma    • Cirrhosis of liver (CMS/HCC)    • Duodenitis     11/12 EGD   • Enlarged lymph node     retroperitoneal lymph node enlarged, 5/12 PET CT consistent with low grade lymphoproliferative disorder (  [facility])  9/12Bone marrow bx negative    • Esophageal varices (CMS/HCC)    • Fatty liver    • Folic acid deficiency    • Graves disease    • History of blood transfusion    • Hyperlipidemia    • Hypertension    • Leukocytosis    • FRAZIER (nonalcoholic steatohepatitis) 2012   • Obstructive sleep apnea     4/10 dx, prescribed CPAP at 12 cm H2O (did not tolerate)     Past Surgical History:   Procedure Laterality Date   • APPENDECTOMY     • CHOLECYSTECTOMY      10/9/12 [place]: Lap Cholecystectomy and Appendectomy, Liver Bx:Cirrhosis (Biliary Dyskinesia, Cholecystitis, Chronic Appendicitis)   • COLONOSCOPY N/A 4/22/2016    [facility]   • ENDOSCOPY N/A 4/22/2016    Procedure: ESOPHAGOGASTRODUODENOSCOPY;  Surgeon: Annie Tierney MD;  Location: [facility] ENDOSCOPY;  Service:    • ENDOSCOPY N/A 12/20/2016    Procedure: ESOPHAGOGASTRODUODENOSCOPY AT BEDSIDE;  Surgeon: Haresh Fritz MD;  Location: [facility] ENDOSCOPY;  Service:    • ENDOSCOPY N/A 12/22/2016     Procedure: ESOPHAGOGASTRODUODENOSCOPY WITH BANDING X2;  Surgeon: Chriss Reece MD;  Location: [facility] ENDOSCOPY;  Service:    • ENDOSCOPY N/A 12/26/2016    Procedure: ESOPHAGOGASTRODUODENOSCOPY AT BEDSIDE;  Surgeon: Vidya Norman MD;  Location: [facility] ENDOSCOPY;  Service:    • ENDOSCOPY N/A 2/8/2017    Grade I esophageal varices, scar in the lower third of the esophagus, Portal hypertensive gastropathy.     • ENDOSCOPY N/A 10/2/2017    Grade II esophageal varices, clotted blood in the gastric fundus and in the gastric body, portal hypertensive gastropathy.  No specimens collected.    • ENDOSCOPY N/A 10/3/2017    Grade II esophageal varices, completely eradicated, portal hypertensive gastropathy   • ENDOSCOPY N/A 11/7/2017    Grade I esophageal varices.Scar in the lower third of the esophagus. Erosive gastritis with hemorrhage.Portal hypertensive gastropathy.A few bleeding angioectasias in the stomach. Injected. Treated with argon plasma coagulation (APC). No specimans collected.      • ENDOSCOPY N/A 5/23/2018    Grade I esoph varices, severe portal HTN gastropathy in fundus and body, scattered inflamm and erosions in antrum,    • ENDOSCOPY N/A 2/5/2019    Procedure: ESOPHAGOGASTRODUODENOSCOPY;  Surgeon: Annie Tierney MD;  Location: [facility] ENDOSCOPY;  Service: Gastroenterology   • ENDOSCOPY N/A 2/12/2019    Non-bleeding grade II esophageal varices, Portal hypertensive gastropathy   • LAPAROSCOPIC APPENDECTOMY  10/09/2012   • LIVER BIOPSY      9/12 Liver Bx done with Mariza and Appy (cirrhosis)   • UPPER GASTROINTESTINAL ENDOSCOPY      11/12 EGD with Erosive gastritis, duodenitis, and portal hypertensive gastropathy (Dr. Tierney) (no esophageal varices_       Current Outpatient Medications:   •  fluticasone (FLOVENT HFA) 44 MCG/ACT inhaler, Inhale 1 puff 2 (Two) Times a Day., Disp: 1 inhaler, Rfl: 1  •  furosemide (LASIX) 40 MG tablet, Take 1 tablet by mouth Daily., Disp: 30 tablet, Rfl: 11  •  lactulose  (CHRONULAC) 10 GM/15ML solution, Take 45 mL by mouth Every 12 (Twelve) Hours., Disp: 946 mL, Rfl: 3  •  levothyroxine (SYNTHROID) 200 MCG tablet, Take 1 tablet by mouth Daily., Disp: 90 tablet, Rfl: 1  •  levothyroxine (SYNTHROID, LEVOTHROID) 25 MCG tablet, Take 1 tablet by mouth Daily., Disp: 90 tablet, Rfl: 3  •  nadolol (CORGARD) 20 MG tablet, TAKE ONE TABLET BY MOUTH DAILY, Disp: 30 tablet, Rfl: 3  •  pantoprazole (PROTONIX) 40 MG EC tablet, Take 1 tablet by mouth 2 (Two) Times a Day Before Meals., Disp: 60 tablet, Rfl: 11  •  potassium chloride (K-DUR,KLOR-CON) 20 MEQ CR tablet, Take 1 tablet by mouth 2 (Two) Times a Day., Disp: 60 tablet, Rfl: 1  •  rifaximin (XIFAXAN) 550 MG tablet, Take 1 tablet by mouth Every 12 (Twelve) Hours., Disp: 60 tablet, Rfl: 11  •  spironolactone (ALDACTONE) 50 MG tablet, Take 3 tablets by mouth Daily., Disp: 90 tablet, Rfl: 11  •  sucralfate (CARAFATE) 1 g tablet, Take 1 tablet by mouth 2 (Two) Times a Day., Disp: 60 tablet, Rfl: 2  •  vitamin D (ERGOCALCIFEROL) 53531 units capsule capsule, Take 1 capsule by mouth 1 (One) Time Per Week., Disp: 12 capsule, Rfl: 3  •  ferrous sulfate 325 (65 FE) MG tablet, Take 1 tablet by mouth Daily With Breakfast., Disp: 30 tablet, Rfl: 11  PRN Meds:.  Allergies   Allergen Reactions   • Penicillins Swelling   • Latex Rash   • Tomato Rash     Social History     Socioeconomic History   • Marital status: Single     Spouse name: Not on file   • Number of children: 2   • Years of education: High School   • Highest education level: Not on file   Occupational History   • Occupation: Pharmacy Tech     Employer: [employer]   Tobacco Use   • Smoking status: Current Every Day Smoker     Packs/day: 0.25     Types: Cigarettes   • Smokeless tobacco: Never Used   Substance and Sexual Activity   • Alcohol use: No   • Drug use: No   • Sexual activity: Defer     Family History   Problem Relation Age of Onset   • Alcohol abuse Father    • Anxiety disorder  Father    • COPD Father    • Depression Father    • Hyperlipidemia Father    • Hypertension Father    • Asthma Brother    • Coronary artery disease Brother    • Diabetes type II Other    • Early death Brother         Age 5 months   • Heart failure Brother    • Malig Hyperthermia Neg Hx      Review of Systems   Constitutional: Negative for appetite change and unexpected weight change.   Gastrointestinal: Negative for abdominal distention, abdominal pain and blood in stool.   Psychiatric/Behavioral: Negative for confusion.   All other systems reviewed and are negative.    Vitals:    04/11/19 0839   BP: 120/70   Temp: 98.7 °F (37.1 °C)         04/11/19  0839   Weight: 81.6 kg (179 lb 12.8 oz)     Physical Exam   Constitutional: She appears well-developed and well-nourished.   HENT:   Head: Normocephalic and atraumatic.   Eyes: No scleral icterus.   Abdominal: Soft. She exhibits no distension and no mass. There is no tenderness.   Neurological: She is alert.   Skin: Skin is warm and dry.   Psychiatric: She has a normal mood and affect.     No images are attached to the encounter.  Diagnoses and all orders for this visit:    Other cirrhosis of liver (CMS/HCC)  -     US Liver; Future  -     CBC & Differential  -     Comprehensive Metabolic Panel  -     Protime-INR    Secondary esophageal varices without bleeding (CMS/HCC)  -     Case Request; Standing  -     Case Request    Other orders  -     Follow Anesthesia Guidelines / Standing Orders; Future  -     Implement Anesthesia orders day of procedure.; Standing  -     Obtain informed consent; Standing         Plan:  - needs repeat egd to ensure varices are eradicated - last egd 2/19  - due for hcc surveillance in  July  - labs today to calculate MELD  - continue current meds

## 2019-04-12 ENCOUNTER — TELEPHONE (OUTPATIENT)
Dept: GASTROENTEROLOGY | Facility: CLINIC | Age: 44
End: 2019-04-12

## 2019-04-12 NOTE — TELEPHONE ENCOUNTER
Call to pt.  Advise per Dr Tierney to start taking 30 meq kcl in am and 20 at night.  Recheck bmp in 1 week due to low K on labs.    Otherwise, labs stable - meld 13 - would still be medically early for transplant.    Pt verb understanding.  States will call back to make lab appt.

## 2019-04-12 NOTE — TELEPHONE ENCOUNTER
----- Message from Vidya Norman MD sent at 4/12/2019  7:52 AM EDT -----  Critical lab value-- potassium 2.6-- spoke with her mom who answers her phone for her while she is at work and advised taking 40meq of her potassium at time of call and before bed.  Advised would need repeat labs.

## 2019-04-12 NOTE — TELEPHONE ENCOUNTER
pls tell her to start taking 40 meq kcl in am and 20 at night - recheck bmp 1 week - due to low K on labs    Otherwise labs stable- meld 13 - would still be medically early for transplant

## 2019-05-07 ENCOUNTER — LAB (OUTPATIENT)
Dept: OTHER | Facility: HOSPITAL | Age: 44
End: 2019-05-07

## 2019-05-07 DIAGNOSIS — D69.6 THROMBOCYTOPENIA (HCC): ICD-10-CM

## 2019-05-07 DIAGNOSIS — D50.9 IRON DEFICIENCY ANEMIA, UNSPECIFIED IRON DEFICIENCY ANEMIA TYPE: ICD-10-CM

## 2019-05-07 LAB
BASOPHILS # BLD AUTO: 0.03 10*3/MM3 (ref 0–0.2)
BASOPHILS NFR BLD AUTO: 1.1 % (ref 0–1.5)
DEPRECATED RDW RBC AUTO: 63 FL (ref 37–54)
EOSINOPHIL # BLD AUTO: 0.25 10*3/MM3 (ref 0–0.4)
EOSINOPHIL NFR BLD AUTO: 9.6 % (ref 0.3–6.2)
ERYTHROCYTE [DISTWIDTH] IN BLOOD BY AUTOMATED COUNT: 17.9 % (ref 12.3–15.4)
FERRITIN SERPL-MCNC: 143.7 NG/ML (ref 13–150)
HCT VFR BLD AUTO: 36 % (ref 34–46.6)
HGB BLD-MCNC: 11.6 G/DL (ref 12–15.9)
HGB RETIC QN AUTO: 30.8 PG (ref 29.8–36.1)
IMM GRANULOCYTES # BLD AUTO: 0 10*3/MM3 (ref 0–0.05)
IMM GRANULOCYTES NFR BLD AUTO: 0 % (ref 0–0.5)
IMM RETICS NFR: 11.3 % (ref 3–15.8)
IRON 24H UR-MRATE: 43 MCG/DL (ref 37–145)
IRON SATN MFR SERPL: 21 % (ref 20–50)
LYMPHOCYTES # BLD AUTO: 0.64 10*3/MM3 (ref 0.7–3.1)
LYMPHOCYTES NFR BLD AUTO: 24.5 % (ref 19.6–45.3)
MCH RBC QN AUTO: 31.2 PG (ref 26.6–33)
MCHC RBC AUTO-ENTMCNC: 32.2 G/DL (ref 31.5–35.7)
MCV RBC AUTO: 96.8 FL (ref 79–97)
MONOCYTES # BLD AUTO: 0.37 10*3/MM3 (ref 0.1–0.9)
MONOCYTES NFR BLD AUTO: 14.2 % (ref 5–12)
NEUTROPHILS # BLD AUTO: 1.32 10*3/MM3 (ref 1.7–7)
NEUTROPHILS NFR BLD AUTO: 50.6 % (ref 42.7–76)
NRBC BLD AUTO-RTO: 0 /100 WBC (ref 0–0.2)
PLAT MORPH BLD: NORMAL
PLATELET # BLD AUTO: 37 10*3/MM3 (ref 140–450)
PMV BLD AUTO: 12.9 FL (ref 6–12)
RBC # BLD AUTO: 3.72 10*6/MM3 (ref 3.77–5.28)
RBC MORPH BLD: NORMAL
RETICS/RBC NFR AUTO: 1.34 % (ref 0.7–1.9)
TIBC SERPL-MCNC: 201 MCG/DL (ref 298–536)
TRANSFERRIN SERPL-MCNC: 135 MG/DL (ref 200–360)
WBC MORPH BLD: NORMAL
WBC NRBC COR # BLD: 2.61 10*3/MM3 (ref 3.4–10.8)

## 2019-05-07 PROCEDURE — 85046 RETICYTE/HGB CONCENTRATE: CPT | Performed by: INTERNAL MEDICINE

## 2019-05-07 PROCEDURE — 85025 COMPLETE CBC W/AUTO DIFF WBC: CPT | Performed by: INTERNAL MEDICINE

## 2019-05-07 PROCEDURE — 84466 ASSAY OF TRANSFERRIN: CPT | Performed by: INTERNAL MEDICINE

## 2019-05-07 PROCEDURE — 36415 COLL VENOUS BLD VENIPUNCTURE: CPT

## 2019-05-07 PROCEDURE — 85007 BL SMEAR W/DIFF WBC COUNT: CPT | Performed by: INTERNAL MEDICINE

## 2019-05-07 PROCEDURE — 83540 ASSAY OF IRON: CPT | Performed by: INTERNAL MEDICINE

## 2019-05-07 PROCEDURE — 82728 ASSAY OF FERRITIN: CPT | Performed by: INTERNAL MEDICINE

## 2019-05-07 NOTE — PROGRESS NOTES
Patient Platelet count 37. Spoke to YONY Palaciso. No new orders at this time. Patient returning next week.

## 2019-07-22 RX ORDER — NADOLOL 20 MG/1
TABLET ORAL
Qty: 30 TABLET | Refills: 2 | Status: SHIPPED | OUTPATIENT
Start: 2019-07-22 | End: 2019-12-02 | Stop reason: SDUPTHER

## 2019-10-16 ENCOUNTER — HOSPITAL ENCOUNTER (INPATIENT)
Facility: HOSPITAL | Age: 44
LOS: 2 days | Discharge: HOME | End: 2019-10-18
Attending: EMERGENCY MEDICINE | Admitting: INTERNAL MEDICINE

## 2019-10-16 DIAGNOSIS — E72.20 HYPERAMMONEMIA (HCC): ICD-10-CM

## 2019-10-16 DIAGNOSIS — E83.51 HYPOCALCEMIA: ICD-10-CM

## 2019-10-16 DIAGNOSIS — Z87.19 HISTORY OF CIRRHOSIS: ICD-10-CM

## 2019-10-16 DIAGNOSIS — E80.6 HYPERBILIRUBINEMIA: ICD-10-CM

## 2019-10-16 DIAGNOSIS — E87.6 HYPOKALEMIA: ICD-10-CM

## 2019-10-16 DIAGNOSIS — G93.40 ACUTE ENCEPHALOPATHY: Primary | ICD-10-CM

## 2019-10-16 DIAGNOSIS — J45.20 MILD INTERMITTENT ASTHMA WITHOUT COMPLICATION: ICD-10-CM

## 2019-10-16 PROBLEM — Z91.199 NONCOMPLIANCE: Status: ACTIVE | Noted: 2019-10-16

## 2019-10-16 PROBLEM — K74.69 OTHER CIRRHOSIS OF LIVER (HCC): Status: ACTIVE | Noted: 2017-11-01

## 2019-10-16 PROBLEM — Z71.6 TOBACCO ABUSE COUNSELING: Status: ACTIVE | Noted: 2019-10-16

## 2019-10-16 LAB
ALBUMIN SERPL-MCNC: 2.4 G/DL (ref 3.5–5.2)
ALBUMIN/GLOB SERPL: 0.3 G/DL
ALP SERPL-CCNC: 137 U/L (ref 39–117)
ALT SERPL W P-5'-P-CCNC: 20 U/L (ref 1–33)
AMMONIA BLD-SCNC: 132 UMOL/L (ref 11–51)
ANION GAP SERPL CALCULATED.3IONS-SCNC: 8.9 MMOL/L (ref 5–15)
AST SERPL-CCNC: 49 U/L (ref 1–32)
BACTERIA UR QL AUTO: ABNORMAL /HPF
BASOPHILS # BLD AUTO: 0.06 10*3/MM3 (ref 0–0.2)
BASOPHILS NFR BLD AUTO: 1.8 % (ref 0–1.5)
BILIRUB SERPL-MCNC: 3.2 MG/DL (ref 0.2–1.2)
BILIRUB UR QL STRIP: NEGATIVE
BUN BLD-MCNC: 6 MG/DL (ref 6–20)
BUN/CREAT SERPL: 9.7 (ref 7–25)
CALCIUM SPEC-SCNC: 7.2 MG/DL (ref 8.6–10.5)
CHLORIDE SERPL-SCNC: 96 MMOL/L (ref 98–107)
CLARITY UR: ABNORMAL
CO2 SERPL-SCNC: 25.1 MMOL/L (ref 22–29)
COLOR UR: YELLOW
CREAT BLD-MCNC: 0.62 MG/DL (ref 0.57–1)
DEPRECATED RDW RBC AUTO: 49.3 FL (ref 37–54)
EOSINOPHIL # BLD AUTO: 0.19 10*3/MM3 (ref 0–0.4)
EOSINOPHIL NFR BLD AUTO: 5.6 % (ref 0.3–6.2)
ERYTHROCYTE [DISTWIDTH] IN BLOOD BY AUTOMATED COUNT: 14.7 % (ref 12.3–15.4)
GFR SERPL CREATININE-BSD FRML MDRD: 106 ML/MIN/1.73
GLOBULIN UR ELPH-MCNC: 8.1 GM/DL
GLUCOSE BLD-MCNC: 96 MG/DL (ref 65–99)
GLUCOSE UR STRIP-MCNC: NEGATIVE MG/DL
HCT VFR BLD AUTO: 33.2 % (ref 34–46.6)
HGB BLD-MCNC: 11.2 G/DL (ref 12–15.9)
HGB UR QL STRIP.AUTO: NEGATIVE
HYALINE CASTS UR QL AUTO: ABNORMAL /LPF
IMM GRANULOCYTES # BLD AUTO: 0.02 10*3/MM3 (ref 0–0.05)
IMM GRANULOCYTES NFR BLD AUTO: 0.6 % (ref 0–0.5)
INR PPP: 1.64 (ref 0.9–1.1)
KETONES UR QL STRIP: NEGATIVE
LEUKOCYTE ESTERASE UR QL STRIP.AUTO: ABNORMAL
LIPASE SERPL-CCNC: 58 U/L (ref 13–60)
LYMPHOCYTES # BLD AUTO: 0.67 10*3/MM3 (ref 0.7–3.1)
LYMPHOCYTES NFR BLD AUTO: 19.8 % (ref 19.6–45.3)
MAGNESIUM SERPL-MCNC: 2.1 MG/DL (ref 1.6–2.6)
MCH RBC QN AUTO: 31.2 PG (ref 26.6–33)
MCHC RBC AUTO-ENTMCNC: 33.7 G/DL (ref 31.5–35.7)
MCV RBC AUTO: 92.5 FL (ref 79–97)
MONOCYTES # BLD AUTO: 0.24 10*3/MM3 (ref 0.1–0.9)
MONOCYTES NFR BLD AUTO: 7.1 % (ref 5–12)
NEUTROPHILS # BLD AUTO: 2.21 10*3/MM3 (ref 1.7–7)
NEUTROPHILS NFR BLD AUTO: 65.1 % (ref 42.7–76)
NITRITE UR QL STRIP: NEGATIVE
NRBC BLD AUTO-RTO: 0 /100 WBC (ref 0–0.2)
PH UR STRIP.AUTO: 8 [PH] (ref 5–8)
PLATELET # BLD AUTO: 39 10*3/MM3 (ref 140–450)
PMV BLD AUTO: 12.4 FL (ref 6–12)
POTASSIUM BLD-SCNC: 1.9 MMOL/L (ref 3.5–5.2)
PROT SERPL-MCNC: 10.5 G/DL (ref 6–8.5)
PROT UR QL STRIP: NEGATIVE
PROTHROMBIN TIME: 19.1 SECONDS (ref 11.7–14.2)
RBC # BLD AUTO: 3.59 10*6/MM3 (ref 3.77–5.28)
RBC # UR: ABNORMAL /HPF
REF LAB TEST METHOD: ABNORMAL
SODIUM BLD-SCNC: 130 MMOL/L (ref 136–145)
SP GR UR STRIP: <1.005 (ref 1–1.03)
SQUAMOUS #/AREA URNS HPF: ABNORMAL /HPF
TROPONIN T SERPL-MCNC: <0.01 NG/ML (ref 0–0.03)
UROBILINOGEN UR QL STRIP: ABNORMAL
WBC NRBC COR # BLD: 3.39 10*3/MM3 (ref 3.4–10.8)
WBC UR QL AUTO: ABNORMAL /HPF

## 2019-10-16 PROCEDURE — 84484 ASSAY OF TROPONIN QUANT: CPT | Performed by: EMERGENCY MEDICINE

## 2019-10-16 PROCEDURE — 94799 UNLISTED PULMONARY SVC/PX: CPT

## 2019-10-16 PROCEDURE — 93010 ELECTROCARDIOGRAM REPORT: CPT | Performed by: INTERNAL MEDICINE

## 2019-10-16 PROCEDURE — 93005 ELECTROCARDIOGRAM TRACING: CPT | Performed by: EMERGENCY MEDICINE

## 2019-10-16 PROCEDURE — 82140 ASSAY OF AMMONIA: CPT | Performed by: EMERGENCY MEDICINE

## 2019-10-16 PROCEDURE — 99254 IP/OBS CNSLTJ NEW/EST MOD 60: CPT | Performed by: INTERNAL MEDICINE

## 2019-10-16 PROCEDURE — 85025 COMPLETE CBC W/AUTO DIFF WBC: CPT | Performed by: EMERGENCY MEDICINE

## 2019-10-16 PROCEDURE — 85610 PROTHROMBIN TIME: CPT | Performed by: EMERGENCY MEDICINE

## 2019-10-16 PROCEDURE — 83690 ASSAY OF LIPASE: CPT | Performed by: EMERGENCY MEDICINE

## 2019-10-16 PROCEDURE — 81001 URINALYSIS AUTO W/SCOPE: CPT | Performed by: EMERGENCY MEDICINE

## 2019-10-16 PROCEDURE — 94640 AIRWAY INHALATION TREATMENT: CPT

## 2019-10-16 PROCEDURE — 83735 ASSAY OF MAGNESIUM: CPT | Performed by: EMERGENCY MEDICINE

## 2019-10-16 PROCEDURE — 80053 COMPREHEN METABOLIC PANEL: CPT | Performed by: EMERGENCY MEDICINE

## 2019-10-16 PROCEDURE — 99285 EMERGENCY DEPT VISIT HI MDM: CPT

## 2019-10-16 RX ORDER — LACTULOSE 10 G/15ML
30 SOLUTION ORAL ONCE
Status: DISCONTINUED | OUTPATIENT
Start: 2019-10-16 | End: 2019-10-16

## 2019-10-16 RX ORDER — SODIUM CHLORIDE 0.9 % (FLUSH) 0.9 %
10 SYRINGE (ML) INJECTION EVERY 12 HOURS SCHEDULED
Status: DISCONTINUED | OUTPATIENT
Start: 2019-10-16 | End: 2019-10-18 | Stop reason: HOSPADM

## 2019-10-16 RX ORDER — LACTULOSE 10 G/15ML
10 SOLUTION ORAL ONCE
Status: DISCONTINUED | OUTPATIENT
Start: 2019-10-16 | End: 2019-10-16

## 2019-10-16 RX ORDER — ONDANSETRON 2 MG/ML
4 INJECTION INTRAMUSCULAR; INTRAVENOUS EVERY 6 HOURS PRN
Status: DISCONTINUED | OUTPATIENT
Start: 2019-10-16 | End: 2019-10-18 | Stop reason: HOSPADM

## 2019-10-16 RX ORDER — LACTULOSE 10 G/15ML
30 SOLUTION ORAL 3 TIMES DAILY
Status: DISCONTINUED | OUTPATIENT
Start: 2019-10-16 | End: 2019-10-18 | Stop reason: HOSPADM

## 2019-10-16 RX ORDER — NADOLOL 20 MG/1
20 TABLET ORAL DAILY
Status: DISCONTINUED | OUTPATIENT
Start: 2019-10-16 | End: 2019-10-18 | Stop reason: HOSPADM

## 2019-10-16 RX ORDER — FERROUS SULFATE 325(65) MG
325 TABLET ORAL
Status: DISCONTINUED | OUTPATIENT
Start: 2019-10-17 | End: 2019-10-18 | Stop reason: HOSPADM

## 2019-10-16 RX ORDER — FUROSEMIDE 40 MG/1
40 TABLET ORAL DAILY
Status: DISCONTINUED | OUTPATIENT
Start: 2019-10-16 | End: 2019-10-18 | Stop reason: HOSPADM

## 2019-10-16 RX ORDER — BUDESONIDE 0.5 MG/2ML
0.5 INHALANT ORAL
Status: DISCONTINUED | OUTPATIENT
Start: 2019-10-16 | End: 2019-10-18 | Stop reason: HOSPADM

## 2019-10-16 RX ORDER — SODIUM CHLORIDE 0.9 % (FLUSH) 0.9 %
10 SYRINGE (ML) INJECTION AS NEEDED
Status: DISCONTINUED | OUTPATIENT
Start: 2019-10-16 | End: 2019-10-18 | Stop reason: HOSPADM

## 2019-10-16 RX ORDER — LEVOTHYROXINE SODIUM 0.1 MG/1
200 TABLET ORAL
Status: DISCONTINUED | OUTPATIENT
Start: 2019-10-17 | End: 2019-10-18 | Stop reason: HOSPADM

## 2019-10-16 RX ORDER — PANTOPRAZOLE SODIUM 40 MG/1
40 TABLET, DELAYED RELEASE ORAL
Status: DISCONTINUED | OUTPATIENT
Start: 2019-10-16 | End: 2019-10-18 | Stop reason: HOSPADM

## 2019-10-16 RX ORDER — POTASSIUM CHLORIDE 750 MG/1
20 CAPSULE, EXTENDED RELEASE ORAL DAILY
Status: DISCONTINUED | OUTPATIENT
Start: 2019-10-16 | End: 2019-10-18 | Stop reason: HOSPADM

## 2019-10-16 RX ORDER — POTASSIUM CHLORIDE 750 MG/1
40 CAPSULE, EXTENDED RELEASE ORAL ONCE
Status: COMPLETED | OUTPATIENT
Start: 2019-10-16 | End: 2019-10-16

## 2019-10-16 RX ORDER — POTASSIUM CHLORIDE 1.5 G/1.77G
40 POWDER, FOR SOLUTION ORAL AS NEEDED
Status: DISCONTINUED | OUTPATIENT
Start: 2019-10-16 | End: 2019-10-18 | Stop reason: HOSPADM

## 2019-10-16 RX ORDER — POTASSIUM CHLORIDE 7.45 MG/ML
10 INJECTION INTRAVENOUS
Status: DISCONTINUED | OUTPATIENT
Start: 2019-10-16 | End: 2019-10-18 | Stop reason: HOSPADM

## 2019-10-16 RX ORDER — LEVOTHYROXINE SODIUM 0.03 MG/1
25 TABLET ORAL
Status: DISCONTINUED | OUTPATIENT
Start: 2019-10-17 | End: 2019-10-18 | Stop reason: HOSPADM

## 2019-10-16 RX ORDER — POTASSIUM CHLORIDE 750 MG/1
40 CAPSULE, EXTENDED RELEASE ORAL AS NEEDED
Status: DISCONTINUED | OUTPATIENT
Start: 2019-10-16 | End: 2019-10-18 | Stop reason: HOSPADM

## 2019-10-16 RX ORDER — SODIUM CHLORIDE 9 MG/ML
50 INJECTION, SOLUTION INTRAVENOUS CONTINUOUS
Status: DISCONTINUED | OUTPATIENT
Start: 2019-10-16 | End: 2019-10-18 | Stop reason: HOSPADM

## 2019-10-16 RX ADMIN — PANTOPRAZOLE SODIUM 40 MG: 40 TABLET, DELAYED RELEASE ORAL at 17:45

## 2019-10-16 RX ADMIN — POTASSIUM CHLORIDE 40 MEQ: 750 CAPSULE, EXTENDED RELEASE ORAL at 12:10

## 2019-10-16 RX ADMIN — POTASSIUM CHLORIDE 40 MEQ: 750 CAPSULE, EXTENDED RELEASE ORAL at 16:25

## 2019-10-16 RX ADMIN — POTASSIUM CHLORIDE 40 MEQ: 750 CAPSULE, EXTENDED RELEASE ORAL at 20:48

## 2019-10-16 RX ADMIN — SODIUM CHLORIDE, PRESERVATIVE FREE 10 ML: 5 INJECTION INTRAVENOUS at 20:49

## 2019-10-16 RX ADMIN — NADOLOL 20 MG: 20 TABLET ORAL at 17:45

## 2019-10-16 RX ADMIN — LACTULOSE 30 G: 10 SOLUTION ORAL at 16:26

## 2019-10-16 RX ADMIN — SPIRONOLACTONE 150 MG: 100 TABLET, FILM COATED ORAL at 17:45

## 2019-10-16 RX ADMIN — LACTULOSE 30 G: 10 SOLUTION ORAL at 20:48

## 2019-10-16 RX ADMIN — FUROSEMIDE 40 MG: 40 TABLET ORAL at 17:45

## 2019-10-16 RX ADMIN — RIFAXIMIN 550 MG: 550 TABLET ORAL at 20:48

## 2019-10-16 RX ADMIN — SODIUM CHLORIDE 125 ML/HR: 9 INJECTION, SOLUTION INTRAVENOUS at 12:09

## 2019-10-16 RX ADMIN — BUDESONIDE 0.5 MG: 0.5 INHALANT RESPIRATORY (INHALATION) at 20:01

## 2019-10-16 RX ADMIN — SODIUM CHLORIDE, PRESERVATIVE FREE 10 ML: 5 INJECTION INTRAVENOUS at 20:48

## 2019-10-16 RX ADMIN — POTASSIUM CHLORIDE 20 MEQ: 750 CAPSULE, EXTENDED RELEASE ORAL at 16:29

## 2019-10-16 NOTE — CONSULTS
[facility]  Initial Inpatient Consult Note    Referring Provider: Darryl    Reason for Consultation: Piedra cirrhosis    Subjective     History of present illness:    44 y.o. female with a history of Piedra cirrhosis, became confused over the last 24 hours, forgetful.  Her family asked her to come to the emergency room.  She was found to be encephalopathic.  Admitted to the hospital.  At bedside today she answers some of my questions appropriately some not.  She has had no nausea, vomiting, abdominal pain, GI bleeding.  No fevers, chills.    She tells me she has had paracentesis in the past x2 not recently  Her EGD reports can be reviewed below  It appears she is due for EGD and ultrasound and AFP for all of her screening test    Past Medical History:  Past Medical History:   Diagnosis Date   • Abnormal cervical Papanicolaou smear     remote   • Acute gastric mucosal erosion     11/12 EGD   • Allergic rhinitis    • Anemia    • Asthma    • Cirrhosis of liver (CMS/HCC)    • Duodenitis     11/12 EGD   • Enlarged lymph node     retroperitoneal lymph node enlarged, 5/12 PET CT consistent with low grade lymphoproliferative disorder (  [facility])  9/12Bone marrow bx negative    • Esophageal varices (CMS/HCC)    • Fatty liver    • Folic acid deficiency    • Graves disease    • History of blood transfusion    • Hyperlipidemia    • Hypertension    • Leukocytosis    • PIEDRA (nonalcoholic steatohepatitis) 2012   • Obstructive sleep apnea     4/10 dx, prescribed CPAP at 12 cm H2O (did not tolerate)     Past Surgical History:  Past Surgical History:   Procedure Laterality Date   • APPENDECTOMY     • CHOLECYSTECTOMY      10/9/12 [place]: Lap Cholecystectomy and Appendectomy, Liver Bx:Cirrhosis (Biliary Dyskinesia, Cholecystitis, Chronic Appendicitis)   • COLONOSCOPY N/A 4/22/2016    [facility]   • ENDOSCOPY N/A 4/22/2016    Procedure: ESOPHAGOGASTRODUODENOSCOPY;  Surgeon: Annie Tierney MD;  Location: [facility]  ENDOSCOPY;  Service:    • ENDOSCOPY N/A 12/20/2016    Procedure: ESOPHAGOGASTRODUODENOSCOPY AT BEDSIDE;  Surgeon: Haresh Fritz MD;  Location: [facility] ENDOSCOPY;  Service:    • ENDOSCOPY N/A 12/22/2016    Procedure: ESOPHAGOGASTRODUODENOSCOPY WITH BANDING X2;  Surgeon: Chriss Reece MD;  Location: [facility] ENDOSCOPY;  Service:    • ENDOSCOPY N/A 12/26/2016    Procedure: ESOPHAGOGASTRODUODENOSCOPY AT BEDSIDE;  Surgeon: Vidya Norman MD;  Location: [facility] ENDOSCOPY;  Service:    • ENDOSCOPY N/A 2/8/2017    Grade I esophageal varices, scar in the lower third of the esophagus, Portal hypertensive gastropathy.     • ENDOSCOPY N/A 10/2/2017    Grade II esophageal varices, clotted blood in the gastric fundus and in the gastric body, portal hypertensive gastropathy.  No specimens collected.    • ENDOSCOPY N/A 10/3/2017    Grade II esophageal varices, completely eradicated, portal hypertensive gastropathy   • ENDOSCOPY N/A 11/7/2017    Grade I esophageal varices.Scar in the lower third of the esophagus. Erosive gastritis with hemorrhage.Portal hypertensive gastropathy.A few bleeding angioectasias in the stomach. Injected. Treated with argon plasma coagulation (APC). No specimans collected.      • ENDOSCOPY N/A 5/23/2018    Grade I esoph varices, severe portal HTN gastropathy in fundus and body, scattered inflamm and erosions in antrum,    • ENDOSCOPY N/A 2/5/2019    Procedure: ESOPHAGOGASTRODUODENOSCOPY;  Surgeon: Annie Tierney MD;  Location: [facility] ENDOSCOPY;  Service: Gastroenterology   • ENDOSCOPY N/A 2/12/2019    Non-bleeding grade II esophageal varices, Portal hypertensive gastropathy   • LAPAROSCOPIC APPENDECTOMY  10/09/2012   • LIVER BIOPSY      9/12 Liver Bx done with Mariza and Appy (cirrhosis)   • UPPER GASTROINTESTINAL ENDOSCOPY      11/12 EGD with Erosive gastritis, duodenitis, and portal hypertensive gastropathy (Dr. Tierney) (no esophageal varices_      Social History:   Social History     Tobacco Use    • Smoking status: Current Every Day Smoker     Packs/day: 0.25     Types: Cigarettes   • Smokeless tobacco: Never Used   Substance Use Topics   • Alcohol use: No      Family History:  Family History   Problem Relation Age of Onset   • Alcohol abuse Father    • Anxiety disorder Father    • COPD Father    • Depression Father    • Hyperlipidemia Father    • Hypertension Father    • Asthma Brother    • Coronary artery disease Brother    • Diabetes type II Other    • Early death Brother         Age 5 months   • Heart failure Brother    • Malig Hyperthermia Neg Hx        Home Meds:  Medications Prior to Admission   Medication Sig Dispense Refill Last Dose   • ferrous sulfate 325 (65 FE) MG tablet Take 1 tablet by mouth Daily With Breakfast. 30 tablet 11 Not Taking   • fluticasone (FLOVENT HFA) 44 MCG/ACT inhaler Inhale 1 puff 2 (Two) Times a Day. 1 inhaler 1 Taking   • furosemide (LASIX) 40 MG tablet Take 1 tablet by mouth Daily. 30 tablet 11 Taking   • lactulose (CHRONULAC) 10 GM/15ML solution Take 45 mL by mouth Every 12 (Twelve) Hours. 946 mL 3 Taking   • levothyroxine (SYNTHROID) 200 MCG tablet Take 1 tablet by mouth Daily. 90 tablet 1 Taking   • levothyroxine (SYNTHROID, LEVOTHROID) 25 MCG tablet Take 1 tablet by mouth Daily. 90 tablet 3 Taking   • nadolol (CORGARD) 20 MG tablet TAKE ONE TABLET BY MOUTH DAILY 30 tablet 2    • pantoprazole (PROTONIX) 40 MG EC tablet Take 1 tablet by mouth 2 (Two) Times a Day Before Meals. 60 tablet 11 Taking   • potassium chloride (K-DUR,KLOR-CON) 20 MEQ CR tablet Take 1 tablet by mouth 2 (Two) Times a Day. 60 tablet 1 Taking   • rifaximin (XIFAXAN) 550 MG tablet Take 1 tablet by mouth Every 12 (Twelve) Hours. 60 tablet 11 Taking   • spironolactone (ALDACTONE) 50 MG tablet Take 3 tablets by mouth Daily. 90 tablet 11 Taking   • sucralfate (CARAFATE) 1 g tablet Take 1 tablet by mouth 2 (Two) Times a Day. 60 tablet 2 Taking   • vitamin D (ERGOCALCIFEROL) 46041 units capsule capsule  Take 1 capsule by mouth 1 (One) Time Per Week. 12 capsule 3 Taking     Current Meds:     budesonide 0.5 mg Nebulization BID - RT   [START ON 10/17/2019] ferrous sulfate 325 mg Oral Daily With Breakfast   lactulose 30 g Oral Once   levothyroxine 200 mcg Oral Daily   levothyroxine 25 mcg Oral Daily   nadolol 20 mg Oral Daily   pantoprazole 40 mg Oral BID AC   rifaximin 550 mg Oral Q12H   sodium chloride 10 mL Intravenous Q12H     Allergies:  Allergies   Allergen Reactions   • Penicillins Swelling   • Latex Rash   • Tomato Rash     Review of Systems  She has weakness fatigue all other systems reviewed and negative     Objective     Vital Signs  Temp:  [96.5 °F (35.8 °C)-97.4 °F (36.3 °C)] 97.4 °F (36.3 °C)  Heart Rate:  [73-91] 73  Resp:  [16-18] 18  BP: (120-135)/(77-86) 120/82  Physical Exam:  General Appearance:    Alert, cooperative, in no acute distress   Head:    Normocephalic, without obvious abnormality, atraumatic   Eyes:          conjunctivae icterus   Throat:   no thrush, oral mucosa moist   Neck:   Supple, no adenopathy   Lungs:     Clear to auscultation bilaterally    Heart:    Regular rhythm and normal rate    Chest Wall:    No abnormalities observed   Abdomen:     Soft, nondistended, nontender; normal bowel sounds   Extremities:   no edema, no redness   Skin:   No bruising or rash   Psychiatric:  normal mood and insight     Results Review:   I reviewed the patient's new clinical results.    Results from last 7 days   Lab Units 10/16/19  1104   WBC 10*3/mm3 3.39*   HEMOGLOBIN g/dL 11.2*   HEMATOCRIT % 33.2*   PLATELETS 10*3/mm3 39*     Results from last 7 days   Lab Units 10/16/19  1104   SODIUM mmol/L 130*   POTASSIUM mmol/L 1.9*   CHLORIDE mmol/L 96*   CO2 mmol/L 25.1   BUN mg/dL 6   CREATININE mg/dL 0.62   CALCIUM mg/dL 7.2*   BILIRUBIN mg/dL 3.2*   ALK PHOS U/L 137*   ALT (SGPT) U/L 20   AST (SGOT) U/L 49*   GLUCOSE mg/dL 96     Results from last 7 days   Lab Units 10/16/19  1104   INR  1.64*     Lab  Results   Lab Value Date/Time    LIPASE 58 10/16/2019 1104    LIPASE 31 12/20/2016 1355    LIPASE 20 03/04/2015 1846       Radiology:  No orders to display       Assessment/Plan   Patient Active Problem List   Diagnosis   • PIEDRA (nonalcoholic steatohepatitis)   • Hypothyroidism   • Vitamin D deficiency   • Hyperlipidemia   • Iron deficiency anemia   • Esophageal varices in cirrhosis (CMS/HCC)   • Encephalopathy, hepatic (CMS/HCC)   • Gastrointestinal hemorrhage with hematemesis   • Other cirrhosis of liver (CMS/HCC)   • Secondary esophageal varices without bleeding (CMS/HCC)   • Thrombocytopenia (CMS/HCC)   • Adverse effect of iron or its compound, subsequent encounter   • Noncompliance   • Tobacco abuse counseling       Assessment:  1. Piedra cirrhosis  2. Hepatic encephalopathy  3. Anemia  4. Thrombocytopenia  5. Varices    Plan:  · Continue lactulose and Xifaxan  · A platelet count of 39 precludes inpatient EGD at this time  · She can follow-up with Dr. Tierney to discuss outpatient EGD and adding DOPTELET  preprocedure to improve platelet count  · Routine labs daily  · she should continue to follow with [facility] [place]  · Ultrasound to rule out hepatocellular carcinoma if there is ascites on board we will plan for tap while she is here  · Continue diuretics  · Watch renal function and electrolyte  · Continue beta-blocker  · Low-sodium diet      I discussed the patients findings and my recommendations with patient, family and nursing staff.    Chriss Santos MD

## 2019-10-16 NOTE — H&P
Patient Name:  Jaz Gipson  YOB: 1975  MRN:  [id number]  Admit Date:  10/16/2019  Patient Care Team:  Head, LON Oswald as PCP - General (Nurse Practitioner)  Gus Orourke II, MD as Consulting Physician (Hematology and Oncology)  Head, LON Oswald as Referring Physician (Nurse Practitioner)  Eric Laughlin MD as Consulting Physician (Pulmonary Disease)  Laura Xie MD (Gastroenterology)      Subjective   History Present Illness     Chief Complaint   Patient presents with   • Altered Mental Status       Ms. Gipson is a 44 y.o. smoker with a history of Frazier cirrhosis, esophageal varices with prior bleeding, Graves' disease, that presents to  complaining of increased confusion.  Family is at bedside including daughter who reports that she has not been taking her lactulose and several other medications including potassium in over a month.  She does tell her family that she has been taking them but then admits this is not true.  She recently started new job and was hesitant to take vascular dose and have bowel movements while at work.  Per family she has been easily distracted, forgetful, and lethargic. She is well overdue for cirrhosis monitoring with gastroenterology followed by Dr. Tierney.     History of Present Illness  Review of Systems   Constitutional: Negative for appetite change, chills and unexpected weight change.   HENT: Negative for congestion and trouble swallowing.    Eyes: Negative for visual disturbance.   Respiratory: Negative for choking and shortness of breath.    Cardiovascular: Negative for chest pain, palpitations and leg swelling.   Gastrointestinal: Positive for abdominal distention. Negative for abdominal pain, blood in stool, constipation, diarrhea, nausea and vomiting.   Endocrine: Negative for polydipsia, polyphagia and polyuria.   Genitourinary: Negative for dysuria.   Musculoskeletal: Negative for back pain.   Skin: Negative  for color change.   Neurological: Negative for dizziness and weakness.   Hematological: Does not bruise/bleed easily.   Psychiatric/Behavioral: Positive for confusion, decreased concentration and sleep disturbance.        Personal History     Past Medical History:   Diagnosis Date   • Abnormal cervical Papanicolaou smear     remote   • Acute gastric mucosal erosion     11/12 EGD   • Allergic rhinitis    • Anemia    • Asthma    • Cirrhosis of liver (CMS/HCC)    • Duodenitis     11/12 EGD   • Enlarged lymph node     retroperitoneal lymph node enlarged, 5/12 PET CT consistent with low grade lymphoproliferative disorder (  [facility])  9/12Bone marrow bx negative    • Esophageal varices (CMS/HCC)    • Fatty liver    • Folic acid deficiency    • Graves disease    • History of blood transfusion    • Hyperlipidemia    • Hypertension    • Leukocytosis    • PIEDRA (nonalcoholic steatohepatitis) 2012   • Obstructive sleep apnea     4/10 dx, prescribed CPAP at 12 cm H2O (did not tolerate)     Past Surgical History:   Procedure Laterality Date   • APPENDECTOMY     • CHOLECYSTECTOMY      10/9/12 [place]: Lap Cholecystectomy and Appendectomy, Liver Bx:Cirrhosis (Biliary Dyskinesia, Cholecystitis, Chronic Appendicitis)   • COLONOSCOPY N/A 4/22/2016    [facility]   • ENDOSCOPY N/A 4/22/2016    Procedure: ESOPHAGOGASTRODUODENOSCOPY;  Surgeon: Annie Tiereny MD;  Location: [facility] ENDOSCOPY;  Service:    • ENDOSCOPY N/A 12/20/2016    Procedure: ESOPHAGOGASTRODUODENOSCOPY AT BEDSIDE;  Surgeon: Haresh Fritz MD;  Location: [facility] ENDOSCOPY;  Service:    • ENDOSCOPY N/A 12/22/2016    Procedure: ESOPHAGOGASTRODUODENOSCOPY WITH BANDING X2;  Surgeon: Chriss Reece MD;  Location: [facility] ENDOSCOPY;  Service:    • ENDOSCOPY N/A 12/26/2016    Procedure: ESOPHAGOGASTRODUODENOSCOPY AT BEDSIDE;  Surgeon: Vidya Norman MD;  Location: [facility] ENDOSCOPY;  Service:    • ENDOSCOPY N/A 2/8/2017    Grade I esophageal varices, scar in the lower third  of the esophagus, Portal hypertensive gastropathy.     • ENDOSCOPY N/A 10/2/2017    Grade II esophageal varices, clotted blood in the gastric fundus and in the gastric body, portal hypertensive gastropathy.  No specimens collected.    • ENDOSCOPY N/A 10/3/2017    Grade II esophageal varices, completely eradicated, portal hypertensive gastropathy   • ENDOSCOPY N/A 11/7/2017    Grade I esophageal varices.Scar in the lower third of the esophagus. Erosive gastritis with hemorrhage.Portal hypertensive gastropathy.A few bleeding angioectasias in the stomach. Injected. Treated with argon plasma coagulation (APC). No specimans collected.      • ENDOSCOPY N/A 5/23/2018    Grade I esoph varices, severe portal HTN gastropathy in fundus and body, scattered inflamm and erosions in antrum,    • ENDOSCOPY N/A 2/5/2019    Procedure: ESOPHAGOGASTRODUODENOSCOPY;  Surgeon: Annie Tierney MD;  Location: [facility] ENDOSCOPY;  Service: Gastroenterology   • ENDOSCOPY N/A 2/12/2019    Non-bleeding grade II esophageal varices, Portal hypertensive gastropathy   • LAPAROSCOPIC APPENDECTOMY  10/09/2012   • LIVER BIOPSY      9/12 Liver Bx done with Mariza and Appy (cirrhosis)   • UPPER GASTROINTESTINAL ENDOSCOPY      11/12 EGD with Erosive gastritis, duodenitis, and portal hypertensive gastropathy (Dr. Tierney) (no esophageal varices_     Family History   Problem Relation Age of Onset   • Alcohol abuse Father    • Anxiety disorder Father    • COPD Father    • Depression Father    • Hyperlipidemia Father    • Hypertension Father    • Asthma Brother    • Coronary artery disease Brother    • Diabetes type II Other    • Early death Brother         Age 5 months   • Heart failure Brother    • Malig Hyperthermia Neg Hx      Social History     Tobacco Use   • Smoking status: Current Every Day Smoker     Packs/day: 0.25     Types: Cigarettes   • Smokeless tobacco: Never Used   Substance Use Topics   • Alcohol use: No   • Drug use: No     No current  facility-administered medications on file prior to encounter.      Current Outpatient Medications on File Prior to Encounter   Medication Sig Dispense Refill   • ferrous sulfate 325 (65 FE) MG tablet Take 1 tablet by mouth Daily With Breakfast. 30 tablet 11   • fluticasone (FLOVENT HFA) 44 MCG/ACT inhaler Inhale 1 puff 2 (Two) Times a Day. 1 inhaler 1   • furosemide (LASIX) 40 MG tablet Take 1 tablet by mouth Daily. 30 tablet 11   • lactulose (CHRONULAC) 10 GM/15ML solution Take 45 mL by mouth Every 12 (Twelve) Hours. 946 mL 3   • levothyroxine (SYNTHROID) 200 MCG tablet Take 1 tablet by mouth Daily. 90 tablet 1   • levothyroxine (SYNTHROID, LEVOTHROID) 25 MCG tablet Take 1 tablet by mouth Daily. 90 tablet 3   • nadolol (CORGARD) 20 MG tablet TAKE ONE TABLET BY MOUTH DAILY 30 tablet 2   • pantoprazole (PROTONIX) 40 MG EC tablet Take 1 tablet by mouth 2 (Two) Times a Day Before Meals. 60 tablet 11   • potassium chloride (K-DUR,KLOR-CON) 20 MEQ CR tablet Take 1 tablet by mouth 2 (Two) Times a Day. 60 tablet 1   • rifaximin (XIFAXAN) 550 MG tablet Take 1 tablet by mouth Every 12 (Twelve) Hours. 60 tablet 11   • spironolactone (ALDACTONE) 50 MG tablet Take 3 tablets by mouth Daily. 90 tablet 11   • sucralfate (CARAFATE) 1 g tablet Take 1 tablet by mouth 2 (Two) Times a Day. 60 tablet 2   • vitamin D (ERGOCALCIFEROL) 58412 units capsule capsule Take 1 capsule by mouth 1 (One) Time Per Week. 12 capsule 3     Allergies   Allergen Reactions   • Penicillins Swelling   • Latex Rash   • Tomato Rash       Objective    Objective     Vital Signs  Temp:  [96.5 °F (35.8 °C)-97.4 °F (36.3 °C)] 97.4 °F (36.3 °C)  Heart Rate:  [73-91] 73  Resp:  [16-18] 18  BP: (120-135)/(77-86) 120/82  SpO2:  [98 %-100 %] 100 %  on   ;   Device (Oxygen Therapy): room air  Body mass index is 30.9 kg/m².    Physical Exam   Constitutional: She is oriented to person, place, and time. She appears well-developed and well-nourished.   Obese, jaundiced,  lethargic female   HENT:   Head: Normocephalic and atraumatic.   Eyes: Pupils are equal, round, and reactive to light. Scleral icterus is present.   Neck: No JVD present.   Cardiovascular: Normal rate, regular rhythm and normal heart sounds.   Pulmonary/Chest: Effort normal and breath sounds normal.   Abdominal: Soft. Bowel sounds are normal. She exhibits distension. She exhibits no shifting dullness, no pulsatile liver, no fluid wave, no abdominal bruit, no ascites, no pulsatile midline mass and no mass. There is no hepatosplenomegaly. There is no tenderness. There is no rigidity and no guarding. No hernia.   Musculoskeletal: Normal range of motion.   Neurological: She is alert and oriented to person, place, and time.   Asterixis    Skin: Skin is warm and dry.   jaundiced   Psychiatric: Thought content normal. She is slowed. She exhibits a depressed mood. She exhibits abnormal recent memory.   Nursing note and vitals reviewed.      Results Review:  I reviewed the patient's new clinical results.  I reviewed the patient's new imaging results and agree with the interpretation.  I reviewed the patient's other test results and agree with the interpretation  I personally viewed and interpreted the patient's EKG/Telemetry data  Discussed with ED provider.    Lab Results (last 24 hours)     Procedure Component Value Units Date/Time    CBC & Differential [[id number]] Collected:  10/16/19 1104    Specimen:  Blood Updated:  10/16/19 1132    Narrative:       The following orders were created for panel order CBC & Differential.  Procedure                               Abnormality         Status                     ---------                               -----------         ------                     CBC Auto Differential[[id number]]        Abnormal            Final result                 Please view results for these tests on the individual orders.    Comprehensive Metabolic Panel [[id number]]  (Abnormal) Collected:  10/16/19 1104     Specimen:  Blood Updated:  10/16/19 1137     Glucose 96 mg/dL      BUN 6 mg/dL      Creatinine 0.62 mg/dL      Sodium 130 mmol/L      Potassium 1.9 mmol/L      Chloride 96 mmol/L      CO2 25.1 mmol/L      Calcium 7.2 mg/dL      Total Protein 10.5 g/dL      Albumin 2.40 g/dL      ALT (SGPT) 20 U/L      AST (SGOT) 49 U/L      Alkaline Phosphatase 137 U/L      Total Bilirubin 3.2 mg/dL      eGFR Non African Amer 106 mL/min/1.73      Globulin 8.1 gm/dL      A/G Ratio 0.3 g/dL      BUN/Creatinine Ratio 9.7     Anion Gap 8.9 mmol/L     Narrative:       GFR Normal >60  Chronic Kidney Disease <60  Kidney Failure <15    Protime-INR [[id number]]  (Abnormal) Collected:  10/16/19 1104    Specimen:  Blood Updated:  10/16/19 1127     Protime 19.1 Seconds      INR 1.64    Lipase [[id number]]  (Normal) Collected:  10/16/19 1104    Specimen:  Blood Updated:  10/16/19 1136     Lipase 58 U/L     Troponin [[id number]]  (Normal) Collected:  10/16/19 1104    Specimen:  Blood Updated:  10/16/19 1136     Troponin T <0.010 ng/mL     Narrative:       Troponin T Reference Range:  <= 0.03 ng/mL-   Negative for AMI  >0.03 ng/mL-     Abnormal for myocardial necrosis.  Clinicians would have to utilize clinical acumen, EKG, Troponin and serial changes to determine if it is an Acute Myocardial Infarction or myocardial injury due to an underlying chronic condition.     Magnesium [[id number]]  (Normal) Collected:  10/16/19 1104    Specimen:  Blood Updated:  10/16/19 1136     Magnesium 2.1 mg/dL     Ammonia [[id number]]  (Abnormal) Collected:  10/16/19 1104    Specimen:  Blood Updated:  10/16/19 1126     Ammonia 132 umol/L     CBC Auto Differential [[id number]]  (Abnormal) Collected:  10/16/19 1104    Specimen:  Blood Updated:  10/16/19 1132     WBC 3.39 10*3/mm3      RBC 3.59 10*6/mm3      Hemoglobin 11.2 g/dL      Hematocrit 33.2 %      MCV 92.5 fL      MCH 31.2 pg      MCHC 33.7 g/dL      RDW 14.7 %      RDW-SD 49.3 fl      MPV 12.4 fL      Platelets  39 10*3/mm3      Neutrophil % 65.1 %      Lymphocyte % 19.8 %      Monocyte % 7.1 %      Eosinophil % 5.6 %      Basophil % 1.8 %      Immature Grans % 0.6 %      Neutrophils, Absolute 2.21 10*3/mm3      Lymphocytes, Absolute 0.67 10*3/mm3      Monocytes, Absolute 0.24 10*3/mm3      Eosinophils, Absolute 0.19 10*3/mm3      Basophils, Absolute 0.06 10*3/mm3      Immature Grans, Absolute 0.02 10*3/mm3      nRBC 0.0 /100 WBC     Urinalysis With Microscopic If Indicated (No Culture) - Urine, Clean Catch [[id number]]  (Abnormal) Collected:  10/16/19 1211    Specimen:  Urine, Clean Catch Updated:  10/16/19 1230     Color, UA Yellow     Appearance, UA Cloudy     pH, UA 8.0     Specific Gravity, UA <1.005     Glucose, UA Negative     Ketones, UA Negative     Bilirubin, UA Negative     Blood, UA Negative     Protein, UA Negative     Leuk Esterase, UA Moderate (2+)     Nitrite, UA Negative     Urobilinogen, UA 1.0 E.U./dL    Urinalysis, Microscopic Only - Urine, Clean Catch [[id number]]  (Abnormal) Collected:  10/16/19 1211    Specimen:  Urine, Clean Catch Updated:  10/16/19 1230     RBC, UA 0-2 /HPF      WBC, UA 6-12 /HPF      Bacteria, UA 2+ /HPF      Squamous Epithelial Cells, UA 3-6 /HPF      Hyaline Casts, UA None Seen /LPF      Methodology Automated Microscopy          Imaging Results (last 24 hours)     ** No results found for the last 24 hours. **               ECG 12 Lead   Preliminary Result   HEART RATE= 75  bpm   RR Interval= 800  ms   ND Interval= 203  ms   P Horizontal Axis= 10  deg   P Front Axis= 38  deg   QRSD Interval= 112  ms   QT Interval= 519  ms   QRS Axis= 5  deg   T Wave Axis= 207  deg   - ABNORMAL ECG -   Sinus rhythm   Borderline prolonged ND interval   Nonspecific T abnrm, anterolateral leads   Prolonged QT interval   Electronically Signed By:    Date and Time of Study: 2019-10-16 12:48:19           Assessment/Plan     Active Hospital Problems    Diagnosis POA   • **Encephalopathy, hepatic (CMS/HCC)  [K72.90] Yes   • Noncompliance [Z91.19] Not Applicable   • Tobacco abuse counseling [Z71.6] Not Applicable   • Thrombocytopenia (CMS/HCC) [D69.6] Yes   • Secondary esophageal varices without bleeding (CMS/HCC) [I85.10] Yes     Added automatically from request for surgery [id number]     • Other cirrhosis of liver (CMS/HCC) [K74.69] Yes     Added automatically from request for surgery [id number]     • Iron deficiency anemia [D50.9] Yes   • PIEDRA (nonalcoholic steatohepatitis) [K75.81] Yes     Admit to telemetry   Consult GI  Nursing swallow assessment and if passes will start oral lactulose  Electrolyte replacement protocol for potassium  Ultrasound liver  AFP tumor marker  She is also overdue for EGD  Will address home meds when reviewed by nursing staff.   Patient has not been taking many of her medications including lactulose.  She was not even aware that she was prescribed Xifaxan.  Long discussion regarding compliance with medications, treatment  Tobacco abuse counseling  ·   · I discussed the patient's findings and my recommendations with patient, family, nursing staff and ED provider.    VTE Prophylaxis - SCDs.  Code Status - Full code.       LON Gaspar  [facility]  10/16/19  2:23 PM

## 2019-10-16 NOTE — ED TRIAGE NOTES
Pt reports she has cirrhosis of the liver and reports she has not had her lactulose in a month. Pt reports she does not like to take it because it makes her go to the bathroom. Pt is confused at this time. Family reports this started last night.

## 2019-10-16 NOTE — PLAN OF CARE
Problem: Patient Care Overview  Goal: Plan of Care Review   10/16/19 1340 10/16/19 3634   Plan of Care Review   Progress --  no change   OTHER   Outcome Summary --  Patient arrived to unit alert and slow to orient. Up x1 top BR. Not taking home meds per family. Patient complains that having multiple BMs a day is not condusive to her lifestyle. CTM,    Coping/Psychosocial   Plan of Care Reviewed With patient --

## 2019-10-16 NOTE — ED PROVIDER NOTES
" EMERGENCY DEPARTMENT ENCOUNTER    Room Number:  N532/1  Date of encounter:  10/16/2019  PCP: Cheli Noonan APRN  Historian: patient and family      HPI:  Chief Complaint: AMS  A complete HPI/ROS/PMH/PSH/SH/FH are unobtainable due to: AMS    Context: Jaz Gipson is a 44 y.o. female who presents to the ED c/o AMS beginning last night.  Pt also complains of difficulty remembering things, sudden emotional changes, nausea, lightheadedness and \"shaking\" but denies cough, SOA, fever, pain, vomiting and diarrhea. Pt has not been taking medication for liver cirrhosis for the past month.      PAST MEDICAL HISTORY  Active Ambulatory Problems     Diagnosis Date Noted   • PIEDRA (nonalcoholic steatohepatitis) 04/20/2016   • Hypothyroidism 04/26/2016   • Vitamin D deficiency 04/26/2016   • Hyperlipidemia 04/26/2016   • Iron deficiency anemia 04/26/2016   • Esophageal varices in cirrhosis (CMS/HCC) 12/20/2016   • Encephalopathy, hepatic (CMS/HCC) 12/24/2016   • Gastrointestinal hemorrhage with hematemesis 10/01/2017   • Other cirrhosis of liver (CMS/HCC) 11/01/2017   • Secondary esophageal varices without bleeding (CMS/HCC) 04/02/2018   • Thrombocytopenia (CMS/HCC) 03/19/2019   • Adverse effect of iron or its compound, subsequent encounter 03/19/2019     Resolved Ambulatory Problems     Diagnosis Date Noted   • No Resolved Ambulatory Problems     Past Medical History:   Diagnosis Date   • Abnormal cervical Papanicolaou smear    • Acute gastric mucosal erosion    • Allergic rhinitis    • Anemia    • Asthma    • Cirrhosis of liver (CMS/HCC)    • Duodenitis    • Enlarged lymph node    • Esophageal varices (CMS/HCC)    • Fatty liver    • Folic acid deficiency    • Graves disease    • History of blood transfusion    • Hyperlipidemia    • Hypertension    • Leukocytosis    • PIEDRA (nonalcoholic steatohepatitis) 2012   • Obstructive sleep apnea          PAST SURGICAL HISTORY  Past Surgical History:   Procedure Laterality Date   • " APPENDECTOMY     • CHOLECYSTECTOMY      10/9/12 [place]: Lap Cholecystectomy and Appendectomy, Liver Bx:Cirrhosis (Biliary Dyskinesia, Cholecystitis, Chronic Appendicitis)   • COLONOSCOPY N/A 4/22/2016    NBIH   • ENDOSCOPY N/A 4/22/2016    Procedure: ESOPHAGOGASTRODUODENOSCOPY;  Surgeon: Annie Tierney MD;  Location: [facility] ENDOSCOPY;  Service:    • ENDOSCOPY N/A 12/20/2016    Procedure: ESOPHAGOGASTRODUODENOSCOPY AT BEDSIDE;  Surgeon: Haresh Fritz MD;  Location: [facility] ENDOSCOPY;  Service:    • ENDOSCOPY N/A 12/22/2016    Procedure: ESOPHAGOGASTRODUODENOSCOPY WITH BANDING X2;  Surgeon: Chriss Reece MD;  Location: [facility] ENDOSCOPY;  Service:    • ENDOSCOPY N/A 12/26/2016    Procedure: ESOPHAGOGASTRODUODENOSCOPY AT BEDSIDE;  Surgeon: Vidya Norman MD;  Location: [facility] ENDOSCOPY;  Service:    • ENDOSCOPY N/A 2/8/2017    Grade I esophageal varices, scar in the lower third of the esophagus, Portal hypertensive gastropathy.     • ENDOSCOPY N/A 10/2/2017    Grade II esophageal varices, clotted blood in the gastric fundus and in the gastric body, portal hypertensive gastropathy.  No specimens collected.    • ENDOSCOPY N/A 10/3/2017    Grade II esophageal varices, completely eradicated, portal hypertensive gastropathy   • ENDOSCOPY N/A 11/7/2017    Grade I esophageal varices.Scar in the lower third of the esophagus. Erosive gastritis with hemorrhage.Portal hypertensive gastropathy.A few bleeding angioectasias in the stomach. Injected. Treated with argon plasma coagulation (APC). No specimans collected.      • ENDOSCOPY N/A 5/23/2018    Grade I esoph varices, severe portal HTN gastropathy in fundus and body, scattered inflamm and erosions in antrum,    • ENDOSCOPY N/A 2/5/2019    Procedure: ESOPHAGOGASTRODUODENOSCOPY;  Surgeon: Annie Tienrey MD;  Location: [facility] ENDOSCOPY;  Service: Gastroenterology   • ENDOSCOPY N/A 2/12/2019    Non-bleeding grade II esophageal varices, Portal hypertensive gastropathy    • LAPAROSCOPIC APPENDECTOMY  10/09/2012   • LIVER BIOPSY      9/12 Liver Bx done with Mariza and Appy (cirrhosis)   • UPPER GASTROINTESTINAL ENDOSCOPY      11/12 EGD with Erosive gastritis, duodenitis, and portal hypertensive gastropathy (Dr. Tierney) (no esophageal varices_         FAMILY HISTORY  Family History   Problem Relation Age of Onset   • Alcohol abuse Father    • Anxiety disorder Father    • COPD Father    • Depression Father    • Hyperlipidemia Father    • Hypertension Father    • Asthma Brother    • Coronary artery disease Brother    • Diabetes type II Other    • Early death Brother         Age 5 months   • Heart failure Brother    • Malig Hyperthermia Neg Hx          SOCIAL HISTORY  Social History     Socioeconomic History   • Marital status: Single     Spouse name: Not on file   • Number of children: 2   • Years of education: High School   • Highest education level: Not on file   Occupational History   • Occupation: Pharmacy Tech     Employer: [employer]   Tobacco Use   • Smoking status: Current Every Day Smoker     Packs/day: 0.25     Types: Cigarettes   • Smokeless tobacco: Never Used   Substance and Sexual Activity   • Alcohol use: No   • Drug use: No   • Sexual activity: Defer         ALLERGIES  Penicillins; Latex; and Tomato        REVIEW OF SYSTEMS  Review of Systems   All systems reviewed and negative except for those discussed in HPI.       PHYSICAL EXAM    I have reviewed the triage vital signs and nursing notes.    ED Triage Vitals [10/16/19 1029]   Temp Heart Rate Resp BP SpO2   96.5 °F (35.8 °C) 91 16 -- 99 %      Temp src Heart Rate Source Patient Position BP Location FiO2 (%)   -- -- -- -- --     General: Awake, alert, no acute distress  HEENT: Mucous membranes moist, atraumatic, normocephalic, EOMI, mild conjunctival icterus  Neck: Full ROM  Pulm: Symmetric, non-labored, lungs CTAB  Cardiovascular: Regular rate and rhythm, normal S1/S2, intact distal pulses, no peripheral  edema  GI: Soft, non-tender, distended, no rebound, no guarding, bowel sounds present  MSK: Full ROM, no deformity  Skin: Warm, dry  Neuro: Alert and oriented x 3, GCS 15, moving all extremities, no focal deficits, asterixis   Psych: Calm, cooperative    Physical Exam    LAB RESULTS  Recent Results (from the past 24 hour(s))   Comprehensive Metabolic Panel    Collection Time: 10/16/19 11:04 AM   Result Value Ref Range    Glucose 96 65 - 99 mg/dL    BUN 6 6 - 20 mg/dL    Creatinine 0.62 0.57 - 1.00 mg/dL    Sodium 130 (L) 136 - 145 mmol/L    Potassium 1.9 (C) 3.5 - 5.2 mmol/L    Chloride 96 (L) 98 - 107 mmol/L    CO2 25.1 22.0 - 29.0 mmol/L    Calcium 7.2 (L) 8.6 - 10.5 mg/dL    Total Protein 10.5 (H) 6.0 - 8.5 g/dL    Albumin 2.40 (L) 3.50 - 5.20 g/dL    ALT (SGPT) 20 1 - 33 U/L    AST (SGOT) 49 (H) 1 - 32 U/L    Alkaline Phosphatase 137 (H) 39 - 117 U/L    Total Bilirubin 3.2 (H) 0.2 - 1.2 mg/dL    eGFR Non African Amer 106 >60 mL/min/1.73    Globulin 8.1 gm/dL    A/G Ratio 0.3 g/dL    BUN/Creatinine Ratio 9.7 7.0 - 25.0    Anion Gap 8.9 5.0 - 15.0 mmol/L   Protime-INR    Collection Time: 10/16/19 11:04 AM   Result Value Ref Range    Protime 19.1 (H) 11.7 - 14.2 Seconds    INR 1.64 (H) 0.90 - 1.10   Lipase    Collection Time: 10/16/19 11:04 AM   Result Value Ref Range    Lipase 58 13 - 60 U/L   Troponin    Collection Time: 10/16/19 11:04 AM   Result Value Ref Range    Troponin T <0.010 0.000 - 0.030 ng/mL   Magnesium    Collection Time: 10/16/19 11:04 AM   Result Value Ref Range    Magnesium 2.1 1.6 - 2.6 mg/dL   Ammonia    Collection Time: 10/16/19 11:04 AM   Result Value Ref Range    Ammonia 132 (H) 11 - 51 umol/L   CBC Auto Differential    Collection Time: 10/16/19 11:04 AM   Result Value Ref Range    WBC 3.39 (L) 3.40 - 10.80 10*3/mm3    RBC 3.59 (L) 3.77 - 5.28 10*6/mm3    Hemoglobin 11.2 (L) 12.0 - 15.9 g/dL    Hematocrit 33.2 (L) 34.0 - 46.6 %    MCV 92.5 79.0 - 97.0 fL    MCH 31.2 26.6 - 33.0 pg    MCHC  33.7 31.5 - 35.7 g/dL    RDW 14.7 12.3 - 15.4 %    RDW-SD 49.3 37.0 - 54.0 fl    MPV 12.4 (H) 6.0 - 12.0 fL    Platelets 39 (C) 140 - 450 10*3/mm3    Neutrophil % 65.1 42.7 - 76.0 %    Lymphocyte % 19.8 19.6 - 45.3 %    Monocyte % 7.1 5.0 - 12.0 %    Eosinophil % 5.6 0.3 - 6.2 %    Basophil % 1.8 (H) 0.0 - 1.5 %    Immature Grans % 0.6 (H) 0.0 - 0.5 %    Neutrophils, Absolute 2.21 1.70 - 7.00 10*3/mm3    Lymphocytes, Absolute 0.67 (L) 0.70 - 3.10 10*3/mm3    Monocytes, Absolute 0.24 0.10 - 0.90 10*3/mm3    Eosinophils, Absolute 0.19 0.00 - 0.40 10*3/mm3    Basophils, Absolute 0.06 0.00 - 0.20 10*3/mm3    Immature Grans, Absolute 0.02 0.00 - 0.05 10*3/mm3    nRBC 0.0 0.0 - 0.2 /100 WBC   Urinalysis With Microscopic If Indicated (No Culture) - Urine, Clean Catch    Collection Time: 10/16/19 12:11 PM   Result Value Ref Range    Color, UA Yellow Yellow, Straw    Appearance, UA Cloudy (A) Clear    pH, UA 8.0 5.0 - 8.0    Specific Gravity, UA <1.005 (L) 1.005 - 1.030    Glucose, UA Negative Negative    Ketones, UA Negative Negative    Bilirubin, UA Negative Negative    Blood, UA Negative Negative    Protein, UA Negative Negative    Leuk Esterase, UA Moderate (2+) (A) Negative    Nitrite, UA Negative Negative    Urobilinogen, UA 1.0 E.U./dL 0.2 - 1.0 E.U./dL   Urinalysis, Microscopic Only - Urine, Clean Catch    Collection Time: 10/16/19 12:11 PM   Result Value Ref Range    RBC, UA 0-2 None Seen, 0-2 /HPF    WBC, UA 6-12 (A) None Seen, 0-2 /HPF    Bacteria, UA 2+ (A) None Seen /HPF    Squamous Epithelial Cells, UA 3-6 (A) None Seen, 0-2 /HPF    Hyaline Casts, UA None Seen None Seen /LPF    Methodology Automated Microscopy        Ordered the above labs and independently reviewed the results.        RADIOLOGY  No Radiology Exams Resulted Within Past 24 Hours    I ordered the above noted radiological studies. Reviewed by me and discussed with radiologist.  See dictation for official radiology  interpretation.      PROCEDURES    Procedures    EKG    EKG time: 1248  Rhythm/rate: NSR 75  Normal axis  Borderline 1st degree AV block with W[place] interval of 203  Prolonged QTC of 580  Non specific T wave abnormalities  No STEMI     New changes compared to prior 10/2017    MEDICATIONS GIVEN IN ER    Medications   sodium chloride 0.9 % infusion (125 mL/hr Intravenous New Bag 10/16/19 1209)   magnesium hydroxide (MILK OF MAGNESIA) suspension 2400 mg/10mL 10 mL (not administered)   sodium chloride 0.9 % flush 10 mL (not administered)   sodium chloride 0.9 % flush 10 mL (not administered)   ondansetron (ZOFRAN) injection 4 mg (not administered)   potassium chloride (MICRO-K) CR capsule 40 mEq (not administered)     Or   potassium chloride (KLOR-CON) packet 40 mEq (not administered)     Or   potassium chloride 10 mEq in 100 mL IVPB (not administered)   ferrous sulfate tablet 325 mg (not administered)   budesonide (PULMICORT) nebulizer solution 0.5 mg (not administered)   levothyroxine (SYNTHROID, LEVOTHROID) tablet 200 mcg (not administered)   levothyroxine (SYNTHROID, LEVOTHROID) tablet 25 mcg (not administered)   nadolol (CORGARD) tablet 20 mg (not administered)   pantoprazole (PROTONIX) EC tablet 40 mg (not administered)   rifaximin (XIFAXAN) tablet 550 mg (not administered)   spironolactone (ALDACTONE) tablet 150 mg (not administered)   potassium chloride (KLOR-CON) packet 20 mEq (not administered)   furosemide (LASIX) tablet 40 mg (not administered)   lactulose (CHRONULAC) 10 GM/15ML solution 30 g (not administered)   potassium chloride (MICRO-K) CR capsule 40 mEq (40 mEq Oral Given 10/16/19 1210)         PROGRESS, DATA ANALYSIS, CONSULTS, AND MEDICAL DECISION MAKING    All labs have been independently reviewed by me.  All radiology studies have been reviewed by me and discussed with radiologist dictating the report.   EKG's independently viewed and interpreted by me.  Discussion below represents my analysis of  pertinent findings related to patient's condition, differential diagnosis, treatment plan and final disposition.           1057 ordered labs for further evaluation    1156  Ordered EKG for further evaluation    1200 placed call to [facility]    1201 pt rechecked and resting comfortably. Informed pt of need to admit due to decreased potassium and increased ammonia.  Pt understands and agrees with the plan. All questions have been answered.    1211 discussed pt case with Esthela Andrews, [facility], for Dr. Norton who agrees to admit the pt.     Patient presented today with altered mental status brought in by family.  They are concerned for possible elevated ammonia levels that she has not been taking her lactulose for approximately the last month.  Has had behavioral oddities and strange behavior in addition to confusion.  Patient is hyperammonemic today, with a value of 132.  I do suspect this is the primary source of her issues today.  Of note, she is also quite hypokalemic with a potassium of 1.9.  Oral patient has been ordered, lactulose has been ordered.  Of note, patient is slightly hypocalcemic as well and does demonstrate a prolonged QTC.  Would recommend calcium repletion in the hospital as well.  Patient has been updated the course and plan the need for hospital stay.  A to hospitalize.    AS OF 4:12 PM VITALS:    BP - 120/82  HR - 73  TEMP - 97.4 °F (36.3 °C) (Oral)  02 SATS - 100%        DIAGNOSIS  Final diagnoses:   Acute encephalopathy   Hyperammonemia (CMS/HCC)   History of cirrhosis   Hypokalemia   Hyperbilirubinemia   Hypocalcemia         DISPOSITION  ADMISSION    Discussed treatment plan and reason for admission with pt/family and admitting physician.  Pt/family voiced understanding of the plan for admission for further testing/treatment as needed.             Documentation assistance provided by jacqueline Packer for Dr. Herman.  Information recorded by the jacqueline was done at my direction and has been  verified and validated by me.                  Cely Packer  10/16/19 1302       Bill Herman MD  10/16/19 7395

## 2019-10-17 ENCOUNTER — APPOINTMENT (OUTPATIENT)
Dept: ULTRASOUND IMAGING | Facility: HOSPITAL | Age: 44
End: 2019-10-17

## 2019-10-17 LAB
ALPHA-FETOPROTEIN: 2.73 NG/ML (ref 0–8.3)
AMMONIA BLD-SCNC: 159 UMOL/L (ref 11–51)
ANION GAP SERPL CALCULATED.3IONS-SCNC: 5.5 MMOL/L (ref 5–15)
APTT PPP: 45.1 SECONDS (ref 22.7–35.4)
BASOPHILS # BLD AUTO: 0.06 10*3/MM3 (ref 0–0.2)
BASOPHILS NFR BLD AUTO: 1.5 % (ref 0–1.5)
BUN BLD-MCNC: 5 MG/DL (ref 6–20)
BUN/CREAT SERPL: 8.3 (ref 7–25)
CALCIUM SPEC-SCNC: 7.2 MG/DL (ref 8.6–10.5)
CHLORIDE SERPL-SCNC: 106 MMOL/L (ref 98–107)
CO2 SERPL-SCNC: 24.5 MMOL/L (ref 22–29)
CREAT BLD-MCNC: 0.6 MG/DL (ref 0.57–1)
DEPRECATED RDW RBC AUTO: 52.1 FL (ref 37–54)
DIFFERENTIAL METHOD BLD: ABNORMAL
EOSINOPHIL # BLD AUTO: 0.28 10*3/MM3 (ref 0–0.4)
EOSINOPHIL NFR BLD AUTO: 6.8 % (ref 0.3–6.2)
ERYTHROCYTE [DISTWIDTH] IN BLOOD BY AUTOMATED COUNT: 15.1 % (ref 12.3–15.4)
GFR SERPL CREATININE-BSD FRML MDRD: 110 ML/MIN/1.73
GLUCOSE BLD-MCNC: 101 MG/DL (ref 65–99)
HCT VFR BLD AUTO: 30.2 % (ref 34–46.6)
HGB BLD-MCNC: 10.1 G/DL (ref 12–15.9)
IMM GRANULOCYTES # BLD AUTO: 0.02 10*3/MM3 (ref 0–0.05)
IMM GRANULOCYTES NFR BLD AUTO: 0.5 % (ref 0–0.5)
INR PPP: 1.73 (ref 0.9–1.1)
LYMPHOCYTES # BLD AUTO: 0.9 10*3/MM3 (ref 0.7–3.1)
LYMPHOCYTES NFR BLD AUTO: 21.9 % (ref 19.6–45.3)
MCH RBC QN AUTO: 31.5 PG (ref 26.6–33)
MCHC RBC AUTO-ENTMCNC: 33.4 G/DL (ref 31.5–35.7)
MCV RBC AUTO: 94.1 FL (ref 79–97)
MONOCYTES # BLD AUTO: 0.45 10*3/MM3 (ref 0.1–0.9)
MONOCYTES NFR BLD AUTO: 10.9 % (ref 5–12)
NEUTROPHILS # BLD AUTO: 2.4 10*3/MM3 (ref 1.7–7)
NEUTROPHILS NFR BLD AUTO: 58.4 % (ref 42.7–76)
NRBC BLD AUTO-RTO: 0 /100 WBC (ref 0–0.2)
PLATELET # BLD AUTO: 38 10*3/MM3 (ref 140–450)
PMV BLD AUTO: 13.4 FL (ref 6–12)
POTASSIUM BLD-SCNC: 2.9 MMOL/L (ref 3.5–5.2)
POTASSIUM BLD-SCNC: 3 MMOL/L (ref 3.5–5.2)
PROTHROMBIN TIME: 19.9 SECONDS (ref 11.7–14.2)
RBC # BLD AUTO: 3.21 10*6/MM3 (ref 3.77–5.28)
SODIUM BLD-SCNC: 136 MMOL/L (ref 136–145)
WBC # BLD AUTO: 4.11 10*3/MM3 (ref 3.4–10.8)
WBC NRBC COR # BLD: 4.11 10*3/MM3 (ref 3.4–10.8)

## 2019-10-17 PROCEDURE — 82140 ASSAY OF AMMONIA: CPT | Performed by: NURSE PRACTITIONER

## 2019-10-17 PROCEDURE — 76705 ECHO EXAM OF ABDOMEN: CPT

## 2019-10-17 PROCEDURE — 85610 PROTHROMBIN TIME: CPT | Performed by: INTERNAL MEDICINE

## 2019-10-17 PROCEDURE — 85025 COMPLETE CBC W/AUTO DIFF WBC: CPT | Performed by: INTERNAL MEDICINE

## 2019-10-17 PROCEDURE — 85730 THROMBOPLASTIN TIME PARTIAL: CPT | Performed by: INTERNAL MEDICINE

## 2019-10-17 PROCEDURE — 80048 BASIC METABOLIC PNL TOTAL CA: CPT | Performed by: NURSE PRACTITIONER

## 2019-10-17 PROCEDURE — 84132 ASSAY OF SERUM POTASSIUM: CPT | Performed by: INTERNAL MEDICINE

## 2019-10-17 PROCEDURE — 99232 SBSQ HOSP IP/OBS MODERATE 35: CPT | Performed by: INTERNAL MEDICINE

## 2019-10-17 PROCEDURE — 82105 ALPHA-FETOPROTEIN SERUM: CPT | Performed by: NURSE PRACTITIONER

## 2019-10-17 RX ADMIN — SODIUM CHLORIDE 125 ML/HR: 9 INJECTION, SOLUTION INTRAVENOUS at 08:29

## 2019-10-17 RX ADMIN — FERROUS SULFATE TAB 325 MG (65 MG ELEMENTAL FE) 325 MG: 325 (65 FE) TAB at 08:29

## 2019-10-17 RX ADMIN — RIFAXIMIN 550 MG: 550 TABLET ORAL at 21:04

## 2019-10-17 RX ADMIN — PANTOPRAZOLE SODIUM 40 MG: 40 TABLET, DELAYED RELEASE ORAL at 18:00

## 2019-10-17 RX ADMIN — LEVOTHYROXINE SODIUM 200 MCG: 100 TABLET ORAL at 05:09

## 2019-10-17 RX ADMIN — FUROSEMIDE 40 MG: 40 TABLET ORAL at 08:28

## 2019-10-17 RX ADMIN — SPIRONOLACTONE 150 MG: 100 TABLET, FILM COATED ORAL at 08:28

## 2019-10-17 RX ADMIN — LACTULOSE 30 G: 10 SOLUTION ORAL at 08:28

## 2019-10-17 RX ADMIN — SODIUM CHLORIDE 125 ML/HR: 9 INJECTION, SOLUTION INTRAVENOUS at 15:46

## 2019-10-17 RX ADMIN — LACTULOSE 30 G: 10 SOLUTION ORAL at 21:04

## 2019-10-17 RX ADMIN — POTASSIUM CHLORIDE 40 MEQ: 750 CAPSULE, EXTENDED RELEASE ORAL at 15:46

## 2019-10-17 RX ADMIN — POTASSIUM CHLORIDE 40 MEQ: 750 CAPSULE, EXTENDED RELEASE ORAL at 08:28

## 2019-10-17 RX ADMIN — POTASSIUM CHLORIDE 40 MEQ: 750 CAPSULE, EXTENDED RELEASE ORAL at 05:09

## 2019-10-17 RX ADMIN — LACTULOSE 30 G: 10 SOLUTION ORAL at 15:46

## 2019-10-17 RX ADMIN — SODIUM CHLORIDE, PRESERVATIVE FREE 10 ML: 5 INJECTION INTRAVENOUS at 21:00

## 2019-10-17 RX ADMIN — NADOLOL 20 MG: 20 TABLET ORAL at 08:29

## 2019-10-17 RX ADMIN — SODIUM CHLORIDE 125 ML/HR: 9 INJECTION, SOLUTION INTRAVENOUS at 00:12

## 2019-10-17 RX ADMIN — LEVOTHYROXINE SODIUM 25 MCG: 25 TABLET ORAL at 05:09

## 2019-10-17 RX ADMIN — SODIUM CHLORIDE, PRESERVATIVE FREE 10 ML: 5 INJECTION INTRAVENOUS at 08:30

## 2019-10-17 RX ADMIN — PANTOPRAZOLE SODIUM 40 MG: 40 TABLET, DELAYED RELEASE ORAL at 07:28

## 2019-10-17 RX ADMIN — RIFAXIMIN 550 MG: 550 TABLET ORAL at 08:28

## 2019-10-17 NOTE — PLAN OF CARE
Problem: Patient Care Overview  Goal: Plan of Care Review  Outcome: Ongoing (interventions implemented as appropriate)   10/17/19 9425   Plan of Care Review   Progress improving   OTHER   Outcome Summary Per report, patient has moments of confusion, but mostly A&O x4. Has had 4 BMs today. Potassium replaced per protocol with recheck at 2000. [facility]. Several medications on list have not been filled since Jan or Feb and are annotated as such. Vss.   Coping/Psychosocial   Plan of Care Reviewed With patient       Problem: Confusion, Acute (Adult)  Goal: Cognitive/Functional Impairments Minimized  Outcome: Ongoing (interventions implemented as appropriate)    Goal: Safety  Outcome: Ongoing (interventions implemented as appropriate)      Problem: Fall Risk (Adult)  Goal: Absence of Fall  Outcome: Ongoing (interventions implemented as appropriate)

## 2019-10-17 NOTE — PLAN OF CARE
Problem: Patient Care Overview  Goal: Plan of Care Review  Outcome: Ongoing (interventions implemented as appropriate)   10/17/19 0500   Plan of Care Review   Progress improving   OTHER   Outcome Summary Patient only confused to time tonight. Patient taking lactulose and having frequent BMs tonight with improved mentation. Patient remains free from falls and vital signs remain stable. Patient denies pain. Patient had ultrasound of abdomen tonight and results are pending. Platelets remain low, results called to APRN. Continue to monitor closely, continue with current plan of care.   Coping/Psychosocial   Plan of Care Reviewed With patient       Problem: Confusion, Acute (Adult)  Goal: Identify Related Risk Factors and Signs and Symptoms  Outcome: Outcome(s) achieved Date Met: 10/17/19    Goal: Cognitive/Functional Impairments Minimized  Outcome: Ongoing (interventions implemented as appropriate)    Goal: Safety  Outcome: Ongoing (interventions implemented as appropriate)      Problem: Fall Risk (Adult)  Goal: Identify Related Risk Factors and Signs and Symptoms  Outcome: Outcome(s) achieved Date Met: 10/17/19    Goal: Absence of Fall  Outcome: Ongoing (interventions implemented as appropriate)

## 2019-10-17 NOTE — PROGRESS NOTES
Discharge Planning Assessment  [facility]     Patient Name: Jaz Gipson  MRN: [id number]  Today's Date: 10/17/2019    Admit Date: 10/16/2019    Discharge Needs Assessment     Row Name 10/17/19 1449       Living Environment    Name(s) of Who Lives With Patient  mother (Genet Gipson, [phone]) and two daughters (ages 19 and 20 y/o)    Current Living Arrangements  home/apartment/condo    Primary Care Provided by  self    Provides Primary Care For  no one    Family Caregiver if Needed  child(rachel), adult;parent(s)    Family Caregiver Names  mother and two daughters    Quality of Family Relationships  helpful;involved;supportive    Able to Return to Prior Arrangements  yes       Resource/Environmental Concerns    Resource/Environmental Concerns  none       Transition Planning    Patient/Family Anticipates Transition to  home with family    Patient/Family Anticipated Services at Transition  none    Transportation Anticipated  family or friend will provide       Discharge Needs Assessment    Concerns to be Addressed  discharge planning    Equipment Currently Used at Home  none    Discharge Coordination/Progress  Home with family        Discharge Plan     Row Name 10/17/19 6883       Plan    Plan  Home with family    Patient/Family in Agreement with Plan  yes    Plan Comments  CCP met with pt and daughter to discuss d/c planning. Facesheet verified. CCP role explained. Pt resides in a single level home with her mother (Genet Gipson, [phone]) and two daughters (ages 19 and 20 y/o), uses no DME, and has no h/o home health or sub-acute rehab. Pt's pharmacy is [facility] [address]. Pt reports never filling her xifaxan prescription due to cost/minimal coverage under her [employer] Medicaid policy which recently lapsed as pt has a new [employer] commercial policy via her new employer. Per pt's [facility] pharmacy ([phone]), xifaxan cost under pt's old insurance policy was just $4/month, but was never filled. Xifaxan will  require prior authorization under her new [place] insurance policy. CCP to initiate xifaxan prior auth and to assist should additional d/c needs arise. Lily Nixon LCSW        Destination      No service coordination in this encounter.      Durable Medical Equipment      No service coordination in this encounter.      Dialysis/Infusion      No service coordination in this encounter.      Home Medical Care      No service coordination in this encounter.      Therapy      No service coordination in this encounter.      Community Resources      No service coordination in this encounter.          Demographic Summary     Row Name 10/17/19 1448       General Information    Admission Type  inpatient    Arrived From  home    Referral Source  admission list    Reason for Consult  discharge planning    Preferred Language  English        Functional Status     Row Name 10/17/19 1448       Functional Status    Usual Activity Tolerance  good    Current Activity Tolerance  good       Functional Status, IADL    Medications  independent    Meal Preparation  independent    Housekeeping  independent    Laundry  independent    Shopping  independent       Mental Status Summary    Recent Changes in Mental Status/Cognitive Functioning  no changes        Psychosocial    No documentation.       Abuse/Neglect    No documentation.       Legal    No documentation.       Substance Abuse    No documentation.       Patient Forms    No documentation.           Sunita Nixon LCSW

## 2019-10-17 NOTE — PROGRESS NOTES
"DAILY PROGRESS NOTE  [facility]    Patient Identification:  Name: Jaz Gipson  Age: 44 y.o.  Sex: female  :  1975  MRN: [id number]         Primary Care Physician: Saran, LON Oswald    Subjective: patient is sitting up; feels a little better; still tired and feels like she is sleeping a lot  Interval History: follow up for hepatic encephalopathy, angulo cirrhosis, anemia, thrombocytopenia, obesity     Objective:    Scheduled Meds:  budesonide 0.5 mg Nebulization BID - RT   ferrous sulfate 325 mg Oral Daily With Breakfast   furosemide 40 mg Oral Daily   lactulose 30 g Oral TID   levothyroxine 200 mcg Oral Q AM   levothyroxine 25 mcg Oral Q AM   nadolol 20 mg Oral Daily   pantoprazole 40 mg Oral BID AC   potassium chloride 20 mEq Oral Daily   rifaximin 550 mg Oral Q12H   sodium chloride 10 mL Intravenous Q12H   spironolactone 150 mg Oral Daily     Continuous Infusions:  sodium chloride 125 mL/hr Last Rate: 125 mL/hr (10/17/19 1546)       Vital signs in last 24 hours:  Temp:  [97.8 °F (36.6 °C)-98.2 °F (36.8 °C)] 98.2 °F (36.8 °C)  Heart Rate:  [72-84] 84  Resp:  [16-18] 18  BP: (101-115)/(72-77) 101/74    Intake/Output:    Intake/Output Summary (Last 24 hours) at 10/17/2019 1959  Last data filed at 10/17/2019 1520  Gross per 24 hour   Intake 610 ml   Output --   Net 610 ml       Exam:  /74 (BP Location: Left arm, Patient Position: Sitting)   Pulse 84   Temp 98.2 °F (36.8 °C) (Oral)   Resp 18   Ht 162.6 cm (64\")   Wt 83.6 kg (184 lb 3.2 oz)   LMP  (LMP Unknown)   SpO2 95%   Breastfeeding? No   BMI 31.62 kg/m²     General Appearance:    Alert, cooperative, no distress, AAOx3                          Head:    Normocephalic, without obvious abnormality, atraumatic                           Eyes:    PERRL, conjunctiva/corneas clear, EOM's intact, both eyes                         Throat:   Lips, tongue, gums normal; oral mucosa pink and moist                           Neck:   Supple, " symmetrical, trachea midline, no JVD                         Lungs:    Decreased breath sounds bilaterally, respirations unlabored                 Chest Wall:    No tenderness or deformity                          Heart:    Regular rate and rhythm, S1 and S2 normal, no murmur,no  Rub                                      or gallop                  Abdomen:     Soft, non-tender, bowel sounds active, no masses, no                                                        organomegaly                  Extremities:   Extremities normal, atraumatic, no cyanosis or edema                        Pulses:   Pulses palpable in all extremities                            Skin:   Skin is warm and dry,  no rashes or palpable lesions                  Neurologic:   CNII-XII intact, motor strength grossly intact, sensation grossly                                         intact to light touch, no focal deficits noted       Data Review:  Labs in chart were reviewed.  Lab Results   Component Value Date    WBC 4.11 10/17/2019    WBC 4.11 10/17/2019    HGB 10.1 (L) 10/17/2019    HCT 30.2 (L) 10/17/2019    PLT 38 (C) 10/17/2019     Lab Results   Component Value Date     10/17/2019    K 3.0 (L) 10/17/2019     10/17/2019    CO2 24.5 10/17/2019    BUN 5 (L) 10/17/2019    CREATININE 0.60 10/17/2019    GLUCOSE 101 (H) 10/17/2019     Lab Results   Component Value Date    CALCIUM 7.2 (L) 10/17/2019    MG 2.1 10/16/2019     Lab Results   Component Value Date    AST 49 (H) 10/16/2019    ALT 20 10/16/2019    ALKPHOS 137 (H) 10/16/2019     Patient Active Problem List   Diagnosis Code   • PIEDRA (nonalcoholic steatohepatitis) K75.81   • Hypothyroidism E03.9   • Vitamin D deficiency E55.9   • Hyperlipidemia E78.5   • Iron deficiency anemia D50.9   • Esophageal varices in cirrhosis (CMS/HCC) K74.60, I85.10   • Encephalopathy, hepatic (CMS/HCC) K72.90   • Gastrointestinal hemorrhage with hematemesis K92.0   • Other cirrhosis of liver (CMS/HCC)  K74.69   • Secondary esophageal varices without bleeding (CMS/HCC) I85.10   • Thrombocytopenia (CMS/HCC) D69.6   • Adverse effect of iron or its compound, subsequent encounter T45.4X5D   • Noncompliance Z91.19   • Tobacco abuse counseling Z71.6       Assessment:    Encephalopathy, hepatic (CMS/HCC)    PIEDRA (nonalcoholic steatohepatitis)    Iron deficiency anemia    Other cirrhosis of liver (CMS/HCC)    Secondary esophageal varices without bleeding (CMS/HCC)    Thrombocytopenia (CMS/HCC)    Noncompliance    Tobacco abuse counseling      Plan:  Continue lactulose and xifaxan  Decrease iv fluids  Mental status is clearing  Repeat ammonia in am  Trend hgb  Appreciate input from gi  Monitor platelets  Notes reviewed  Medium risk    Ashley Sanderson MD  10/17/2019  7:59 PM

## 2019-10-18 VITALS
BODY MASS INDEX: 32.01 KG/M2 | WEIGHT: 187.5 LBS | DIASTOLIC BLOOD PRESSURE: 77 MMHG | OXYGEN SATURATION: 94 % | TEMPERATURE: 98 F | RESPIRATION RATE: 18 BRPM | HEIGHT: 64 IN | HEART RATE: 73 BPM | SYSTOLIC BLOOD PRESSURE: 110 MMHG

## 2019-10-18 LAB
AMMONIA BLD-SCNC: 115 UMOL/L (ref 11–51)
ANION GAP SERPL CALCULATED.3IONS-SCNC: 2.8 MMOL/L (ref 5–15)
BUN BLD-MCNC: 3 MG/DL (ref 6–20)
BUN/CREAT SERPL: 4.6 (ref 7–25)
CALCIUM SPEC-SCNC: 7.1 MG/DL (ref 8.6–10.5)
CHLORIDE SERPL-SCNC: 110 MMOL/L (ref 98–107)
CO2 SERPL-SCNC: 26.2 MMOL/L (ref 22–29)
CREAT BLD-MCNC: 0.65 MG/DL (ref 0.57–1)
DEPRECATED RDW RBC AUTO: 51.8 FL (ref 37–54)
ERYTHROCYTE [DISTWIDTH] IN BLOOD BY AUTOMATED COUNT: 15.1 % (ref 12.3–15.4)
GFR SERPL CREATININE-BSD FRML MDRD: 100 ML/MIN/1.73
GLUCOSE BLD-MCNC: 81 MG/DL (ref 65–99)
HCT VFR BLD AUTO: 30.1 % (ref 34–46.6)
HGB BLD-MCNC: 9.9 G/DL (ref 12–15.9)
MCH RBC QN AUTO: 30.7 PG (ref 26.6–33)
MCHC RBC AUTO-ENTMCNC: 32.9 G/DL (ref 31.5–35.7)
MCV RBC AUTO: 93.2 FL (ref 79–97)
PLATELET # BLD AUTO: 35 10*3/MM3 (ref 140–450)
PMV BLD AUTO: 13.7 FL (ref 6–12)
POTASSIUM BLD-SCNC: 3 MMOL/L (ref 3.5–5.2)
POTASSIUM BLD-SCNC: 3.5 MMOL/L (ref 3.5–5.2)
RBC # BLD AUTO: 3.23 10*6/MM3 (ref 3.77–5.28)
SODIUM BLD-SCNC: 139 MMOL/L (ref 136–145)
WBC NRBC COR # BLD: 3.68 10*3/MM3 (ref 3.4–10.8)

## 2019-10-18 PROCEDURE — 99232 SBSQ HOSP IP/OBS MODERATE 35: CPT | Performed by: INTERNAL MEDICINE

## 2019-10-18 PROCEDURE — 82140 ASSAY OF AMMONIA: CPT | Performed by: INTERNAL MEDICINE

## 2019-10-18 PROCEDURE — 84132 ASSAY OF SERUM POTASSIUM: CPT | Performed by: INTERNAL MEDICINE

## 2019-10-18 PROCEDURE — 85027 COMPLETE CBC AUTOMATED: CPT | Performed by: INTERNAL MEDICINE

## 2019-10-18 PROCEDURE — 80048 BASIC METABOLIC PNL TOTAL CA: CPT | Performed by: INTERNAL MEDICINE

## 2019-10-18 RX ORDER — FLUTICASONE PROPIONATE 44 UG/1
1 AEROSOL, METERED RESPIRATORY (INHALATION)
Qty: 1 INHALER | Refills: 1 | Status: SHIPPED | OUTPATIENT
Start: 2019-10-18 | End: 2020-12-29

## 2019-10-18 RX ADMIN — POTASSIUM CHLORIDE 40 MEQ: 750 CAPSULE, EXTENDED RELEASE ORAL at 10:29

## 2019-10-18 RX ADMIN — NADOLOL 20 MG: 20 TABLET ORAL at 08:12

## 2019-10-18 RX ADMIN — SODIUM CHLORIDE, PRESERVATIVE FREE 10 ML: 5 INJECTION INTRAVENOUS at 08:12

## 2019-10-18 RX ADMIN — POTASSIUM CHLORIDE 40 MEQ: 750 CAPSULE, EXTENDED RELEASE ORAL at 06:14

## 2019-10-18 RX ADMIN — RIFAXIMIN 550 MG: 550 TABLET ORAL at 08:12

## 2019-10-18 RX ADMIN — PANTOPRAZOLE SODIUM 40 MG: 40 TABLET, DELAYED RELEASE ORAL at 07:37

## 2019-10-18 RX ADMIN — SODIUM CHLORIDE 50 ML/HR: 9 INJECTION, SOLUTION INTRAVENOUS at 06:14

## 2019-10-18 RX ADMIN — LACTULOSE 30 G: 10 SOLUTION ORAL at 08:11

## 2019-10-18 RX ADMIN — LEVOTHYROXINE SODIUM 200 MCG: 100 TABLET ORAL at 07:36

## 2019-10-18 RX ADMIN — POTASSIUM CHLORIDE 40 MEQ: 750 CAPSULE, EXTENDED RELEASE ORAL at 01:02

## 2019-10-18 RX ADMIN — FUROSEMIDE 40 MG: 40 TABLET ORAL at 08:12

## 2019-10-18 RX ADMIN — LEVOTHYROXINE SODIUM 25 MCG: 25 TABLET ORAL at 07:37

## 2019-10-18 RX ADMIN — SPIRONOLACTONE 150 MG: 100 TABLET, FILM COATED ORAL at 08:12

## 2019-10-18 RX ADMIN — FERROUS SULFATE TAB 325 MG (65 MG ELEMENTAL FE) 325 MG: 325 (65 FE) TAB at 08:12

## 2019-10-18 RX ADMIN — POTASSIUM CHLORIDE 20 MEQ: 750 CAPSULE, EXTENDED RELEASE ORAL at 08:12

## 2019-10-18 NOTE — PROGRESS NOTES
Continued Stay Note  [facility]     Patient Name: Jaz Gipson  MRN: [id number]  Today's Date: 10/18/2019    Admit Date: 10/16/2019    Discharge Plan     Row Name 10/18/19 1459       Plan    Plan  Home with family- family to transport.    Patient/Family in Agreement with Plan  yes    Plan Comments  [facility] completed Xifaxin prior auth on cover my meds. [facility] called [facility] [place] [phone] spoke with Umm in Pharmacy. Authorization went thru and cost for Xifaxin prescription for 60 days is $206.61. She states currently only have enough for partial fill but will have ready today and order for monday pickup. [facility] provided pt with Xifaxin coupon and patient assistance program. Pt denies any concerns or additional dc needs. presley franco/ccp        Discharge Codes    No documentation.       Expected Discharge Date and Time     Expected Discharge Date Expected Discharge Time    Oct 18, 2019             Robina Chavez RN

## 2019-10-18 NOTE — PROGRESS NOTES
[facility]  Inpatient Progress Note    Reason for Follow Up: Frazier cirrhosis    Subjective     Interval History: Patient denies any GI related complaints, tolerating p.o. well.  Serum ammonia has improved from 159 to 115.  Still thrombocytopenic.      Current Facility-Administered Medications:   •  budesonide (PULMICORT) nebulizer solution 0.5 mg, 0.5 mg, Nebulization, BID - RT, Luca Norton MD, 0.5 mg at 10/16/19 2001  •  ferrous sulfate tablet 325 mg, 325 mg, Oral, Daily With Breakfast, Luca Norton MD, 325 mg at 10/18/19 0812  •  furosemide (LASIX) tablet 40 mg, 40 mg, Oral, Daily, Luca Norton MD, 40 mg at 10/18/19 0812  •  lactulose (CHRONULAC) 10 GM/15ML solution 30 g, 30 g, Oral, TID, Chriss Santos MD, 30 g at 10/18/19 0811  •  levothyroxine (SYNTHROID, LEVOTHROID) tablet 200 mcg, 200 mcg, Oral, Q AM, Luca Norton MD, 200 mcg at 10/18/19 0736  •  levothyroxine (SYNTHROID, LEVOTHROID) tablet 25 mcg, 25 mcg, Oral, Q AM, Luca Norton MD, 25 mcg at 10/18/19 0737  •  magnesium hydroxide (MILK OF MAGNESIA) suspension 2400 mg/10mL 10 mL, 10 mL, Oral, Daily PRN, Esthela Andrews, APRN  •  nadolol (CORGARD) tablet 20 mg, 20 mg, Oral, Daily, Luca Norton MD, 20 mg at 10/18/19 0812  •  ondansetron (ZOFRAN) injection 4 mg, 4 mg, Intravenous, Q6H PRN, Esthela Andrews, APRN  •  pantoprazole (PROTONIX) EC tablet 40 mg, 40 mg, Oral, BID AC, Luca Norton MD, 40 mg at 10/18/19 0737  •  potassium chloride (MICRO-K) CR capsule 40 mEq, 40 mEq, Oral, PRN, 40 mEq at 10/18/19 1029 **OR** potassium chloride (KLOR-CON) packet 40 mEq, 40 mEq, Oral, PRN **OR** potassium chloride 10 mEq in 100 mL IVPB, 10 mEq, Intravenous, Q1H PRN, Esthela Andrews APRN  •  potassium chloride (MICRO-K) CR capsule 20 mEq, 20 mEq, Oral, Daily, Luca Norton MD, 20 mEq at 10/18/19 0812  •  rifaximin (XIFAXAN) tablet 550 mg, 550 mg, Oral, Q12H, Luca Norton MD, 550 mg at  10/18/19 0812  •  sodium chloride 0.9 % flush 10 mL, 10 mL, Intravenous, Q12H, Esthela Andrews APRN, 10 mL at 10/18/19 0812  •  sodium chloride 0.9 % flush 10 mL, 10 mL, Intravenous, PRN, Esthela Andrews APRN, 10 mL at 10/16/19 2048  •  sodium chloride 0.9 % infusion, 50 mL/hr, Intravenous, Continuous, Stingl, Ashley Joyner MD, Last Rate: 50 mL/hr at 10/18/19 0614, 50 mL/hr at 10/18/19 0614  •  spironolactone (ALDACTONE) tablet 150 mg, 150 mg, Oral, Daily, Luca Norton MD, 150 mg at 10/18/19 0812  Review of Systems:    All systems were reviewed and negative except for:  Gastrointestinal: positive for  See HPI    Objective     Vital Signs  Temp:  [97.8 °F (36.6 °C)-98.3 °F (36.8 °C)] 98.3 °F (36.8 °C)  Heart Rate:  [72-84] 75  Resp:  [16-18] 18  BP: (101-110)/(68-74) 110/68  Body mass index is 32.18 kg/m².    Intake/Output Summary (Last 24 hours) at 10/18/2019 1236  Last data filed at 10/18/2019 0900  Gross per 24 hour   Intake 850 ml   Output --   Net 850 ml     I/O this shift:  In: 120 [P.O.:120]  Out: -      Physical Exam:   General: patient awake, alert and cooperative   Eyes: Normal lids and lashes, no scleral icterus   Neck: supple, normal ROM   Skin: warm and dry, not jaundiced   Cardiovascular: regular rhythm and rate, no murmurs auscultated   Pulm: clear to auscultation bilaterally, regular and unlabored   Abdomen: soft, nontender, nondistended; normal bowel sounds   Extremities: no rash or edema   Psychiatric: Normal mood and behavior; memory intact     Results Review:     I reviewed the patient's new clinical results.    Results from last 7 days   Lab Units 10/18/19  0503 10/17/19  0325 10/16/19  1104   WBC 10*3/mm3 3.68 4.11  4.11 3.39*   HEMOGLOBIN g/dL 9.9* 10.1* 11.2*   HEMATOCRIT % 30.1* 30.2* 33.2*   PLATELETS 10*3/mm3 35* 38* 39*     Results from last 7 days   Lab Units 10/18/19  0503 10/17/19  2005 10/17/19  0325 10/16/19  1104   SODIUM mmol/L 139  --  136 130*   POTASSIUM mmol/L  3.0* 2.9* 3.0* 1.9*   CHLORIDE mmol/L 110*  --  106 96*   CO2 mmol/L 26.2  --  24.5 25.1   BUN mg/dL 3*  --  5* 6   CREATININE mg/dL 0.65  --  0.60 0.62   CALCIUM mg/dL 7.1*  --  7.2* 7.2*   BILIRUBIN mg/dL  --   --   --  3.2*   ALK PHOS U/L  --   --   --  137*   ALT (SGPT) U/L  --   --   --  20   AST (SGOT) U/L  --   --   --  49*   GLUCOSE mg/dL 81  --  101* 96     Results from last 7 days   Lab Units 10/17/19  0325 10/16/19  1104   INR  1.73* 1.64*     Lab Results   Lab Value Date/Time    LIPASE 58 10/16/2019 1104    LIPASE 31 12/20/2016 1355    LIPASE 20 03/04/2015 1846       Radiology:  US Abdomen Limited   Final Result   1. Liver findings as above, a discrete mass lesion is not identified.   2. Homogeneous splenomegaly and small amount of ascites likely sequelae   of portal hypertension.       This report was finalized on 10/17/2019 11:04 PM by Avinash Foley M.D.              Assessment/Plan     Patient Active Problem List   Diagnosis   • FRAZIER (nonalcoholic steatohepatitis)   • Hypothyroidism   • Vitamin D deficiency   • Hyperlipidemia   • Iron deficiency anemia   • Esophageal varices in cirrhosis (CMS/HCC)   • Encephalopathy, hepatic (CMS/HCC)   • Gastrointestinal hemorrhage with hematemesis   • Other cirrhosis of liver (CMS/HCC)   • Secondary esophageal varices without bleeding (CMS/HCC)   • Thrombocytopenia (CMS/HCC)   • Adverse effect of iron or its compound, subsequent encounter   • Noncompliance   • Tobacco abuse counseling       Assessment:  1. Frazier cirrhosis  2. Hepatic encephalopathy: Improved  3. Anemia  4. Thrombocytopenia  5. History of varices      Plan:  · Continue current medications  · Can follow-up with Dr. Annie Tierney in the outpatient setting  · Eventual endoscopic evaluation when thrombocytopenia improved  I discussed the patients findings and my recommendations with patient.    Ford Upton MD

## 2019-10-18 NOTE — DISCHARGE SUMMARY
PHYSICIAN DISCHARGE SUMMARY                                                                        [facility]    Patient Identification:  Name: Jaz Gipson  Age: 44 y.o.  Sex: female  :  1975  MRN: [id number]  Primary Care Physician: Head, LON Oswald    Admit date: 10/16/2019  Discharge date and time:10/18/19    Discharged Condition: good    Discharge Diagnoses:  Encephalopathy, hepatic (CMS/HCC)    PIEDRA (nonalcoholic steatohepatitis)    Iron deficiency anemia    Other cirrhosis of liver (CMS/HCC)    Secondary esophageal varices without bleeding (CMS/HCC)    Thrombocytopenia (CMS/HCC)    Noncompliance    Tobacco abuse counseling   pancytopenia due to cirrhosis  Patient Active Problem List   Diagnosis Code   • PIEDRA (nonalcoholic steatohepatitis) K75.81   • Hypothyroidism E03.9   • Vitamin D deficiency E55.9   • Hyperlipidemia E78.5   • Iron deficiency anemia D50.9   • Esophageal varices in cirrhosis (CMS/HCC) K74.60, I85.10   • Encephalopathy, hepatic (CMS/HCC) K72.90   • Gastrointestinal hemorrhage with hematemesis K92.0   • Other cirrhosis of liver (CMS/HCC) K74.69   • Secondary esophageal varices without bleeding (CMS/HCC) I85.10   • Thrombocytopenia (CMS/HCC) D69.6   • Adverse effect of iron or its compound, subsequent encounter T45.4X5D   • Noncompliance Z91.19   • Tobacco abuse counseling Z71.6       PMHX:   Past Medical History:   Diagnosis Date   • Abnormal cervical Papanicolaou smear     remote   • Acute gastric mucosal erosion      EGD   • Allergic rhinitis    • Anemia    • Asthma    • Cirrhosis of liver (CMS/HCC)    • Duodenitis      EGD   • Enlarged lymph node     retroperitoneal lymph node enlarged,  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)  Bone marrow bx negative    • Esophageal varices (CMS/HCC)    • Fatty liver    • Folic acid deficiency    • Graves  disease    • History of blood transfusion    • Hyperlipidemia    • Hypertension    • Leukocytosis    • PIEDRA (nonalcoholic steatohepatitis) 2012   • Obstructive sleep apnea     4/10 dx, prescribed CPAP at 12 cm H2O (did not tolerate)     PSHX:   Past Surgical History:   Procedure Laterality Date   • APPENDECTOMY     • CHOLECYSTECTOMY      10/9/12 [place]: Lap Cholecystectomy and Appendectomy, Liver Bx:Cirrhosis (Biliary Dyskinesia, Cholecystitis, Chronic Appendicitis)   • COLONOSCOPY N/A 4/22/2016    NBIH   • ENDOSCOPY N/A 4/22/2016    Procedure: ESOPHAGOGASTRODUODENOSCOPY;  Surgeon: Annie Tierney MD;  Location: [facility] ENDOSCOPY;  Service:    • ENDOSCOPY N/A 12/20/2016    Procedure: ESOPHAGOGASTRODUODENOSCOPY AT BEDSIDE;  Surgeon: Haresh Fritz MD;  Location: [facility] ENDOSCOPY;  Service:    • ENDOSCOPY N/A 12/22/2016    Procedure: ESOPHAGOGASTRODUODENOSCOPY WITH BANDING X2;  Surgeon: Chriss Reece MD;  Location: [facility] ENDOSCOPY;  Service:    • ENDOSCOPY N/A 12/26/2016    Procedure: ESOPHAGOGASTRODUODENOSCOPY AT BEDSIDE;  Surgeon: Vidya Norman MD;  Location: [facility] ENDOSCOPY;  Service:    • ENDOSCOPY N/A 2/8/2017    Grade I esophageal varices, scar in the lower third of the esophagus, Portal hypertensive gastropathy.     • ENDOSCOPY N/A 10/2/2017    Grade II esophageal varices, clotted blood in the gastric fundus and in the gastric body, portal hypertensive gastropathy.  No specimens collected.    • ENDOSCOPY N/A 10/3/2017    Grade II esophageal varices, completely eradicated, portal hypertensive gastropathy   • ENDOSCOPY N/A 11/7/2017    Grade I esophageal varices.Scar in the lower third of the esophagus. Erosive gastritis with hemorrhage.Portal hypertensive gastropathy.A few bleeding angioectasias in the stomach. Injected. Treated with argon plasma coagulation (APC). No specimans collected.      • ENDOSCOPY N/A 5/23/2018    Grade I esoph varices, severe portal HTN gastropathy in fundus and body,  scattered inflamm and erosions in antrum,    • ENDOSCOPY N/A 2/5/2019    Procedure: ESOPHAGOGASTRODUODENOSCOPY;  Surgeon: Annie Tierney MD;  Location: [facility] ENDOSCOPY;  Service: Gastroenterology   • ENDOSCOPY N/A 2/12/2019    Non-bleeding grade II esophageal varices, Portal hypertensive gastropathy   • LAPAROSCOPIC APPENDECTOMY  10/09/2012   • LIVER BIOPSY      9/12 Liver Bx done with Mariza and Appy (cirrhosis)   • UPPER GASTROINTESTINAL ENDOSCOPY      11/12 EGD with Erosive gastritis, duodenitis, and portal hypertensive gastropathy (Dr. Tierney) (no esophageal varices_       Hospital Course: Jaz Gipson  Was admitted with confusion and intermittently lethargic; she has a history of angulo cirrhosis but had stopped taking her medications; her ammonia was high and she was restarted on lactulose, xifaxan and diuretics; her mental status cleared; plan is for outpatient follow up with dr tierney; the importance of medication compliance was stressed    Consults:     Consults     Date and Time Order Name Status Description    10/16/2019 1212 Inpatient Gastroenterology Consult Completed     10/16/2019 1200 [facility] (on-call MD unless specified) Details Completed         Results from last 7 days   Lab Units 10/18/19  0503   WBC 10*3/mm3 3.68   HEMOGLOBIN g/dL 9.9*   HEMATOCRIT % 30.1*   PLATELETS 10*3/mm3 35*     Results from last 7 days   Lab Units 10/18/19  0503   SODIUM mmol/L 139   POTASSIUM mmol/L 3.0*   CHLORIDE mmol/L 110*   CO2 mmol/L 26.2   BUN mg/dL 3*   CREATININE mg/dL 0.65   GLUCOSE mg/dL 81   CALCIUM mg/dL 7.1*     Significant Diagnostic Studies: Labs in chart were reviewed.  Imaging Results (all)     Procedure Component Value Units Date/Time    US Abdomen Limited [[id number]] Collected:  10/17/19 0737     Updated:  10/17/19 9988    Narrative:       LIMITED ABDOMEN ULTRASOUND     CLINICAL HISTORY: Cirrhosis.     COMPARISON: Liver ultrasound 04/10/2018.     FINDINGS: Longitudinal and transverse images of the  "abdomen obtained.  Liver is small and somewhat nodular suggesting cirrhosis. Parenchyma is  heterogeneous and somewhat hyperechoic possibly due to variations in  fatty content. A discrete mass lesion is not identified by sonography.  There is a recannulized paraumbilical vein noted. Gallbladder is absent.  Right kidney measures 13 cm and the left kidney 13.5 cm in length. There  is no renal mass or hydronephrosis. Spleen enlarged at 18.5 cm. Pancreas  not visualized. Small amount of ascites present.          Impression:       1. Liver findings as above, a discrete mass lesion is not identified.  2. Homogeneous splenomegaly and small amount of ascites likely sequelae  of portal hypertension.     This report was finalized on 10/17/2019 11:04 PM by Avinash Foley M.D.           /77 (BP Location: Left arm, Patient Position: Sitting)   Pulse 73   Temp 98 °F (36.7 °C) (Oral)   Resp 18   Ht 162.6 cm (64\")   Wt 85 kg (187 lb 8 oz)   LMP  (LMP Unknown)   SpO2 94%   Breastfeeding? No   BMI 32.18 kg/m²     Discharge Exam:  General Appearance:    Alert, cooperative, no distress, AAOx3                          Head:    Normocephalic, without obvious abnormality, atraumatic                          Eyes:    PERRL, conjunctiva/corneas clear, EOM's intact, both eyes                        Throat:   Lips, tongue, gums normal; oral mucosa pink and moist                          Neck:   Supple, symmetrical, trachea midline, no JVD                        Lungs:     Clear to auscultation bilaterally, respirations unlabored                Chest Wall:    No tenderness or deformity                        Heart:    Regular rate and rhythm, S1 and S2 normal, no murmur,no  Rub                                       or gallop                  Abdomen:     Soft, non-tender, bowel sounds active, no masses, no                                                        organomegaly                  Extremities:   Extremities normal, " atraumatic, no cyanosis or edema, pulses                                           palpable in all extremities                             Skin:   Skin is warm and dry,  no rashes or palpable lesions                  Neurologic:   CNII-XII intact, motor strength grossly intact, sensation grossly                                           intact to light touch, no focal deficits noted     Disposition:  Home    Patient Instructions:      Discharge Medications      Continue These Medications      Instructions Start Date   ferrous sulfate 325 (65 FE) MG tablet   325 mg, Oral, Daily With Breakfast      fluticasone 44 MCG/ACT inhaler  Commonly known as:  FLOVENT HFA   1 puff, Inhalation, 2 Times Daily - RT      furosemide 40 MG tablet  Commonly known as:  LASIX   40 mg, Oral, Daily      lactulose 10 GM/15ML solution  Commonly known as:  CHRONULAC   45 mL, Oral, Every 12 Hours      levothyroxine 200 MCG tablet  Commonly known as:  SYNTHROID   200 mcg, Oral, Daily      levothyroxine 25 MCG tablet  Commonly known as:  SYNTHROID, LEVOTHROID   25 mcg, Oral, Daily      nadolol 20 MG tablet  Commonly known as:  CORGARD   TAKE ONE TABLET BY MOUTH DAILY      pantoprazole 40 MG EC tablet  Commonly known as:  PROTONIX   40 mg, Oral, 2 Times Daily Before Meals      potassium chloride 20 MEQ CR tablet  Commonly known as:  K-DUR,KLOR-CON   20 mEq, Oral, 2 Times Daily      rifaximin 550 MG tablet  Commonly known as:  XIFAXAN   550 mg, Oral, Every 12 Hours Scheduled      spironolactone 50 MG tablet  Commonly known as:  ALDACTONE   150 mg, Oral, Daily      sucralfate 1 g tablet  Commonly known as:  CARAFATE   1 g, Oral, 2 Times Daily      vitamin D 83909 units capsule capsule  Commonly known as:  ERGOCALCIFEROL   50,000 Units, Oral, Weekly             No future appointments.  Follow-up Information     Head, Cheli GUZMAN, APRN .    Specialty:  Nurse Practitioner  Contact information:  [address] [facility] [place]  [address]  [phone]                 Discharge Order (From admission, onward)    Start     Ordered    10/18/19 1345  Discharge patient  Once     Expected Discharge Date:  10/18/19    Discharge Disposition:  Home or Self Care    Physician of Record for Attribution - Please select from Treatment Team:  BONITA LONGO [[id number]]    Review needed by CMO to determine Physician of Record:  No       Question Answer Comment   Physician of Record for Attribution - Please select from Treatment Team BONITA LONGO    Review needed by CMO to determine Physician of Record No        10/18/19 1345               Signed:  Ashley Sanderson MD  10/18/2019  1:46 PM

## 2019-10-19 ENCOUNTER — READMISSION MANAGEMENT (OUTPATIENT)
Dept: CALL CENTER | Facility: HOSPITAL | Age: 44
End: 2019-10-19

## 2019-10-19 NOTE — OUTREACH NOTE
Prep Survey      Responses   Facility patient discharged from?  [place]   Is patient eligible?  Yes   Discharge diagnosis  Encephalopathy, hepatic,      PIEDRA (nonalcoholic steatohepatitis)   Does the patient have one of the following disease processes/diagnoses(primary or secondary)?  Other   Does the patient have Home health ordered?  No   Is there a DME ordered?  No   Prep survey completed?  Yes          Crystal Nicole RN

## 2019-10-21 ENCOUNTER — PRIOR AUTHORIZATION (OUTPATIENT)
Dept: GASTROENTEROLOGY | Facility: CLINIC | Age: 44
End: 2019-10-21

## 2019-10-21 NOTE — TELEPHONE ENCOUNTER
PA approved for Xifaxan for treatment of HE. Good through 10/17/2020. Letter scanned into Media tab.

## 2019-10-22 ENCOUNTER — READMISSION MANAGEMENT (OUTPATIENT)
Dept: CALL CENTER | Facility: HOSPITAL | Age: 44
End: 2019-10-22

## 2019-10-22 NOTE — OUTREACH NOTE
Medical Week 1 Survey      Responses   Facility patient discharged from?  [place]   Does the patient have one of the following disease processes/diagnoses(primary or secondary)?  Other   Is there a successful TCM telephone encounter documented?  No   Week 1 attempt successful?  No   Unsuccessful attempts  Attempt 1          Tiffany Shaw RN

## 2019-10-25 ENCOUNTER — READMISSION MANAGEMENT (OUTPATIENT)
Dept: CALL CENTER | Facility: HOSPITAL | Age: 44
End: 2019-10-25

## 2019-10-25 NOTE — OUTREACH NOTE
Medical Week 1 Survey      Responses   Facility patient discharged from?  [facility]   Does the patient have one of the following disease processes/diagnoses(primary or secondary)?  Other   Is there a successful TCM telephone encounter documented?  No   Week 1 attempt successful?  Yes   Call start time  0857   Call end time  0908   Discharge diagnosis  Encephalopathy, hepatic,      PIEDRA (nonalcoholic steatohepatitis)   Meds reviewed with patient/caregiver?  Yes   Is the patient having any side effects they believe may be caused by any medication additions or changes?  No   Does the patient have all medications ordered at discharge?  Yes   Is the patient taking all medications as directed (includes completed medication regime)?  Yes   Comments regarding appointments  Sees GI  on Nov 1 2019    Does the patient have a primary care provider?   Yes   Does the patient have an appointment with their PCP within 7 days of discharge?  No   Comments regarding PCP  PCP Logan FORREST   What is preventing the patient from scheduling follow up appointments within 7 days of discharge?  Haven't had time   Nursing Interventions  Verified appointment date/time/provider, Educated patient on importance of making appointment, Advised patient to make appointment   Has the patient kept scheduled appointments due by today?  N/A   Has home health visited the patient within 72 hours of discharge?  N/A   Psychosocial issues?  No   Comments  Patient reports she is feeling better. Abdominal pain has decreased. She has not had a stool since discharge. She is taking medications as directed.    Did the patient receive a copy of their discharge instructions?  Yes   Nursing interventions  Reviewed instructions with patient   What is the patient's perception of their health status since discharge?  Improving   Is the patient/caregiver able to teach back signs and symptoms related to disease process for when to call PCP?  Yes   Is the  patient/caregiver able to teach back signs and symptoms related to disease process for when to call 911?  Yes   Is the patient/caregiver able to teach back the hierarchy of who to call/visit for symptoms/problems? PCP, Specialist, Home health nurse, Urgent Care, ED, 911  Yes   Additional teach back comments  If no stool, abdominal pain worsens, fever or chills seek medical care. Patient verbalized understanding.    Week 1 call completed?  Yes          Barry Crandall RN

## 2019-11-01 ENCOUNTER — READMISSION MANAGEMENT (OUTPATIENT)
Dept: CALL CENTER | Facility: HOSPITAL | Age: 44
End: 2019-11-01

## 2019-11-01 ENCOUNTER — OFFICE VISIT (OUTPATIENT)
Dept: GASTROENTEROLOGY | Facility: CLINIC | Age: 44
End: 2019-11-01

## 2019-11-01 ENCOUNTER — TELEPHONE (OUTPATIENT)
Dept: GASTROENTEROLOGY | Facility: CLINIC | Age: 44
End: 2019-11-01

## 2019-11-01 ENCOUNTER — APPOINTMENT (OUTPATIENT)
Dept: LAB | Facility: HOSPITAL | Age: 44
End: 2019-11-01

## 2019-11-01 VITALS
WEIGHT: 174.8 LBS | BODY MASS INDEX: 29.84 KG/M2 | TEMPERATURE: 98.5 F | HEIGHT: 64 IN | SYSTOLIC BLOOD PRESSURE: 106 MMHG | DIASTOLIC BLOOD PRESSURE: 72 MMHG

## 2019-11-01 DIAGNOSIS — K74.69 OTHER CIRRHOSIS OF LIVER (HCC): Primary | ICD-10-CM

## 2019-11-01 DIAGNOSIS — D69.6 THROMBOCYTOPENIA (HCC): ICD-10-CM

## 2019-11-01 DIAGNOSIS — K74.60 ESOPHAGEAL VARICES IN CIRRHOSIS (HCC): ICD-10-CM

## 2019-11-01 DIAGNOSIS — Z71.6 TOBACCO ABUSE COUNSELING: ICD-10-CM

## 2019-11-01 DIAGNOSIS — I85.10 ESOPHAGEAL VARICES IN CIRRHOSIS (HCC): ICD-10-CM

## 2019-11-01 DIAGNOSIS — K76.82 ENCEPHALOPATHY, HEPATIC (HCC): ICD-10-CM

## 2019-11-01 LAB
ALBUMIN SERPL-MCNC: 2.5 G/DL (ref 3.5–5.2)
ALBUMIN/GLOB SERPL: 0.3 G/DL
ALP SERPL-CCNC: 174 U/L (ref 39–117)
ALPHA-FETOPROTEIN: 1.4 NG/ML (ref 0–8.3)
ALT SERPL W P-5'-P-CCNC: 22 U/L (ref 1–33)
AMMONIA BLD-SCNC: 80 UMOL/L (ref 11–51)
ANION GAP SERPL CALCULATED.3IONS-SCNC: 6.8 MMOL/L (ref 5–15)
AST SERPL-CCNC: 51 U/L (ref 1–32)
BASOPHILS # BLD MANUAL: 0.05 10*3/MM3 (ref 0–0.2)
BASOPHILS # BLD MANUAL: 0.05 10*3/MM3 (ref 0–0.2)
BASOPHILS NFR BLD AUTO: 1.8 % (ref 0–1.5)
BASOPHILS NFR BLD AUTO: 2 % (ref 0–1.5)
BILIRUB SERPL-MCNC: 3.2 MG/DL (ref 0.2–1.2)
BUN BLD-MCNC: 4 MG/DL (ref 6–20)
BUN/CREAT SERPL: 5.3 (ref 7–25)
CALCIUM SPEC-SCNC: 7.8 MG/DL (ref 8.6–10.5)
CHLORIDE SERPL-SCNC: 101 MMOL/L (ref 98–107)
CO2 SERPL-SCNC: 28.2 MMOL/L (ref 22–29)
CREAT BLD-MCNC: 0.75 MG/DL (ref 0.57–1)
DEPRECATED RDW RBC AUTO: 56 FL (ref 37–54)
EOSINOPHIL # BLD MANUAL: 0.1 10*3/MM3 (ref 0–0.4)
EOSINOPHIL # BLD MANUAL: 0.25 10*3/MM3 (ref 0–0.4)
EOSINOPHIL NFR BLD MANUAL: 3.5 % (ref 0.3–6.2)
EOSINOPHIL NFR BLD MANUAL: 9 % (ref 0.3–6.2)
ERYTHROCYTE [DISTWIDTH] IN BLOOD BY AUTOMATED COUNT: 16.3 % (ref 12.3–15.4)
GFR SERPL CREATININE-BSD FRML MDRD: 85 ML/MIN/1.73
GLOBULIN UR ELPH-MCNC: 7.6 GM/DL
GLUCOSE BLD-MCNC: 96 MG/DL (ref 65–99)
HCT VFR BLD AUTO: 32.2 % (ref 34–46.6)
HGB BLD-MCNC: 10.9 G/DL (ref 12–15.9)
INR PPP: 1.77 (ref 0.9–1.1)
LYMPHOCYTES # BLD MANUAL: 0.19 10*3/MM3 (ref 0.7–3.1)
LYMPHOCYTES # BLD MANUAL: 0.71 10*3/MM3 (ref 0.7–3.1)
LYMPHOCYTES NFR BLD MANUAL: 26 % (ref 19.6–45.3)
LYMPHOCYTES NFR BLD MANUAL: 7 % (ref 19.6–45.3)
LYMPHOCYTES NFR BLD MANUAL: 7 % (ref 5–12)
LYMPHOCYTES NFR BLD MANUAL: 8 % (ref 5–12)
MAGNESIUM SERPL-MCNC: 2.1 MG/DL (ref 1.6–2.6)
MCH RBC QN AUTO: 31.6 PG (ref 26.6–33)
MCHC RBC AUTO-ENTMCNC: 33.9 G/DL (ref 31.5–35.7)
MCV RBC AUTO: 93.3 FL (ref 79–97)
MONOCYTES # BLD AUTO: 0.19 10*3/MM3 (ref 0.1–0.9)
MONOCYTES # BLD AUTO: 0.22 10*3/MM3 (ref 0.1–0.9)
NEUTROPHILS # BLD AUTO: 1.51 10*3/MM3 (ref 1.7–7)
NEUTROPHILS # BLD AUTO: 2.21 10*3/MM3 (ref 1.7–7)
NEUTROPHILS NFR BLD MANUAL: 55 % (ref 42.7–76)
NEUTROPHILS NFR BLD MANUAL: 80.7 % (ref 42.7–76)
PLAT MORPH BLD: NORMAL
PLAT MORPH BLD: NORMAL
PLATELET # BLD AUTO: 41 10*3/MM3 (ref 140–450)
PMV BLD AUTO: 12.5 FL (ref 6–12)
POTASSIUM BLD-SCNC: 3.4 MMOL/L (ref 3.5–5.2)
PROT SERPL-MCNC: 10.1 G/DL (ref 6–8.5)
PROTHROMBIN TIME: 20.3 SECONDS (ref 11.7–14.2)
RBC # BLD AUTO: 3.45 10*6/MM3 (ref 3.77–5.28)
RBC MORPH BLD: NORMAL
RBC MORPH BLD: NORMAL
SMUDGE CELLS BLD QL SMEAR: ABNORMAL
SODIUM BLD-SCNC: 136 MMOL/L (ref 136–145)
WBC MORPH BLD: NORMAL
WBC NRBC COR # BLD: 2.74 10*3/MM3 (ref 3.4–10.8)

## 2019-11-01 PROCEDURE — 99214 OFFICE O/P EST MOD 30 MIN: CPT | Performed by: NURSE PRACTITIONER

## 2019-11-01 PROCEDURE — 85610 PROTHROMBIN TIME: CPT | Performed by: NURSE PRACTITIONER

## 2019-11-01 PROCEDURE — 80053 COMPREHEN METABOLIC PANEL: CPT | Performed by: NURSE PRACTITIONER

## 2019-11-01 PROCEDURE — 82140 ASSAY OF AMMONIA: CPT | Performed by: NURSE PRACTITIONER

## 2019-11-01 PROCEDURE — 85025 COMPLETE CBC W/AUTO DIFF WBC: CPT | Performed by: NURSE PRACTITIONER

## 2019-11-01 PROCEDURE — 85007 BL SMEAR W/DIFF WBC COUNT: CPT | Performed by: NURSE PRACTITIONER

## 2019-11-01 PROCEDURE — 82105 ALPHA-FETOPROTEIN SERUM: CPT | Performed by: NURSE PRACTITIONER

## 2019-11-01 PROCEDURE — 36415 COLL VENOUS BLD VENIPUNCTURE: CPT | Performed by: NURSE PRACTITIONER

## 2019-11-01 PROCEDURE — 83735 ASSAY OF MAGNESIUM: CPT | Performed by: NURSE PRACTITIONER

## 2019-11-01 NOTE — OUTREACH NOTE
Medical Week 2 Survey      Responses   Facility patient discharged from?  [facility]   Does the patient have one of the following disease processes/diagnoses(primary or secondary)?  Other   Week 2 attempt successful?  No   Unsuccessful attempts  Attempt 1          Barry Crandall RN

## 2019-11-01 NOTE — TELEPHONE ENCOUNTER
Call received from Vivien @ [facility].  Critical lab value - platelets: 41,000.      H&H 10.9/32.2.    Message to SHILPI Coppola.

## 2019-11-01 NOTE — PROGRESS NOTES
Chief Complaint   Patient presents with   • Cirrhosis       Jaz Gipson is a  44 y.o. female here for a follow up visit for cirrhosis.    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  She is a patient of Dr. Tierney.  She was last seen in the office on 4/11/2019.  She has a history of Frazier cirrhosis with recent episodes of hepatic encephalopathy that have required hospitalization.  She is currently on lactulose and Xifaxan.  She also has a history of esophageal varices that have been banded in the past.  She also has a history of GERD and takes Protonix 40 mill grams once daily.  She also has a history of thrombocytopenia.  She is due for repeat EGD to monitor those bands but with her thrombocytopenia that has been put on hold.  Her daughters tell me that the patient tends to be too absent-minded to keep up with her own medications so they keep track of her medicines for her the patient is happy to report that now she is able to work from home which this is helped her tremendously.  She admits she was not taking the lactulose before because when she had a work outside the home she always had accidents for try to run to the bathroom.  But now that she is home and working she is able to take the lactulose and take bathroom breaks.  The patient denies any alcohol use but she does use tobacco.  She admits she is never quit smoking and she is been smoking since she was 20 years old.  She has been evaluated by the [facility] transplant center and was told that she is not a candidate until she is able to quit smoking and take her medicines as directed.  Is overdue for a follow-up appointment with them.  Last EGD was done on 2/2019.  Last colonoscopy was done on 4/2016.  She denies any jaundice, itching, confusion or abdominal swelling today.  She does admit she has some short-term memory loss but she admits this is  been going on for a long time.  She denies any swelling in her legs or feet today.  Past Medical History:   Diagnosis Date   • Abnormal cervical Papanicolaou smear     remote   • Acute gastric mucosal erosion     11/12 EGD   • Allergic rhinitis    • Anemia    • Asthma    • Cirrhosis of liver (CMS/HCC)    • Duodenitis     11/12 EGD   • Enlarged lymph node     retroperitoneal lymph node enlarged, 5/12 PET CT consistent with low grade lymphoproliferative disorder (  [facility])  9/12Bone marrow bx negative    • Esophageal varices (CMS/HCC)    • Fatty liver    • Folic acid deficiency    • Graves disease    • History of blood transfusion    • Hyperlipidemia    • Hypertension    • Leukocytosis    • PIEDRA (nonalcoholic steatohepatitis) 2012   • Obstructive sleep apnea     4/10 dx, prescribed CPAP at 12 cm H2O (did not tolerate)       Past Surgical History:   Procedure Laterality Date   • APPENDECTOMY     • CHOLECYSTECTOMY      10/9/12 [facility]: Lap Cholecystectomy and Appendectomy, Liver Bx:Cirrhosis (Biliary Dyskinesia, Cholecystitis, Chronic Appendicitis)   • COLONOSCOPY N/A 4/22/2016    NB   • ENDOSCOPY N/A 4/22/2016    Procedure: ESOPHAGOGASTRODUODENOSCOPY;  Surgeon: Annie Tierney MD;  Location: [facility] ENDOSCOPY;  Service:    • ENDOSCOPY N/A 12/20/2016    Procedure: ESOPHAGOGASTRODUODENOSCOPY AT BEDSIDE;  Surgeon: Haresh Fritz MD;  Location: [facility] ENDOSCOPY;  Service:    • ENDOSCOPY N/A 12/22/2016    Procedure: ESOPHAGOGASTRODUODENOSCOPY WITH BANDING X2;  Surgeon: Chriss Reece MD;  Location: [facility] ENDOSCOPY;  Service:    • ENDOSCOPY N/A 12/26/2016    Procedure: ESOPHAGOGASTRODUODENOSCOPY AT BEDSIDE;  Surgeon: Vidya Norman MD;  Location: [facility] ENDOSCOPY;  Service:    • ENDOSCOPY N/A 2/8/2017    Grade I esophageal varices, scar in the lower third of the esophagus, Portal hypertensive gastropathy.     • ENDOSCOPY N/A 10/2/2017    Grade II esophageal varices, clotted blood in the gastric fundus and in  the gastric body, portal hypertensive gastropathy.  No specimens collected.    • ENDOSCOPY N/A 10/3/2017    Grade II esophageal varices, completely eradicated, portal hypertensive gastropathy   • ENDOSCOPY N/A 11/7/2017    Grade I esophageal varices.Scar in the lower third of the esophagus. Erosive gastritis with hemorrhage.Portal hypertensive gastropathy.A few bleeding angioectasias in the stomach. Injected. Treated with argon plasma coagulation (APC). No specimans collected.      • ENDOSCOPY N/A 5/23/2018    Grade I esoph varices, severe portal HTN gastropathy in fundus and body, scattered inflamm and erosions in antrum,    • ENDOSCOPY N/A 2/5/2019    Procedure: ESOPHAGOGASTRODUODENOSCOPY;  Surgeon: Annie Tierney MD;  Location: [facility] ENDOSCOPY;  Service: Gastroenterology   • ENDOSCOPY N/A 2/12/2019    Non-bleeding grade II esophageal varices, Portal hypertensive gastropathy   • LAPAROSCOPIC APPENDECTOMY  10/09/2012   • LIVER BIOPSY      9/12 Liver Bx done with Mariza and Appy (cirrhosis)   • UPPER GASTROINTESTINAL ENDOSCOPY      11/12 EGD with Erosive gastritis, duodenitis, and portal hypertensive gastropathy (Dr. Tierney) (no esophageal varices_       Scheduled Meds:    Continuous Infusions:  No current facility-administered medications for this visit.     PRN Meds:.    Allergies   Allergen Reactions   • Penicillins Swelling   • Latex Rash   • Tomato Rash       Social History     Socioeconomic History   • Marital status: Single     Spouse name: Not on file   • Number of children: 2   • Years of education: High School   • Highest education level: Not on file   Occupational History   • Occupation: Pharmacy Tech     Employer: [employer]   Tobacco Use   • Smoking status: Current Every Day Smoker     Packs/day: 0.25     Types: Cigarettes   • Smokeless tobacco: Never Used   Substance and Sexual Activity   • Alcohol use: No   • Drug use: No   • Sexual activity: Defer       Family History   Problem Relation  Age of Onset   • Alcohol abuse Father    • Anxiety disorder Father    • COPD Father    • Depression Father    • Hyperlipidemia Father    • Hypertension Father    • Asthma Brother    • Coronary artery disease Brother    • Diabetes type II Other    • Early death Brother         Age 5 months   • Heart failure Brother    • Malig Hyperthermia Neg Hx        Review of Systems   Constitutional: Positive for fatigue. Negative for appetite change, chills, diaphoresis, fever and unexpected weight change.   HENT: Negative for nosebleeds, postnasal drip, sore throat, trouble swallowing and voice change.    Respiratory: Negative for cough, choking, chest tightness, shortness of breath and wheezing.    Cardiovascular: Negative for chest pain, palpitations and leg swelling.   Gastrointestinal: Positive for abdominal distention. Negative for abdominal pain, anal bleeding, blood in stool, constipation, diarrhea, nausea, rectal pain and vomiting.   Endocrine: Negative for polydipsia, polyphagia and polyuria.   Musculoskeletal: Negative for gait problem.   Skin: Negative for rash and wound.   Allergic/Immunologic: Negative for food allergies.   Neurological: Negative for dizziness, speech difficulty and light-headedness.   Psychiatric/Behavioral: Negative for confusion, self-injury, sleep disturbance and suicidal ideas.       Vitals:    11/01/19 1125   BP: 106/72   Temp: 98.5 °F (36.9 °C)       Physical Exam   Constitutional: She is oriented to person, place, and time. She appears well-developed and well-nourished. She does not appear ill. No distress.   HENT:   Head: Normocephalic.   Eyes: Pupils are equal, round, and reactive to light.   Cardiovascular: Normal rate, regular rhythm and normal heart sounds.   Pulmonary/Chest: Effort normal and breath sounds normal.   Abdominal: Soft. Bowel sounds are normal. She exhibits distension. She exhibits no ascites and no mass. There is no hepatosplenomegaly. There is no tenderness. There is no  rebound and no guarding. No hernia.   Musculoskeletal: Normal range of motion.   Neurological: She is alert and oriented to person, place, and time.   Skin: Skin is warm and dry.   Psychiatric: She has a normal mood and affect. Her speech is normal and behavior is normal. Judgment normal.       No images are attached to the encounter.    Assessment and plan     1. Other cirrhosis of liver (CMS/HCC)  - CBC & Differential  - Comprehensive Metabolic Panel  - Protime-INR  - Ammonia  - Magnesium  - AFP Tumor Marker    2. Esophageal varices in cirrhosis (CMS/HCC)    3. Encephalopathy, hepatic (CMS/HCC)  - CBC & Differential  - Comprehensive Metabolic Panel  - Protime-INR  - Ammonia  - Magnesium  - AFP Tumor Marker    4. Thrombocytopenia (CMS/HCC)  - CBC & Differential    5. Tobacco abuse counseling        Reviewed her last hospital admission admission records with her today.  She looks better than last time I seen her.  She definitely suffers from some short-term memory loss.  She she is negative for any ascites or lower extremities dependent edema today.  Long discussion with her and her daughters about medication compliance, diet restrictions as well as daily weights.  I would really really like it for her to be able to find a way to quit smoking so that she could be a liver transplant candidate with the [facility].  Patient needs to follow-up with the [facility] as planned.  Will check labs today.  Continue 2 g sodium diet.  Continue current meds.  She does need an EGD to reassess the esophageal varices that have been banded but we need to get her platelets count up first.  We will have her make a follow-up with me in 1 month and go ahead make a follow-up with Dr. Tierney in 3 months.

## 2019-11-01 NOTE — TELEPHONE ENCOUNTER
That is actually an improvement from the 35,000 that she had upon hospital discharge.  We will continue to monitor this.  Thanks

## 2019-11-04 ENCOUNTER — READMISSION MANAGEMENT (OUTPATIENT)
Dept: CALL CENTER | Facility: HOSPITAL | Age: 44
End: 2019-11-04

## 2019-11-04 ENCOUNTER — TELEPHONE (OUTPATIENT)
Dept: GASTROENTEROLOGY | Facility: CLINIC | Age: 44
End: 2019-11-04

## 2019-11-04 NOTE — OUTREACH NOTE
Medical Week 2 Survey      Responses   Facility patient discharged from?  [facility]   Does the patient have one of the following disease processes/diagnoses(primary or secondary)?  Other   Week 2 attempt successful?  Yes   Call start time  1421   General alerts for this patient  Please call after 7:30 pm. She works in a call center.   Discharge diagnosis  Encephalopathy, hepatic,      PIEDRA (nonalcoholic steatohepatitis)   Rescheduled  Rescheduled-pt requested   Call end time  1422          Tiffany Shaw RN

## 2019-11-04 NOTE — TELEPHONE ENCOUNTER
----- Message from LON Rojas sent at 11/1/2019 12:07 PM EDT -----  Do you know the  and or phone number for the  transplant center? Patient needs to make f/u with them but she cannot find any of their contact stuff at home? Thanks.

## 2019-11-04 NOTE — TELEPHONE ENCOUNTER
I did not check iron levels but I did check her hemoglobin is much better.  We can check an iron panel after next visit.

## 2019-11-04 NOTE — TELEPHONE ENCOUNTER
----- Message from LON Rojas sent at 11/1/2019  4:40 PM EDT -----  Please call the patient's daughters and let them know that the ammonia level is significantly better at 80 from previously being at 115 2 weeks ago.  All other labs look pretty stable for her and the platelets have gone up from 35,000 now to 41,000.  They are still too low for her to have an EGD at this time.  But we will continue to monitor these.

## 2019-11-04 NOTE — TELEPHONE ENCOUNTER
Call to pt (daughter's #'s not available).  Advise per M Anat that ammonia level significantly better at 80 from previously being 115  2 wks ago.  All other labs look pretty stable and platelets have gone up from 35,000 to 41,000.  Still to low to have an EGD at this time, but will continue to monitor these.      Verb understanding.  Asking about iron levels.  Question to M Anat.

## 2019-11-04 NOTE — TELEPHONE ENCOUNTER
Called pt and advised that thru her records I found 2 numbers for for the transplant coordinator [phone] or [phone].      Also advised pt that the she did no check iron levels but she did check her hgb and it is much better. She will check an iron panel at next f/u.  Pt verb understanding.

## 2019-11-05 ENCOUNTER — READMISSION MANAGEMENT (OUTPATIENT)
Dept: CALL CENTER | Facility: HOSPITAL | Age: 44
End: 2019-11-05

## 2019-11-05 NOTE — OUTREACH NOTE
Medical Week 2 Survey      Responses   Facility patient discharged from?  [facility]   Does the patient have one of the following disease processes/diagnoses(primary or secondary)?  Other   Week 2 attempt successful?  No   Unsuccessful attempts  Attempt 2   Rescheduled  Revoked [Did not answer the call, bipin jones a rescheduled call. Revoked. ]          Cheli Ricks RN

## 2019-11-13 ENCOUNTER — TELEPHONE (OUTPATIENT)
Dept: GASTROENTEROLOGY | Facility: CLINIC | Age: 44
End: 2019-11-13

## 2019-11-13 NOTE — TELEPHONE ENCOUNTER
Regarding: Prescription Question  Contact: [phone]  ----- Message from [employer]iant, Generic sent at 11/12/2019  7:36 PM EST -----    Piyush Keyes, this is Jaz's daughter Ciara. I wanted to let you know that today when I went to pickup her prescription for the Xifaxan(550 MG) the tech said that the  discount could not be applied anymore. That leaves the prescription to be almost $700 a month. Our family is trying to figure out a way to pay for that but my mom said that she'd be okay if she didn't take it and just took the lactose. I wanted to at least let you know about this and also ask if you have any suggestion about what to do. Thank you.

## 2019-11-13 NOTE — TELEPHONE ENCOUNTER
Can you guys look into this for her.  Maybe she just needs to reapply or something?  She really needs this medication and I am sure her family cannot afford the $750.  Thanks

## 2019-11-20 ENCOUNTER — TELEPHONE (OUTPATIENT)
Dept: GASTROENTEROLOGY | Facility: CLINIC | Age: 44
End: 2019-11-20

## 2019-11-20 NOTE — TELEPHONE ENCOUNTER
----- Message from Mayda Stone sent at 11/20/2019  3:20 PM EST -----  Regarding: Question  Contact: [phone]  Pt daughter wants to talk with Stephy regarding medication Xifaxan

## 2019-11-20 NOTE — TELEPHONE ENCOUNTER
Called pt and spoke with her daughter and she reports that her mother can not use the discount card any more for the xifaxan.  She states her mother can not afford $600 a month.  ADvised I will call her pharmacy to see what is going on.  Called her pharmacy and spoke with pharmacist Meet who reports that the pt has not met her deductible is one reason the script is so high and also the xifaxan only covers for $1200. Pt's current cost is $451.96.      Will call daughter tomorrow.

## 2019-11-25 NOTE — TELEPHONE ENCOUNTER
Called pt's daughter Ciara and advised of the below.  Advised her to call her mother's insurance and see if she can get a tier exception to lower the cost of the xifaxan.  ADvised we will have 5 boxes of xifaxn samples at the  to help her mother get by.  She verb understanding and states she will let us know about the tier exception.

## 2019-12-01 DIAGNOSIS — E03.9 HYPOTHYROIDISM, UNSPECIFIED TYPE: ICD-10-CM

## 2019-12-02 DIAGNOSIS — K76.82 HEPATIC ENCEPHALOPATHY (HCC): ICD-10-CM

## 2019-12-02 DIAGNOSIS — K74.69 OTHER CIRRHOSIS OF LIVER (HCC): ICD-10-CM

## 2019-12-02 DIAGNOSIS — K75.81 NASH (NONALCOHOLIC STEATOHEPATITIS): ICD-10-CM

## 2019-12-02 DIAGNOSIS — R60.9 FLUID RETENTION: ICD-10-CM

## 2019-12-02 RX ORDER — LEVOTHYROXINE SODIUM 0.2 MG/1
TABLET ORAL
Qty: 30 TABLET | Refills: 0 | Status: SHIPPED | OUTPATIENT
Start: 2019-12-02 | End: 2019-12-30

## 2019-12-03 ENCOUNTER — OFFICE VISIT (OUTPATIENT)
Dept: GASTROENTEROLOGY | Facility: CLINIC | Age: 44
End: 2019-12-03

## 2019-12-03 VITALS
HEIGHT: 64 IN | DIASTOLIC BLOOD PRESSURE: 72 MMHG | SYSTOLIC BLOOD PRESSURE: 118 MMHG | WEIGHT: 175 LBS | BODY MASS INDEX: 29.88 KG/M2 | TEMPERATURE: 98.1 F

## 2019-12-03 DIAGNOSIS — K74.60 ESOPHAGEAL VARICES IN CIRRHOSIS (HCC): ICD-10-CM

## 2019-12-03 DIAGNOSIS — D69.6 THROMBOCYTOPENIA (HCC): ICD-10-CM

## 2019-12-03 DIAGNOSIS — K76.82 ENCEPHALOPATHY, HEPATIC (HCC): ICD-10-CM

## 2019-12-03 DIAGNOSIS — K75.81 NASH (NONALCOHOLIC STEATOHEPATITIS): ICD-10-CM

## 2019-12-03 DIAGNOSIS — I85.10 ESOPHAGEAL VARICES IN CIRRHOSIS (HCC): ICD-10-CM

## 2019-12-03 DIAGNOSIS — K74.69 OTHER CIRRHOSIS OF LIVER (HCC): Primary | ICD-10-CM

## 2019-12-03 PROCEDURE — 99214 OFFICE O/P EST MOD 30 MIN: CPT | Performed by: NURSE PRACTITIONER

## 2019-12-03 RX ORDER — LACTULOSE 10 G/15ML
45 SOLUTION ORAL EVERY 12 HOURS
Qty: 946 ML | Refills: 3 | Status: SHIPPED | OUTPATIENT
Start: 2019-12-03 | End: 2020-02-18

## 2019-12-03 RX ORDER — FUROSEMIDE 40 MG/1
40 TABLET ORAL DAILY
Qty: 30 TABLET | Refills: 11 | Status: SHIPPED | OUTPATIENT
Start: 2019-12-03 | End: 2020-12-29

## 2019-12-03 RX ORDER — SPIRONOLACTONE 50 MG/1
150 TABLET, FILM COATED ORAL DAILY
Qty: 90 TABLET | Refills: 11 | Status: SHIPPED | OUTPATIENT
Start: 2019-12-03 | End: 2020-05-09 | Stop reason: SDUPTHER

## 2019-12-03 NOTE — PROGRESS NOTES
Chief Complaint   Patient presents with   • Cirrhosis       Jaz Gipson is a  44 y.o. female here for a follow up visit for cirrhosis.    HPI  44-year-old female presents today accompanied by her daughters for follow-up visit for Frazier cirrhosis.  She is a patient of Dr. Tierney.  She was last seen in the office on 11/1/2019.  She has a history of Frazier cirrhosis with hepatic encephalopathy as well as thrombocytopenia and esophageal varices.  She is doing well on Xifaxan twice daily and lactulose 3 times daily.  She is having 3-6 bowel movements a day.  Unfortunately her insurance coverage on the Xifaxan is crazy outrageous expensive.  Patient needs help getting the Xifaxan.  She has been to see the [facility] transplant center and is currently not a candidate for liver transplant at this time.  Current meld score is 17.  She is a current tobacco smoker but admits she has been working hard to cut down.  She did smoke 1 cigarette yesterday.  Lately she is been complaining of muscle aches and she is wondering if her potassium is too high or too low.  She does take a potassium supplement every day.  She does not have a scale at home and so therefore she has not been weighing herself regularly.  Her weight though is down about 4 pounds since April.  She denies any ascites or dependent edema at this time.  She denies any confusion, jaundice or itching.  She does have a history of GERD and admits she does well on Protonix.  She is due for another screening EGD due to her esophageal varices but with her platelets being low this has been put off for now.  She did call the [facility] to set up a follow-up appointment but they told her unless her meld score is over 20 they really do not need to see her.  She also has a history of low iron and her daughter admits she has been off her iron supplement.  Patient would like to get an iron transfusion if it is still warranted.  Patient denies any  dysphagia, reflux, abdominal pain, nausea and vomiting, diarrhea, constipation, rectal bleeding or melena she does admit that she has no appetite but this is nothing new for her.  She also has a history of Graves' disease.  Past Medical History:   Diagnosis Date   • Abnormal cervical Papanicolaou smear     remote   • Acute gastric mucosal erosion     11/12 EGD   • Allergic rhinitis    • Anemia    • Asthma    • Cirrhosis of liver (CMS/HCC)    • Duodenitis     11/12 EGD   • Enlarged lymph node     retroperitoneal lymph node enlarged, 5/12 PET CT consistent with low grade lymphoproliferative disorder (  [facility])  9/12Bone marrow bx negative    • Esophageal varices (CMS/HCC)    • Fatty liver    • Folic acid deficiency    • Graves disease    • History of blood transfusion    • Hyperlipidemia    • Hypertension    • Leukocytosis    • PIEDRA (nonalcoholic steatohepatitis) 2012   • Obstructive sleep apnea     4/10 dx, prescribed CPAP at 12 cm H2O (did not tolerate)       Past Surgical History:   Procedure Laterality Date   • APPENDECTOMY     • CHOLECYSTECTOMY      10/9/12 [facility]: Lap Cholecystectomy and Appendectomy, Liver Bx:Cirrhosis (Biliary Dyskinesia, Cholecystitis, Chronic Appendicitis)   • COLONOSCOPY N/A 4/22/2016    [facility] [place]   • ENDOSCOPY N/A 4/22/2016    Procedure: ESOPHAGOGASTRODUODENOSCOPY;  Surgeon: Annie Tierney MD;  Location: [facility] ENDOSCOPY;  Service:    • ENDOSCOPY N/A 12/20/2016    Procedure: ESOPHAGOGASTRODUODENOSCOPY AT BEDSIDE;  Surgeon: Haresh Fritz MD;  Location: [facility] ENDOSCOPY;  Service:    • ENDOSCOPY N/A 12/22/2016    Procedure: ESOPHAGOGASTRODUODENOSCOPY WITH BANDING X2;  Surgeon: Chriss Reece MD;  Location: [facility] ENDOSCOPY;  Service:    • ENDOSCOPY N/A 12/26/2016    Procedure: ESOPHAGOGASTRODUODENOSCOPY AT BEDSIDE;  Surgeon: Vidya Norman MD;  Location: [facility] ENDOSCOPY;  Service:    • ENDOSCOPY N/A 2/8/2017    Grade I esophageal varices, scar in the lower third of the  esophagus, Portal hypertensive gastropathy.     • ENDOSCOPY N/A 10/2/2017    Grade II esophageal varices, clotted blood in the gastric fundus and in the gastric body, portal hypertensive gastropathy.  No specimens collected.    • ENDOSCOPY N/A 10/3/2017    Grade II esophageal varices, completely eradicated, portal hypertensive gastropathy   • ENDOSCOPY N/A 11/7/2017    Grade I esophageal varices.Scar in the lower third of the esophagus. Erosive gastritis with hemorrhage.Portal hypertensive gastropathy.A few bleeding angioectasias in the stomach. Injected. Treated with argon plasma coagulation (APC). No specimans collected.      • ENDOSCOPY N/A 5/23/2018    Grade I esoph varices, severe portal HTN gastropathy in fundus and body, scattered inflamm and erosions in antrum,    • ENDOSCOPY N/A 2/5/2019    Grade II esophageal varices, food, portal HTN gastropathy   • ENDOSCOPY N/A 2/12/2019    Non-bleeding grade II esophageal varices, Portal hypertensive gastropathy   • LAPAROSCOPIC APPENDECTOMY  10/09/2012   • LIVER BIOPSY      9/12 Liver Bx done with Mariza and Appy (cirrhosis)   • UPPER GASTROINTESTINAL ENDOSCOPY      11/12 EGD with Erosive gastritis, duodenitis, and portal hypertensive gastropathy (Dr. Tierney) (no esophageal varices_       Scheduled Meds:    Continuous Infusions:  No current facility-administered medications for this visit.     PRN Meds:.    Allergies   Allergen Reactions   • Penicillins Swelling   • Latex Rash   • Tomato Rash       Social History     Socioeconomic History   • Marital status: Single     Spouse name: Not on file   • Number of children: 2   • Years of education: High School   • Highest education level: Not on file   Occupational History   • Occupation: Pharmacy Tech     Employer: [employer]   Tobacco Use   • Smoking status: Current Every Day Smoker     Packs/day: 0.25     Types: Cigarettes   • Smokeless tobacco: Never Used   Substance and Sexual Activity   • Alcohol use: No   •  Drug use: No   • Sexual activity: Defer       Family History   Problem Relation Age of Onset   • Alcohol abuse Father    • Anxiety disorder Father    • COPD Father    • Depression Father    • Hyperlipidemia Father    • Hypertension Father    • Asthma Brother    • Coronary artery disease Brother    • Diabetes type II Other    • Early death Brother         Age 5 months   • Heart failure Brother    • Malig Hyperthermia Neg Hx        Review of Systems   Constitutional: Negative for appetite change, chills, diaphoresis, fatigue, fever and unexpected weight change.   HENT: Negative for nosebleeds, postnasal drip, sore throat, trouble swallowing and voice change.    Respiratory: Negative for cough, choking, chest tightness, shortness of breath and wheezing.    Cardiovascular: Negative for chest pain, palpitations and leg swelling.   Gastrointestinal: Negative for abdominal distention, abdominal pain, anal bleeding, blood in stool, constipation, diarrhea, nausea, rectal pain and vomiting.   Endocrine: Negative for polydipsia, polyphagia and polyuria.   Musculoskeletal: Negative for gait problem.   Skin: Negative for rash and wound.   Allergic/Immunologic: Negative for food allergies.   Neurological: Negative for dizziness, speech difficulty and light-headedness.   Psychiatric/Behavioral: Negative for confusion, self-injury, sleep disturbance and suicidal ideas.       Vitals:    12/03/19 1401   BP: 118/72   Temp: 98.1 °F (36.7 °C)       Physical Exam   Constitutional: She is oriented to person, place, and time. She appears well-developed and well-nourished. She does not appear ill. No distress.   HENT:   Head: Normocephalic.   Eyes: Pupils are equal, round, and reactive to light.   Cardiovascular: Normal rate, regular rhythm and normal heart sounds.   Pulmonary/Chest: Effort normal and breath sounds normal.   Abdominal: Soft. Bowel sounds are normal. She exhibits no distension, no ascites and no mass. There is no  hepatosplenomegaly. There is no tenderness. There is no rebound and no guarding. No hernia.   Musculoskeletal: Normal range of motion.   Neurological: She is alert and oriented to person, place, and time.   Skin: Skin is warm and dry.   Psychiatric: She has a normal mood and affect. Her speech is normal and behavior is normal. Judgment normal.       No images are attached to the encounter.    Assessment and plan     1. Other cirrhosis of liver (CMS/HCC)  - CBC & Differential  - Comprehensive Metabolic Panel  - Iron Profile  - Protime-INR  - Magnesium  - AFP Tumor Marker  - Ammonia    2. PIEDRA (nonalcoholic steatohepatitis)    3. Esophageal varices in cirrhosis (CMS/HCC)    4. Encephalopathy, hepatic (CMS/HCC)  - AFP Tumor Marker  - Ammonia    5. Thrombocytopenia (CMS/HCC)  - CBC & Differential      Reviewed most recent labs with her today.  She seems the clearest mentally today that I have ever seen her.  Continue Xifaxan and lactulose.  I am concerned that she is been a run out of the Xifaxan samples that we have given her.  We will definitely call her insurance and see if we can get a tier exception for the Xifaxan.  Will check routine labs today.  Will recalculate her meld score.  Current meld score was 17.  I am not sure what is causing the muscle cramps.  If it is too much potassium or to little potassium.  I did advise the patient I want her to buy a scale so she can start weighing herself at home daily.  Continue a 2 g sodium diet.  No ascites or dependent lower extremity edema noted today.  Continue to cut down the smoking.  Patient to follow-up with Dr. Tierney as planned.  Patient to call the office with any issues.

## 2019-12-03 NOTE — TELEPHONE ENCOUNTER
"It is noted that escribes show \"transmission in progress\".    Call to [facility] @ [phone] [facility] left with with lasix 40 mg 1 tab po daily, #30, R11.  Lactulose 10 mg/15 ml - take 45 ml by mouth every 12 hours, #946 ml, R3.  Spironalactone 50 mg - take 3 tabs by mouth daily, #90, R11.        "

## 2019-12-04 ENCOUNTER — TELEPHONE (OUTPATIENT)
Dept: GASTROENTEROLOGY | Facility: CLINIC | Age: 44
End: 2019-12-04

## 2019-12-04 LAB
AFP-TM SERPL-MCNC: <0.7 NG/ML (ref 0–8.3)
ALBUMIN SERPL-MCNC: 2.1 G/DL (ref 3.5–5.2)
ALBUMIN/GLOB SERPL: 0.3 G/DL
ALP SERPL-CCNC: 210 U/L (ref 39–117)
ALT SERPL-CCNC: 16 U/L (ref 1–33)
AMMONIA PLAS-MCNC: 41 UMOL/L (ref 11–51)
AST SERPL-CCNC: 51 U/L (ref 1–32)
BASOPHILS # BLD AUTO: (no result) 10*3/UL
BASOPHILS # BLD MANUAL: 0.03 10*3/MM3 (ref 0–0.2)
BASOPHILS NFR BLD MANUAL: 1 % (ref 0–1.5)
BILIRUB SERPL-MCNC: 6.6 MG/DL (ref 0.2–1.2)
BUN SERPL-MCNC: 5 MG/DL (ref 6–20)
BUN/CREAT SERPL: 6.4 (ref 7–25)
CALCIUM SERPL-MCNC: 7.8 MG/DL (ref 8.6–10.5)
CHLORIDE SERPL-SCNC: 95 MMOL/L (ref 98–107)
CO2 SERPL-SCNC: 25 MMOL/L (ref 22–29)
CREAT SERPL-MCNC: 0.78 MG/DL (ref 0.57–1)
DIFFERENTIAL COMMENT: NORMAL
EOSINOPHIL # BLD AUTO: (no result) 10*3/UL
EOSINOPHIL # BLD MANUAL: 0.06 10*3/MM3 (ref 0–0.4)
EOSINOPHIL NFR BLD AUTO: (no result) %
EOSINOPHIL NFR BLD MANUAL: 2 % (ref 0.3–6.2)
ERYTHROCYTE [DISTWIDTH] IN BLOOD BY AUTOMATED COUNT: 14.2 % (ref 12.3–15.4)
GLOBULIN SER CALC-MCNC: 7.4 GM/DL
GLUCOSE SERPL-MCNC: 154 MG/DL (ref 65–99)
HCT VFR BLD AUTO: 30.2 % (ref 34–46.6)
HGB BLD-MCNC: 10.5 G/DL (ref 12–15.9)
INR PPP: 1.57 (ref 0.9–1.1)
IRON SATN MFR SERPL: 65 % (ref 20–50)
IRON SERPL-MCNC: 108 MCG/DL (ref 37–145)
LYMPHOCYTES # BLD AUTO: (no result) 10*3/UL
LYMPHOCYTES # BLD MANUAL: 0.8 10*3/MM3 (ref 0.7–3.1)
LYMPHOCYTES NFR BLD AUTO: (no result) %
LYMPHOCYTES NFR BLD MANUAL: 25 % (ref 19.6–45.3)
MAGNESIUM SERPL-MCNC: 1.8 MG/DL (ref 1.6–2.6)
MCH RBC QN AUTO: 31.4 PG (ref 26.6–33)
MCHC RBC AUTO-ENTMCNC: 34.8 G/DL (ref 31.5–35.7)
MCV RBC AUTO: 90.4 FL (ref 79–97)
MONOCYTES # BLD MANUAL: 0.32 10*3/MM3 (ref 0.1–0.9)
MONOCYTES NFR BLD AUTO: (no result) %
MONOCYTES NFR BLD MANUAL: 10 % (ref 5–12)
NEUTROPHILS # BLD MANUAL: 1.99 10*3/MM3 (ref 1.7–7)
NEUTROPHILS NFR BLD AUTO: (no result) %
NEUTROPHILS NFR BLD MANUAL: 62 % (ref 42.7–76)
PLATELET # BLD AUTO: 36 10*3/MM3 (ref 140–450)
PLATELET BLD QL SMEAR: NORMAL
POTASSIUM SERPL-SCNC: 3.7 MMOL/L (ref 3.5–5.2)
PROT SERPL-MCNC: 9.5 G/DL (ref 6–8.5)
PROTHROMBIN TIME: 18.4 SECONDS (ref 11.7–14.2)
RBC # BLD AUTO: 3.34 10*6/MM3 (ref 3.77–5.28)
RBC MORPH BLD: NORMAL
SODIUM SERPL-SCNC: 127 MMOL/L (ref 136–145)
TIBC SERPL-MCNC: 166 MCG/DL
UIBC SERPL-MCNC: 58 MCG/DL (ref 112–346)
WBC # BLD AUTO: 3.21 10*3/MM3 (ref 3.4–10.8)

## 2019-12-04 NOTE — TELEPHONE ENCOUNTER
----- Message from LON Rojas sent at 12/3/2019  2:41 PM EST -----  Patient's daughters tell me today during the visit that we need to call her insurance to ask for the tier exception on the Xifaxan.  Apparently the patient cannot do this.  Has to come from our office.  Please let me know if you need any help from me on doing this.  We definitely need to get this for her.  Thanks

## 2019-12-04 NOTE — TELEPHONE ENCOUNTER
Called pt and left vm for pt to call back.     Called [facility]er[facility] VARGAS at [phone] and spoke with Geoffrey and was then transferred to [phone] and call dropped. Called again call can not be completed.     Called another [facility] number at [phone] and spoke with Sofia who advised that there is no tier exception and the pt needs to call member services to see if a copay arrangement can be made.     Called [facility] number and left vm for her to call back.

## 2019-12-05 ENCOUNTER — TELEPHONE (OUTPATIENT)
Dept: GASTROENTEROLOGY | Facility: CLINIC | Age: 44
End: 2019-12-05

## 2019-12-05 ENCOUNTER — PRIOR AUTHORIZATION (OUTPATIENT)
Dept: GASTROENTEROLOGY | Facility: CLINIC | Age: 44
End: 2019-12-05

## 2019-12-05 DIAGNOSIS — K75.81 NASH (NONALCOHOLIC STEATOHEPATITIS): ICD-10-CM

## 2019-12-05 DIAGNOSIS — K76.82 ENCEPHALOPATHY, HEPATIC (HCC): Primary | ICD-10-CM

## 2019-12-05 RX ORDER — NADOLOL 20 MG/1
20 TABLET ORAL DAILY
Qty: 30 TABLET | Refills: 2 | Status: SHIPPED | OUTPATIENT
Start: 2019-12-05 | End: 2020-05-09 | Stop reason: SDUPTHER

## 2019-12-05 RX ORDER — POTASSIUM CHLORIDE 20 MEQ/1
20 TABLET, EXTENDED RELEASE ORAL 2 TIMES DAILY
Qty: 60 TABLET | Refills: 1 | Status: SHIPPED | OUTPATIENT
Start: 2019-12-05 | End: 2020-01-14 | Stop reason: SDUPTHER

## 2019-12-05 NOTE — TELEPHONE ENCOUNTER
Called pt and spoke with pt's mother and advised per Dr Tierney that her labs are concerning for decompensation especially total bilirubin .  She wants her to repeat cbc and cmp and inr in 4 wks  Also advised she would like her to have a ct scan of abd/plevis to get a better look at her liver.  ADvised we will have xifaxan samples for her at  .  She will have enough to get thru Dec and in Jan we will apply for pt asst.       Pt's mother verb understanding.  Labs and ct scan ordered. Update given to Carmela BHAKTA.

## 2019-12-05 NOTE — TELEPHONE ENCOUNTER
----- Message from Chely Kaur sent at 12/5/2019  3:00 PM EST -----  Regarding: voicemail  Contact: [phone]  Request to speak to nurse

## 2019-12-05 NOTE — TELEPHONE ENCOUNTER
Called pt 's mother and advised of calling insurance to see if she can get a copay adjustment.  She verb understanding and states they will the insurance.

## 2019-12-05 NOTE — TELEPHONE ENCOUNTER
----- Message from LON Rojas sent at 12/5/2019  1:12 PM EST -----  Please call the patient with recommendations from Dr. Tierney discussed below.  Lets go ahead make sure she can come in for repeat labs (CBC and a CMP with pro time) in 4 weeks.  Lets also go ahead and order a CT scan of the abdomen and pelvis with contrast just to get a better look at the liver with that bilirubin being higher than it normally has been.      Thanks  ----- Message -----  From: Annie Tierney MD  Sent: 12/5/2019  11:46 AM  To: LON Rojas    Labs are concerning for decompensation especially total bilirubin.  Would not schedule scopes until she follows up with me in February.  She needs to apply for the patient assistance program for Xifaxan to get it approved.  I would repeat CBC and CMP as well as INR in 4 weeks.  Please make sure she has had recent HC surveillance    lb  ----- Message -----  From: Stephy Coppola APRN  Sent: 12/4/2019   9:10 AM  To: Annie Tierney MD    I saw her in follow-up yesterday in clinic.  She actually seemed the most with it and clear that I have ever seen her.  However I am quite alarmed by her labs today.  Is there anything else I need to be doing to get her total bilirubin back down and her platelets back up?  She is on lactulose and Xifaxan.  I am working with her insurance to get the Xifaxan approved as written out too expensive so she is been living on our samples.  She is having 3-6 bowel movements a day according to her and her daughters.  She has no ascites no dependent edema.  She is not drinking any alcohol.  Think her diet is the best but her daughters do try to control her the best she can.  She is still smoking cigarettes.  She has an appointment with you in February.  She needs an EGD to reassess her esophageal varices but not to her platelets come up.  Her only real complaint is that lately she is been having muscle cramps.  Her potassium looks  good but her sodium is low.  He is also off of her iron  supplement.  And was wondering if she needs an iron infusion?  Your thoughts?  She did call to set up a follow-up with the [facility] transplant center but they told her and less her meld score was higher they did need to see her back.  I had her meld score at 17 before today's labs.  Thanks

## 2019-12-20 ENCOUNTER — HOSPITAL ENCOUNTER (OUTPATIENT)
Dept: CT IMAGING | Facility: HOSPITAL | Age: 44
Discharge: HOME | End: 2019-12-20
Admitting: INTERNAL MEDICINE

## 2019-12-20 DIAGNOSIS — K76.82 ENCEPHALOPATHY, HEPATIC (HCC): ICD-10-CM

## 2019-12-20 DIAGNOSIS — K75.81 NASH (NONALCOHOLIC STEATOHEPATITIS): ICD-10-CM

## 2019-12-20 LAB — CREAT BLDA-MCNC: 0.6 MG/DL (ref 0.6–1.3)

## 2019-12-20 PROCEDURE — 82565 ASSAY OF CREATININE: CPT

## 2019-12-20 PROCEDURE — 74177 CT ABD & PELVIS W/CONTRAST: CPT

## 2019-12-20 PROCEDURE — 25010000002 IOPAMIDOL 61 % SOLUTION: Performed by: INTERNAL MEDICINE

## 2019-12-20 RX ADMIN — IOPAMIDOL 85 ML: 612 INJECTION, SOLUTION INTRAVENOUS at 10:43

## 2019-12-27 ENCOUNTER — HOSPITAL ENCOUNTER (EMERGENCY)
Facility: HOSPITAL | Age: 44
Discharge: LEFT BEFORE DISCHARGE | End: 2019-12-28
Attending: EMERGENCY MEDICINE | Admitting: EMERGENCY MEDICINE

## 2019-12-27 ENCOUNTER — APPOINTMENT (OUTPATIENT)
Dept: GENERAL RADIOLOGY | Facility: HOSPITAL | Age: 44
End: 2019-12-27

## 2019-12-27 DIAGNOSIS — E87.1 HYPONATREMIA: Primary | ICD-10-CM

## 2019-12-27 DIAGNOSIS — J10.1 INFLUENZA A: ICD-10-CM

## 2019-12-27 DIAGNOSIS — R17 ELEVATED BILIRUBIN: ICD-10-CM

## 2019-12-27 DIAGNOSIS — D69.6 THROMBOCYTOPENIA (HCC): ICD-10-CM

## 2019-12-27 LAB
ALBUMIN SERPL-MCNC: 1.7 G/DL (ref 3.5–5.2)
ALBUMIN/GLOB SERPL: 0.3 G/DL
ALP SERPL-CCNC: 191 U/L (ref 39–117)
ALT SERPL W P-5'-P-CCNC: 19 U/L (ref 1–33)
ANION GAP SERPL CALCULATED.3IONS-SCNC: 10 MMOL/L (ref 5–15)
AST SERPL-CCNC: 57 U/L (ref 1–32)
BILIRUB SERPL-MCNC: 7.7 MG/DL (ref 0.2–1.2)
BUN BLD-MCNC: 4 MG/DL (ref 6–20)
BUN/CREAT SERPL: 5.2 (ref 7–25)
CALCIUM SPEC-SCNC: 7.4 MG/DL (ref 8.6–10.5)
CHLORIDE SERPL-SCNC: 94 MMOL/L (ref 98–107)
CO2 SERPL-SCNC: 24 MMOL/L (ref 22–29)
CREAT BLD-MCNC: 0.77 MG/DL (ref 0.57–1)
D-LACTATE SERPL-SCNC: 1.9 MMOL/L (ref 0.5–2)
DEPRECATED RDW RBC AUTO: 46.8 FL (ref 37–54)
ERYTHROCYTE [DISTWIDTH] IN BLOOD BY AUTOMATED COUNT: 14.6 % (ref 12.3–15.4)
GFR SERPL CREATININE-BSD FRML MDRD: 82 ML/MIN/1.73
GLOBULIN UR ELPH-MCNC: 6.7 GM/DL
GLUCOSE BLD-MCNC: 100 MG/DL (ref 65–99)
HCT VFR BLD AUTO: 25.9 % (ref 34–46.6)
HGB BLD-MCNC: 9.6 G/DL (ref 12–15.9)
INR PPP: 1.71 (ref 0.9–1.1)
MCH RBC QN AUTO: 33.4 PG (ref 26.6–33)
MCHC RBC AUTO-ENTMCNC: 37.1 G/DL (ref 31.5–35.7)
MCV RBC AUTO: 90.2 FL (ref 79–97)
PLATELET # BLD AUTO: 29 10*3/MM3 (ref 140–450)
PMV BLD AUTO: 13 FL (ref 6–12)
POTASSIUM BLD-SCNC: 3 MMOL/L (ref 3.5–5.2)
PROCALCITONIN SERPL-MCNC: 0.21 NG/ML (ref 0.1–0.25)
PROT SERPL-MCNC: 8.4 G/DL (ref 6–8.5)
PROTHROMBIN TIME: 19.7 SECONDS (ref 11.7–14.2)
RBC # BLD AUTO: 2.87 10*6/MM3 (ref 3.77–5.28)
SODIUM BLD-SCNC: 128 MMOL/L (ref 136–145)
WBC NRBC COR # BLD: 2.92 10*3/MM3 (ref 3.4–10.8)

## 2019-12-27 PROCEDURE — 93005 ELECTROCARDIOGRAM TRACING: CPT | Performed by: PHYSICIAN ASSISTANT

## 2019-12-27 PROCEDURE — 93010 ELECTROCARDIOGRAM REPORT: CPT | Performed by: INTERNAL MEDICINE

## 2019-12-27 PROCEDURE — 80053 COMPREHEN METABOLIC PANEL: CPT | Performed by: PHYSICIAN ASSISTANT

## 2019-12-27 PROCEDURE — 94799 UNLISTED PULMONARY SVC/PX: CPT

## 2019-12-27 PROCEDURE — 71046 X-RAY EXAM CHEST 2 VIEWS: CPT

## 2019-12-27 PROCEDURE — 83605 ASSAY OF LACTIC ACID: CPT | Performed by: PHYSICIAN ASSISTANT

## 2019-12-27 PROCEDURE — 84145 PROCALCITONIN (PCT): CPT | Performed by: PHYSICIAN ASSISTANT

## 2019-12-27 PROCEDURE — 94640 AIRWAY INHALATION TREATMENT: CPT

## 2019-12-27 PROCEDURE — 87804 INFLUENZA ASSAY W/OPTIC: CPT | Performed by: PHYSICIAN ASSISTANT

## 2019-12-27 PROCEDURE — 85610 PROTHROMBIN TIME: CPT | Performed by: PHYSICIAN ASSISTANT

## 2019-12-27 PROCEDURE — 99283 EMERGENCY DEPT VISIT LOW MDM: CPT

## 2019-12-27 PROCEDURE — 96374 THER/PROPH/DIAG INJ IV PUSH: CPT

## 2019-12-27 PROCEDURE — 86787 VARICELLA-ZOSTER ANTIBODY: CPT | Performed by: PHYSICIAN ASSISTANT

## 2019-12-27 PROCEDURE — 25010000002 ONDANSETRON PER 1 MG: Performed by: PHYSICIAN ASSISTANT

## 2019-12-27 PROCEDURE — 85025 COMPLETE CBC W/AUTO DIFF WBC: CPT | Performed by: PHYSICIAN ASSISTANT

## 2019-12-27 PROCEDURE — 87040 BLOOD CULTURE FOR BACTERIA: CPT | Performed by: PHYSICIAN ASSISTANT

## 2019-12-27 PROCEDURE — 85007 BL SMEAR W/DIFF WBC COUNT: CPT | Performed by: PHYSICIAN ASSISTANT

## 2019-12-27 RX ORDER — ACETAMINOPHEN 650 MG/1
650 SUPPOSITORY RECTAL ONCE
Status: DISCONTINUED | OUTPATIENT
Start: 2019-12-27 | End: 2019-12-27

## 2019-12-27 RX ORDER — ONDANSETRON 2 MG/ML
4 INJECTION INTRAMUSCULAR; INTRAVENOUS ONCE
Status: COMPLETED | OUTPATIENT
Start: 2019-12-27 | End: 2019-12-27

## 2019-12-27 RX ORDER — IPRATROPIUM BROMIDE AND ALBUTEROL SULFATE 2.5; .5 MG/3ML; MG/3ML
3 SOLUTION RESPIRATORY (INHALATION) ONCE
Status: COMPLETED | OUTPATIENT
Start: 2019-12-27 | End: 2019-12-27

## 2019-12-27 RX ADMIN — IPRATROPIUM BROMIDE AND ALBUTEROL SULFATE 3 ML: 2.5; .5 SOLUTION RESPIRATORY (INHALATION) at 22:56

## 2019-12-27 RX ADMIN — ONDANSETRON 4 MG: 2 INJECTION INTRAMUSCULAR; INTRAVENOUS at 23:12

## 2019-12-27 RX ADMIN — SODIUM CHLORIDE 1000 ML: 9 INJECTION, SOLUTION INTRAVENOUS at 23:11

## 2019-12-28 VITALS
SYSTOLIC BLOOD PRESSURE: 108 MMHG | DIASTOLIC BLOOD PRESSURE: 69 MMHG | OXYGEN SATURATION: 98 % | HEART RATE: 104 BPM | BODY MASS INDEX: 30.04 KG/M2 | RESPIRATION RATE: 18 BRPM | TEMPERATURE: 99.8 F | HEIGHT: 64 IN

## 2019-12-28 DIAGNOSIS — E03.9 HYPOTHYROIDISM, UNSPECIFIED TYPE: ICD-10-CM

## 2019-12-28 LAB
ANISOCYTOSIS BLD QL: ABNORMAL
BASOPHILS # BLD MANUAL: 0.04 10*3/MM3 (ref 0–0.2)
BASOPHILS NFR BLD AUTO: 1.3 % (ref 0–1.5)
EOSINOPHIL # BLD MANUAL: 0.04 10*3/MM3 (ref 0–0.4)
EOSINOPHIL NFR BLD MANUAL: 1.3 % (ref 0.3–6.2)
FLUAV AG NPH QL: POSITIVE
FLUBV AG NPH QL IA: NEGATIVE
LYMPHOCYTES # BLD MANUAL: 0.08 10*3/MM3 (ref 0.7–3.1)
LYMPHOCYTES NFR BLD MANUAL: 2.6 % (ref 19.6–45.3)
LYMPHOCYTES NFR BLD MANUAL: 2.6 % (ref 5–12)
MONOCYTES # BLD AUTO: 0.08 10*3/MM3 (ref 0.1–0.9)
NEUTROPHILS # BLD AUTO: 2.69 10*3/MM3 (ref 1.7–7)
NEUTROPHILS NFR BLD MANUAL: 92.2 % (ref 42.7–76)
PLAT MORPH BLD: NORMAL
SMUDGE CELLS BLD QL SMEAR: ABNORMAL

## 2019-12-28 PROCEDURE — 87040 BLOOD CULTURE FOR BACTERIA: CPT | Performed by: PHYSICIAN ASSISTANT

## 2019-12-28 RX ORDER — OSELTAMIVIR PHOSPHATE 75 MG/1
75 CAPSULE ORAL 2 TIMES DAILY
Qty: 10 CAPSULE | Refills: 0 | Status: SHIPPED | OUTPATIENT
Start: 2019-12-28 | End: 2020-01-02

## 2019-12-30 RX ORDER — LEVOTHYROXINE SODIUM 0.2 MG/1
TABLET ORAL
Qty: 30 TABLET | Refills: 0 | Status: SHIPPED | OUTPATIENT
Start: 2019-12-30 | End: 2020-02-07 | Stop reason: SDUPTHER

## 2019-12-31 LAB
VZV IGG SER IA-ACNC: >4000 INDEX
VZV IGM SER IA-ACNC: 0.96 INDEX (ref 0–0.9)

## 2020-01-01 LAB — BACTERIA SPEC AEROBE CULT: NORMAL

## 2020-01-02 ENCOUNTER — TELEPHONE (OUTPATIENT)
Dept: GASTROENTEROLOGY | Facility: CLINIC | Age: 45
End: 2020-01-02

## 2020-01-02 DIAGNOSIS — K75.81 NASH (NONALCOHOLIC STEATOHEPATITIS): Primary | ICD-10-CM

## 2020-01-02 LAB — BACTERIA SPEC AEROBE CULT: NORMAL

## 2020-01-02 NOTE — TELEPHONE ENCOUNTER
CT shows decreased ascites and decrease splenic size.  Stomach appears thickened - likely due to liver related gastropathy noted on prior egd    Recent labs from er reviewed - bilirubin increasing - I want her to repeat labs next week (cmp, cbc, inr)    She needs to add nightly boost or ensure to improve her nutritional status    pls tell her to call to get f/u with [facility] as her meld score has increased to 26 per her 12/27 labs.  pls fax most recent labs to [facility]

## 2020-01-02 NOTE — TELEPHONE ENCOUNTER
VM to pt with request to contact office.     Call to mother, Genet (see hipaa).  Advise per Dr Tierney that CT shows decreased ascites and decrease splenic size.  Stomach appears thickened - likely due to liver related gastropathy noted on prior EGD.    Recent labs from ER reviewed - bilirubin increasing  Needs repeat labs next wk.    Needs to add nightly boost or ensure to improve nutritional status.     Need to f/u with [facility] as meld score has increased to 26 per 12/27 labs.      Labs of 12/27 faxed via [facility] to [facility] @ [phone].

## 2020-01-03 ENCOUNTER — TELEPHONE (OUTPATIENT)
Dept: GASTROENTEROLOGY | Facility: CLINIC | Age: 45
End: 2020-01-03

## 2020-01-03 NOTE — TELEPHONE ENCOUNTER
----- Message from Mayda Stone sent at 1/3/2020  8:46 AM EST -----  Regarding: Request  Contact: [phone]  Transplant office from [facility] is asking for the last office note and insurance card in order to process the referral Dr Tierney did.   Fax [phone]

## 2020-01-03 NOTE — TELEPHONE ENCOUNTER
It is noted that pt has lab appt scheduled for 1/7. Order placed for cmp, cbc, inr - message to Dr Tierney.

## 2020-01-07 ENCOUNTER — TELEPHONE (OUTPATIENT)
Dept: GASTROENTEROLOGY | Facility: CLINIC | Age: 45
End: 2020-01-07

## 2020-01-07 LAB
ALBUMIN SERPL-MCNC: 1.8 G/DL (ref 3.5–5.2)
ALBUMIN/GLOB SERPL: 0.3 G/DL
ALP SERPL-CCNC: 207 U/L (ref 39–117)
ALT SERPL-CCNC: 15 U/L (ref 1–33)
AST SERPL-CCNC: 51 U/L (ref 1–32)
BASOPHILS # BLD AUTO: ABNORMAL 10*3/UL
BASOPHILS # BLD MANUAL: 0.03 10*3/MM3 (ref 0–0.2)
BASOPHILS NFR BLD MANUAL: 1.1 % (ref 0–1.5)
BILIRUB SERPL-MCNC: 7 MG/DL (ref 0.2–1.2)
BUN SERPL-MCNC: 5 MG/DL (ref 6–20)
BUN/CREAT SERPL: 6.4 (ref 7–25)
CALCIUM SERPL-MCNC: 7.5 MG/DL (ref 8.6–10.5)
CHLORIDE SERPL-SCNC: 99 MMOL/L (ref 98–107)
CO2 SERPL-SCNC: 24.4 MMOL/L (ref 22–29)
CREAT SERPL-MCNC: 0.78 MG/DL (ref 0.57–1)
DIFFERENTIAL COMMENT: ABNORMAL
EOSINOPHIL # BLD AUTO: ABNORMAL 10*3/UL
EOSINOPHIL # BLD MANUAL: 0.03 10*3/MM3 (ref 0–0.4)
EOSINOPHIL NFR BLD AUTO: ABNORMAL %
EOSINOPHIL NFR BLD MANUAL: 1.1 % (ref 0.3–6.2)
ERYTHROCYTE [DISTWIDTH] IN BLOOD BY AUTOMATED COUNT: 13.6 % (ref 12.3–15.4)
GLOBULIN SER CALC-MCNC: 7.2 GM/DL
GLUCOSE SERPL-MCNC: 174 MG/DL (ref 65–99)
HCT VFR BLD AUTO: 28.6 % (ref 34–46.6)
HGB BLD-MCNC: 10.2 G/DL (ref 12–15.9)
INR PPP: 1.8 (ref 0.9–1.1)
LYMPHOCYTES # BLD AUTO: ABNORMAL 10*3/UL
LYMPHOCYTES # BLD MANUAL: 0.31 10*3/MM3 (ref 0.7–3.1)
LYMPHOCYTES NFR BLD AUTO: ABNORMAL %
LYMPHOCYTES NFR BLD MANUAL: 11.8 % (ref 19.6–45.3)
MCH RBC QN AUTO: 34.1 PG (ref 26.6–33)
MCHC RBC AUTO-ENTMCNC: 35.7 G/DL (ref 31.5–35.7)
MCV RBC AUTO: 95.7 FL (ref 79–97)
MONOCYTES # BLD MANUAL: 0.34 10*3/MM3 (ref 0.1–0.9)
MONOCYTES NFR BLD AUTO: ABNORMAL %
MONOCYTES NFR BLD MANUAL: 12.9 % (ref 5–12)
NEUTROPHILS # BLD MANUAL: 1.85 10*3/MM3 (ref 1.7–7)
NEUTROPHILS NFR BLD AUTO: ABNORMAL %
NEUTROPHILS NFR BLD MANUAL: 71 % (ref 42.7–76)
PLATELET # BLD AUTO: 43 10*3/MM3 (ref 140–450)
PLATELET BLD QL SMEAR: ABNORMAL
POTASSIUM SERPL-SCNC: 2.9 MMOL/L (ref 3.5–5.2)
PROT SERPL-MCNC: 9 G/DL (ref 6–8.5)
PROTHROMBIN TIME: 20.6 SECONDS (ref 11.7–14.2)
RBC # BLD AUTO: 2.99 10*6/MM3 (ref 3.77–5.28)
RBC MORPH BLD: ABNORMAL
SODIUM SERPL-SCNC: 131 MMOL/L (ref 136–145)
WBC # BLD AUTO: 2.6 10*3/MM3 (ref 3.4–10.8)

## 2020-01-07 NOTE — TELEPHONE ENCOUNTER
----- Message from Chely Kaur sent at 1/7/2020  4:24 PM EST -----  Regarding: CRITICAL RESULT  PLATELET COUNT [phone]

## 2020-01-08 ENCOUNTER — TELEPHONE (OUTPATIENT)
Dept: GASTROENTEROLOGY | Facility: CLINIC | Age: 45
End: 2020-01-08

## 2020-01-08 DIAGNOSIS — K75.81 NASH (NONALCOHOLIC STEATOHEPATITIS): Primary | ICD-10-CM

## 2020-01-08 NOTE — TELEPHONE ENCOUNTER
Call to mother, Genet (see hipaa).   Advise per Dr Tierney that potassium is low. Making sure that taking potassium 20 mEq twice a day.  If has been compliant, then increase to 40 mEq twice a day.  Repeat labs in 2 wks.  Otherwise, labs are stable and bilirubin slightly improved although still very high.      Verb understanding.  Lab appt scheduled for 1/21 @ 11am.  Order placed for cmp - message to Dr Tierney.

## 2020-01-08 NOTE — TELEPHONE ENCOUNTER
Please let her know that her potassium is low.  Please make sure she is taking potassium 20 mEq twice a day.  If she has been compliant then tell her to increase to 40 mEq twice a day.  Repeat labs in 2 weeks-CMP.  Otherwise labs are stable and bilirubin is slightly improved although still very high

## 2020-01-14 ENCOUNTER — RESULTS ENCOUNTER (OUTPATIENT)
Dept: GASTROENTEROLOGY | Facility: CLINIC | Age: 45
End: 2020-01-14

## 2020-01-14 ENCOUNTER — TELEPHONE (OUTPATIENT)
Dept: GASTROENTEROLOGY | Facility: CLINIC | Age: 45
End: 2020-01-14

## 2020-01-14 DIAGNOSIS — K75.81 NASH (NONALCOHOLIC STEATOHEPATITIS): ICD-10-CM

## 2020-01-14 RX ORDER — POTASSIUM CHLORIDE 20 MEQ/1
40 TABLET, EXTENDED RELEASE ORAL 2 TIMES DAILY
Qty: 120 TABLET | Refills: 1 | Status: SHIPPED | OUTPATIENT
Start: 2020-01-14 | End: 2020-04-24

## 2020-01-14 NOTE — TELEPHONE ENCOUNTER
----- Message from Atiya Jarrett sent at 1/14/2020  8:59 AM EST -----  Regarding: prescription vm from avery's phone  Pt left msg needing another prescription? Did not leave what [phone]

## 2020-01-14 NOTE — TELEPHONE ENCOUNTER
Attempt return call to Genet velásquez (see hipaa) - unable to leave message.  Need to know what refill.

## 2020-01-14 NOTE — TELEPHONE ENCOUNTER
----- Message from Yvette Sears Rep sent at 1/14/2020 12:54 PM EST -----  Regarding: potassium  Contact: [phone]  Pt is calling and needs more potassium called in [facility] [phone].

## 2020-01-21 LAB
ALBUMIN SERPL-MCNC: 1.8 G/DL (ref 3.5–5.2)
ALBUMIN/GLOB SERPL: 0.3 G/DL
ALP SERPL-CCNC: 220 U/L (ref 39–117)
ALT SERPL-CCNC: 19 U/L (ref 1–33)
AST SERPL-CCNC: 55 U/L (ref 1–32)
BILIRUB SERPL-MCNC: 7.7 MG/DL (ref 0.2–1.2)
BUN SERPL-MCNC: 6 MG/DL (ref 6–20)
BUN/CREAT SERPL: 8.6 (ref 7–25)
CALCIUM SERPL-MCNC: 7.4 MG/DL (ref 8.6–10.5)
CHLORIDE SERPL-SCNC: 95 MMOL/L (ref 98–107)
CO2 SERPL-SCNC: 23.6 MMOL/L (ref 22–29)
CREAT SERPL-MCNC: 0.7 MG/DL (ref 0.57–1)
GLOBULIN SER CALC-MCNC: 6.5 GM/DL
GLUCOSE SERPL-MCNC: 129 MG/DL (ref 65–99)
POTASSIUM SERPL-SCNC: 2.7 MMOL/L (ref 3.5–5.2)
PROT SERPL-MCNC: 8.3 G/DL (ref 6–8.5)
SODIUM SERPL-SCNC: 127 MMOL/L (ref 136–145)

## 2020-01-22 ENCOUNTER — TELEPHONE (OUTPATIENT)
Dept: GASTROENTEROLOGY | Facility: CLINIC | Age: 45
End: 2020-01-22

## 2020-01-22 DIAGNOSIS — K75.81 NASH (NONALCOHOLIC STEATOHEPATITIS): Primary | ICD-10-CM

## 2020-01-22 DIAGNOSIS — K76.82 HEPATIC ENCEPHALOPATHY (HCC): ICD-10-CM

## 2020-01-22 NOTE — TELEPHONE ENCOUNTER
Please let her know that her potassium is persistently low.  Recommend that she stop her furosemide for now because this is contributing to her low potassium.  Continue all their listed medicines including potassium supplementation.  Repeat BMP in 1 week

## 2020-01-23 RX ORDER — SUCRALFATE 1 G/1
TABLET ORAL
Qty: 60 TABLET | Refills: 1 | Status: SHIPPED | OUTPATIENT
Start: 2020-01-23 | End: 2020-06-09

## 2020-01-23 NOTE — TELEPHONE ENCOUNTER
Call to mother, Genet (see hipaa).  Advise per Dr Tierney that potassium persistently low.  Recommend stop furosemide for now because this is contributing to low potassium.  Continue all other listed meds, including potassium supplement.    Verb understanding.  Requests lab appt for Tuesday.  Scheduled for 1/28 @ 2pm.  Order placed for bmp - message to Dr Tierney.     Genet reports that pt has had persistent cough since had the flu.  Asking what she can safely take for cough - question to Dr Tierney.      Question to Genet re: status of xifaxan ongoing.  Advise that can apply for financial assistance program - Genet will discuss with pt and call back.

## 2020-01-24 NOTE — TELEPHONE ENCOUNTER
Call to pt.  Advise per Dr Tierney that can take mucinex of delsym (cough suppressant), would be safe.     Verb understanding.  States would like to proceed with xifaxan financial assistance.  It is noted that last prescription was 1/24/19.  Will try re-prescribing in order to trigger PA and find out new pricing.      Xifaxan 550 mg 1 tab po 2x/day, #60, R11.  Message to Cheli AGUIRRE

## 2020-01-26 ENCOUNTER — HOSPITAL ENCOUNTER (INPATIENT)
Facility: HOSPITAL | Age: 45
LOS: 5 days | Discharge: LEFT BEFORE DISCHARGE | End: 2020-01-31
Attending: EMERGENCY MEDICINE | Admitting: HOSPITALIST

## 2020-01-26 DIAGNOSIS — E87.1 HYPONATREMIA: ICD-10-CM

## 2020-01-26 DIAGNOSIS — D69.6 THROMBOCYTOPENIA (HCC): ICD-10-CM

## 2020-01-26 DIAGNOSIS — K76.82 HEPATIC ENCEPHALOPATHY (HCC): Primary | ICD-10-CM

## 2020-01-26 PROBLEM — I10 HYPERTENSION: Status: ACTIVE | Noted: 2020-01-26

## 2020-01-26 LAB
ALBUMIN SERPL-MCNC: 1.9 G/DL (ref 3.5–5.2)
ALBUMIN/GLOB SERPL: 0.3 G/DL
ALP SERPL-CCNC: 238 U/L (ref 39–117)
ALT SERPL W P-5'-P-CCNC: 22 U/L (ref 1–33)
AMMONIA BLD-SCNC: 109 UMOL/L (ref 11–51)
ANION GAP SERPL CALCULATED.3IONS-SCNC: 12.6 MMOL/L (ref 5–15)
AST SERPL-CCNC: 57 U/L (ref 1–32)
BACTERIA UR QL AUTO: ABNORMAL /HPF
BILIRUB SERPL-MCNC: 11.2 MG/DL (ref 0.2–1.2)
BILIRUB UR QL STRIP: ABNORMAL
BUN BLD-MCNC: 7 MG/DL (ref 6–20)
BUN/CREAT SERPL: 11.5 (ref 7–25)
BURR CELLS BLD QL SMEAR: ABNORMAL
CALCIUM SPEC-SCNC: 8 MG/DL (ref 8.6–10.5)
CHLORIDE SERPL-SCNC: 86 MMOL/L (ref 98–107)
CLARITY UR: CLEAR
CO2 SERPL-SCNC: 24.4 MMOL/L (ref 22–29)
COLOR UR: ABNORMAL
CREAT BLD-MCNC: 0.61 MG/DL (ref 0.57–1)
DEPRECATED RDW RBC AUTO: 42.1 FL (ref 37–54)
EOSINOPHIL # BLD MANUAL: 0.07 10*3/MM3 (ref 0–0.4)
EOSINOPHIL NFR BLD MANUAL: 1 % (ref 0.3–6.2)
ERYTHROCYTE [DISTWIDTH] IN BLOOD BY AUTOMATED COUNT: 12.4 % (ref 12.3–15.4)
GFR SERPL CREATININE-BSD FRML MDRD: 107 ML/MIN/1.73
GLOBULIN UR ELPH-MCNC: 6 GM/DL
GLUCOSE BLD-MCNC: 93 MG/DL (ref 65–99)
GLUCOSE UR STRIP-MCNC: NEGATIVE MG/DL
HCT VFR BLD AUTO: 26.1 % (ref 34–46.6)
HGB BLD-MCNC: 9.8 G/DL (ref 12–15.9)
HGB UR QL STRIP.AUTO: ABNORMAL
HOLD SPECIMEN: NORMAL
HOLD SPECIMEN: NORMAL
HYALINE CASTS UR QL AUTO: ABNORMAL /LPF
KETONES UR QL STRIP: NEGATIVE
LEUKOCYTE ESTERASE UR QL STRIP.AUTO: NEGATIVE
LIPASE SERPL-CCNC: 27 U/L (ref 13–60)
LYMPHOCYTES # BLD MANUAL: 0.15 10*3/MM3 (ref 0.7–3.1)
LYMPHOCYTES NFR BLD MANUAL: 2 % (ref 19.6–45.3)
LYMPHOCYTES NFR BLD MANUAL: 8.2 % (ref 5–12)
MCH RBC QN AUTO: 34.6 PG (ref 26.6–33)
MCHC RBC AUTO-ENTMCNC: 37.5 G/DL (ref 31.5–35.7)
MCV RBC AUTO: 92.2 FL (ref 79–97)
MONOCYTES # BLD AUTO: 0.6 10*3/MM3 (ref 0.1–0.9)
NEUTROPHILS # BLD AUTO: 6.47 10*3/MM3 (ref 1.7–7)
NEUTROPHILS NFR BLD MANUAL: 88.8 % (ref 42.7–76)
NITRITE UR QL STRIP: POSITIVE
PH UR STRIP.AUTO: 7 [PH] (ref 5–8)
PLAT MORPH BLD: NORMAL
PLATELET # BLD AUTO: 33 10*3/MM3 (ref 140–450)
PMV BLD AUTO: 13 FL (ref 6–12)
POIKILOCYTOSIS BLD QL SMEAR: ABNORMAL
POTASSIUM BLD-SCNC: 3.1 MMOL/L (ref 3.5–5.2)
PROT SERPL-MCNC: 7.9 G/DL (ref 6–8.5)
PROT UR QL STRIP: NEGATIVE
RBC # BLD AUTO: 2.83 10*6/MM3 (ref 3.77–5.28)
RBC # UR: ABNORMAL /HPF
REF LAB TEST METHOD: ABNORMAL
SODIUM BLD-SCNC: 123 MMOL/L (ref 136–145)
SP GR UR STRIP: <=1.005 (ref 1–1.03)
SQUAMOUS #/AREA URNS HPF: ABNORMAL /HPF
UROBILINOGEN UR QL STRIP: ABNORMAL
WBC MORPH BLD: NORMAL
WBC NRBC COR # BLD: 7.29 10*3/MM3 (ref 3.4–10.8)
WBC UR QL AUTO: ABNORMAL /HPF
WHOLE BLOOD HOLD SPECIMEN: NORMAL
WHOLE BLOOD HOLD SPECIMEN: NORMAL

## 2020-01-26 PROCEDURE — 82607 VITAMIN B-12: CPT | Performed by: INTERNAL MEDICINE

## 2020-01-26 PROCEDURE — 82746 ASSAY OF FOLIC ACID SERUM: CPT | Performed by: INTERNAL MEDICINE

## 2020-01-26 PROCEDURE — 82140 ASSAY OF AMMONIA: CPT | Performed by: NURSE PRACTITIONER

## 2020-01-26 PROCEDURE — 25010000002 ONDANSETRON PER 1 MG: Performed by: NURSE PRACTITIONER

## 2020-01-26 PROCEDURE — 85025 COMPLETE CBC W/AUTO DIFF WBC: CPT | Performed by: NURSE PRACTITIONER

## 2020-01-26 PROCEDURE — 36415 COLL VENOUS BLD VENIPUNCTURE: CPT

## 2020-01-26 PROCEDURE — 81001 URINALYSIS AUTO W/SCOPE: CPT | Performed by: NURSE PRACTITIONER

## 2020-01-26 PROCEDURE — 25010000002 ONDANSETRON PER 1 MG: Performed by: INTERNAL MEDICINE

## 2020-01-26 PROCEDURE — 83690 ASSAY OF LIPASE: CPT | Performed by: NURSE PRACTITIONER

## 2020-01-26 PROCEDURE — 93010 ELECTROCARDIOGRAM REPORT: CPT | Performed by: INTERNAL MEDICINE

## 2020-01-26 PROCEDURE — 93005 ELECTROCARDIOGRAM TRACING: CPT | Performed by: INTERNAL MEDICINE

## 2020-01-26 PROCEDURE — 99284 EMERGENCY DEPT VISIT MOD MDM: CPT

## 2020-01-26 PROCEDURE — 80053 COMPREHEN METABOLIC PANEL: CPT | Performed by: NURSE PRACTITIONER

## 2020-01-26 PROCEDURE — 85007 BL SMEAR W/DIFF WBC COUNT: CPT | Performed by: NURSE PRACTITIONER

## 2020-01-26 RX ORDER — NADOLOL 20 MG/1
20 TABLET ORAL DAILY
Status: DISCONTINUED | OUTPATIENT
Start: 2020-01-27 | End: 2020-01-31 | Stop reason: HOSPADM

## 2020-01-26 RX ORDER — SUCRALFATE 1 G/1
1 TABLET ORAL 2 TIMES DAILY
Status: DISCONTINUED | OUTPATIENT
Start: 2020-01-26 | End: 2020-01-31 | Stop reason: HOSPADM

## 2020-01-26 RX ORDER — POTASSIUM CHLORIDE 750 MG/1
20 TABLET, EXTENDED RELEASE ORAL 2 TIMES DAILY
COMMUNITY
End: 2020-01-26

## 2020-01-26 RX ORDER — POTASSIUM CHLORIDE 750 MG/1
20 CAPSULE, EXTENDED RELEASE ORAL 3 TIMES DAILY
Status: DISCONTINUED | OUTPATIENT
Start: 2020-01-26 | End: 2020-01-31 | Stop reason: HOSPADM

## 2020-01-26 RX ORDER — NITROGLYCERIN 0.4 MG/1
0.4 TABLET SUBLINGUAL
Status: DISCONTINUED | OUTPATIENT
Start: 2020-01-26 | End: 2020-01-31 | Stop reason: HOSPADM

## 2020-01-26 RX ORDER — ERGOCALCIFEROL 1.25 MG/1
50000 CAPSULE ORAL WEEKLY
Status: DISCONTINUED | OUTPATIENT
Start: 2020-01-27 | End: 2020-01-31 | Stop reason: HOSPADM

## 2020-01-26 RX ORDER — ONDANSETRON 2 MG/ML
4 INJECTION INTRAMUSCULAR; INTRAVENOUS ONCE
Status: COMPLETED | OUTPATIENT
Start: 2020-01-26 | End: 2020-01-26

## 2020-01-26 RX ORDER — LACTULOSE 10 G/15ML
45 SOLUTION ORAL 3 TIMES DAILY
Status: DISCONTINUED | OUTPATIENT
Start: 2020-01-26 | End: 2020-01-31 | Stop reason: HOSPADM

## 2020-01-26 RX ORDER — LEVOTHYROXINE SODIUM 0.03 MG/1
25 TABLET ORAL EVERY MORNING
Status: DISCONTINUED | OUTPATIENT
Start: 2020-01-27 | End: 2020-01-31 | Stop reason: HOSPADM

## 2020-01-26 RX ORDER — BUDESONIDE 0.5 MG/2ML
0.5 INHALANT ORAL
Status: DISCONTINUED | OUTPATIENT
Start: 2020-01-26 | End: 2020-01-31 | Stop reason: HOSPADM

## 2020-01-26 RX ORDER — PANTOPRAZOLE SODIUM 40 MG/1
40 TABLET, DELAYED RELEASE ORAL
Status: DISCONTINUED | OUTPATIENT
Start: 2020-01-27 | End: 2020-01-31 | Stop reason: HOSPADM

## 2020-01-26 RX ORDER — LACTULOSE 10 G/15ML
30 SOLUTION ORAL EVERY 8 HOURS
Status: DISCONTINUED | OUTPATIENT
Start: 2020-01-26 | End: 2020-01-26

## 2020-01-26 RX ORDER — SODIUM CHLORIDE 0.9 % (FLUSH) 0.9 %
10 SYRINGE (ML) INJECTION AS NEEDED
Status: DISCONTINUED | OUTPATIENT
Start: 2020-01-26 | End: 2020-01-31 | Stop reason: HOSPADM

## 2020-01-26 RX ORDER — LEVOTHYROXINE SODIUM 0.1 MG/1
200 TABLET ORAL DAILY
Status: DISCONTINUED | OUTPATIENT
Start: 2020-01-27 | End: 2020-01-31 | Stop reason: HOSPADM

## 2020-01-26 RX ORDER — ERGOCALCIFEROL 1.25 MG/1
50000 CAPSULE ORAL WEEKLY
Status: DISCONTINUED | OUTPATIENT
Start: 2020-01-26 | End: 2020-01-26

## 2020-01-26 RX ORDER — BENZONATATE 100 MG/1
100 CAPSULE ORAL 3 TIMES DAILY PRN
Status: DISCONTINUED | OUTPATIENT
Start: 2020-01-26 | End: 2020-01-31 | Stop reason: HOSPADM

## 2020-01-26 RX ORDER — FUROSEMIDE 40 MG/1
40 TABLET ORAL DAILY
Status: DISCONTINUED | OUTPATIENT
Start: 2020-01-27 | End: 2020-01-27

## 2020-01-26 RX ORDER — ONDANSETRON 2 MG/ML
4 INJECTION INTRAMUSCULAR; INTRAVENOUS EVERY 6 HOURS PRN
Status: DISCONTINUED | OUTPATIENT
Start: 2020-01-26 | End: 2020-01-31 | Stop reason: HOSPADM

## 2020-01-26 RX ORDER — SODIUM CHLORIDE 0.9 % (FLUSH) 0.9 %
10 SYRINGE (ML) INJECTION EVERY 12 HOURS SCHEDULED
Status: DISCONTINUED | OUTPATIENT
Start: 2020-01-26 | End: 2020-01-31 | Stop reason: HOSPADM

## 2020-01-26 RX ADMIN — BENZONATATE 100 MG: 100 CAPSULE ORAL at 21:07

## 2020-01-26 RX ADMIN — SODIUM CHLORIDE, PRESERVATIVE FREE 10 ML: 5 INJECTION INTRAVENOUS at 20:36

## 2020-01-26 RX ADMIN — SODIUM CHLORIDE 1000 ML: 9 INJECTION, SOLUTION INTRAVENOUS at 14:23

## 2020-01-26 RX ADMIN — ONDANSETRON HYDROCHLORIDE 4 MG: 2 SOLUTION INTRAMUSCULAR; INTRAVENOUS at 21:14

## 2020-01-26 RX ADMIN — LACTULOSE 45 ML: 10 SOLUTION ORAL at 20:31

## 2020-01-26 RX ADMIN — RIFAXIMIN 550 MG: 550 TABLET ORAL at 22:44

## 2020-01-26 RX ADMIN — POTASSIUM CHLORIDE 20 MEQ: 750 CAPSULE, EXTENDED RELEASE ORAL at 20:35

## 2020-01-26 RX ADMIN — ONDANSETRON 4 MG: 2 INJECTION INTRAMUSCULAR; INTRAVENOUS at 14:23

## 2020-01-26 RX ADMIN — SODIUM CHLORIDE, PRESERVATIVE FREE 10 ML: 5 INJECTION INTRAVENOUS at 14:19

## 2020-01-27 ENCOUNTER — APPOINTMENT (OUTPATIENT)
Dept: GENERAL RADIOLOGY | Facility: HOSPITAL | Age: 45
End: 2020-01-27

## 2020-01-27 ENCOUNTER — APPOINTMENT (OUTPATIENT)
Dept: ULTRASOUND IMAGING | Facility: HOSPITAL | Age: 45
End: 2020-01-27

## 2020-01-27 ENCOUNTER — APPOINTMENT (OUTPATIENT)
Dept: CARDIOLOGY | Facility: HOSPITAL | Age: 45
End: 2020-01-27

## 2020-01-27 LAB
ALBUMIN SERPL-MCNC: 1.3 G/DL (ref 3.5–5.2)
ALBUMIN/GLOB SERPL: 0.2 G/DL
ALP SERPL-CCNC: 197 U/L (ref 39–117)
ALT SERPL W P-5'-P-CCNC: 16 U/L (ref 1–33)
AMMONIA BLD-SCNC: 120 UMOL/L (ref 11–51)
ANION GAP SERPL CALCULATED.3IONS-SCNC: 8.3 MMOL/L (ref 5–15)
AST SERPL-CCNC: 47 U/L (ref 1–32)
BASOPHILS # BLD AUTO: 0.03 10*3/MM3 (ref 0–0.2)
BASOPHILS NFR BLD AUTO: 0.7 % (ref 0–1.5)
BH CV VAS HEPATIC PORTAL VEIN DIAMETER: 0.96 CM
BH CV VAS SMA HEPATIC EDV: 25.2 CM/S
BH CV VAS SMA HEPATIC PSV: 75.6 CM/S
BH CV VAS SMA SPLENIC EDV: 42.4 CM/S
BH CV VAS SMA SPLENIC PSV: 156 CM/S
BILIRUB SERPL-MCNC: 10.2 MG/DL (ref 0.2–1.2)
BUN BLD-MCNC: 5 MG/DL (ref 6–20)
BUN/CREAT SERPL: 8.8 (ref 7–25)
CALCIUM SPEC-SCNC: 7.7 MG/DL (ref 8.6–10.5)
CHLORIDE SERPL-SCNC: 95 MMOL/L (ref 98–107)
CHLORIDE UR-SCNC: <20 MMOL/L
CO2 SERPL-SCNC: 21.7 MMOL/L (ref 22–29)
CREAT BLD-MCNC: 0.57 MG/DL (ref 0.57–1)
DEPRECATED RDW RBC AUTO: 40.9 FL (ref 37–54)
EOSINOPHIL # BLD AUTO: 0.27 10*3/MM3 (ref 0–0.4)
EOSINOPHIL NFR BLD AUTO: 6.5 % (ref 0.3–6.2)
ERYTHROCYTE [DISTWIDTH] IN BLOOD BY AUTOMATED COUNT: 12.2 % (ref 12.3–15.4)
FERRITIN SERPL-MCNC: 861 NG/ML (ref 13–150)
FOLATE SERPL-MCNC: 5.47 NG/ML (ref 4.78–24.2)
GFR SERPL CREATININE-BSD FRML MDRD: 116 ML/MIN/1.73
GLOBULIN UR ELPH-MCNC: 5.7 GM/DL
GLUCOSE BLD-MCNC: 113 MG/DL (ref 65–99)
HCT VFR BLD AUTO: 23.3 % (ref 34–46.6)
HGB BLD-MCNC: 8.6 G/DL (ref 12–15.9)
IMM GRANULOCYTES # BLD AUTO: 0.02 10*3/MM3 (ref 0–0.05)
IMM GRANULOCYTES NFR BLD AUTO: 0.5 % (ref 0–0.5)
INR PPP: 2.11 (ref 0.9–1.1)
IRON 24H UR-MRATE: 78 MCG/DL (ref 37–145)
IRON SATN MFR SERPL: 87 % (ref 20–50)
LYMPHOCYTES # BLD AUTO: 0.81 10*3/MM3 (ref 0.7–3.1)
LYMPHOCYTES NFR BLD AUTO: 19.6 % (ref 19.6–45.3)
MCH RBC QN AUTO: 34.4 PG (ref 26.6–33)
MCHC RBC AUTO-ENTMCNC: 36.9 G/DL (ref 31.5–35.7)
MCV RBC AUTO: 93.2 FL (ref 79–97)
MONOCYTES # BLD AUTO: 0.66 10*3/MM3 (ref 0.1–0.9)
MONOCYTES NFR BLD AUTO: 16 % (ref 5–12)
NEUTROPHILS # BLD AUTO: 2.34 10*3/MM3 (ref 1.7–7)
NEUTROPHILS NFR BLD AUTO: 56.7 % (ref 42.7–76)
NRBC BLD AUTO-RTO: 0 /100 WBC (ref 0–0.2)
OSMOLALITY SERPL: 272 MOSM/KG (ref 275–300)
OSMOLALITY UR: 347 MOSM/KG
PLATELET # BLD AUTO: 26 10*3/MM3 (ref 140–450)
PLATELET # BLD AUTO: 32 10*3/MM3 (ref 140–450)
PLATELETS.RETICULATED NFR BLD AUTO: 7.1 % (ref 0.9–6.5)
PMV BLD AUTO: 12.9 FL (ref 6–12)
POTASSIUM BLD-SCNC: 2.8 MMOL/L (ref 3.5–5.2)
POTASSIUM BLD-SCNC: 3.4 MMOL/L (ref 3.5–5.2)
PROT SERPL-MCNC: 7 G/DL (ref 6–8.5)
PROTHROMBIN TIME: 23.3 SECONDS (ref 11.7–14.2)
RBC # BLD AUTO: 2.5 10*6/MM3 (ref 3.77–5.28)
SODIUM BLD-SCNC: 125 MMOL/L (ref 136–145)
SODIUM UR-SCNC: <20 MMOL/L
T4 FREE SERPL-MCNC: 1.49 NG/DL (ref 0.93–1.7)
TIBC SERPL-MCNC: 89 MCG/DL (ref 298–536)
TRANSFERRIN SERPL-MCNC: 60 MG/DL (ref 200–360)
TSH SERPL DL<=0.05 MIU/L-ACNC: 0.01 UIU/ML (ref 0.27–4.2)
VIT B12 BLD-MCNC: 1271 PG/ML (ref 211–946)
WBC NRBC COR # BLD: 4.13 10*3/MM3 (ref 3.4–10.8)

## 2020-01-27 PROCEDURE — 85610 PROTHROMBIN TIME: CPT | Performed by: INTERNAL MEDICINE

## 2020-01-27 PROCEDURE — 71046 X-RAY EXAM CHEST 2 VIEWS: CPT

## 2020-01-27 PROCEDURE — 99253 IP/OBS CNSLTJ NEW/EST LOW 45: CPT | Performed by: INTERNAL MEDICINE

## 2020-01-27 PROCEDURE — 85055 RETICULATED PLATELET ASSAY: CPT | Performed by: INTERNAL MEDICINE

## 2020-01-27 PROCEDURE — 85025 COMPLETE CBC W/AUTO DIFF WBC: CPT | Performed by: INTERNAL MEDICINE

## 2020-01-27 PROCEDURE — 80053 COMPREHEN METABOLIC PANEL: CPT | Performed by: INTERNAL MEDICINE

## 2020-01-27 PROCEDURE — 94799 UNLISTED PULMONARY SVC/PX: CPT

## 2020-01-27 PROCEDURE — 85014 HEMATOCRIT: CPT | Performed by: INTERNAL MEDICINE

## 2020-01-27 PROCEDURE — 84466 ASSAY OF TRANSFERRIN: CPT | Performed by: INTERNAL MEDICINE

## 2020-01-27 PROCEDURE — 84300 ASSAY OF URINE SODIUM: CPT | Performed by: INTERNAL MEDICINE

## 2020-01-27 PROCEDURE — 82728 ASSAY OF FERRITIN: CPT | Performed by: INTERNAL MEDICINE

## 2020-01-27 PROCEDURE — 93975 VASCULAR STUDY: CPT

## 2020-01-27 PROCEDURE — G0008 ADMIN INFLUENZA VIRUS VAC: HCPCS | Performed by: INTERNAL MEDICINE

## 2020-01-27 PROCEDURE — 99254 IP/OBS CNSLTJ NEW/EST MOD 60: CPT | Performed by: INTERNAL MEDICINE

## 2020-01-27 PROCEDURE — 76705 ECHO EXAM OF ABDOMEN: CPT

## 2020-01-27 PROCEDURE — 82140 ASSAY OF AMMONIA: CPT | Performed by: INTERNAL MEDICINE

## 2020-01-27 PROCEDURE — 84443 ASSAY THYROID STIM HORMONE: CPT | Performed by: HOSPITALIST

## 2020-01-27 PROCEDURE — 82747 ASSAY OF FOLIC ACID RBC: CPT | Performed by: INTERNAL MEDICINE

## 2020-01-27 PROCEDURE — 25010000002 INFLUENZA VAC SPLIT QUAD 0.5 ML SUSPENSION PREFILLED SYRINGE: Performed by: INTERNAL MEDICINE

## 2020-01-27 PROCEDURE — 83540 ASSAY OF IRON: CPT | Performed by: INTERNAL MEDICINE

## 2020-01-27 PROCEDURE — 83930 ASSAY OF BLOOD OSMOLALITY: CPT | Performed by: INTERNAL MEDICINE

## 2020-01-27 PROCEDURE — 90686 IIV4 VACC NO PRSV 0.5 ML IM: CPT | Performed by: INTERNAL MEDICINE

## 2020-01-27 PROCEDURE — 84132 ASSAY OF SERUM POTASSIUM: CPT | Performed by: HOSPITALIST

## 2020-01-27 PROCEDURE — 84439 ASSAY OF FREE THYROXINE: CPT | Performed by: HOSPITALIST

## 2020-01-27 PROCEDURE — 83935 ASSAY OF URINE OSMOLALITY: CPT | Performed by: INTERNAL MEDICINE

## 2020-01-27 PROCEDURE — 82436 ASSAY OF URINE CHLORIDE: CPT | Performed by: INTERNAL MEDICINE

## 2020-01-27 PROCEDURE — 83921 ORGANIC ACID SINGLE QUANT: CPT | Performed by: INTERNAL MEDICINE

## 2020-01-27 PROCEDURE — 94640 AIRWAY INHALATION TREATMENT: CPT

## 2020-01-27 RX ORDER — POTASSIUM CHLORIDE 750 MG/1
40 CAPSULE, EXTENDED RELEASE ORAL AS NEEDED
Status: DISCONTINUED | OUTPATIENT
Start: 2020-01-27 | End: 2020-01-31 | Stop reason: HOSPADM

## 2020-01-27 RX ORDER — POTASSIUM CHLORIDE 1.5 G/1.77G
40 POWDER, FOR SOLUTION ORAL AS NEEDED
Status: DISCONTINUED | OUTPATIENT
Start: 2020-01-27 | End: 2020-01-31 | Stop reason: HOSPADM

## 2020-01-27 RX ORDER — POTASSIUM CHLORIDE 7.45 MG/ML
10 INJECTION INTRAVENOUS
Status: DISCONTINUED | OUTPATIENT
Start: 2020-01-27 | End: 2020-01-31 | Stop reason: HOSPADM

## 2020-01-27 RX ADMIN — LACTULOSE 45 ML: 10 SOLUTION ORAL at 20:29

## 2020-01-27 RX ADMIN — LEVOTHYROXINE SODIUM 200 MCG: 100 TABLET ORAL at 07:52

## 2020-01-27 RX ADMIN — NADOLOL 20 MG: 20 TABLET ORAL at 07:53

## 2020-01-27 RX ADMIN — POTASSIUM CHLORIDE 40 MEQ: 750 CAPSULE, EXTENDED RELEASE ORAL at 12:11

## 2020-01-27 RX ADMIN — RIFAXIMIN 550 MG: 550 TABLET ORAL at 20:29

## 2020-01-27 RX ADMIN — SODIUM CHLORIDE, PRESERVATIVE FREE 10 ML: 5 INJECTION INTRAVENOUS at 08:02

## 2020-01-27 RX ADMIN — SPIRONOLACTONE 150 MG: 100 TABLET, FILM COATED ORAL at 08:09

## 2020-01-27 RX ADMIN — LACTULOSE 45 ML: 10 SOLUTION ORAL at 08:09

## 2020-01-27 RX ADMIN — PANTOPRAZOLE SODIUM 40 MG: 40 TABLET, DELAYED RELEASE ORAL at 17:16

## 2020-01-27 RX ADMIN — BENZONATATE 100 MG: 100 CAPSULE ORAL at 07:51

## 2020-01-27 RX ADMIN — LEVOTHYROXINE SODIUM 25 MCG: 100 TABLET ORAL at 07:52

## 2020-01-27 RX ADMIN — SUCRALFATE 1 G: 1 TABLET ORAL at 17:16

## 2020-01-27 RX ADMIN — INFLUENZA VIRUS VACCINE 0.5 ML: 15; 15; 15; 15 SUSPENSION INTRAMUSCULAR at 12:21

## 2020-01-27 RX ADMIN — POTASSIUM CHLORIDE 40 MEQ: 750 CAPSULE, EXTENDED RELEASE ORAL at 23:23

## 2020-01-27 RX ADMIN — SODIUM CHLORIDE, PRESERVATIVE FREE 10 ML: 5 INJECTION INTRAVENOUS at 20:29

## 2020-01-27 RX ADMIN — PANTOPRAZOLE SODIUM 40 MG: 40 TABLET, DELAYED RELEASE ORAL at 08:09

## 2020-01-27 RX ADMIN — POTASSIUM CHLORIDE 40 MEQ: 750 CAPSULE, EXTENDED RELEASE ORAL at 07:51

## 2020-01-27 RX ADMIN — BUDESONIDE 0.5 MG: 0.5 INHALANT RESPIRATORY (INHALATION) at 22:06

## 2020-01-27 RX ADMIN — POTASSIUM CHLORIDE 40 MEQ: 750 CAPSULE, EXTENDED RELEASE ORAL at 17:16

## 2020-01-27 RX ADMIN — LACTULOSE 45 ML: 10 SOLUTION ORAL at 17:16

## 2020-01-27 RX ADMIN — ERGOCALCIFEROL 50000 UNITS: 1.25 CAPSULE ORAL at 07:52

## 2020-01-27 RX ADMIN — SUCRALFATE 1 G: 1 TABLET ORAL at 07:52

## 2020-01-27 RX ADMIN — BENZONATATE 100 MG: 100 CAPSULE ORAL at 17:36

## 2020-01-27 RX ADMIN — RIFAXIMIN 550 MG: 550 TABLET ORAL at 07:53

## 2020-01-28 ENCOUNTER — TELEPHONE (OUTPATIENT)
Dept: GASTROENTEROLOGY | Facility: CLINIC | Age: 45
End: 2020-01-28

## 2020-01-28 LAB
ALBUMIN SERPL-MCNC: 1.6 G/DL (ref 3.5–5.2)
ALBUMIN/GLOB SERPL: 0.3 G/DL
ALP SERPL-CCNC: 218 U/L (ref 39–117)
ALT SERPL W P-5'-P-CCNC: 18 U/L (ref 1–33)
AMMONIA BLD-SCNC: 95 UMOL/L (ref 11–51)
ANION GAP SERPL CALCULATED.3IONS-SCNC: 7.2 MMOL/L (ref 5–15)
ANISOCYTOSIS BLD QL: ABNORMAL
AST SERPL-CCNC: 52 U/L (ref 1–32)
BILIRUB SERPL-MCNC: 8.7 MG/DL (ref 0.2–1.2)
BUN BLD-MCNC: 6 MG/DL (ref 6–20)
BUN/CREAT SERPL: 12.2 (ref 7–25)
BURR CELLS BLD QL SMEAR: ABNORMAL
CALCIUM SPEC-SCNC: 7.8 MG/DL (ref 8.6–10.5)
CHLORIDE SERPL-SCNC: 98 MMOL/L (ref 98–107)
CO2 SERPL-SCNC: 21.8 MMOL/L (ref 22–29)
CREAT BLD-MCNC: 0.49 MG/DL (ref 0.57–1)
DEPRECATED RDW RBC AUTO: 42.3 FL (ref 37–54)
EOSINOPHIL # BLD MANUAL: 0.22 10*3/MM3 (ref 0–0.4)
EOSINOPHIL NFR BLD MANUAL: 5.3 % (ref 0.3–6.2)
ERYTHROCYTE [DISTWIDTH] IN BLOOD BY AUTOMATED COUNT: 12.8 % (ref 12.3–15.4)
FOLATE BLD-MCNC: 516 NG/ML
FOLATE RBC-MCNC: 2106 NG/ML
GFR SERPL CREATININE-BSD FRML MDRD: 138 ML/MIN/1.73
GLOBULIN UR ELPH-MCNC: 6.2 GM/DL
GLUCOSE BLD-MCNC: 72 MG/DL (ref 65–99)
HCT VFR BLD AUTO: 24 % (ref 34–46.6)
HCT VFR BLD AUTO: 24.5 % (ref 34–46.6)
HGB BLD-MCNC: 9 G/DL (ref 12–15.9)
INR PPP: 1.99 (ref 0.9–1.1)
LYMPHOCYTES # BLD MANUAL: 0.22 10*3/MM3 (ref 0.7–3.1)
LYMPHOCYTES NFR BLD MANUAL: 5.3 % (ref 19.6–45.3)
LYMPHOCYTES NFR BLD MANUAL: 8.4 % (ref 5–12)
MAGNESIUM SERPL-MCNC: 1.8 MG/DL (ref 1.6–2.6)
MCH RBC QN AUTO: 34.7 PG (ref 26.6–33)
MCHC RBC AUTO-ENTMCNC: 37.5 G/DL (ref 31.5–35.7)
MCV RBC AUTO: 92.7 FL (ref 79–97)
MONOCYTES # BLD AUTO: 0.35 10*3/MM3 (ref 0.1–0.9)
NEUTROPHILS # BLD AUTO: 3.42 10*3/MM3 (ref 1.7–7)
NEUTROPHILS NFR BLD MANUAL: 81.1 % (ref 42.7–76)
PHOSPHATE SERPL-MCNC: 2.9 MG/DL (ref 2.5–4.5)
PLAT MORPH BLD: NORMAL
PLATELET # BLD AUTO: 29 10*3/MM3 (ref 140–450)
PMV BLD AUTO: 12.7 FL (ref 6–12)
POIKILOCYTOSIS BLD QL SMEAR: ABNORMAL
POTASSIUM BLD-SCNC: 4.1 MMOL/L (ref 3.5–5.2)
PROT SERPL-MCNC: 7.8 G/DL (ref 6–8.5)
PROTHROMBIN TIME: 22.3 SECONDS (ref 11.7–14.2)
RBC # BLD AUTO: 2.59 10*6/MM3 (ref 3.77–5.28)
SMUDGE CELLS BLD QL SMEAR: ABNORMAL
SODIUM BLD-SCNC: 127 MMOL/L (ref 136–145)
URATE SERPL-MCNC: 2.6 MG/DL (ref 2.4–5.7)
WBC NRBC COR # BLD: 4.22 10*3/MM3 (ref 3.4–10.8)

## 2020-01-28 PROCEDURE — 85007 BL SMEAR W/DIFF WBC COUNT: CPT | Performed by: INTERNAL MEDICINE

## 2020-01-28 PROCEDURE — 99231 SBSQ HOSP IP/OBS SF/LOW 25: CPT | Performed by: INTERNAL MEDICINE

## 2020-01-28 PROCEDURE — 85610 PROTHROMBIN TIME: CPT | Performed by: INTERNAL MEDICINE

## 2020-01-28 PROCEDURE — 83735 ASSAY OF MAGNESIUM: CPT | Performed by: INTERNAL MEDICINE

## 2020-01-28 PROCEDURE — 94799 UNLISTED PULMONARY SVC/PX: CPT

## 2020-01-28 PROCEDURE — 93005 ELECTROCARDIOGRAM TRACING: CPT | Performed by: HOSPITALIST

## 2020-01-28 PROCEDURE — 84100 ASSAY OF PHOSPHORUS: CPT | Performed by: INTERNAL MEDICINE

## 2020-01-28 PROCEDURE — 93010 ELECTROCARDIOGRAM REPORT: CPT | Performed by: INTERNAL MEDICINE

## 2020-01-28 PROCEDURE — 82140 ASSAY OF AMMONIA: CPT | Performed by: HOSPITALIST

## 2020-01-28 PROCEDURE — 85025 COMPLETE CBC W/AUTO DIFF WBC: CPT | Performed by: INTERNAL MEDICINE

## 2020-01-28 PROCEDURE — 84550 ASSAY OF BLOOD/URIC ACID: CPT | Performed by: INTERNAL MEDICINE

## 2020-01-28 PROCEDURE — 80053 COMPREHEN METABOLIC PANEL: CPT | Performed by: INTERNAL MEDICINE

## 2020-01-28 PROCEDURE — 99232 SBSQ HOSP IP/OBS MODERATE 35: CPT | Performed by: INTERNAL MEDICINE

## 2020-01-28 RX ORDER — FUROSEMIDE 20 MG/1
20 TABLET ORAL DAILY
Status: DISCONTINUED | OUTPATIENT
Start: 2020-01-28 | End: 2020-01-31 | Stop reason: HOSPADM

## 2020-01-28 RX ADMIN — SPIRONOLACTONE 150 MG: 100 TABLET, FILM COATED ORAL at 10:55

## 2020-01-28 RX ADMIN — LEVOTHYROXINE SODIUM 200 MCG: 100 TABLET ORAL at 08:25

## 2020-01-28 RX ADMIN — BENZONATATE 100 MG: 100 CAPSULE ORAL at 02:35

## 2020-01-28 RX ADMIN — PANTOPRAZOLE SODIUM 40 MG: 40 TABLET, DELAYED RELEASE ORAL at 17:42

## 2020-01-28 RX ADMIN — SODIUM CHLORIDE, PRESERVATIVE FREE 10 ML: 5 INJECTION INTRAVENOUS at 08:30

## 2020-01-28 RX ADMIN — PANTOPRAZOLE SODIUM 40 MG: 40 TABLET, DELAYED RELEASE ORAL at 06:33

## 2020-01-28 RX ADMIN — BENZONATATE 100 MG: 100 CAPSULE ORAL at 11:00

## 2020-01-28 RX ADMIN — POTASSIUM CHLORIDE 20 MEQ: 750 CAPSULE, EXTENDED RELEASE ORAL at 17:42

## 2020-01-28 RX ADMIN — FUROSEMIDE 20 MG: 20 TABLET ORAL at 14:08

## 2020-01-28 RX ADMIN — NADOLOL 20 MG: 20 TABLET ORAL at 08:25

## 2020-01-28 RX ADMIN — BUDESONIDE 0.5 MG: 0.5 INHALANT RESPIRATORY (INHALATION) at 07:43

## 2020-01-28 RX ADMIN — POTASSIUM CHLORIDE 20 MEQ: 750 CAPSULE, EXTENDED RELEASE ORAL at 08:25

## 2020-01-28 RX ADMIN — SUCRALFATE 1 G: 1 TABLET ORAL at 17:42

## 2020-01-28 RX ADMIN — LACTULOSE 45 ML: 10 SOLUTION ORAL at 08:24

## 2020-01-28 RX ADMIN — BENZONATATE 100 MG: 100 CAPSULE ORAL at 20:37

## 2020-01-28 RX ADMIN — RIFAXIMIN 550 MG: 550 TABLET ORAL at 20:37

## 2020-01-28 RX ADMIN — SUCRALFATE 1 G: 1 TABLET ORAL at 06:33

## 2020-01-28 RX ADMIN — POTASSIUM CHLORIDE 20 MEQ: 750 CAPSULE, EXTENDED RELEASE ORAL at 20:37

## 2020-01-28 RX ADMIN — RIFAXIMIN 550 MG: 550 TABLET ORAL at 08:25

## 2020-01-28 RX ADMIN — SODIUM CHLORIDE, PRESERVATIVE FREE 10 ML: 5 INJECTION INTRAVENOUS at 20:39

## 2020-01-28 RX ADMIN — LEVOTHYROXINE SODIUM 25 MCG: 100 TABLET ORAL at 06:33

## 2020-01-28 RX ADMIN — BUDESONIDE 0.5 MG: 0.5 INHALANT RESPIRATORY (INHALATION) at 19:45

## 2020-01-28 RX ADMIN — LACTULOSE 45 ML: 10 SOLUTION ORAL at 17:42

## 2020-01-28 RX ADMIN — LACTULOSE 45 ML: 10 SOLUTION ORAL at 21:54

## 2020-01-29 LAB
ALBUMIN SERPL-MCNC: 1.6 G/DL (ref 3.5–5.2)
ALBUMIN/GLOB SERPL: 0.3 G/DL
ALP SERPL-CCNC: 200 U/L (ref 39–117)
ALT SERPL W P-5'-P-CCNC: 19 U/L (ref 1–33)
AMMONIA BLD-SCNC: 49 UMOL/L (ref 11–51)
ANION GAP SERPL CALCULATED.3IONS-SCNC: 8.2 MMOL/L (ref 5–15)
AST SERPL-CCNC: 57 U/L (ref 1–32)
BILIRUB SERPL-MCNC: 6.5 MG/DL (ref 0.2–1.2)
BUN BLD-MCNC: 4 MG/DL (ref 6–20)
BUN/CREAT SERPL: 8.3 (ref 7–25)
CALCIUM SPEC-SCNC: 7.4 MG/DL (ref 8.6–10.5)
CHLORIDE SERPL-SCNC: 101 MMOL/L (ref 98–107)
CO2 SERPL-SCNC: 19.8 MMOL/L (ref 22–29)
CREAT BLD-MCNC: 0.48 MG/DL (ref 0.57–1)
GFR SERPL CREATININE-BSD FRML MDRD: 141 ML/MIN/1.73
GLOBULIN UR ELPH-MCNC: 6 GM/DL
GLUCOSE BLD-MCNC: 124 MG/DL (ref 65–99)
INR PPP: 2.04 (ref 0.9–1.1)
POTASSIUM BLD-SCNC: 3.7 MMOL/L (ref 3.5–5.2)
PROT SERPL-MCNC: 7.6 G/DL (ref 6–8.5)
PROTHROMBIN TIME: 22.7 SECONDS (ref 11.7–14.2)
SODIUM BLD-SCNC: 129 MMOL/L (ref 136–145)

## 2020-01-29 PROCEDURE — 82140 ASSAY OF AMMONIA: CPT | Performed by: HOSPITALIST

## 2020-01-29 PROCEDURE — 85610 PROTHROMBIN TIME: CPT | Performed by: INTERNAL MEDICINE

## 2020-01-29 PROCEDURE — 94799 UNLISTED PULMONARY SVC/PX: CPT

## 2020-01-29 PROCEDURE — 25010000002 ONDANSETRON PER 1 MG: Performed by: INTERNAL MEDICINE

## 2020-01-29 PROCEDURE — 99231 SBSQ HOSP IP/OBS SF/LOW 25: CPT | Performed by: INTERNAL MEDICINE

## 2020-01-29 PROCEDURE — 99232 SBSQ HOSP IP/OBS MODERATE 35: CPT | Performed by: INTERNAL MEDICINE

## 2020-01-29 PROCEDURE — 80053 COMPREHEN METABOLIC PANEL: CPT | Performed by: INTERNAL MEDICINE

## 2020-01-29 RX ORDER — GUAIFENESIN/DEXTROMETHORPHAN 100-10MG/5
5 SYRUP ORAL EVERY 4 HOURS PRN
Status: DISCONTINUED | OUTPATIENT
Start: 2020-01-29 | End: 2020-01-31 | Stop reason: HOSPADM

## 2020-01-29 RX ADMIN — LACTULOSE 45 ML: 10 SOLUTION ORAL at 08:13

## 2020-01-29 RX ADMIN — PANTOPRAZOLE SODIUM 40 MG: 40 TABLET, DELAYED RELEASE ORAL at 06:38

## 2020-01-29 RX ADMIN — POTASSIUM CHLORIDE 20 MEQ: 750 CAPSULE, EXTENDED RELEASE ORAL at 17:09

## 2020-01-29 RX ADMIN — BENZONATATE 100 MG: 100 CAPSULE ORAL at 16:14

## 2020-01-29 RX ADMIN — SPIRONOLACTONE 150 MG: 100 TABLET, FILM COATED ORAL at 08:13

## 2020-01-29 RX ADMIN — ONDANSETRON HYDROCHLORIDE 4 MG: 2 SOLUTION INTRAMUSCULAR; INTRAVENOUS at 19:50

## 2020-01-29 RX ADMIN — POTASSIUM CHLORIDE 20 MEQ: 750 CAPSULE, EXTENDED RELEASE ORAL at 21:48

## 2020-01-29 RX ADMIN — RIFAXIMIN 550 MG: 550 TABLET ORAL at 08:14

## 2020-01-29 RX ADMIN — NADOLOL 20 MG: 20 TABLET ORAL at 08:14

## 2020-01-29 RX ADMIN — LEVOTHYROXINE SODIUM 25 MCG: 100 TABLET ORAL at 06:38

## 2020-01-29 RX ADMIN — POTASSIUM CHLORIDE 20 MEQ: 750 CAPSULE, EXTENDED RELEASE ORAL at 08:14

## 2020-01-29 RX ADMIN — GUAIFENESIN AND DEXTROMETHORPHAN 5 ML: 100; 10 SYRUP ORAL at 18:19

## 2020-01-29 RX ADMIN — SODIUM CHLORIDE, PRESERVATIVE FREE 10 ML: 5 INJECTION INTRAVENOUS at 08:15

## 2020-01-29 RX ADMIN — SUCRALFATE 1 G: 1 TABLET ORAL at 17:09

## 2020-01-29 RX ADMIN — LEVOTHYROXINE SODIUM 200 MCG: 100 TABLET ORAL at 08:14

## 2020-01-29 RX ADMIN — LACTULOSE 45 ML: 10 SOLUTION ORAL at 21:48

## 2020-01-29 RX ADMIN — SODIUM CHLORIDE, PRESERVATIVE FREE 10 ML: 5 INJECTION INTRAVENOUS at 21:53

## 2020-01-29 RX ADMIN — RIFAXIMIN 550 MG: 550 TABLET ORAL at 21:48

## 2020-01-29 RX ADMIN — LACTULOSE 45 ML: 10 SOLUTION ORAL at 17:08

## 2020-01-29 RX ADMIN — PANTOPRAZOLE SODIUM 40 MG: 40 TABLET, DELAYED RELEASE ORAL at 17:09

## 2020-01-29 RX ADMIN — BENZONATATE 100 MG: 100 CAPSULE ORAL at 06:41

## 2020-01-29 RX ADMIN — BUDESONIDE 0.5 MG: 0.5 INHALANT RESPIRATORY (INHALATION) at 08:50

## 2020-01-29 RX ADMIN — BENZONATATE 100 MG: 100 CAPSULE ORAL at 23:35

## 2020-01-29 RX ADMIN — BUDESONIDE 0.5 MG: 0.5 INHALANT RESPIRATORY (INHALATION) at 19:31

## 2020-01-29 RX ADMIN — SUCRALFATE 1 G: 1 TABLET ORAL at 06:38

## 2020-01-29 RX ADMIN — FUROSEMIDE 20 MG: 20 TABLET ORAL at 08:14

## 2020-01-30 ENCOUNTER — APPOINTMENT (OUTPATIENT)
Dept: GENERAL RADIOLOGY | Facility: HOSPITAL | Age: 45
End: 2020-01-30

## 2020-01-30 LAB
ALBUMIN SERPL-MCNC: 1.7 G/DL (ref 3.5–5.2)
ALBUMIN/GLOB SERPL: 0.3 G/DL
ALP SERPL-CCNC: 200 U/L (ref 39–117)
ALT SERPL W P-5'-P-CCNC: 18 U/L (ref 1–33)
ANION GAP SERPL CALCULATED.3IONS-SCNC: 10.6 MMOL/L (ref 5–15)
ANISOCYTOSIS BLD QL: ABNORMAL
AST SERPL-CCNC: 59 U/L (ref 1–32)
BILIRUB SERPL-MCNC: 6.5 MG/DL (ref 0.2–1.2)
BUN BLD-MCNC: 5 MG/DL (ref 6–20)
BUN/CREAT SERPL: 8.2 (ref 7–25)
CALCIUM SPEC-SCNC: 7.5 MG/DL (ref 8.6–10.5)
CHLORIDE SERPL-SCNC: 97 MMOL/L (ref 98–107)
CO2 SERPL-SCNC: 20.4 MMOL/L (ref 22–29)
CREAT BLD-MCNC: 0.61 MG/DL (ref 0.57–1)
D-LACTATE SERPL-SCNC: 1.7 MMOL/L (ref 0.5–2)
DEPRECATED RDW RBC AUTO: 43.3 FL (ref 37–54)
EOSINOPHIL # BLD MANUAL: 0.14 10*3/MM3 (ref 0–0.4)
EOSINOPHIL NFR BLD MANUAL: 1 % (ref 0.3–6.2)
ERYTHROCYTE [DISTWIDTH] IN BLOOD BY AUTOMATED COUNT: 13 % (ref 12.3–15.4)
GFR SERPL CREATININE-BSD FRML MDRD: 107 ML/MIN/1.73
GLOBULIN UR ELPH-MCNC: 5.4 GM/DL
GLUCOSE BLD-MCNC: 86 MG/DL (ref 65–99)
HCT VFR BLD AUTO: 22.1 % (ref 34–46.6)
HGB BLD-MCNC: 8.2 G/DL (ref 12–15.9)
INR PPP: 2.33 (ref 0.9–1.1)
LYMPHOCYTES # BLD MANUAL: 0.56 10*3/MM3 (ref 0.7–3.1)
LYMPHOCYTES NFR BLD MANUAL: 2 % (ref 5–12)
LYMPHOCYTES NFR BLD MANUAL: 4 % (ref 19.6–45.3)
MACROCYTES BLD QL SMEAR: ABNORMAL
MAGNESIUM SERPL-MCNC: 1.5 MG/DL (ref 1.6–2.6)
MCH RBC QN AUTO: 35.2 PG (ref 26.6–33)
MCHC RBC AUTO-ENTMCNC: 37.1 G/DL (ref 31.5–35.7)
MCV RBC AUTO: 94.8 FL (ref 79–97)
MONOCYTES # BLD AUTO: 0.28 10*3/MM3 (ref 0.1–0.9)
NEUTROPHILS # BLD AUTO: 12.92 10*3/MM3 (ref 1.7–7)
NEUTROPHILS NFR BLD MANUAL: 93 % (ref 42.7–76)
PHOSPHATE SERPL-MCNC: 3.3 MG/DL (ref 2.5–4.5)
PLAT MORPH BLD: NORMAL
PLATELET # BLD AUTO: 30 10*3/MM3 (ref 140–450)
PMV BLD AUTO: 11.8 FL (ref 6–12)
POTASSIUM BLD-SCNC: 3.8 MMOL/L (ref 3.5–5.2)
PROCALCITONIN SERPL-MCNC: 0.29 NG/ML (ref 0.1–0.25)
PROT SERPL-MCNC: 7.1 G/DL (ref 6–8.5)
PROTHROMBIN TIME: 25.2 SECONDS (ref 11.7–14.2)
RBC # BLD AUTO: 2.33 10*6/MM3 (ref 3.77–5.28)
SODIUM BLD-SCNC: 128 MMOL/L (ref 136–145)
URATE SERPL-MCNC: 2.6 MG/DL (ref 2.4–5.7)
WBC MORPH BLD: NORMAL
WBC NRBC COR # BLD: 13.89 10*3/MM3 (ref 3.4–10.8)

## 2020-01-30 PROCEDURE — 25010000002 MAGNESIUM SULFATE IN D5W 1G/100ML (PREMIX) 1-5 GM/100ML-% SOLUTION: Performed by: INTERNAL MEDICINE

## 2020-01-30 PROCEDURE — 84145 PROCALCITONIN (PCT): CPT | Performed by: HOSPITALIST

## 2020-01-30 PROCEDURE — 84100 ASSAY OF PHOSPHORUS: CPT | Performed by: INTERNAL MEDICINE

## 2020-01-30 PROCEDURE — 85610 PROTHROMBIN TIME: CPT | Performed by: INTERNAL MEDICINE

## 2020-01-30 PROCEDURE — 94799 UNLISTED PULMONARY SVC/PX: CPT

## 2020-01-30 PROCEDURE — 83605 ASSAY OF LACTIC ACID: CPT | Performed by: HOSPITALIST

## 2020-01-30 PROCEDURE — 25010000002 ONDANSETRON PER 1 MG: Performed by: INTERNAL MEDICINE

## 2020-01-30 PROCEDURE — 80053 COMPREHEN METABOLIC PANEL: CPT | Performed by: INTERNAL MEDICINE

## 2020-01-30 PROCEDURE — 87040 BLOOD CULTURE FOR BACTERIA: CPT | Performed by: HOSPITALIST

## 2020-01-30 PROCEDURE — 99255 IP/OBS CONSLTJ NEW/EST HI 80: CPT | Performed by: INTERNAL MEDICINE

## 2020-01-30 PROCEDURE — 84550 ASSAY OF BLOOD/URIC ACID: CPT | Performed by: INTERNAL MEDICINE

## 2020-01-30 PROCEDURE — 85007 BL SMEAR W/DIFF WBC COUNT: CPT | Performed by: INTERNAL MEDICINE

## 2020-01-30 PROCEDURE — 85025 COMPLETE CBC W/AUTO DIFF WBC: CPT | Performed by: INTERNAL MEDICINE

## 2020-01-30 PROCEDURE — 71046 X-RAY EXAM CHEST 2 VIEWS: CPT

## 2020-01-30 PROCEDURE — 99232 SBSQ HOSP IP/OBS MODERATE 35: CPT | Performed by: INTERNAL MEDICINE

## 2020-01-30 PROCEDURE — 83735 ASSAY OF MAGNESIUM: CPT | Performed by: INTERNAL MEDICINE

## 2020-01-30 RX ORDER — MAGNESIUM SULFATE 1 G/100ML
1 INJECTION INTRAVENOUS
Status: DISPENSED | OUTPATIENT
Start: 2020-01-30 | End: 2020-01-30

## 2020-01-30 RX ADMIN — BUDESONIDE 0.5 MG: 0.5 INHALANT RESPIRATORY (INHALATION) at 19:27

## 2020-01-30 RX ADMIN — FUROSEMIDE 20 MG: 20 TABLET ORAL at 10:28

## 2020-01-30 RX ADMIN — POTASSIUM CHLORIDE 20 MEQ: 750 CAPSULE, EXTENDED RELEASE ORAL at 15:39

## 2020-01-30 RX ADMIN — NADOLOL 20 MG: 20 TABLET ORAL at 09:40

## 2020-01-30 RX ADMIN — ONDANSETRON HYDROCHLORIDE 4 MG: 2 SOLUTION INTRAMUSCULAR; INTRAVENOUS at 02:08

## 2020-01-30 RX ADMIN — LACTULOSE 45 ML: 10 SOLUTION ORAL at 20:30

## 2020-01-30 RX ADMIN — BUDESONIDE 0.5 MG: 0.5 INHALANT RESPIRATORY (INHALATION) at 07:31

## 2020-01-30 RX ADMIN — RIFAXIMIN 550 MG: 550 TABLET ORAL at 20:30

## 2020-01-30 RX ADMIN — SODIUM CHLORIDE, PRESERVATIVE FREE 10 ML: 5 INJECTION INTRAVENOUS at 20:30

## 2020-01-30 RX ADMIN — RIFAXIMIN 550 MG: 550 TABLET ORAL at 08:51

## 2020-01-30 RX ADMIN — BENZONATATE 100 MG: 100 CAPSULE ORAL at 23:47

## 2020-01-30 RX ADMIN — PANTOPRAZOLE SODIUM 40 MG: 40 TABLET, DELAYED RELEASE ORAL at 17:50

## 2020-01-30 RX ADMIN — LEVOTHYROXINE SODIUM 200 MCG: 100 TABLET ORAL at 08:52

## 2020-01-30 RX ADMIN — POTASSIUM CHLORIDE 20 MEQ: 750 CAPSULE, EXTENDED RELEASE ORAL at 08:52

## 2020-01-30 RX ADMIN — SPIRONOLACTONE 150 MG: 100 TABLET, FILM COATED ORAL at 10:28

## 2020-01-30 RX ADMIN — POTASSIUM CHLORIDE 20 MEQ: 750 CAPSULE, EXTENDED RELEASE ORAL at 20:30

## 2020-01-30 RX ADMIN — LEVOTHYROXINE SODIUM 25 MCG: 100 TABLET ORAL at 06:36

## 2020-01-30 RX ADMIN — LACTULOSE 45 ML: 10 SOLUTION ORAL at 08:53

## 2020-01-30 RX ADMIN — SODIUM CHLORIDE, PRESERVATIVE FREE 10 ML: 5 INJECTION INTRAVENOUS at 08:53

## 2020-01-30 RX ADMIN — MAGNESIUM SULFATE 1 G: 1 INJECTION INTRAVENOUS at 08:53

## 2020-01-30 RX ADMIN — PANTOPRAZOLE SODIUM 40 MG: 40 TABLET, DELAYED RELEASE ORAL at 06:36

## 2020-01-30 RX ADMIN — LACTULOSE 45 ML: 10 SOLUTION ORAL at 15:39

## 2020-01-30 RX ADMIN — SUCRALFATE 1 G: 1 TABLET ORAL at 06:36

## 2020-01-30 RX ADMIN — SUCRALFATE 1 G: 1 TABLET ORAL at 15:39

## 2020-01-31 VITALS
WEIGHT: 168.3 LBS | HEART RATE: 91 BPM | RESPIRATION RATE: 18 BRPM | BODY MASS INDEX: 28.73 KG/M2 | TEMPERATURE: 98.3 F | DIASTOLIC BLOOD PRESSURE: 60 MMHG | OXYGEN SATURATION: 96 % | SYSTOLIC BLOOD PRESSURE: 108 MMHG | HEIGHT: 64 IN

## 2020-01-31 LAB
ALBUMIN SERPL-MCNC: 1.5 G/DL (ref 3.5–5.2)
ALBUMIN/GLOB SERPL: 0.3 G/DL
ALP SERPL-CCNC: 180 U/L (ref 39–117)
ALT SERPL W P-5'-P-CCNC: 18 U/L (ref 1–33)
ANION GAP SERPL CALCULATED.3IONS-SCNC: 6.9 MMOL/L (ref 5–15)
AST SERPL-CCNC: 46 U/L (ref 1–32)
BASOPHILS # BLD AUTO: 0.04 10*3/MM3 (ref 0–0.2)
BASOPHILS NFR BLD AUTO: 0.6 % (ref 0–1.5)
BILIRUB SERPL-MCNC: 6.5 MG/DL (ref 0.2–1.2)
BUN BLD-MCNC: 5 MG/DL (ref 6–20)
BUN/CREAT SERPL: 10 (ref 7–25)
CALCIUM SPEC-SCNC: 7.5 MG/DL (ref 8.6–10.5)
CHLORIDE SERPL-SCNC: 97 MMOL/L (ref 98–107)
CO2 SERPL-SCNC: 19.1 MMOL/L (ref 22–29)
CREAT BLD-MCNC: 0.5 MG/DL (ref 0.57–1)
DEPRECATED RDW RBC AUTO: 44.7 FL (ref 37–54)
EOSINOPHIL # BLD AUTO: 0.31 10*3/MM3 (ref 0–0.4)
EOSINOPHIL NFR BLD AUTO: 4.5 % (ref 0.3–6.2)
ERYTHROCYTE [DISTWIDTH] IN BLOOD BY AUTOMATED COUNT: 13 % (ref 12.3–15.4)
GFR SERPL CREATININE-BSD FRML MDRD: 135 ML/MIN/1.73
GLOBULIN UR ELPH-MCNC: 5.9 GM/DL
GLUCOSE BLD-MCNC: 124 MG/DL (ref 65–99)
HCT VFR BLD AUTO: 21.9 % (ref 34–46.6)
HGB BLD-MCNC: 7.9 G/DL (ref 12–15.9)
INR PPP: 2.51 (ref 0.9–1.1)
LYMPHOCYTES # BLD AUTO: 1.07 10*3/MM3 (ref 0.7–3.1)
LYMPHOCYTES NFR BLD AUTO: 15.6 % (ref 19.6–45.3)
Lab: NORMAL
MAGNESIUM SERPL-MCNC: 1.7 MG/DL (ref 1.6–2.6)
MCH RBC QN AUTO: 34.5 PG (ref 26.6–33)
MCHC RBC AUTO-ENTMCNC: 36.1 G/DL (ref 31.5–35.7)
MCV RBC AUTO: 95.6 FL (ref 79–97)
METHYLMALONATE SERPL-SCNC: 155 NMOL/L (ref 0–378)
MONOCYTES # BLD AUTO: 0.97 10*3/MM3 (ref 0.1–0.9)
MONOCYTES NFR BLD AUTO: 14.2 % (ref 5–12)
NEUTROPHILS # BLD AUTO: 4.42 10*3/MM3 (ref 1.7–7)
NEUTROPHILS NFR BLD AUTO: 64.5 % (ref 42.7–76)
PHOSPHATE SERPL-MCNC: 2.8 MG/DL (ref 2.5–4.5)
PLATELET # BLD AUTO: 27 10*3/MM3 (ref 140–450)
PMV BLD AUTO: 12.6 FL (ref 6–12)
POTASSIUM BLD-SCNC: 3.6 MMOL/L (ref 3.5–5.2)
PROT SERPL-MCNC: 7.4 G/DL (ref 6–8.5)
PROTHROMBIN TIME: 26.8 SECONDS (ref 11.7–14.2)
RBC # BLD AUTO: 2.29 10*6/MM3 (ref 3.77–5.28)
SODIUM BLD-SCNC: 123 MMOL/L (ref 136–145)
URATE SERPL-MCNC: 2.4 MG/DL (ref 2.4–5.7)
WBC NRBC COR # BLD: 6.85 10*3/MM3 (ref 3.4–10.8)

## 2020-01-31 PROCEDURE — 99252 IP/OBS CONSLTJ NEW/EST SF 35: CPT | Performed by: INTERNAL MEDICINE

## 2020-01-31 PROCEDURE — 83735 ASSAY OF MAGNESIUM: CPT | Performed by: INTERNAL MEDICINE

## 2020-01-31 PROCEDURE — 94799 UNLISTED PULMONARY SVC/PX: CPT

## 2020-01-31 PROCEDURE — 25010000002 MAGNESIUM SULFATE IN D5W 1G/100ML (PREMIX) 1-5 GM/100ML-% SOLUTION: Performed by: INTERNAL MEDICINE

## 2020-01-31 PROCEDURE — 85610 PROTHROMBIN TIME: CPT | Performed by: INTERNAL MEDICINE

## 2020-01-31 PROCEDURE — 85025 COMPLETE CBC W/AUTO DIFF WBC: CPT | Performed by: INTERNAL MEDICINE

## 2020-01-31 PROCEDURE — 99232 SBSQ HOSP IP/OBS MODERATE 35: CPT | Performed by: INTERNAL MEDICINE

## 2020-01-31 PROCEDURE — 84100 ASSAY OF PHOSPHORUS: CPT | Performed by: INTERNAL MEDICINE

## 2020-01-31 PROCEDURE — 80053 COMPREHEN METABOLIC PANEL: CPT | Performed by: INTERNAL MEDICINE

## 2020-01-31 PROCEDURE — 84550 ASSAY OF BLOOD/URIC ACID: CPT | Performed by: INTERNAL MEDICINE

## 2020-01-31 RX ORDER — PHYTONADIONE 5 MG/1
2.5 TABLET ORAL ONCE
Status: COMPLETED | OUTPATIENT
Start: 2020-01-31 | End: 2020-01-31

## 2020-01-31 RX ORDER — MAGNESIUM SULFATE 1 G/100ML
1 INJECTION INTRAVENOUS
Status: COMPLETED | OUTPATIENT
Start: 2020-01-31 | End: 2020-01-31

## 2020-01-31 RX ORDER — DIPHENOXYLATE HYDROCHLORIDE AND ATROPINE SULFATE 2.5; .025 MG/1; MG/1
1 TABLET ORAL DAILY
Status: DISCONTINUED | OUTPATIENT
Start: 2020-01-31 | End: 2020-01-31 | Stop reason: HOSPADM

## 2020-01-31 RX ADMIN — Medication 1 TABLET: at 10:21

## 2020-01-31 RX ADMIN — NADOLOL 20 MG: 20 TABLET ORAL at 10:21

## 2020-01-31 RX ADMIN — PHYTONADIONE 2.5 MG: 5 TABLET ORAL at 10:24

## 2020-01-31 RX ADMIN — POTASSIUM CHLORIDE 20 MEQ: 750 CAPSULE, EXTENDED RELEASE ORAL at 08:52

## 2020-01-31 RX ADMIN — MAGNESIUM SULFATE 1 G: 1 INJECTION INTRAVENOUS at 09:01

## 2020-01-31 RX ADMIN — FUROSEMIDE 20 MG: 20 TABLET ORAL at 08:52

## 2020-01-31 RX ADMIN — MAGNESIUM SULFATE 1 G: 1 INJECTION INTRAVENOUS at 10:21

## 2020-01-31 RX ADMIN — PANTOPRAZOLE SODIUM 40 MG: 40 TABLET, DELAYED RELEASE ORAL at 06:34

## 2020-01-31 RX ADMIN — BUDESONIDE 0.5 MG: 0.5 INHALANT RESPIRATORY (INHALATION) at 08:22

## 2020-01-31 RX ADMIN — SUCRALFATE 1 G: 1 TABLET ORAL at 06:34

## 2020-01-31 RX ADMIN — LEVOTHYROXINE SODIUM 25 MCG: 100 TABLET ORAL at 06:13

## 2020-01-31 RX ADMIN — SPIRONOLACTONE 150 MG: 100 TABLET, FILM COATED ORAL at 08:51

## 2020-01-31 RX ADMIN — LEVOTHYROXINE SODIUM 200 MCG: 100 TABLET ORAL at 08:52

## 2020-01-31 RX ADMIN — LACTULOSE 45 ML: 10 SOLUTION ORAL at 08:52

## 2020-01-31 RX ADMIN — SODIUM CHLORIDE, PRESERVATIVE FREE 10 ML: 5 INJECTION INTRAVENOUS at 08:53

## 2020-01-31 RX ADMIN — RIFAXIMIN 550 MG: 550 TABLET ORAL at 08:52

## 2020-02-01 ENCOUNTER — READMISSION MANAGEMENT (OUTPATIENT)
Dept: CALL CENTER | Facility: HOSPITAL | Age: 45
End: 2020-02-01

## 2020-02-01 NOTE — OUTREACH NOTE
Prep Survey      Responses   Facility patient discharged from?  [facility]   Is patient eligible?  No   What are the reasons patient is not eligible?  Other   Does the patient have one of the following disease processes/diagnoses(primary or secondary)?  Other   Prep survey completed?  Yes          Delmis Sherman RN

## 2020-02-01 NOTE — DISCHARGE SUMMARY
[facility]  [phone]    DISCHARGE SUMMARY  [facility]    Patient Identification:  Name: Jaz Gipson  Age: 45 y.o.  Sex: female  :  1975  MRN: [id number]  Primary Care Physician: Head, LON Oswald    Admit date: 2020  Discharge date and time: 2020     Discharge Diagnoses:  Hepatic encephalopathy (CMS/HCC)    PIEDRA (nonalcoholic steatohepatitis)    Hypothyroidism    Hyperlipidemia    Esophageal varices in cirrhosis (CMS/HCC)    Thrombocytopenia (CMS/HCC)    Hypertension       History of present illness from H&P:  Patient is a 45-year-old female with past medical history remarkable for end-stage liver disease with cirrhosis due to underlying [place] was in her usual state of health until Friday evening when she started having nausea and vomiting.  She could not keep anything down and was unable to take her medications including her lactulose.  She continues to struggle and was getting significantly weaker since.  Last night she tried to force herself to have some lactulose as ordered but she could not and also was getting more yellow and confused.  With that she was brought to the emergency room for further evaluation and is being mated for further care.  Patient denies any hematemesis or melena denies any exposure to sick contact.    Hospital Course:     Patient was admitted to the hospital with nausea and vomiting during hospitalization patient is improved greatly.  Bilirubin level has improved.  Unfortunately her INR level is going up.  And her sodium level today is lower.  Requested that the patient stay in the hospital she understands the risks associated with hyponatremia and worsening liver function and the possibility of death.  I have offered patient transferred directly from [facility] to [facility] for possible liver transplant patient refuses wishes to go home and make arrangements to go to [facility] for liver transplant herself.   Patient is upset.  Her ammonia level has normalized and her mentation is normal.  I discussed patient's ability to make her decision with Dr. Hall and with the nurse.  It is thought patient is able to understand risks and benefits decision.    Patient is leaving AMA    Nausea vomiting with dehydration  -gi consult during hospitalization  -last CT of the abd 12/20/19; gastritis; ppi, carafate  -Monitor, no abd pain     Leukocytosis- blood cultures pending  -procal mildly elevated and lactic acid normal     Hyponatremia - nephrology following  -better     Hypokalemia provided with fluid restriction and potassium replacement.     Worsening jaundice -bili is stable  -hyperammonemia- lactulose continue her Xifaxan  -gi consult   -inr is worsening     Hypothyroidism-continue thyroid replacement therapy.  -nl free t4     Hypertension-monitor     Thrombocytopenia likely due to liver disease  -now plts in 20s  -consut CBC group     Anemia-multifactorial, plan is to monitor.     Cough since gabriela     Low magnesium- need to replace    The patient was seen and examined on the day of discharge.    Consults:   Consults     Date and Time Order Name Status Description    1/30/2020 0935 Inpatient Infectious Diseases Consult Completed     1/27/2020 0846 Hematology & Oncology Inpatient Consult Completed     1/27/2020 0832 Inpatient Nephrology Consult Completed     1/26/2020 2006 Inpatient Gastroenterology Consult Completed     1/26/2020 1532 [facility] (on-call MD unless specified) Details Completed           Results from last 7 days   Lab Units 01/31/20  0527   WBC 10*3/mm3 6.85   HEMOGLOBIN g/dL 7.9*   HEMATOCRIT % 21.9*   PLATELETS 10*3/mm3 27*       Results from last 7 days   Lab Units 01/31/20  0527   SODIUM mmol/L 123*   POTASSIUM mmol/L 3.6   CHLORIDE mmol/L 97*   CO2 mmol/L 19.1*   BUN mg/dL 5*   CREATININE mg/dL 0.50*   GLUCOSE mg/dL 124*   CALCIUM mg/dL 7.5*       Significant Diagnostic Studies:   WBC   Date Value Ref Range  Status   01/31/2020 6.85 3.40 - 10.80 10*3/mm3 Final     Hemoglobin   Date Value Ref Range Status   01/31/2020 7.9 (L) 12.0 - 15.9 g/dL Final     Hematocrit   Date Value Ref Range Status   01/31/2020 21.9 (L) 34.0 - 46.6 % Final     Platelets   Date Value Ref Range Status   01/31/2020 27 (C) 140 - 450 10*3/mm3 Final     Sodium   Date Value Ref Range Status   01/31/2020 123 (L) 136 - 145 mmol/L Final     Potassium   Date Value Ref Range Status   01/31/2020 3.6 3.5 - 5.2 mmol/L Final     Chloride   Date Value Ref Range Status   01/31/2020 97 (L) 98 - 107 mmol/L Final     CO2   Date Value Ref Range Status   01/31/2020 19.1 (L) 22.0 - 29.0 mmol/L Final     BUN   Date Value Ref Range Status   01/31/2020 5 (L) 6 - 20 mg/dL Final     Creatinine   Date Value Ref Range Status   01/31/2020 0.50 (L) 0.57 - 1.00 mg/dL Final     Glucose   Date Value Ref Range Status   01/31/2020 124 (H) 65 - 99 mg/dL Final     Calcium   Date Value Ref Range Status   01/31/2020 7.5 (L) 8.6 - 10.5 mg/dL Final     Magnesium   Date Value Ref Range Status   01/31/2020 1.7 1.6 - 2.6 mg/dL Final     Phosphorus   Date Value Ref Range Status   01/31/2020 2.8 2.5 - 4.5 mg/dL Final     AST (SGOT)   Date Value Ref Range Status   01/31/2020 46 (H) 1 - 32 U/L Final     ALT (SGPT)   Date Value Ref Range Status   01/31/2020 18 1 - 33 U/L Final     Alkaline Phosphatase   Date Value Ref Range Status   01/31/2020 180 (H) 39 - 117 U/L Final     INR   Date Value Ref Range Status   01/31/2020 2.51 (H) 0.90 - 1.10 Final     No results found for: COLORU, CLARITYU, SPECGRAV, PHUR, PROTEINUR, GLUCOSEU, KETONESU, BLOODU, NITRITE, LEUKOCYTESUR, BILIRUBINUR, UROBILINOGEN, RBCUA, WBCUA, BACTERIA, UACOMMENT  No results found for: TROPONINT, TROPONINI, BNP  No components found for: HGBA1C;2  No components found for: TSH;2    Imaging Results (All)     Procedure Component Value Units Date/Time    XR Chest PA & Lateral [[id number]] Collected:  01/30/20 1327     Updated:  01/30/20  1338    Narrative:       XR CHEST PA AND LATERAL-     HISTORY: Female who is 45 years-old,  pneumonia     TECHNIQUE: Frontal and lateral views of the chest     COMPARISON: 01/27/2020     FINDINGS: Heart size is normal. Mild prominence of vascular and  interstitial markings. Small left basilar atelectasis or infiltrate has  developed, follow-up suggested. No pleural effusion, or pneumothorax. No  acute osseous process.          Impression:       Small left basilar atelectasis or infiltrate.     This report was finalized on 1/30/2020 1:35 PM by Dr. Carlos Fields M.D.       US Liver [[id number]] Collected:  01/27/20 1558     Updated:  01/27/20 1604    Narrative:       US LIVER-     INDICATIONS: Cirrhosis, elevated bilirubin     TECHNIQUE: Ultrasound of the right upper quadrant     COMPARISON: CT from 12/20/2019     FINDINGS:     The gallbladder is absent.     No intrahepatic or extrahepatic biliary ductal dilatation is  demonstrated. The common biliary duct caliber is measured at 5 mm.     A nodular contour and coarsened echotexture of the liver, recanalized  umbilical vein, compatible with stated history of cirrhosis. Minimal  perihepatic ascites. No discrete liver lesion is identified.     The pancreas is partly obscured. The right kidney, 12.1 cm, and the  pancreas otherwise appear unremarkable.                Impression:          Cirrhotic changes of the liver with small perihepatic ascites. Status  post cholecystectomy. No biliary ductal dilatation.     This report was finalized on 1/27/2020 4:01 PM by Dr. Carlos Fields M.D.       XR Chest PA & Lateral [[id number]] Collected:  01/27/20 1222     Updated:  01/27/20 1227    Narrative:       XR CHEST PA AND LATERAL-     HISTORY: Female who is 45 years-old,  cough     TECHNIQUE: Frontal and lateral views of the chest     COMPARISON: 12/27/2019     FINDINGS: Heart size is normal. Mild prominence of vascular and  interstitial markings. No focal pulmonary  consolidation, pleural  effusion, or pneumothorax. No acute osseous process.       Impression:       No focal pulmonary consolidation. Mild prominence of  vascular and interstitial markings.     This report was finalized on 1/27/2020 12:23 PM by Dr. Carlos Fields M.D.         No results found for: SITE, ALLENTEST, PHART, DNJ6LYJ, PO2ART, LKM0NUH, BASEEXCESS, O7PNVBOQ, HGBBG, HCTABG, OXYHEMOGLOBI, METHHGBN, CARBOXYHGB, CO2CT, BAROMETRIC, MODALITY, FIO2       Discharge Medications      ASK your doctor about these medications      Instructions Start Date   fluticasone 44 MCG/ACT inhaler  Commonly known as:  FLOVENT HFA   1 puff, Inhalation, 2 Times Daily - RT      furosemide 40 MG tablet  Commonly known as:  LASIX   40 mg, Oral, Daily      lactulose 10 GM/15ML solution  Commonly known as:  CHRONULAC   45 mL, Oral, Every 12 Hours      levothyroxine 25 MCG tablet  Commonly known as:  SYNTHROID, LEVOTHROID   25 mcg, Oral, Daily      levothyroxine 200 MCG tablet  Commonly known as:  SYNTHROID, LEVOTHROID   TAKE ONE TABLET BY MOUTH DAILY      nadolol 20 MG tablet  Commonly known as:  CORGARD   20 mg, Oral, Daily      pantoprazole 40 MG EC tablet  Commonly known as:  PROTONIX   40 mg, Oral, 2 Times Daily Before Meals      potassium chloride 20 MEQ CR tablet  Commonly known as:  K-DUR,KLOR-CON  Ask about: Which instructions should I use?   40 mEq, Oral, 2 Times Daily      riFAXIMin 550 MG tablet  Commonly known as:  XIFAXAN   550 mg, Oral, Every 12 Hours Scheduled      spironolactone 50 MG tablet  Commonly known as:  ALDACTONE   150 mg, Oral, Daily      sucralfate 1 g tablet  Commonly known as:  CARAFATE   TAKE ONE TABLET BY MOUTH TWICE A DAY      vitamin D 1.25 MG (11721 UT) capsule capsule  Commonly known as:  ERGOCALCIFEROL   50,000 Units, Oral, Weekly               Patient Instructions:       Future Appointments   Date Time Provider Department Center   2/7/2020  2:00 PM Head, LON Oswald [facility] None    2/25/2020  2:00 PM Annie Tierney MD [facility] None        Follow-up Information     Head, LON Oswald. Go on 2/3/2020.    Specialty:  Nurse Practitioner  Why:  Appointment time is 11:30 am.  Contact information:  [address] [facility] [id number]  [phone]                   Discharge Order (From admission, onward)    None          No active diet order      TEST  RESULTS PENDING AT DISCHARGE   Order Current Status    Blood Culture - Blood, Arm, Left Preliminary result    Blood Culture - Blood, Arm, Right Preliminary result            Discharge instructions:  Follow up with your primary care provider in 1-2 weeks with a cbc and cmp     Patient left AMA, I requested for the patient to stay secondary to worsening INR as well as worsening sodium level, and discussed with the patient the possibility of death if she is discharged    Total time spent discharging patient including evaluation, post hospitalization follow up,  medication and post hospitalization instructions and education, total time exceeds 30 minutes.    Signed:  Julián Loya MD  2/1/2020  6:16 PM

## 2020-02-04 LAB
BACTERIA SPEC AEROBE CULT: NORMAL
BACTERIA SPEC AEROBE CULT: NORMAL

## 2020-02-05 DIAGNOSIS — E55.9 VITAMIN D DEFICIENCY: ICD-10-CM

## 2020-02-06 RX ORDER — ERGOCALCIFEROL 1.25 MG/1
CAPSULE ORAL
Qty: 4 CAPSULE | Refills: 2 | Status: SHIPPED | OUTPATIENT
Start: 2020-02-06 | End: 2020-05-09 | Stop reason: SDUPTHER

## 2020-02-07 ENCOUNTER — OFFICE VISIT (OUTPATIENT)
Dept: FAMILY MEDICINE CLINIC | Facility: CLINIC | Age: 45
End: 2020-02-07

## 2020-02-07 VITALS
BODY MASS INDEX: 28 KG/M2 | RESPIRATION RATE: 16 BRPM | SYSTOLIC BLOOD PRESSURE: 126 MMHG | OXYGEN SATURATION: 98 % | HEIGHT: 64 IN | TEMPERATURE: 99.1 F | WEIGHT: 164 LBS | HEART RATE: 110 BPM | DIASTOLIC BLOOD PRESSURE: 80 MMHG

## 2020-02-07 DIAGNOSIS — D69.6 THROMBOCYTOPENIA (HCC): ICD-10-CM

## 2020-02-07 DIAGNOSIS — K74.69 OTHER CIRRHOSIS OF LIVER (HCC): Primary | ICD-10-CM

## 2020-02-07 DIAGNOSIS — E03.9 HYPOTHYROIDISM, UNSPECIFIED TYPE: ICD-10-CM

## 2020-02-07 DIAGNOSIS — K75.81 NASH (NONALCOHOLIC STEATOHEPATITIS): ICD-10-CM

## 2020-02-07 PROCEDURE — 99213 OFFICE O/P EST LOW 20 MIN: CPT | Performed by: NURSE PRACTITIONER

## 2020-02-07 RX ORDER — LEVOTHYROXINE SODIUM 0.2 MG/1
200 TABLET ORAL DAILY
Qty: 30 TABLET | Refills: 2 | Status: SHIPPED | OUTPATIENT
Start: 2020-02-07 | End: 2020-04-20 | Stop reason: DRUGHIGH

## 2020-02-07 NOTE — PROGRESS NOTES
Patient ID: Jaz Gipson is a 45 y.o. female     Subjective     Chief Complaint   Patient presents with   • Hospital f/u   • Hepatic encephalopathy       History of Present Illness    Hospital stay:  Admit date:  1/26/2020  Discharge date:  1/31/2020    Jaz Gipson presents to the office today for follow-up after being seen in the hospital.  She went to [facility] on January 26, 2020 due to increased nausea and vomiting.  She has history of cirrhosis of liver.  She usually takes lactulose and rifaximin.  She was unable to keep medications down due to vomiting.  She developed hepatic encephalopathy.  She was admitted for further evaluation.  Condition improved during hospitalization.  However she left against medical device.  Needed continuing monitoring due to worsening liver function test along with hyponatremia and increase in INR levels.  Patient refused further testing and signed out AMA.  At the time of discharge her ammonia level had normalized and her mentation was normal.  She was also being followed by gastroenterology during hospitalization.  There was discussion of needing EGD completed however her platelet count is too low for testing.  I recommended her continuing lactulose and rifaximin as directed.  She states since discharge from the hospital, her condition has continued to be stabilized.  She has had no further nausea or vomiting.  Currently she is on fluid restriction due to hyponatremia.    Had flu 12/26/2019 and has had a continued dry nagging cough since then.  She has been afebrile.  No shortness of breath.  She has scheduled an appointment with the liver transplant team on Tuesday.  States during that appointment she is scheduled to have blood work completed.  She also has follow-up scheduled with Dr. Tierney with gastroenterology on 2/25/2020.    She denies any complaints of fever, chills, cough, chest pain, shortness of air, abdominal pain, or any other concerns.     The  following portions of the patient's history were reviewed and updated as appropriate: allergies, current medications, past family history, past medical history, past social history, past surgical history and problem list.       Review of Systems   Constitution: Negative.   HENT: Negative.    Eyes: Negative.    Cardiovascular: Negative.    Respiratory: Negative.    Endocrine: Negative.    Hematologic/Lymphatic: Negative.    Skin: Negative.    Musculoskeletal: Negative.    Gastrointestinal: Positive for nausea and vomiting.   Genitourinary: Negative.    Neurological: Negative.    Psychiatric/Behavioral: Negative.        Vitals:    02/07/20 1355   BP: 126/80   Pulse: 110   Resp: 16   Temp: 99.1 °F (37.3 °C)   SpO2: 98%       Documented weights    02/07/20 1355   Weight: 74.4 kg (164 lb)     Body mass index is 28.15 kg/m².    Results for orders placed or performed during the hospital encounter of 01/26/20   Blood Culture - Blood, Arm, Right   Result Value Ref Range    Blood Culture No growth at 5 days    Blood Culture - Blood, Arm, Left   Result Value Ref Range    Blood Culture No growth at 5 days    Comprehensive Metabolic Panel   Result Value Ref Range    Glucose 93 65 - 99 mg/dL    BUN 7 6 - 20 mg/dL    Creatinine 0.61 0.57 - 1.00 mg/dL    Sodium 123 (L) 136 - 145 mmol/L    Potassium 3.1 (L) 3.5 - 5.2 mmol/L    Chloride 86 (L) 98 - 107 mmol/L    CO2 24.4 22.0 - 29.0 mmol/L    Calcium 8.0 (L) 8.6 - 10.5 mg/dL    Total Protein 7.9 6.0 - 8.5 g/dL    Albumin 1.90 (L) 3.50 - 5.20 g/dL    ALT (SGPT) 22 1 - 33 U/L    AST (SGOT) 57 (H) 1 - 32 U/L    Alkaline Phosphatase 238 (H) 39 - 117 U/L    Total Bilirubin 11.2 (H) 0.2 - 1.2 mg/dL    eGFR Non African Amer 107 >60 mL/min/1.73    Globulin 6.0 gm/dL    A/G Ratio 0.3 g/dL    BUN/Creatinine Ratio 11.5 7.0 - 25.0    Anion Gap 12.6 5.0 - 15.0 mmol/L   Lipase   Result Value Ref Range    Lipase 27 13 - 60 U/L   Urinalysis With Microscopic If Indicated (No Culture) - Urine, Clean  Catch   Result Value Ref Range    Color, UA Dark Yellow (A) Yellow, Straw    Appearance, UA Clear Clear    pH, UA 7.0 5.0 - 8.0    Specific Gravity, UA <=1.005 1.005 - 1.030    Glucose, UA Negative Negative    Ketones, UA Negative Negative    Bilirubin, UA Large (3+) (A) Negative    Blood, UA Trace (A) Negative    Protein, UA Negative Negative    Leuk Esterase, UA Negative Negative    Nitrite, UA Positive (A) Negative    Urobilinogen, UA 1.0 E.U./dL 0.2 - 1.0 E.U./dL   Ammonia   Result Value Ref Range    Ammonia 109 (H) 11 - 51 umol/L   CBC Auto Differential   Result Value Ref Range    WBC 7.29 3.40 - 10.80 10*3/mm3    RBC 2.83 (L) 3.77 - 5.28 10*6/mm3    Hemoglobin 9.8 (L) 12.0 - 15.9 g/dL    Hematocrit 26.1 (L) 34.0 - 46.6 %    MCV 92.2 79.0 - 97.0 fL    MCH 34.6 (H) 26.6 - 33.0 pg    MCHC 37.5 (H) 31.5 - 35.7 g/dL    RDW 12.4 12.3 - 15.4 %    RDW-SD 42.1 37.0 - 54.0 fl    MPV 13.0 (H) 6.0 - 12.0 fL    Platelets 33 (C) 140 - 450 10*3/mm3   Urinalysis, Microscopic Only - Urine, Clean Catch   Result Value Ref Range    RBC, UA 0-2 None Seen, 0-2 /HPF    WBC, UA 0-2 None Seen, 0-2 /HPF    Bacteria, UA None Seen None Seen /HPF    Squamous Epithelial Cells, UA 7-12 (A) None Seen, 0-2 /HPF    Hyaline Casts, UA 0-2 None Seen /LPF    Methodology Manual Light Microscopy    Comprehensive Metabolic Panel   Result Value Ref Range    Glucose 113 (H) 65 - 99 mg/dL    BUN 5 (L) 6 - 20 mg/dL    Creatinine 0.57 0.57 - 1.00 mg/dL    Sodium 125 (L) 136 - 145 mmol/L    Potassium 2.8 (L) 3.5 - 5.2 mmol/L    Chloride 95 (L) 98 - 107 mmol/L    CO2 21.7 (L) 22.0 - 29.0 mmol/L    Calcium 7.7 (L) 8.6 - 10.5 mg/dL    Total Protein 7.0 6.0 - 8.5 g/dL    Albumin 1.30 (L) 3.50 - 5.20 g/dL    ALT (SGPT) 16 1 - 33 U/L    AST (SGOT) 47 (H) 1 - 32 U/L    Alkaline Phosphatase 197 (H) 39 - 117 U/L    Total Bilirubin 10.2 (H) 0.2 - 1.2 mg/dL    eGFR Non African Amer 116 >60 mL/min/1.73    Globulin 5.7 gm/dL    A/G Ratio 0.2 g/dL    BUN/Creatinine  Ratio 8.8 7.0 - 25.0    Anion Gap 8.3 5.0 - 15.0 mmol/L   CBC Auto Differential   Result Value Ref Range    WBC 4.13 3.40 - 10.80 10*3/mm3    RBC 2.50 (L) 3.77 - 5.28 10*6/mm3    Hemoglobin 8.6 (L) 12.0 - 15.9 g/dL    Hematocrit 23.3 (L) 34.0 - 46.6 %    MCV 93.2 79.0 - 97.0 fL    MCH 34.4 (H) 26.6 - 33.0 pg    MCHC 36.9 (H) 31.5 - 35.7 g/dL    RDW 12.2 (L) 12.3 - 15.4 %    RDW-SD 40.9 37.0 - 54.0 fl    MPV 12.9 (H) 6.0 - 12.0 fL    Platelets 26 (C) 140 - 450 10*3/mm3    Neutrophil % 56.7 42.7 - 76.0 %    Lymphocyte % 19.6 19.6 - 45.3 %    Monocyte % 16.0 (H) 5.0 - 12.0 %    Eosinophil % 6.5 (H) 0.3 - 6.2 %    Basophil % 0.7 0.0 - 1.5 %    Immature Grans % 0.5 0.0 - 0.5 %    Neutrophils, Absolute 2.34 1.70 - 7.00 10*3/mm3    Lymphocytes, Absolute 0.81 0.70 - 3.10 10*3/mm3    Monocytes, Absolute 0.66 0.10 - 0.90 10*3/mm3    Eosinophils, Absolute 0.27 0.00 - 0.40 10*3/mm3    Basophils, Absolute 0.03 0.00 - 0.20 10*3/mm3    Immature Grans, Absolute 0.02 0.00 - 0.05 10*3/mm3    nRBC 0.0 0.0 - 0.2 /100 WBC   Ammonia   Result Value Ref Range    Ammonia 120 (H) 11 - 51 umol/L   TSH   Result Value Ref Range    TSH 0.012 (L) 0.270 - 4.200 uIU/mL   T4, Free   Result Value Ref Range    Free T4 1.49 0.93 - 1.70 ng/dL   Methylmalonic Acid, Serum   Result Value Ref Range    Methylmalonic Acid 155 0 - 378 nmol/L    Disclaimer: Comment    Vitamin B12   Result Value Ref Range    Vitamin B-12 1,271 (H) 211 - 946 pg/mL   Folate   Result Value Ref Range    Folate 5.47 4.78 - 24.20 ng/mL   Folate RBC   Result Value Ref Range    Folate, Hemolysate 516.0 Not Estab. ng/mL    Hematocrit 24.5 (L) 34.0 - 46.6 %    RBC Folate 2106 >498 ng/mL   Immature Platelet Fraction   Result Value Ref Range    IPF 7.10 (H) 0.90 - 6.50 %    Platelets 32 (C) 140 - 450 10*3/mm3   Ferritin   Result Value Ref Range    Ferritin 861.00 (H) 13.00 - 150.00 ng/mL   Iron Profile   Result Value Ref Range    Iron 78 37 - 145 mcg/dL    Iron Saturation 87 (H) 20 - 50 %     Transferrin 60 (L) 200 - 360 mg/dL    TIBC 89 (L) 298 - 536 mcg/dL   Protime-INR   Result Value Ref Range    Protime 23.3 (H) 11.7 - 14.2 Seconds    INR 2.11 (H) 0.90 - 1.10   Osmolality, Urine - Urine, Clean Catch   Result Value Ref Range    Osmolality, Urine 347 mOsm/kg   Osmolality, Serum   Result Value Ref Range    Osmolality 272 (L) 275 - 300 mOsm/kg   Sodium, Urine, Random -   Result Value Ref Range    Sodium, Urine <20 mmol/L   Chloride, Urine, Random -   Result Value Ref Range    Chloride, Urine <20 mmol/L   Potassium   Result Value Ref Range    Potassium 3.4 (L) 3.5 - 5.2 mmol/L   Magnesium   Result Value Ref Range    Magnesium 1.8 1.6 - 2.6 mg/dL   Phosphorus   Result Value Ref Range    Phosphorus 2.9 2.5 - 4.5 mg/dL   Uric Acid   Result Value Ref Range    Uric Acid 2.6 2.4 - 5.7 mg/dL   CBC Auto Differential   Result Value Ref Range    WBC 4.22 3.40 - 10.80 10*3/mm3    RBC 2.59 (L) 3.77 - 5.28 10*6/mm3    Hemoglobin 9.0 (L) 12.0 - 15.9 g/dL    Hematocrit 24.0 (L) 34.0 - 46.6 %    MCV 92.7 79.0 - 97.0 fL    MCH 34.7 (H) 26.6 - 33.0 pg    MCHC 37.5 (H) 31.5 - 35.7 g/dL    RDW 12.8 12.3 - 15.4 %    RDW-SD 42.3 37.0 - 54.0 fl    MPV 12.7 (H) 6.0 - 12.0 fL    Platelets 29 (C) 140 - 450 10*3/mm3   Ammonia   Result Value Ref Range    Ammonia 95 (H) 11 - 51 umol/L   Comprehensive Metabolic Panel   Result Value Ref Range    Glucose 72 65 - 99 mg/dL    BUN 6 6 - 20 mg/dL    Creatinine 0.49 (L) 0.57 - 1.00 mg/dL    Sodium 127 (L) 136 - 145 mmol/L    Potassium 4.1 3.5 - 5.2 mmol/L    Chloride 98 98 - 107 mmol/L    CO2 21.8 (L) 22.0 - 29.0 mmol/L    Calcium 7.8 (L) 8.6 - 10.5 mg/dL    Total Protein 7.8 6.0 - 8.5 g/dL    Albumin 1.60 (L) 3.50 - 5.20 g/dL    ALT (SGPT) 18 1 - 33 U/L    AST (SGOT) 52 (H) 1 - 32 U/L    Alkaline Phosphatase 218 (H) 39 - 117 U/L    Total Bilirubin 8.7 (H) 0.2 - 1.2 mg/dL    eGFR Non African Amer 138 >60 mL/min/1.73    Globulin 6.2 gm/dL    A/G Ratio 0.3 g/dL    BUN/Creatinine Ratio 12.2  7.0 - 25.0    Anion Gap 7.2 5.0 - 15.0 mmol/L   Protime-INR   Result Value Ref Range    Protime 22.3 (H) 11.7 - 14.2 Seconds    INR 1.99 (H) 0.90 - 1.10   Comprehensive Metabolic Panel   Result Value Ref Range    Glucose 124 (H) 65 - 99 mg/dL    BUN 4 (L) 6 - 20 mg/dL    Creatinine 0.48 (L) 0.57 - 1.00 mg/dL    Sodium 129 (L) 136 - 145 mmol/L    Potassium 3.7 3.5 - 5.2 mmol/L    Chloride 101 98 - 107 mmol/L    CO2 19.8 (L) 22.0 - 29.0 mmol/L    Calcium 7.4 (L) 8.6 - 10.5 mg/dL    Total Protein 7.6 6.0 - 8.5 g/dL    Albumin 1.60 (L) 3.50 - 5.20 g/dL    ALT (SGPT) 19 1 - 33 U/L    AST (SGOT) 57 (H) 1 - 32 U/L    Alkaline Phosphatase 200 (H) 39 - 117 U/L    Total Bilirubin 6.5 (H) 0.2 - 1.2 mg/dL    eGFR Non African Amer 141 >60 mL/min/1.73    Globulin 6.0 gm/dL    A/G Ratio 0.3 g/dL    BUN/Creatinine Ratio 8.3 7.0 - 25.0    Anion Gap 8.2 5.0 - 15.0 mmol/L   Protime-INR   Result Value Ref Range    Protime 22.7 (H) 11.7 - 14.2 Seconds    INR 2.04 (H) 0.90 - 1.10   Ammonia   Result Value Ref Range    Ammonia 49 11 - 51 umol/L   Uric Acid   Result Value Ref Range    Uric Acid 2.6 2.4 - 5.7 mg/dL   Magnesium   Result Value Ref Range    Magnesium 1.5 (L) 1.6 - 2.6 mg/dL   CBC Auto Differential   Result Value Ref Range    WBC 13.89 (H) 3.40 - 10.80 10*3/mm3    RBC 2.33 (L) 3.77 - 5.28 10*6/mm3    Hemoglobin 8.2 (L) 12.0 - 15.9 g/dL    Hematocrit 22.1 (L) 34.0 - 46.6 %    MCV 94.8 79.0 - 97.0 fL    MCH 35.2 (H) 26.6 - 33.0 pg    MCHC 37.1 (H) 31.5 - 35.7 g/dL    RDW 13.0 12.3 - 15.4 %    RDW-SD 43.3 37.0 - 54.0 fl    MPV 11.8 6.0 - 12.0 fL    Platelets 30 (C) 140 - 450 10*3/mm3   Comprehensive Metabolic Panel   Result Value Ref Range    Glucose 86 65 - 99 mg/dL    BUN 5 (L) 6 - 20 mg/dL    Creatinine 0.61 0.57 - 1.00 mg/dL    Sodium 128 (L) 136 - 145 mmol/L    Potassium 3.8 3.5 - 5.2 mmol/L    Chloride 97 (L) 98 - 107 mmol/L    CO2 20.4 (L) 22.0 - 29.0 mmol/L    Calcium 7.5 (L) 8.6 - 10.5 mg/dL    Total Protein 7.1 6.0 - 8.5  g/dL    Albumin 1.70 (L) 3.50 - 5.20 g/dL    ALT (SGPT) 18 1 - 33 U/L    AST (SGOT) 59 (H) 1 - 32 U/L    Alkaline Phosphatase 200 (H) 39 - 117 U/L    Total Bilirubin 6.5 (H) 0.2 - 1.2 mg/dL    eGFR Non African Amer 107 >60 mL/min/1.73    Globulin 5.4 gm/dL    A/G Ratio 0.3 g/dL    BUN/Creatinine Ratio 8.2 7.0 - 25.0    Anion Gap 10.6 5.0 - 15.0 mmol/L   Protime-INR   Result Value Ref Range    Protime 25.2 (H) 11.7 - 14.2 Seconds    INR 2.33 (H) 0.90 - 1.10   Phosphorus   Result Value Ref Range    Phosphorus 3.3 2.5 - 4.5 mg/dL   Procalcitonin   Result Value Ref Range    Procalcitonin 0.29 (H) 0.10 - 0.25 ng/mL   Lactic Acid, Plasma   Result Value Ref Range    Lactate 1.7 0.5 - 2.0 mmol/L   Uric Acid   Result Value Ref Range    Uric Acid 2.4 2.4 - 5.7 mg/dL   Phosphorus   Result Value Ref Range    Phosphorus 2.8 2.5 - 4.5 mg/dL   Magnesium   Result Value Ref Range    Magnesium 1.7 1.6 - 2.6 mg/dL   Comprehensive Metabolic Panel   Result Value Ref Range    Glucose 124 (H) 65 - 99 mg/dL    BUN 5 (L) 6 - 20 mg/dL    Creatinine 0.50 (L) 0.57 - 1.00 mg/dL    Sodium 123 (L) 136 - 145 mmol/L    Potassium 3.6 3.5 - 5.2 mmol/L    Chloride 97 (L) 98 - 107 mmol/L    CO2 19.1 (L) 22.0 - 29.0 mmol/L    Calcium 7.5 (L) 8.6 - 10.5 mg/dL    Total Protein 7.4 6.0 - 8.5 g/dL    Albumin 1.50 (L) 3.50 - 5.20 g/dL    ALT (SGPT) 18 1 - 33 U/L    AST (SGOT) 46 (H) 1 - 32 U/L    Alkaline Phosphatase 180 (H) 39 - 117 U/L    Total Bilirubin 6.5 (H) 0.2 - 1.2 mg/dL    eGFR Non African Amer 135 >60 mL/min/1.73    Globulin 5.9 gm/dL    A/G Ratio 0.3 g/dL    BUN/Creatinine Ratio 10.0 7.0 - 25.0    Anion Gap 6.9 5.0 - 15.0 mmol/L   Protime-INR   Result Value Ref Range    Protime 26.8 (H) 11.7 - 14.2 Seconds    INR 2.51 (H) 0.90 - 1.10   CBC Auto Differential   Result Value Ref Range    WBC 6.85 3.40 - 10.80 10*3/mm3    RBC 2.29 (L) 3.77 - 5.28 10*6/mm3    Hemoglobin 7.9 (L) 12.0 - 15.9 g/dL    Hematocrit 21.9 (L) 34.0 - 46.6 %    MCV 95.6 79.0 -  97.0 fL    MCH 34.5 (H) 26.6 - 33.0 pg    MCHC 36.1 (H) 31.5 - 35.7 g/dL    RDW 13.0 12.3 - 15.4 %    RDW-SD 44.7 37.0 - 54.0 fl    MPV 12.6 (H) 6.0 - 12.0 fL    Platelets 27 (C) 140 - 450 10*3/mm3    Neutrophil % 64.5 42.7 - 76.0 %    Lymphocyte % 15.6 (L) 19.6 - 45.3 %    Monocyte % 14.2 (H) 5.0 - 12.0 %    Eosinophil % 4.5 0.3 - 6.2 %    Basophil % 0.6 0.0 - 1.5 %    Neutrophils, Absolute 4.42 1.70 - 7.00 10*3/mm3    Lymphocytes, Absolute 1.07 0.70 - 3.10 10*3/mm3    Monocytes, Absolute 0.97 (H) 0.10 - 0.90 10*3/mm3    Eosinophils, Absolute 0.31 0.00 - 0.40 10*3/mm3    Basophils, Absolute 0.04 0.00 - 0.20 10*3/mm3   Light Blue Top   Result Value Ref Range    Extra Tube hold for add-on    Green Top (Gel)   Result Value Ref Range    Extra Tube Hold for add-ons.    Lavender Top   Result Value Ref Range    Extra Tube hold for add-on    Gold Top - SST   Result Value Ref Range    Extra Tube Hold for add-ons.    Manual Differential   Result Value Ref Range    Neutrophil % 88.8 (H) 42.7 - 76.0 %    Lymphocyte % 2.0 (L) 19.6 - 45.3 %    Monocyte % 8.2 5.0 - 12.0 %    Eosinophil % 1.0 0.3 - 6.2 %    Neutrophils Absolute 6.47 1.70 - 7.00 10*3/mm3    Lymphocytes Absolute 0.15 (L) 0.70 - 3.10 10*3/mm3    Monocytes Absolute 0.60 0.10 - 0.90 10*3/mm3    Eosinophils Absolute 0.07 0.00 - 0.40 10*3/mm3    [place] Cells Mod/2+ None Seen    Poikilocytes Large/3+ None Seen    WBC Morphology Normal Normal    Platelet Morphology Normal Normal   Manual Differential   Result Value Ref Range    Neutrophil % 81.1 (H) 42.7 - 76.0 %    Lymphocyte % 5.3 (L) 19.6 - 45.3 %    Monocyte % 8.4 5.0 - 12.0 %    Eosinophil % 5.3 0.3 - 6.2 %    Neutrophils Absolute 3.42 1.70 - 7.00 10*3/mm3    Lymphocytes Absolute 0.22 (L) 0.70 - 3.10 10*3/mm3    Monocytes Absolute 0.35 0.10 - 0.90 10*3/mm3    Eosinophils Absolute 0.22 0.00 - 0.40 10*3/mm3    Anisocytosis Mod/2+ None Seen    [place] Cells Mod/2+ None Seen    Poikilocytes Mod/2+ None Seen    [place]udge Cells  Large/3+ None Seen    Platelet Morphology Normal Normal   Manual Differential   Result Value Ref Range    Neutrophil % 93.0 (H) 42.7 - 76.0 %    Lymphocyte % 4.0 (L) 19.6 - 45.3 %    Monocyte % 2.0 (L) 5.0 - 12.0 %    Eosinophil % 1.0 0.3 - 6.2 %    Neutrophils Absolute 12.92 (H) 1.70 - 7.00 10*3/mm3    Lymphocytes Absolute 0.56 (L) 0.70 - 3.10 10*3/mm3    Monocytes Absolute 0.28 0.10 - 0.90 10*3/mm3    Eosinophils Absolute 0.14 0.00 - 0.40 10*3/mm3    Anisocytosis Mod/2+ None Seen    Macrocytes Mod/2+ None Seen    WBC Morphology Normal Normal    Platelet Morphology Normal Normal   Duplex Portal Hepatic Complete CAR   Result Value Ref Range    Portal Vein Diameter 0.96 cm    SMA Hepatic PSV 75.6 cm/s    SMA Hepatic EDV 25.2 cm/s    SMA Splenic  cm/s    SMA Splenic EDV 42.4 cm/s       Objective     Physical Exam   Constitutional: She is oriented to person, place, and time. She appears ill. No distress.   jaundiced   HENT:   Head: Normocephalic and atraumatic.   Eyes: Pupils are equal, round, and reactive to light. EOM are normal.   Sclera icterus present   Neck: Normal range of motion.   Cardiovascular: Regular rhythm. Tachycardia present.   Pulmonary/Chest: Effort normal and breath sounds normal. No respiratory distress. She has no wheezes.   Abdominal: Soft. Normal appearance and bowel sounds are normal. There is no hepatosplenomegaly. There is no tenderness.   Musculoskeletal: Normal range of motion. She exhibits no edema.   Lymphadenopathy:     She has no cervical adenopathy.   Neurological: She is alert and oriented to person, place, and time. No cranial nerve deficit.   Skin:   Jaundiced   Psychiatric: She has a normal mood and affect.     Current outpatient and discharge medications have been reconciled for the patient.  Reviewed by: Cheli Noonan APRN    Assessment/Plan     Assessment/Plan     Jaz was seen today for hospital f/u and hepatic encephalopathy.    Diagnoses and all orders for this  visit:    Other cirrhosis of liver (CMS/HCC)    Hypothyroidism, unspecified type  -     levothyroxine (SYNTHROID, LEVOTHROID) 200 MCG tablet; Take 1 tablet by mouth Daily.  -     T3, Free; Future  -     T4, Free; Future  -     TSH; Future    PIEDRA (nonalcoholic steatohepatitis)    Thrombocytopenia (CMS/HCC)        Summary:  Jaz Gipson presented to office today for hospital follow-up appointment.  She states her condition has improved since discharge from the hospital.  It is reassuring she has appointment with the liver transplant team on Tuesday.  At that time she is having blood work completed.  Will review results once completed.  Instruct continue lactulose and rifaximin as directed.  Also she states she is in need for refill of her levothyroxine.  She was taking levothyroxine 225 mcg daily.  During hospitalization her TSH level was too low at 0.012.  However her free T4 was normal.  Due to low TSH level, instructed will decrease levothyroxine to 200 mcg daily.  Will repeat TSH with free T3 and free T4 in 6 to 8 weeks.  We will call her with results. Continue fluid restriction of 1000 cc/day due to hyponatremia until repeat labs completed. Recommended to take OTC Delsym as needed for cough.  Sounds like more related to dryness due to fluid restriction.  She is also to notify us if for some reason she is unable to make it into appointment to the liver transplant team in order to recheck needed labs.  Also if her condition worsens she needs to report directly to the emergency room.  She should follow-up with Dr. Tierney as scheduled.      In the meantime, instructed to contact us sooner for any problems or concerns.    Cheli Noonan, APRN  Family Medicine  [facility] [place]  02/07/20  2:17 PM

## 2020-02-11 ENCOUNTER — TELEPHONE (OUTPATIENT)
Dept: GASTROENTEROLOGY | Facility: CLINIC | Age: 45
End: 2020-02-11

## 2020-02-11 NOTE — TELEPHONE ENCOUNTER
Family member in office requesting xifaxan samples - 2 boxes given. (awaiting financial assistance determination).

## 2020-02-18 DIAGNOSIS — K76.82 HEPATIC ENCEPHALOPATHY (HCC): ICD-10-CM

## 2020-02-18 RX ORDER — LACTULOSE 10 G/15ML
SOLUTION ORAL
Qty: 473 ML | Refills: 5 | Status: SHIPPED | OUTPATIENT
Start: 2020-02-18 | End: 2020-05-11

## 2020-02-18 RX ORDER — LACTULOSE 10 G/15ML
SOLUTION ORAL
Qty: 945 ML | Refills: 2 | Status: SHIPPED | OUTPATIENT
Start: 2020-02-18 | End: 2020-05-11 | Stop reason: SDUPTHER

## 2020-02-20 RX ORDER — LACTULOSE 10 G/15ML
45 SOLUTION ORAL EVERY 12 HOURS
Qty: 946 ML | Refills: 3 | OUTPATIENT
Start: 2020-02-20

## 2020-02-25 ENCOUNTER — OFFICE VISIT (OUTPATIENT)
Dept: GASTROENTEROLOGY | Facility: CLINIC | Age: 45
End: 2020-02-25

## 2020-02-25 VITALS
DIASTOLIC BLOOD PRESSURE: 82 MMHG | BODY MASS INDEX: 28.13 KG/M2 | HEIGHT: 64 IN | SYSTOLIC BLOOD PRESSURE: 130 MMHG | TEMPERATURE: 98.5 F | WEIGHT: 164.8 LBS

## 2020-02-25 DIAGNOSIS — I85.10 ESOPHAGEAL VARICES IN CIRRHOSIS (HCC): ICD-10-CM

## 2020-02-25 DIAGNOSIS — E87.1 HYPONATREMIA: ICD-10-CM

## 2020-02-25 DIAGNOSIS — D64.89 ANEMIA DUE TO OTHER CAUSE, NOT CLASSIFIED: ICD-10-CM

## 2020-02-25 DIAGNOSIS — K74.69 OTHER CIRRHOSIS OF LIVER (HCC): Primary | ICD-10-CM

## 2020-02-25 DIAGNOSIS — K74.60 ESOPHAGEAL VARICES IN CIRRHOSIS (HCC): ICD-10-CM

## 2020-02-25 DIAGNOSIS — K75.81 NASH (NONALCOHOLIC STEATOHEPATITIS): ICD-10-CM

## 2020-02-25 PROCEDURE — 99214 OFFICE O/P EST MOD 30 MIN: CPT | Performed by: INTERNAL MEDICINE

## 2020-02-25 NOTE — PROGRESS NOTES
Subjective   Chief Complaint   Patient presents with   • Cirrhosis       Jaz Gipson is a  45 y.o. female here for a follow up visit for [place] cirrhosis.  Had a recent hospitalization for nausea and vomiting-she was unable to tolerate her medications and so she came to the hospital.      She has recently had hepatic decompensation with an increase in her meld score.  This preceded her recent hospitalization.  Most recently calculated meld during her hospitalization was 22.  She has a history of esophageal varices which were banded in February 2019.  She is due for repeat banding which has been limited by her low platelet count.  Recently sent to [facility] for reevaluation and consideration of liver transplant given her increasing meld score.  In the interim she is also developed hyponatremia.  She does admit that she was drinking copious amounts of water prior to admission but she has now been strictly fluid restricted to 1 L daily.  She is following a 2 g sodium diet.  Her weight is been fairly stable.  She denies any abdominal distention.  She is had no blood in her stool.  She has no abdominal pain or confusion.    She is wishing to seek transplant evaluation at the [facility] where she has multiple family members.  She reports that she smokes a few cigarettes per month.  She does not drink alcohol-she has no history of alcohol abuse.  HPI  Past Medical History:   Diagnosis Date   • Abnormal cervical Papanicolaou smear     remote   • Acute gastric mucosal erosion     11/12 EGD   • Allergic rhinitis    • Anemia    • Asthma    • Cirrhosis of liver (CMS/HCC)    • Duodenitis     11/12 EGD   • Enlarged lymph node     retroperitoneal lymph node enlarged, 5/12 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)  9/12Bone marrow bx negative    • Esophageal varices (CMS/HCC)    • Fatty liver    • Folic acid deficiency    • Graves disease    • History of blood transfusion     • Hyperlipidemia    • Hypertension    • Leukocytosis    • PIEDRA (nonalcoholic steatohepatitis) 2012   • Obstructive sleep apnea     4/10 dx, prescribed CPAP at 12 cm H2O (did not tolerate)     Past Surgical History:   Procedure Laterality Date   • APPENDECTOMY     • CHOLECYSTECTOMY      10/9/12 Madyson: Lap Cholecystectomy and Appendectomy, Liver Bx:Cirrhosis (Biliary Dyskinesia, Cholecystitis, Chronic Appendicitis)   • COLONOSCOPY N/A 4/22/2016    NBIH   • ENDOSCOPY N/A 4/22/2016    Procedure: ESOPHAGOGASTRODUODENOSCOPY;  Surgeon: Annie Tierney MD;  Location: [facility] ENDOSCOPY;  Service:    • ENDOSCOPY N/A 12/20/2016    Procedure: ESOPHAGOGASTRODUODENOSCOPY AT BEDSIDE;  Surgeon: Haresh Fritz MD;  Location: [facility] ENDOSCOPY;  Service:    • ENDOSCOPY N/A 12/22/2016    Procedure: ESOPHAGOGASTRODUODENOSCOPY WITH BANDING X2;  Surgeon: Chriss Reece MD;  Location: [facility] ENDOSCOPY;  Service:    • ENDOSCOPY N/A 12/26/2016    Procedure: ESOPHAGOGASTRODUODENOSCOPY AT BEDSIDE;  Surgeon: Vidya Norman MD;  Location: [facility] ENDOSCOPY;  Service:    • ENDOSCOPY N/A 2/8/2017    Grade I esophageal varices, scar in the lower third of the esophagus, Portal hypertensive gastropathy.     • ENDOSCOPY N/A 10/2/2017    Grade II esophageal varices, clotted blood in the gastric fundus and in the gastric body, portal hypertensive gastropathy.  No specimens collected.    • ENDOSCOPY N/A 10/3/2017    Grade II esophageal varices, completely eradicated, portal hypertensive gastropathy   • ENDOSCOPY N/A 11/7/2017    Grade I esophageal varices.Scar in the lower third of the esophagus. Erosive gastritis with hemorrhage.Portal hypertensive gastropathy.A few bleeding angioectasias in the stomach. Injected. Treated with argon plasma coagulation (APC). No specimans collected.      • ENDOSCOPY N/A 5/23/2018    Grade I esoph varices, severe portal HTN gastropathy in fundus and body, scattered inflamm and erosions in antrum,    • ENDOSCOPY  N/A 2/5/2019    Grade II esophageal varices, food, portal HTN gastropathy   • ENDOSCOPY N/A 2/12/2019    Non-bleeding grade II esophageal varices, Portal hypertensive gastropathy   • LAPAROSCOPIC APPENDECTOMY  10/09/2012   • LIVER BIOPSY      9/12 Liver Bx done with Mariza and Appy (cirrhosis)   • UPPER GASTROINTESTINAL ENDOSCOPY      11/12 EGD with Erosive gastritis, duodenitis, and portal hypertensive gastropathy (Dr. Tierney) (no esophageal varices_       Current Outpatient Medications:   •  fluticasone (FLOVENT HFA) 44 MCG/ACT inhaler, Inhale 1 puff 2 (Two) Times a Day., Disp: 1 inhaler, Rfl: 1  •  furosemide (LASIX) 40 MG tablet, Take 1 tablet by mouth Daily., Disp: 30 tablet, Rfl: 11  •  lactulose (CHRONULAC) 10 GM/15ML solution, TAKE 45 MLS BY MOUTH EVERY 12 HOURS, Disp: 945 mL, Rfl: 2  •  lactulose (CHRONULAC) 10 GM/15ML solution, Take 45 ml po BID for hepatic encephalopathy, Disp: 473 mL, Rfl: 5  •  levothyroxine (SYNTHROID, LEVOTHROID) 200 MCG tablet, Take 1 tablet by mouth Daily., Disp: 30 tablet, Rfl: 2  •  nadolol (CORGARD) 20 MG tablet, Take 1 tablet by mouth Daily., Disp: 30 tablet, Rfl: 2  •  pantoprazole (PROTONIX) 40 MG EC tablet, Take 1 tablet by mouth 2 (Two) Times a Day Before Meals., Disp: 60 tablet, Rfl: 11  •  potassium chloride (K-DUR,KLOR-CON) 20 MEQ CR tablet, Take 2 tablets by mouth 2 (Two) Times a Day., Disp: 120 tablet, Rfl: 1  •  spironolactone (ALDACTONE) 50 MG tablet, Take 3 tablets by mouth Daily., Disp: 90 tablet, Rfl: 11  •  sucralfate (CARAFATE) 1 g tablet, TAKE ONE TABLET BY MOUTH TWICE A DAY, Disp: 60 tablet, Rfl: 1  •  vitamin D (ERGOCALCIFEROL) 1.25 MG (58912 UT) capsule capsule, TAKE ONCE CAPSULE BY MOUTH ONE TIME PER WEEK, Disp: 4 capsule, Rfl: 2  •  nicotine (NICODERM CQ) 7 MG/24HR patch, Place 1 patch on the skin as directed by provider Daily., Disp: 30 each, Rfl: 3  •  riFAXIMin (XIFAXAN) 550 MG tablet, Take 1 tablet by mouth Every 12 (Twelve) Hours., Disp: 60 tablet, Rfl:  11  PRN Meds:.  Allergies   Allergen Reactions   • Penicillins Swelling   • Latex Rash   • Tomato Rash     Social History     Socioeconomic History   • Marital status: Single     Spouse name: Not on file   • Number of children: 2   • Years of education: High School   • Highest education level: Not on file   Occupational History   • Occupation: Pharmacy Tech     Employer: [employer]   Tobacco Use   • Smoking status: Current Every Day Smoker     Packs/day: 0.25     Types: Cigarettes   • Smokeless tobacco: Never Used   Substance and Sexual Activity   • Alcohol use: No   • Drug use: No   • Sexual activity: Defer     Family History   Problem Relation Age of Onset   • Alcohol abuse Father    • Anxiety disorder Father    • COPD Father    • Depression Father    • Hyperlipidemia Father    • Hypertension Father    • Asthma Brother    • Coronary artery disease Brother    • Diabetes type II Other    • Early death Brother         Age 5 months   • Heart failure Brother    • Malig Hyperthermia Neg Hx      Review of Systems   Constitutional: Negative for appetite change and unexpected weight change.   Gastrointestinal: Negative for abdominal pain, blood in stool, nausea and vomiting.   Skin: Positive for color change.   Psychiatric/Behavioral: Negative for confusion.   All other systems reviewed and are negative.    Vitals:    02/25/20 1410   BP: 130/82   Temp: 98.5 °F (36.9 °C)         02/25/20  1410   Weight: 74.8 kg (164 lb 12.8 oz)       Objective   Physical Exam   Constitutional: She appears well-developed and well-nourished.   HENT:   Head: Normocephalic and atraumatic.   Eyes: Scleral icterus is present.   Pulmonary/Chest: Effort normal.   Abdominal: Soft. She exhibits no distension and no mass. There is no tenderness.   Musculoskeletal: She exhibits no edema.   Neurological: She is alert.   Skin: Skin is warm and dry.   Jaundice   Psychiatric: She has a normal mood and affect.     No radiology results for the last 7  days    Assessment/Plan   Diagnoses and all orders for this visit:    Other cirrhosis of liver (CMS/HCC)  -     CBC & Differential  -     Comprehensive Metabolic Panel  -     Protime-INR  -     AFP Tumor Marker    PIEDRA (nonalcoholic steatohepatitis)    Esophageal varices in cirrhosis (CMS/HCC)    Hyponatremia    Anemia due to other cause, not classified    Other orders  -     nicotine (NICODERM CQ) 7 MG/24HR patch; Place 1 patch on the skin as directed by provider Daily.      Plan:  · Repeat labs today  · Due for repeat egd - need to reassess platelet count - likely will need doptelet prior  · Discussed with patient and her daughters at length the need for transplant evaluation given her worsening meld score and decompensation-they wish to pursue evaluation at the [facility].  Have also encouraged him to consider additional evaluation at the [facility].  · Patient requests NicoDerm patch-I have instructed her strictly to cease tobacco abuse immediately  · Due for ultrasound in June for HCC surveillance  · Continue 2 g sodium diet, 1 L daily fluid restriction  · Continue current medications-information given to her daughter to obtain and assistance with obtaining Xifaxan    Greater than 25 minutes spent participating in direct patient care and counseling

## 2020-02-26 LAB
AFP-TM SERPL-MCNC: <0.7 NG/ML (ref 0–8.3)
ALBUMIN SERPL-MCNC: 1.7 G/DL (ref 3.8–4.8)
ALBUMIN/GLOB SERPL: 0.3 {RATIO} (ref 1.2–2.2)
ALP SERPL-CCNC: 205 IU/L (ref 39–117)
ALT SERPL-CCNC: 9 IU/L (ref 0–32)
AST SERPL-CCNC: 44 IU/L (ref 0–40)
BASOPHILS # BLD AUTO: 0 X10E3/UL (ref 0–0.2)
BASOPHILS NFR BLD AUTO: 1 %
BILIRUB SERPL-MCNC: 4.3 MG/DL (ref 0–1.2)
BUN SERPL-MCNC: 6 MG/DL (ref 6–24)
BUN/CREAT SERPL: 10 (ref 9–23)
CALCIUM SERPL-MCNC: 7.3 MG/DL (ref 8.7–10.2)
CHLORIDE SERPL-SCNC: 99 MMOL/L (ref 96–106)
CO2 SERPL-SCNC: 22 MMOL/L (ref 20–29)
CREAT SERPL-MCNC: 0.59 MG/DL (ref 0.57–1)
EOSINOPHIL # BLD AUTO: 0.3 X10E3/UL (ref 0–0.4)
EOSINOPHIL NFR BLD AUTO: 10 %
ERYTHROCYTE [DISTWIDTH] IN BLOOD BY AUTOMATED COUNT: 11.8 % (ref 11.7–15.4)
GLOBULIN SER CALC-MCNC: 6.7 G/DL (ref 1.5–4.5)
GLUCOSE SERPL-MCNC: 94 MG/DL (ref 65–99)
HCT VFR BLD AUTO: 27.7 % (ref 34–46.6)
HGB BLD-MCNC: 9.7 G/DL (ref 11.1–15.9)
IMM GRANULOCYTES # BLD AUTO: 0 X10E3/UL (ref 0–0.1)
IMM GRANULOCYTES NFR BLD AUTO: 0 %
INR PPP: 1.5 (ref 0.8–1.2)
LYMPHOCYTES # BLD AUTO: 0.7 X10E3/UL (ref 0.7–3.1)
LYMPHOCYTES NFR BLD AUTO: 20 %
MCH RBC QN AUTO: 33.1 PG (ref 26.6–33)
MCHC RBC AUTO-ENTMCNC: 35 G/DL (ref 31.5–35.7)
MCV RBC AUTO: 95 FL (ref 79–97)
MONOCYTES # BLD AUTO: 0.6 X10E3/UL (ref 0.1–0.9)
MONOCYTES NFR BLD AUTO: 18 %
MORPHOLOGY BLD-IMP: ABNORMAL
NEUTROPHILS # BLD AUTO: 1.7 X10E3/UL (ref 1.4–7)
NEUTROPHILS NFR BLD AUTO: 51 %
PLATELET # BLD AUTO: 41 X10E3/UL (ref 150–450)
POTASSIUM SERPL-SCNC: 3.6 MMOL/L (ref 3.5–5.2)
PROT SERPL-MCNC: 8.4 G/DL (ref 6–8.5)
PROTHROMBIN TIME: 15.4 SEC (ref 9.1–12)
RBC # BLD AUTO: 2.93 X10E6/UL (ref 3.77–5.28)
SODIUM SERPL-SCNC: 126 MMOL/L (ref 134–144)
WBC # BLD AUTO: 3.3 X10E3/UL (ref 3.4–10.8)

## 2020-03-04 RX ORDER — PANTOPRAZOLE SODIUM 40 MG/1
TABLET, DELAYED RELEASE ORAL
Qty: 30 TABLET | Refills: 5 | Status: SHIPPED | OUTPATIENT
Start: 2020-03-04 | End: 2020-05-09 | Stop reason: SDUPTHER

## 2020-03-09 ENCOUNTER — TELEPHONE (OUTPATIENT)
Dept: GASTROENTEROLOGY | Facility: CLINIC | Age: 45
End: 2020-03-09

## 2020-03-09 NOTE — TELEPHONE ENCOUNTER
[facility] may be able to obtain xifaxan for pt.      Call to Mother, Genet Malhotra, to advise applying on behalf of pt.  Verb understanding.     Form initiated and to Dr Tierney.

## 2020-03-10 ENCOUNTER — TELEPHONE (OUTPATIENT)
Dept: GASTROENTEROLOGY | Facility: CLINIC | Age: 45
End: 2020-03-10

## 2020-03-10 NOTE — TELEPHONE ENCOUNTER
Recent labs show normal AFP (tumor marker)    Labs look better than 1 month ago.  Bilirubin has decreased.  Anemia has improved.  Platelet count still significantly low.    Medication (doptelet) to improve platelet count for EGD sent to pharmacy.  Once we see if this will get approved, can decide when to schedule EGD

## 2020-03-12 ENCOUNTER — PRIOR AUTHORIZATION (OUTPATIENT)
Dept: GASTROENTEROLOGY | Facility: CLINIC | Age: 45
End: 2020-03-12

## 2020-03-16 NOTE — TELEPHONE ENCOUNTER
Call to mother, Genet Malhotra (see hipaa).  Advise per Dr Tierney note.  Verb understanding.      Asking what MELD score would now be.  Advise that Dr Tierney out of office this wk - will send question.  Verb understanding.

## 2020-03-16 NOTE — TELEPHONE ENCOUNTER
Fax received from [facility] that xifaxan has been shipped to home. (see media tab).     Call to mother, Genet Malhotra (see hipaa).  States has been received via fed ex.  Have been advised that $0 copay.     Update to Dr Tierney.

## 2020-03-17 NOTE — TELEPHONE ENCOUNTER
Pauline Lim MA  [facility] Clinical 1 Pool; Annie Tierney MD 21 hours ago (1:50 PM)      PA for Doptelet denied by Nerissa   To be scanned into Media tab    Routing comment        Paulnie Lim MA  [facility] Clinical 1 Pool 5 days ago      PA for Doptelet is in process thru [facility] fo Thrombocytopenia/cirrhosis/esophageal varices      **Please update when letter in system - message to Pauline Valentine Rn.

## 2020-03-23 ENCOUNTER — TELEPHONE (OUTPATIENT)
Dept: GASTROENTEROLOGY | Facility: CLINIC | Age: 45
End: 2020-03-23

## 2020-03-23 DIAGNOSIS — K74.69 OTHER CIRRHOSIS OF LIVER (HCC): Primary | ICD-10-CM

## 2020-03-23 NOTE — TELEPHONE ENCOUNTER
Call returned to # given - [facility] - (doptelet program).  VM left with Kristal requesting return call.

## 2020-03-23 NOTE — TELEPHONE ENCOUNTER
Call to mother, Genet Malhotra.  Advise per Dr Tierney that will pursue enrolling pt in Doptelet program.  Verb understanding.  Authorizes to sign for pt.      Form initiated - to DR Tierney.

## 2020-03-23 NOTE — TELEPHONE ENCOUNTER
----- Message from Sallie Montes sent at 3/23/2020 12:01 PM EDT -----  Regarding: PT ASSISTANT PROGRAM   Contact: [phone]  CAROLINE PERRY CALLED STATED NEED AN UPDATE FORM..

## 2020-03-27 NOTE — TELEPHONE ENCOUNTER
Fax received from [facility] that new enrollment form needs to be completed.  Initiated and to DR Tireney

## 2020-03-30 NOTE — TELEPHONE ENCOUNTER
Completed form faxed to [facility] @ [phone].  Confirmation received - see media tab.    Call to mother, Genet Malhotra.  Advise that Doptelet rx will be shipped to pt's home.  DO NOT TAKE until prior to EGD - which is yet to be scheduled.  Verb understanding.

## 2020-03-31 ENCOUNTER — TELEPHONE (OUTPATIENT)
Dept: GASTROENTEROLOGY | Facility: CLINIC | Age: 45
End: 2020-03-31

## 2020-03-31 NOTE — TELEPHONE ENCOUNTER
Attempted to send records 3x to number listed and line is busy.  Called [facility] at [phone] and spoke with Cherie who could not find pt is the system.    Called pt and spoke with pt's daughter who advised that the number should be [phone].

## 2020-03-31 NOTE — TELEPHONE ENCOUNTER
----- Message from Annie Tierney MD sent at 3/31/2020  1:50 PM EDT -----  Regarding: please fax records to [facility]  Please fax my most recent office notes may be the last 3.  Please fax her last 2 EGDs, previous discharge summaries, last ultrasound.  Fax number in my note from today - she has a video visit scheduled for Friday-  lb

## 2020-03-31 NOTE — TELEPHONE ENCOUNTER
Per Dr Tierney's request the records she requested were faxed to [facility] at [phone].

## 2020-04-02 ENCOUNTER — TELEPHONE (OUTPATIENT)
Dept: GASTROENTEROLOGY | Facility: CLINIC | Age: 45
End: 2020-04-02

## 2020-04-02 NOTE — TELEPHONE ENCOUNTER
----- Message from Yvette Sears sent at 4/2/2020 10:49 AM EDT -----  Regarding: records  Contact: [phone]  Pt called and wanted to see if her records could be sent again because they said they did not receive them. [phone]

## 2020-04-07 ENCOUNTER — RESULTS ENCOUNTER (OUTPATIENT)
Dept: FAMILY MEDICINE CLINIC | Facility: CLINIC | Age: 45
End: 2020-04-07

## 2020-04-07 DIAGNOSIS — E03.9 HYPOTHYROIDISM, UNSPECIFIED TYPE: ICD-10-CM

## 2020-04-14 ENCOUNTER — TELEMEDICINE (OUTPATIENT)
Dept: GASTROENTEROLOGY | Facility: CLINIC | Age: 45
End: 2020-04-14

## 2020-04-14 VITALS — WEIGHT: 161 LBS | HEIGHT: 64 IN | BODY MASS INDEX: 27.49 KG/M2

## 2020-04-14 DIAGNOSIS — E87.1 HYPONATREMIA: ICD-10-CM

## 2020-04-14 DIAGNOSIS — K74.60 ESOPHAGEAL VARICES IN CIRRHOSIS (HCC): ICD-10-CM

## 2020-04-14 DIAGNOSIS — D69.6 THROMBOCYTOPENIA (HCC): ICD-10-CM

## 2020-04-14 DIAGNOSIS — K75.81 NASH (NONALCOHOLIC STEATOHEPATITIS): Primary | ICD-10-CM

## 2020-04-14 DIAGNOSIS — I85.10 ESOPHAGEAL VARICES IN CIRRHOSIS (HCC): ICD-10-CM

## 2020-04-14 PROCEDURE — 99214 OFFICE O/P EST MOD 30 MIN: CPT | Performed by: INTERNAL MEDICINE

## 2020-04-14 NOTE — PROGRESS NOTES
Subjective   Chief Complaint   Patient presents with   • Follow-up       Jaz Gipson is a  45 y.o. female here for a follow up visit for [place] cirrhosis.     This encounter was provided via real-time audio/video technology.    Since her last visit she is been doing well.  She reports less bloating.  Lower extremity edema has improved with elevation of her lower extremities.  She is using compression socks as needed.  She has not seen any blood in her stool.  She is having multiple bowel movements daily and family reports that she is at her mental baseline.  She is still working and she denies confusion.  Appetite is okay, may be even a little better since she is been eating more at home with the coronavirus epidemic.  She did see a physician from the [facility] transplant program last week via telehealth visit.  She is getting labs today.    We are still awaiting approval of Doptelet.    HPI  Past Medical History:   Diagnosis Date   • Abnormal cervical Papanicolaou smear     remote   • Acute gastric mucosal erosion     11/12 EGD   • Allergic rhinitis    • Anemia    • Asthma    • Cirrhosis of liver (CMS/HCC)    • Duodenitis     11/12 EGD   • Enlarged lymph node     retroperitoneal lymph node enlarged, 5/12 PET CT consistent with low grade lymphoproliferative disorder (  [facility])  9/12Bone marrow bx negative    • Esophageal varices (CMS/HCC)    • Fatty liver    • Folic acid deficiency    • Graves disease    • History of blood transfusion    • Hyperlipidemia    • Hypertension    • Leukocytosis    • PIEDRA (nonalcoholic steatohepatitis) 2012   • Obstructive sleep apnea     4/10 dx, prescribed CPAP at 12 cm H2O (did not tolerate)     Past Surgical History:   Procedure Laterality Date   • APPENDECTOMY     • CHOLECYSTECTOMY      10/9/12 Madyson: Lap Cholecystectomy and Appendectomy, Liver Bx:Cirrhosis (Biliary Dyskinesia, Cholecystitis, Chronic Appendicitis)   • COLONOSCOPY N/A 4/22/2016    [facility]   • ENDOSCOPY  N/A 4/22/2016    Procedure: ESOPHAGOGASTRODUODENOSCOPY;  Surgeon: Annie Tierney MD;  Location: [facility] ENDOSCOPY;  Service:    • ENDOSCOPY N/A 12/20/2016    Procedure: ESOPHAGOGASTRODUODENOSCOPY AT BEDSIDE;  Surgeon: Haresh Fritz MD;  Location: [facility] ENDOSCOPY;  Service:    • ENDOSCOPY N/A 12/22/2016    Procedure: ESOPHAGOGASTRODUODENOSCOPY WITH BANDING X2;  Surgeon: Chriss Reece MD;  Location: [facility] ENDOSCOPY;  Service:    • ENDOSCOPY N/A 12/26/2016    Procedure: ESOPHAGOGASTRODUODENOSCOPY AT BEDSIDE;  Surgeon: Vidya Norman MD;  Location: [facility] ENDOSCOPY;  Service:    • ENDOSCOPY N/A 2/8/2017    Grade I esophageal varices, scar in the lower third of the esophagus, Portal hypertensive gastropathy.     • ENDOSCOPY N/A 10/2/2017    Grade II esophageal varices, clotted blood in the gastric fundus and in the gastric body, portal hypertensive gastropathy.  No specimens collected.    • ENDOSCOPY N/A 10/3/2017    Grade II esophageal varices, completely eradicated, portal hypertensive gastropathy   • ENDOSCOPY N/A 11/7/2017    Grade I esophageal varices.Scar in the lower third of the esophagus. Erosive gastritis with hemorrhage.Portal hypertensive gastropathy.A few bleeding angioectasias in the stomach. Injected. Treated with argon plasma coagulation (APC). No specimans collected.      • ENDOSCOPY N/A 5/23/2018    Grade I esoph varices, severe portal HTN gastropathy in fundus and body, scattered inflamm and erosions in antrum,    • ENDOSCOPY N/A 2/5/2019    Grade II esophageal varices, food, portal HTN gastropathy   • ENDOSCOPY N/A 2/12/2019    Non-bleeding grade II esophageal varices, Portal hypertensive gastropathy   • LAPAROSCOPIC APPENDECTOMY  10/09/2012   • LIVER BIOPSY      9/12 Liver Bx done with Mariza and Appy (cirrhosis)   • UPPER GASTROINTESTINAL ENDOSCOPY      11/12 EGD with Erosive gastritis, duodenitis, and portal hypertensive gastropathy (Dr. Tierney) (no esophageal varices_       Current  Outpatient Medications:   •  fluticasone (FLOVENT HFA) 44 MCG/ACT inhaler, Inhale 1 puff 2 (Two) Times a Day., Disp: 1 inhaler, Rfl: 1  •  furosemide (LASIX) 40 MG tablet, Take 1 tablet by mouth Daily., Disp: 30 tablet, Rfl: 11  •  lactulose (CHRONULAC) 10 GM/15ML solution, Take 45 ml po BID for hepatic encephalopathy, Disp: 473 mL, Rfl: 5  •  levothyroxine (SYNTHROID, LEVOTHROID) 200 MCG tablet, Take 1 tablet by mouth Daily., Disp: 30 tablet, Rfl: 2  •  nadolol (CORGARD) 20 MG tablet, Take 1 tablet by mouth Daily., Disp: 30 tablet, Rfl: 2  •  nicotine (NICODERM CQ) 7 MG/24HR patch, Place 1 patch on the skin as directed by provider Daily., Disp: 30 each, Rfl: 3  •  pantoprazole (PROTONIX) 40 MG EC tablet, TAKE ONE TABLET BY MOUTH TWICE A DAY BEFORE MEALS, Disp: 30 tablet, Rfl: 5  •  potassium chloride (K-DUR,KLOR-CON) 20 MEQ CR tablet, Take 2 tablets by mouth 2 (Two) Times a Day., Disp: 120 tablet, Rfl: 1  •  riFAXIMin (XIFAXAN) 550 MG tablet, Take 1 tablet by mouth Every 12 (Twelve) Hours., Disp: 60 tablet, Rfl: 11  •  spironolactone (ALDACTONE) 50 MG tablet, Take 3 tablets by mouth Daily., Disp: 90 tablet, Rfl: 11  •  sucralfate (CARAFATE) 1 g tablet, TAKE ONE TABLET BY MOUTH TWICE A DAY, Disp: 60 tablet, Rfl: 1  •  vitamin D (ERGOCALCIFEROL) 1.25 MG (44401 UT) capsule capsule, TAKE ONCE CAPSULE BY MOUTH ONE TIME PER WEEK, Disp: 4 capsule, Rfl: 2  •  lactulose (CHRONULAC) 10 GM/15ML solution, TAKE 45 MLS BY MOUTH EVERY 12 HOURS, Disp: 945 mL, Rfl: 2  PRN Meds:.  Allergies   Allergen Reactions   • Penicillins Swelling   • Latex Rash   • Tomato Rash     Social History     Socioeconomic History   • Marital status: Single     Spouse name: Not on file   • Number of children: 2   • Years of education: High School   • Highest education level: Not on file   Occupational History   • Occupation: Pharmacy Tech     Employer: [employer]   Tobacco Use   • Smoking status: Current Every Day Smoker     Packs/day: 0.25      Types: Cigarettes   • Smokeless tobacco: Never Used   Substance and Sexual Activity   • Alcohol use: No   • Drug use: No   • Sexual activity: Defer     Family History   Problem Relation Age of Onset   • Alcohol abuse Father    • Anxiety disorder Father    • COPD Father    • Depression Father    • Hyperlipidemia Father    • Hypertension Father    • Asthma Brother    • Coronary artery disease Brother    • Diabetes type II Other    • Early death Brother         Age 5 months   • Heart failure Brother    • Malig Hyperthermia Neg Hx      Review of Systems   Constitutional: Negative for appetite change, fever and unexpected weight change.   Gastrointestinal: Negative for abdominal pain and blood in stool.   Psychiatric/Behavioral: Negative for confusion.     There were no vitals filed for this visit.      04/14/20  1251   Weight: 73 kg (161 lb)       Objective      Physical Exam  Alert & Oriented, no acute distress  Head: normocephalic, atraumatic  ENT: normal lips, teeth and gums  Eyes: normal pupils, sclera anicteric  Neck: normal ROM, no mass  Skin: no visible rash or jaundice  Pulm/chest: normal respiratory effort; normal anterior chest symmetry  Psych: normal judgement and insight    No radiology results for the last 7 days    Assessment/Plan   Diagnoses and all orders for this visit:    PIEDRA (nonalcoholic steatohepatitis)  -     CBC & Differential  -     Comprehensive Metabolic Panel  -     Protime-INR    Esophageal varices in cirrhosis (CMS/HCC)    Hyponatremia    Thrombocytopenia (CMS/HCC)      Plan:  · Labs today  · Awaiting approval of Doptelet-once approved we will schedule repeat EGD  · Schedule ultrasound for HCC surveillance in June  · Video follow-up in 4 weeks    Time of call was 19 minutes  .> 25 minutes spent participating in patient care with greater than half spent in face-to-face contact with the patient in direct patient care and counseling

## 2020-04-15 ENCOUNTER — TELEPHONE (OUTPATIENT)
Dept: GASTROENTEROLOGY | Facility: CLINIC | Age: 45
End: 2020-04-15

## 2020-04-15 LAB
ALBUMIN SERPL-MCNC: 1.7 G/DL (ref 3.8–4.8)
ALBUMIN/GLOB SERPL: 0.2 {RATIO} (ref 1.2–2.2)
ALP SERPL-CCNC: 259 IU/L (ref 39–117)
ALT SERPL-CCNC: 20 IU/L (ref 0–32)
AST SERPL-CCNC: 63 IU/L (ref 0–40)
BASOPHILS # BLD AUTO: 0 X10E3/UL (ref 0–0.2)
BASOPHILS NFR BLD AUTO: 1 %
BILIRUB SERPL-MCNC: 5.4 MG/DL (ref 0–1.2)
BUN SERPL-MCNC: 7 MG/DL (ref 6–24)
BUN/CREAT SERPL: 9 (ref 9–23)
CALCIUM SERPL-MCNC: 7.3 MG/DL (ref 8.7–10.2)
CHLORIDE SERPL-SCNC: 98 MMOL/L (ref 96–106)
CO2 SERPL-SCNC: 22 MMOL/L (ref 20–29)
CREAT SERPL-MCNC: 0.81 MG/DL (ref 0.57–1)
EOSINOPHIL # BLD AUTO: 0.1 X10E3/UL (ref 0–0.4)
EOSINOPHIL NFR BLD AUTO: 5 %
ERYTHROCYTE [DISTWIDTH] IN BLOOD BY AUTOMATED COUNT: 14.1 % (ref 11.7–15.4)
GLOBULIN SER CALC-MCNC: 6.9 G/DL (ref 1.5–4.5)
GLUCOSE SERPL-MCNC: 105 MG/DL (ref 65–99)
HCT VFR BLD AUTO: 23.8 % (ref 34–46.6)
HGB BLD-MCNC: 8.4 G/DL (ref 11.1–15.9)
IMM GRANULOCYTES # BLD AUTO: 0 X10E3/UL (ref 0–0.1)
IMM GRANULOCYTES NFR BLD AUTO: 0 %
INR PPP: 1.5 (ref 0.8–1.2)
LYMPHOCYTES # BLD AUTO: 0.6 X10E3/UL (ref 0.7–3.1)
LYMPHOCYTES NFR BLD AUTO: 25 %
MCH RBC QN AUTO: 30.7 PG (ref 26.6–33)
MCHC RBC AUTO-ENTMCNC: 35.3 G/DL (ref 31.5–35.7)
MCV RBC AUTO: 87 FL (ref 79–97)
MONOCYTES # BLD AUTO: 0.4 X10E3/UL (ref 0.1–0.9)
MONOCYTES NFR BLD AUTO: 20 %
MORPHOLOGY BLD-IMP: ABNORMAL
NEUTROPHILS # BLD AUTO: 1.1 X10E3/UL (ref 1.4–7)
NEUTROPHILS NFR BLD AUTO: 49 %
PLATELET # BLD AUTO: 41 X10E3/UL (ref 150–450)
POTASSIUM SERPL-SCNC: 3.4 MMOL/L (ref 3.5–5.2)
PROT SERPL-MCNC: 8.6 G/DL (ref 6–8.5)
PROTHROMBIN TIME: 15.6 SEC (ref 9.1–12)
RBC # BLD AUTO: 2.74 X10E6/UL (ref 3.77–5.28)
SODIUM SERPL-SCNC: 127 MMOL/L (ref 134–144)
WBC # BLD AUTO: 2.2 X10E3/UL (ref 3.4–10.8)

## 2020-04-15 NOTE — TELEPHONE ENCOUNTER
Please let her know that her labs are fairly stable.  Current meld is 25.  Please let us know if we need to fax those anywhere regarding her recent [facility] eval

## 2020-04-15 NOTE — TELEPHONE ENCOUNTER
Call to mother, Genet Malhotra (see hipaa).  Advise per DR Tierney note.  Verb understanding.     Requests fax to [facility] @ [phone] via [employer].

## 2020-04-18 LAB
T3FREE SERPL-MCNC: 1.8 PG/ML (ref 2–4.4)
T4 FREE SERPL-MCNC: 2.7 NG/DL (ref 0.82–1.77)
TSH SERPL DL<=0.005 MIU/L-ACNC: 0.01 UIU/ML (ref 0.45–4.5)

## 2020-04-20 DIAGNOSIS — E03.9 ACQUIRED HYPOTHYROIDISM: Primary | ICD-10-CM

## 2020-04-20 RX ORDER — LEVOTHYROXINE SODIUM 175 UG/1
175 TABLET ORAL DAILY
Qty: 90 TABLET | Refills: 0 | Status: SHIPPED | OUTPATIENT
Start: 2020-04-20 | End: 2020-06-30 | Stop reason: DRUGHIGH

## 2020-04-24 RX ORDER — POTASSIUM CHLORIDE 20 MEQ/1
TABLET, EXTENDED RELEASE ORAL
Qty: 120 TABLET | Refills: 0 | Status: SHIPPED | OUTPATIENT
Start: 2020-04-24 | End: 2020-06-08

## 2020-05-09 DIAGNOSIS — E55.9 VITAMIN D DEFICIENCY: ICD-10-CM

## 2020-05-09 DIAGNOSIS — K75.81 NASH (NONALCOHOLIC STEATOHEPATITIS): ICD-10-CM

## 2020-05-09 DIAGNOSIS — R60.9 FLUID RETENTION: ICD-10-CM

## 2020-05-10 DIAGNOSIS — K76.82 HEPATIC ENCEPHALOPATHY (HCC): ICD-10-CM

## 2020-05-11 DIAGNOSIS — K76.82 HEPATIC ENCEPHALOPATHY (HCC): ICD-10-CM

## 2020-05-11 RX ORDER — LACTULOSE 10 G/15ML
SOLUTION ORAL
Qty: 473 ML | Refills: 5 | OUTPATIENT
Start: 2020-05-11

## 2020-05-11 RX ORDER — LACTULOSE 10 G/15ML
SOLUTION ORAL
Qty: 945 ML | Refills: 2 | OUTPATIENT
Start: 2020-05-11

## 2020-05-11 RX ORDER — ERGOCALCIFEROL 1.25 MG/1
50000 CAPSULE ORAL
Qty: 4 CAPSULE | Refills: 2 | Status: SHIPPED | OUTPATIENT
Start: 2020-05-11 | End: 2022-05-13

## 2020-05-11 RX ORDER — PANTOPRAZOLE SODIUM 40 MG/1
40 TABLET, DELAYED RELEASE ORAL
Qty: 30 TABLET | Refills: 5 | Status: SHIPPED | OUTPATIENT
Start: 2020-05-11 | End: 2021-04-13

## 2020-05-11 RX ORDER — SPIRONOLACTONE 50 MG/1
150 TABLET, FILM COATED ORAL DAILY
Qty: 90 TABLET | Refills: 11 | Status: SHIPPED | OUTPATIENT
Start: 2020-05-11 | End: 2020-12-29

## 2020-05-11 RX ORDER — LACTULOSE 10 G/15ML
30 SOLUTION ORAL 2 TIMES DAILY
Qty: 945 ML | Refills: 2 | Status: SHIPPED | OUTPATIENT
Start: 2020-05-11 | End: 2020-07-31 | Stop reason: DRUGHIGH

## 2020-05-11 RX ORDER — NADOLOL 20 MG/1
20 TABLET ORAL DAILY
Qty: 30 TABLET | Refills: 2 | Status: SHIPPED | OUTPATIENT
Start: 2020-05-11 | End: 2020-12-29

## 2020-06-03 ENCOUNTER — HOSPITAL ENCOUNTER (OUTPATIENT)
Facility: HOSPITAL | Age: 45
Setting detail: HOSPITAL OUTPATIENT SURGERY
End: 2020-06-03
Attending: INTERNAL MEDICINE | Admitting: INTERNAL MEDICINE

## 2020-06-03 ENCOUNTER — PREP FOR SURGERY (OUTPATIENT)
Dept: OTHER | Facility: HOSPITAL | Age: 45
End: 2020-06-03

## 2020-06-03 DIAGNOSIS — K74.60 ESOPHAGEAL VARICES IN CIRRHOSIS (HCC): Primary | ICD-10-CM

## 2020-06-03 DIAGNOSIS — I85.10 ESOPHAGEAL VARICES IN CIRRHOSIS (HCC): Primary | ICD-10-CM

## 2020-06-03 DIAGNOSIS — R18.8 OTHER ASCITES: Primary | ICD-10-CM

## 2020-06-03 RX ORDER — AVATROMBOPAG MALEATE 20 MG/1
TABLET, FILM COATED ORAL
Qty: 15 TABLET | Refills: 0 | Status: SHIPPED | OUTPATIENT
Start: 2020-06-03 | End: 2020-07-31

## 2020-06-03 RX ORDER — ALBUMIN (HUMAN) 12.5 G/50ML
25 SOLUTION INTRAVENOUS ONCE
Status: CANCELLED | OUTPATIENT
Start: 2020-06-12 | End: 2020-06-03

## 2020-06-03 NOTE — TELEPHONE ENCOUNTER
Charlene request received from [facility] for doptelet 20 mg - take 3 tabs (60 mgs) by mouth daily for 5 days. Please do not start taking medication until instructed by your physician, #15, R0.     See encounter note of 3/23.  Above rx approved - update to DR Tierney.

## 2020-06-03 NOTE — TELEPHONE ENCOUNTER
please let her know that we got word that the Doptelet was approved.  Please schedule EGD at [facility] [place], Next available is okay.  Tell her to fill the medication now but do not start taking it -when she has been scheduled we will tell her when to start the medicine

## 2020-06-08 RX ORDER — POTASSIUM CHLORIDE 20 MEQ/1
TABLET, EXTENDED RELEASE ORAL
Qty: 120 TABLET | Refills: 0 | Status: SHIPPED | OUTPATIENT
Start: 2020-06-08 | End: 2020-08-17 | Stop reason: SDUPTHER

## 2020-06-09 RX ORDER — SUCRALFATE 1 G/1
TABLET ORAL
Qty: 60 TABLET | Refills: 3 | Status: SHIPPED | OUTPATIENT
Start: 2020-06-09 | End: 2020-12-29

## 2020-06-09 NOTE — TELEPHONE ENCOUNTER
Call from mother, Genet (see hipaa).  States very concerned re: pt's increased leg cramping.  States becomes so severe that family has to help pt walk.     Advise that labs from today should shed light on this.  Will update Dr Tierney.  May also contact MD on call if needed.  If symptoms become OOC - go to ER.  Verb understanding.     Message to Dr Tierney.

## 2020-06-09 NOTE — TELEPHONE ENCOUNTER
Pt in office for lab.  Requesting carafate refill.  Escribe request located and filled.      States has been very exhausted this wk.  Missed work on 6/7 and 6/8.  Off today 6/9.  Wondering if should take off 6/10 and 6/11.  For paracentesis on 6/12.     Advise may need to check with PCP re: work excuse.  Pt request check with DR Tierney

## 2020-06-09 NOTE — TELEPHONE ENCOUNTER
Confer with Dr Tierney.  Hold diuretics at present.     Call to Genet.  Advise as above.  Verb understanding.     States cramping in legs and hands - cause pt to cry out in pain.  Yesterday occurred only x1.  Occurred again today - lasting a few minutes.  Relieved by massage or standing.      Update to Dr Tierney.

## 2020-06-10 ENCOUNTER — TELEPHONE (OUTPATIENT)
Dept: GASTROENTEROLOGY | Facility: CLINIC | Age: 45
End: 2020-06-10

## 2020-06-10 LAB
ALBUMIN SERPL-MCNC: 1.2 G/DL (ref 3.5–5.2)
ALBUMIN/GLOB SERPL: 0.2 G/DL
ALP SERPL-CCNC: 297 U/L (ref 39–117)
ALT SERPL-CCNC: 29 U/L (ref 1–33)
AST SERPL-CCNC: 59 U/L (ref 1–32)
BASOPHILS # BLD AUTO: ABNORMAL 10*3/UL
BASOPHILS # BLD MANUAL: 0.06 10*3/MM3 (ref 0–0.2)
BASOPHILS NFR BLD MANUAL: 2 % (ref 0–1.5)
BILIRUB SERPL-MCNC: 5.7 MG/DL (ref 0.2–1.2)
BUN SERPL-MCNC: 7 MG/DL (ref 6–20)
BUN/CREAT SERPL: 10.4 (ref 7–25)
CALCIUM SERPL-MCNC: 7 MG/DL (ref 8.6–10.5)
CHLORIDE SERPL-SCNC: 89 MMOL/L (ref 98–107)
CO2 SERPL-SCNC: 20.9 MMOL/L (ref 22–29)
CREAT SERPL-MCNC: 0.67 MG/DL (ref 0.57–1)
DIFFERENTIAL COMMENT: ABNORMAL
EOSINOPHIL # BLD AUTO: ABNORMAL 10*3/UL
EOSINOPHIL NFR BLD AUTO: ABNORMAL %
ERYTHROCYTE [DISTWIDTH] IN BLOOD BY AUTOMATED COUNT: 16.3 % (ref 12.3–15.4)
FERRITIN SERPL-MCNC: 167 NG/ML (ref 13–150)
GLOBULIN SER CALC-MCNC: 7.1 GM/DL
GLUCOSE SERPL-MCNC: 135 MG/DL (ref 65–99)
HCT VFR BLD AUTO: 20.2 % (ref 34–46.6)
HGB BLD-MCNC: 7.1 G/DL (ref 12–15.9)
INR PPP: 2.09 (ref 0.9–1.1)
IRON SATN MFR SERPL: 29 % (ref 20–50)
IRON SERPL-MCNC: 40 MCG/DL (ref 37–145)
LYMPHOCYTES # BLD AUTO: ABNORMAL 10*3/UL
LYMPHOCYTES # BLD MANUAL: 0.36 10*3/MM3 (ref 0.7–3.1)
LYMPHOCYTES NFR BLD AUTO: ABNORMAL %
LYMPHOCYTES NFR BLD MANUAL: 13 % (ref 19.6–45.3)
MCH RBC QN AUTO: 29.5 PG (ref 26.6–33)
MCHC RBC AUTO-ENTMCNC: 35.1 G/DL (ref 31.5–35.7)
MCV RBC AUTO: 83.8 FL (ref 79–97)
MONOCYTES # BLD MANUAL: 0.3 10*3/MM3 (ref 0.1–0.9)
MONOCYTES NFR BLD AUTO: ABNORMAL %
MONOCYTES NFR BLD MANUAL: 11 % (ref 5–12)
NEUTROPHILS # BLD MANUAL: 2.05 10*3/MM3 (ref 1.7–7)
NEUTROPHILS NFR BLD AUTO: ABNORMAL %
NEUTROPHILS NFR BLD MANUAL: 74 % (ref 42.7–76)
PLATELET # BLD AUTO: 43 10*3/MM3 (ref 140–450)
PLATELET BLD QL SMEAR: ABNORMAL
POTASSIUM SERPL-SCNC: 4.3 MMOL/L (ref 3.5–5.2)
PROT SERPL-MCNC: 8.3 G/DL (ref 6–8.5)
PROTHROMBIN TIME: 23.2 SECONDS (ref 11.7–14.2)
RBC # BLD AUTO: 2.41 10*6/MM3 (ref 3.77–5.28)
RBC MORPH BLD: ABNORMAL
SODIUM SERPL-SCNC: 111 MMOL/L (ref 136–145)
TIBC SERPL-MCNC: 139 MCG/DL
UIBC SERPL-MCNC: 99 MCG/DL (ref 112–346)
WBC # BLD AUTO: 2.77 10*3/MM3 (ref 3.4–10.8)

## 2020-06-10 NOTE — TELEPHONE ENCOUNTER
----- Message from Yvette Sears Rep sent at 6/10/2020  8:29 AM EDT -----  Regarding: question for nurse  Contact: [phone]  Pt daughter Madison called and wants to talk to nurse, she said Kelsy is wanting her mom to go to the hospital and before she takes her she would like to talk to nurse.

## 2020-06-10 NOTE — TELEPHONE ENCOUNTER
Call to daughter (see hipaa).  Advise that urgent that pt go to ER - NA very low.  Daughter asking if could go to [facility].  Advise proceed as wishes, but this office would not be able follow there.  Current labs may not be yet available on CareEverywhere for continuity of care.  Daughter states headed to pt's house now - will bring to ER.     Update to Dr Tierney.

## 2020-06-12 ENCOUNTER — HOSPITAL ENCOUNTER (OUTPATIENT)
Dept: ULTRASOUND IMAGING | Facility: HOSPITAL | Age: 45
Discharge: HOME | End: 2020-06-12

## 2020-06-16 ENCOUNTER — TELEPHONE (OUTPATIENT)
Dept: GASTROENTEROLOGY | Facility: CLINIC | Age: 45
End: 2020-06-16

## 2020-06-16 NOTE — TELEPHONE ENCOUNTER
Hospital course reviewed - egd performed at [facility] therefore will cancel scheduled procedure at [facility] - schedule f/u with me - I can see her 6/25 at 1245 - will need f/u labs at that time

## 2020-06-17 NOTE — TELEPHONE ENCOUNTER
VM to pt with request to contact office.     Call to mother, Genet Malhotra (see hipaa).  States pt will now follow with [facility].  Update to Dr Tierney.

## 2020-06-29 ENCOUNTER — OFFICE VISIT (OUTPATIENT)
Dept: FAMILY MEDICINE CLINIC | Facility: CLINIC | Age: 45
End: 2020-06-29

## 2020-06-29 VITALS
BODY MASS INDEX: 26.29 KG/M2 | OXYGEN SATURATION: 98 % | WEIGHT: 154 LBS | RESPIRATION RATE: 16 BRPM | HEIGHT: 64 IN | TEMPERATURE: 97.6 F | HEART RATE: 90 BPM | SYSTOLIC BLOOD PRESSURE: 100 MMHG | DIASTOLIC BLOOD PRESSURE: 60 MMHG

## 2020-06-29 DIAGNOSIS — E03.9 ACQUIRED HYPOTHYROIDISM: ICD-10-CM

## 2020-06-29 DIAGNOSIS — K75.81 NASH (NONALCOHOLIC STEATOHEPATITIS): Primary | ICD-10-CM

## 2020-06-29 DIAGNOSIS — D64.9 LOW HEMOGLOBIN: ICD-10-CM

## 2020-06-29 DIAGNOSIS — E61.1 IRON DEFICIENCY: ICD-10-CM

## 2020-06-29 DIAGNOSIS — E87.1 HYPONATREMIA: ICD-10-CM

## 2020-06-29 PROCEDURE — 99213 OFFICE O/P EST LOW 20 MIN: CPT | Performed by: NURSE PRACTITIONER

## 2020-06-29 RX ORDER — MIDODRINE HYDROCHLORIDE 10 MG/1
15 TABLET ORAL 3 TIMES DAILY
COMMUNITY
Start: 2020-06-15 | End: 2020-12-29

## 2020-06-29 NOTE — PROGRESS NOTES
Patient ID: Jaz Gipson is a 45 y.o. female     Subjective     Chief Complaint   Patient presents with   • Hospital f/u     D/C 6/15/2020 [facility]   • PIEDRA   • Hyponatremia     Admit date:  6/10/2020 with PIEDRA, hypotension, and hyponatremia  D/C date: 6/15/2020      History of Present Illness    Jaz Gipson presents to the office today for hospital follow-up.        Summary of most recent hospitalization involved initially been sent to the hospital due to hyponatremia.  Sodium level was 111 at that time.  She also was found to be anemic.  Her hemoglobin was 5.6.  She had received a transfusion of 4 units of blood along with 4 units of platelets and 3 of FFP.  She underwent EGD during hospitalization which found nonbleeding small esophageal varices along with portal hypertensive gastropathy and nonbleeding erosive gastropathy.  Nephrology was also consulted during hospital stay due to hyponatremia.  Placed on fluid restriction of 1 L/day.  Sodium level at time of discharge was 131.  Only medications changed during hospitalization was adding Midodrin due to hypotensive on admission.  At time of discharge her blood pressure was stable at 98/50.  Fluid restriction currently of 1000 ml per day due to hyponatremia.    Discharge summary from 6/15/2020 was reviewed under Care Everywhere.    Nephrology appointment on 7/7/2020  GI appointment with Dr. Cruz on 7/7/2020. Switching to [facility] from Dr. Tierney.  Appt at [facility] 7/20/20 for evaluation for transplant.  Since she was discharged home, she feels her condition is stable.  She still has shakiness at times and leg cramping however feels symptoms are improving.  Her main concerns is thirst due to fluid restriction.    She denies any complaints of fever, chills, cough, chest pain, shortness of air, abdominal pain, nausea, or any other concerns.     The following portions of the patient's history were reviewed and updated as appropriate: allergies, current  medications, past family history, past medical history, past social history, past surgical history and problem list.       Review of Systems   Constitution: Negative.   HENT: Negative.    Eyes: Negative.    Cardiovascular: Negative.    Respiratory: Negative.    Endocrine: Negative.    Hematologic/Lymphatic: Negative.    Skin: Negative.    Musculoskeletal: Positive for muscle cramps.   Gastrointestinal: Negative.    Genitourinary: Negative.    Neurological: Negative.    Psychiatric/Behavioral: Negative.        Vitals:    06/29/20 1116   BP: 100/60   Pulse: 90   Resp: 16   Temp: 97.6 °F (36.4 °C)   SpO2: 98%       Documented weights    06/29/20 1116   Weight: 69.9 kg (154 lb)     Body mass index is 26.43 kg/m².    Results for orders placed or performed in visit on 04/14/20   Comprehensive Metabolic Panel   Result Value Ref Range    Glucose 105 (H) 65 - 99 mg/dL    BUN 7 6 - 24 mg/dL    Creatinine 0.81 0.57 - 1.00 mg/dL    eGFR Non African Am 89 >59 mL/min/1.73    eGFR African Am 103 >59 mL/min/1.73    BUN/Creatinine Ratio 9 9 - 23    Sodium 127 (L) 134 - 144 mmol/L    Potassium 3.4 (L) 3.5 - 5.2 mmol/L    Chloride 98 96 - 106 mmol/L    Total CO2 22 20 - 29 mmol/L    Calcium 7.3 (L) 8.7 - 10.2 mg/dL    Total Protein 8.6 (H) 6.0 - 8.5 g/dL    Albumin 1.7 (L) 3.8 - 4.8 g/dL    Globulin 6.9 (H) 1.5 - 4.5 g/dL    A/G Ratio 0.2 (L) 1.2 - 2.2    Total Bilirubin 5.4 (H) 0.0 - 1.2 mg/dL    Alkaline Phosphatase 259 (H) 39 - 117 IU/L    AST (SGOT) 63 (H) 0 - 40 IU/L    ALT (SGPT) 20 0 - 32 IU/L   Protime-INR   Result Value Ref Range    INR 1.5 (H) 0.8 - 1.2    Protime 15.6 (H) 9.1 - 12.0 sec   CBC & Differential   Result Value Ref Range    WBC 2.2 (L) 3.4 - 10.8 x10E3/uL    RBC 2.74 (L) 3.77 - 5.28 x10E6/uL    Hemoglobin 8.4 (L) 11.1 - 15.9 g/dL    Hematocrit 23.8 (L) 34.0 - 46.6 %    MCV 87 79 - 97 fL    MCH 30.7 26.6 - 33.0 pg    MCHC 35.3 31.5 - 35.7 g/dL    RDW 14.1 11.7 - 15.4 %    Platelets 41 (L) 150 - 450 x10E3/uL     Neutrophil Rel % 49 Not Estab. %    Lymphocyte Rel % 25 Not Estab. %    Monocyte Rel % 20 Not Estab. %    Eosinophil Rel % 5 Not Estab. %    Basophil Rel % 1 Not Estab. %    Neutrophils Absolute 1.1 (L) 1.4 - 7.0 x10E3/uL    Lymphocytes Absolute 0.6 (L) 0.7 - 3.1 x10E3/uL    Monocytes Absolute 0.4 0.1 - 0.9 x10E3/uL    Eosinophils Absolute 0.1 0.0 - 0.4 x10E3/uL    Basophils Absolute 0.0 0.0 - 0.2 x10E3/uL    Immature Granulocyte Rel % 0 Not Estab. %    Immature Grans Absolute 0.0 0.0 - 0.1 x10E3/uL    Hematology Comments: Note:        Objective     Physical Exam   Constitutional: She is oriented to person, place, and time. Vital signs are normal. She has a sickly appearance.   HENT:   Head: Normocephalic and atraumatic.   Eyes: Lids are normal. Scleral icterus is present.   Neck: Normal range of motion.   Cardiovascular: Normal rate and regular rhythm.   No murmur heard.  Pulmonary/Chest: Effort normal and breath sounds normal. No respiratory distress.   Abdominal: Soft. Bowel sounds are normal. There is tenderness in the right upper quadrant.   Musculoskeletal: She exhibits no edema.   Neurological: She is alert and oriented to person, place, and time.   Skin: There is pallor.   Grayish skin color   Psychiatric: She has a normal mood and affect.          Assessment/Plan     Assessment/Plan     Jaz was seen today for hospital f/u, angulo and hyponatremia.    Diagnoses and all orders for this visit:    ANGULO (nonalcoholic steatohepatitis)  -     CBC & Differential  -     Comprehensive Metabolic Panel    Low hemoglobin  -     CBC & Differential  -     Iron and TIBC    Hyponatremia  -     Comprehensive Metabolic Panel    Iron deficiency  -     Iron and TIBC    Acquired hypothyroidism  -     TSH; Future  -     T4, Free; Future    Other orders  -     Manual Differential  -     Iron Profile      Summary:  Jaz Gipson condition has been stable since discharge from the hospital.  She has appropriate follow-up appointment  scheduled with both nephrology and GI.  Also referral to [facility] for possible liver transplant.  Main concerns are continue muscle cramps in legs.  Instructed would like to recheck her CBC, iron levels, and CMP in office today.  We will call her with results.  Continue fluid restriction at this time.  Instructed may also try over-the-counter biotene to help with dry mouth.  No further changes in medications at this time.  Needs to return in 3 months for next recheck appointment which will be annual physical exam with fasting labs.    In the meantime, instructed to contact us sooner for any problems or concerns.    There are no Patient Instructions on file for this visit.      Cheli Noonan, APRN  Family Medicine  [facility]  06/29/20  11:34

## 2020-06-30 ENCOUNTER — TELEPHONE (OUTPATIENT)
Dept: FAMILY MEDICINE CLINIC | Facility: CLINIC | Age: 45
End: 2020-06-30

## 2020-06-30 LAB
ALBUMIN SERPL-MCNC: 2.5 G/DL (ref 3.5–5.2)
ALBUMIN/GLOB SERPL: 0.4 G/DL
ALP SERPL-CCNC: 222 U/L (ref 39–117)
ALT SERPL-CCNC: 20 U/L (ref 1–33)
AST SERPL-CCNC: 51 U/L (ref 1–32)
BASOPHILS # BLD AUTO: ABNORMAL 10*3/UL
BILIRUB SERPL-MCNC: 8.5 MG/DL (ref 0.2–1.2)
BUN SERPL-MCNC: 12 MG/DL (ref 6–20)
BUN/CREAT SERPL: 11.5 (ref 7–25)
CALCIUM SERPL-MCNC: 8.2 MG/DL (ref 8.6–10.5)
CHLORIDE SERPL-SCNC: 92 MMOL/L (ref 98–107)
CO2 SERPL-SCNC: 20.9 MMOL/L (ref 22–29)
CREAT SERPL-MCNC: 1.04 MG/DL (ref 0.57–1)
DIFFERENTIAL COMMENT: ABNORMAL
EOSINOPHIL # BLD AUTO: ABNORMAL 10*3/UL
EOSINOPHIL # BLD MANUAL: 0.07 10*3/MM3 (ref 0–0.4)
EOSINOPHIL NFR BLD AUTO: ABNORMAL %
EOSINOPHIL NFR BLD MANUAL: 2 % (ref 0.3–6.2)
ERYTHROCYTE [DISTWIDTH] IN BLOOD BY AUTOMATED COUNT: 17.4 % (ref 12.3–15.4)
GLOBULIN SER CALC-MCNC: 6.8 GM/DL
GLUCOSE SERPL-MCNC: 95 MG/DL (ref 65–99)
HCT VFR BLD AUTO: 28 % (ref 34–46.6)
HGB BLD-MCNC: 10.4 G/DL (ref 12–15.9)
IRON SATN MFR SERPL: 84 % (ref 20–50)
IRON SERPL-MCNC: 112 MCG/DL (ref 37–145)
LYMPHOCYTES # BLD AUTO: ABNORMAL 10*3/UL
LYMPHOCYTES # BLD MANUAL: 0.43 10*3/MM3 (ref 0.7–3.1)
LYMPHOCYTES NFR BLD AUTO: ABNORMAL %
LYMPHOCYTES NFR BLD MANUAL: 12 % (ref 19.6–45.3)
MCH RBC QN AUTO: 31.4 PG (ref 26.6–33)
MCHC RBC AUTO-ENTMCNC: 37.1 G/DL (ref 31.5–35.7)
MCV RBC AUTO: 84.6 FL (ref 79–97)
MONOCYTES # BLD MANUAL: 0.68 10*3/MM3 (ref 0.1–0.9)
MONOCYTES NFR BLD AUTO: ABNORMAL %
MONOCYTES NFR BLD MANUAL: 19 % (ref 5–12)
NEUTROPHILS # BLD MANUAL: 2.41 10*3/MM3 (ref 1.7–7)
NEUTROPHILS NFR BLD AUTO: ABNORMAL %
NEUTROPHILS NFR BLD MANUAL: 67 % (ref 42.7–76)
PLATELET # BLD AUTO: 41 10*3/MM3 (ref 140–450)
PLATELET BLD QL SMEAR: ABNORMAL
POTASSIUM SERPL-SCNC: 4.8 MMOL/L (ref 3.5–5.2)
PROT SERPL-MCNC: 9.3 G/DL (ref 6–8.5)
RBC # BLD AUTO: 3.31 10*6/MM3 (ref 3.77–5.28)
RBC MORPH BLD: ABNORMAL
SODIUM SERPL-SCNC: 119 MMOL/L (ref 136–145)
TIBC SERPL-MCNC: 135 MCG/DL
UIBC SERPL-MCNC: 23 MCG/DL (ref 112–346)
WBC # BLD AUTO: 3.59 10*3/MM3 (ref 3.4–10.8)

## 2020-06-30 RX ORDER — LEVOTHYROXINE SODIUM 137 UG/1
137 TABLET ORAL DAILY
COMMUNITY
End: 2020-07-31 | Stop reason: DRUGHIGH

## 2020-06-30 NOTE — TELEPHONE ENCOUNTER
I spoke with Lab on 6/29/2020 at 10:30 pm and 11:20 pm. Platelet was 41,000 and Sodium was 119.  I spoke with Jaz on 6/30/2020 at 7 am . I have discussed her lab work that showed platelet was 41,000 and Sodium was 119.  She was instructed to limit her water intake.  She has upcoming appointments with Nephrology and GI next week.  Denied any nose or gum bleeding

## 2020-06-30 NOTE — PATIENT INSTRUCTIONS
Hyponatremia  Hyponatremia is when the amount of salt (sodium) in your blood is too low. When salt levels are low, your body may take in extra water. This can cause swelling throughout the body. The swelling often affects the brain.  What are the causes?  This condition may be caused by:  · Certain medical problems or conditions.  · Vomiting a lot.  · Having watery poop (diarrhea) often.  · Certain medicines or illegal drugs.  · Not having enough water in the body (dehydration).  · Drinking too much water.  · Eating a diet that is low in salt.  · Large burns on your body.  · Too much sweating.  What increases the risk?  You are more likely to get this condition if you:  · Have long-term (chronic) kidney disease.  · Have heart failure.  · Have a medical condition that causes you to have watery poop often.  · Do very hard exercises.  · Take medicines that affect the amount of salt is in your blood.  What are the signs or symptoms?  Symptoms of this condition include:  · Headache.  · Feeling like you may vomit (nausea).  · Vomiting.  · Being very tired (lethargic).  · Muscle weakness and cramps.  · Not wanting to eat as much as normal (loss of appetite).  · Feeling weak or light-headed.  Severe symptoms of this condition include:  · Confusion.  · Feeling restless (agitation).  · Having a fast heart rate.  · Passing out (fainting).  · Seizures.  · Coma.  How is this treated?  Treatment for this condition depends on the cause. Treatment may include:  · Getting fluids through an IV tube that is put into one of your veins.  · Taking medicines to fix the salt levels in your blood. If medicines are causing the problem, your medicines will need to be changed.  · Limiting how much water or fluid you take in.  · Monitoring in the hospital to watch your symptoms.  Follow these instructions at home:    · Take over-the-counter and prescription medicines only as told by your doctor. Many medicines can make this condition worse.  Talk with your doctor about any medicines that you are taking.  · Eat and drink exactly as you are told by your doctor.  ? Eat only the foods you are told to eat.  ? Limit how much fluid you take.  · Do not drink alcohol.  · Keep all follow-up visits as told by your doctor. This is important.  Contact a doctor if:  · You feel more like you may vomit.  · You feel more tired.  · Your headache gets worse.  · You feel more confused.  · You feel weaker.  · Your symptoms go away and then they come back.  · You have trouble following the diet instructions.  Get help right away if:  · You have a seizure.  · You pass out.  · You keep having watery poop.  · You keep vomiting.  Summary  · Hyponatremia is when the amount of salt in your blood is too low.  · When salt levels are low, you can have swelling throughout the body. The swelling mostly affects the brain.  · Treatment depends on the cause. Treatment may include getting IV fluids, medicines, or not drinking as much fluid.  This information is not intended to replace advice given to you by your health care provider. Make sure you discuss any questions you have with your health care provider.  Document Released: 08/29/2012 Document Revised: 03/05/2020 Document Reviewed: 11/21/2019  Elsevier Patient Education © 2020 [employer]

## 2020-07-02 RX ORDER — POTASSIUM CHLORIDE 1500 MG/1
TABLET, EXTENDED RELEASE ORAL
Qty: 120 TABLET | Refills: 0 | OUTPATIENT
Start: 2020-07-02

## 2020-07-16 DIAGNOSIS — E03.9 ACQUIRED HYPOTHYROIDISM: ICD-10-CM

## 2020-07-16 RX ORDER — LEVOTHYROXINE SODIUM 175 UG/1
TABLET ORAL
Qty: 90 TABLET | Refills: 0 | OUTPATIENT
Start: 2020-07-16

## 2020-07-28 ENCOUNTER — TELEPHONE (OUTPATIENT)
Dept: FAMILY MEDICINE CLINIC | Facility: CLINIC | Age: 45
End: 2020-07-28

## 2020-07-28 DIAGNOSIS — Z12.39 BREAST CANCER SCREENING: Primary | ICD-10-CM

## 2020-07-28 NOTE — TELEPHONE ENCOUNTER
Patient called in requesting an order for a mammogram, her liver coordinator advised that she has this done this week.    Please call back to advise    Liver Coordinator  Lisa Washington. [phone]    Patient's call back # [phone]

## 2020-07-31 ENCOUNTER — OFFICE VISIT (OUTPATIENT)
Dept: FAMILY MEDICINE CLINIC | Facility: CLINIC | Age: 45
End: 2020-07-31

## 2020-07-31 VITALS
SYSTOLIC BLOOD PRESSURE: 100 MMHG | HEIGHT: 64 IN | OXYGEN SATURATION: 100 % | DIASTOLIC BLOOD PRESSURE: 64 MMHG | WEIGHT: 178 LBS | HEART RATE: 84 BPM | TEMPERATURE: 99 F | BODY MASS INDEX: 30.39 KG/M2

## 2020-07-31 DIAGNOSIS — Z12.4 CERVICAL CANCER SCREENING: ICD-10-CM

## 2020-07-31 DIAGNOSIS — Z00.00 ANNUAL PHYSICAL EXAM: Primary | ICD-10-CM

## 2020-07-31 DIAGNOSIS — K75.81 NASH (NONALCOHOLIC STEATOHEPATITIS): ICD-10-CM

## 2020-07-31 PROCEDURE — 99396 PREV VISIT EST AGE 40-64: CPT | Performed by: NURSE PRACTITIONER

## 2020-07-31 RX ORDER — LEVOTHYROXINE SODIUM 0.1 MG/1
100 TABLET ORAL DAILY
COMMUNITY
Start: 2020-07-22 | End: 2020-08-14 | Stop reason: SDUPTHER

## 2020-07-31 RX ORDER — LACTULOSE 10 G/15ML
15 SOLUTION ORAL 3 TIMES DAILY
COMMUNITY
End: 2020-12-29

## 2020-08-04 LAB
CONV .: NORMAL
CYTOLOGIST CVX/VAG CYTO: NORMAL
CYTOLOGY CVX/VAG DOC CYTO: NORMAL
CYTOLOGY CVX/VAG DOC THIN PREP: NORMAL
DX ICD CODE: NORMAL
HIV 1 & 2 AB SER-IMP: NORMAL
OTHER STN SPEC: NORMAL
STAT OF ADQ CVX/VAG CYTO-IMP: NORMAL

## 2020-08-04 NOTE — PROGRESS NOTES
Notify Jaz Gipson Pap is negative.  Needs Pap and mammogram results faxed to [facility].

## 2020-08-14 RX ORDER — LEVOTHYROXINE SODIUM 0.1 MG/1
100 TABLET ORAL DAILY
Qty: 90 TABLET | Refills: 1 | Status: SHIPPED | OUTPATIENT
Start: 2020-08-14 | End: 2020-08-17 | Stop reason: SDUPTHER

## 2020-08-14 NOTE — TELEPHONE ENCOUNTER
PATIENT NEEDS REFILL ON: levothyroxine (SYNTHROID, LEVOTHROID) 100 MCG tablet    PATIENT CAN BE REACHED ON:[phone]    PHARMACY PREFERRED:[id number] [place] [address] AT [facility] [employer] [phone] [facility] [phone] FX

## 2020-08-17 RX ORDER — LEVOTHYROXINE SODIUM 0.1 MG/1
100 TABLET ORAL DAILY
Qty: 90 TABLET | Refills: 1 | Status: SHIPPED | OUTPATIENT
Start: 2020-08-17 | End: 2020-12-29

## 2020-08-17 RX ORDER — POTASSIUM CHLORIDE 20 MEQ/1
20 TABLET, EXTENDED RELEASE ORAL DAILY
Qty: 90 TABLET | Refills: 1 | Status: SHIPPED | OUTPATIENT
Start: 2020-08-17 | End: 2020-12-29

## 2020-08-30 ENCOUNTER — RESULTS ENCOUNTER (OUTPATIENT)
Dept: FAMILY MEDICINE CLINIC | Facility: CLINIC | Age: 45
End: 2020-08-30

## 2020-08-30 DIAGNOSIS — E03.9 ACQUIRED HYPOTHYROIDISM: ICD-10-CM

## 2020-10-14 RX ORDER — SUCRALFATE 1 G/1
TABLET ORAL
Qty: 60 TABLET | Refills: 2 | OUTPATIENT
Start: 2020-10-14

## 2020-12-21 DIAGNOSIS — E78.2 MIXED HYPERLIPIDEMIA: ICD-10-CM

## 2020-12-21 DIAGNOSIS — E03.9 HYPOTHYROIDISM, UNSPECIFIED TYPE: ICD-10-CM

## 2020-12-21 DIAGNOSIS — E87.1 HYPONATREMIA: ICD-10-CM

## 2020-12-21 DIAGNOSIS — E87.1 HYPONATREMIA: Primary | ICD-10-CM

## 2020-12-23 LAB
ALBUMIN SERPL-MCNC: 3.4 G/DL (ref 3.5–5.2)
ALBUMIN/GLOB SERPL: 0.7 G/DL
ALP SERPL-CCNC: 547 U/L (ref 39–117)
ALT SERPL-CCNC: 76 U/L (ref 1–33)
APPEARANCE UR: CLEAR
AST SERPL-CCNC: 35 U/L (ref 1–32)
BACTERIA #/AREA URNS HPF: NORMAL /HPF
BASOPHILS # BLD AUTO: 0.02 10*3/MM3 (ref 0–0.2)
BASOPHILS NFR BLD AUTO: 0.3 % (ref 0–1.5)
BILIRUB SERPL-MCNC: 0.4 MG/DL (ref 0–1.2)
BILIRUB UR QL STRIP: NEGATIVE
BUN SERPL-MCNC: 29 MG/DL (ref 6–20)
BUN/CREAT SERPL: 24.4 (ref 7–25)
CALCIUM SERPL-MCNC: 8.8 MG/DL (ref 8.6–10.5)
CHLORIDE SERPL-SCNC: 105 MMOL/L (ref 98–107)
CHOLEST SERPL-MCNC: 185 MG/DL (ref 0–200)
CO2 SERPL-SCNC: 26.8 MMOL/L (ref 22–29)
COLOR UR: YELLOW
CREAT SERPL-MCNC: 1.19 MG/DL (ref 0.57–1)
EOSINOPHIL # BLD AUTO: 0.09 10*3/MM3 (ref 0–0.4)
EOSINOPHIL NFR BLD AUTO: 1.5 % (ref 0.3–6.2)
EPI CELLS #/AREA URNS HPF: NORMAL /HPF (ref 0–10)
ERYTHROCYTE [DISTWIDTH] IN BLOOD BY AUTOMATED COUNT: 12.2 % (ref 12.3–15.4)
GLOBULIN SER CALC-MCNC: 5 GM/DL
GLUCOSE SERPL-MCNC: 86 MG/DL (ref 65–99)
GLUCOSE UR QL: NEGATIVE
HCT VFR BLD AUTO: 32.4 % (ref 34–46.6)
HDLC SERPL-MCNC: 51 MG/DL (ref 40–60)
HGB BLD-MCNC: 10.7 G/DL (ref 12–15.9)
HGB UR QL STRIP: NEGATIVE
IMM GRANULOCYTES # BLD AUTO: 0.02 10*3/MM3 (ref 0–0.05)
IMM GRANULOCYTES NFR BLD AUTO: 0.3 % (ref 0–0.5)
KETONES UR QL STRIP: NEGATIVE
LDLC SERPL CALC-MCNC: 118 MG/DL (ref 0–100)
LEUKOCYTE ESTERASE UR QL STRIP: NEGATIVE
LYMPHOCYTES # BLD AUTO: 0.71 10*3/MM3 (ref 0.7–3.1)
LYMPHOCYTES NFR BLD AUTO: 11.8 % (ref 19.6–45.3)
MCH RBC QN AUTO: 30.1 PG (ref 26.6–33)
MCHC RBC AUTO-ENTMCNC: 33 G/DL (ref 31.5–35.7)
MCV RBC AUTO: 91.3 FL (ref 79–97)
MICRO URNS: NORMAL
MICRO URNS: NORMAL
MONOCYTES # BLD AUTO: 0.55 10*3/MM3 (ref 0.1–0.9)
MONOCYTES NFR BLD AUTO: 9.2 % (ref 5–12)
MUCOUS THREADS URNS QL MICRO: PRESENT /HPF
NEUTROPHILS # BLD AUTO: 4.61 10*3/MM3 (ref 1.7–7)
NEUTROPHILS NFR BLD AUTO: 76.9 % (ref 42.7–76)
NITRITE UR QL STRIP: NEGATIVE
NRBC BLD AUTO-RTO: 0 /100 WBC (ref 0–0.2)
PH UR STRIP: 7 [PH] (ref 5–7.5)
PLATELET # BLD AUTO: 157 10*3/MM3 (ref 140–450)
POTASSIUM SERPL-SCNC: 4.6 MMOL/L (ref 3.5–5.2)
PROT SERPL-MCNC: 8.4 G/DL (ref 6–8.5)
PROT UR QL STRIP: NORMAL
RBC # BLD AUTO: 3.55 10*6/MM3 (ref 3.77–5.28)
RBC #/AREA URNS HPF: NORMAL /HPF (ref 0–2)
SODIUM SERPL-SCNC: 138 MMOL/L (ref 136–145)
SP GR UR: 1.02 (ref 1–1.03)
T4 FREE SERPL-MCNC: 0.74 NG/DL (ref 0.93–1.7)
TRIGL SERPL-MCNC: 89 MG/DL (ref 0–150)
TSH SERPL DL<=0.005 MIU/L-ACNC: 30.6 UIU/ML (ref 0.27–4.2)
URINALYSIS REFLEX: NORMAL
UROBILINOGEN UR STRIP-MCNC: 0.2 MG/DL (ref 0.2–1)
VLDLC SERPL CALC-MCNC: 16 MG/DL (ref 5–40)
WBC # BLD AUTO: 6 10*3/MM3 (ref 3.4–10.8)
WBC #/AREA URNS HPF: NORMAL /HPF (ref 0–5)

## 2020-12-29 ENCOUNTER — OFFICE VISIT (OUTPATIENT)
Dept: FAMILY MEDICINE CLINIC | Facility: CLINIC | Age: 45
End: 2020-12-29

## 2020-12-29 VITALS
TEMPERATURE: 97.6 F | HEIGHT: 64 IN | RESPIRATION RATE: 16 BRPM | OXYGEN SATURATION: 99 % | DIASTOLIC BLOOD PRESSURE: 80 MMHG | BODY MASS INDEX: 26.63 KG/M2 | WEIGHT: 156 LBS | SYSTOLIC BLOOD PRESSURE: 120 MMHG | HEART RATE: 82 BPM

## 2020-12-29 DIAGNOSIS — Z94.4 HISTORY OF LIVER TRANSPLANT (HCC): ICD-10-CM

## 2020-12-29 DIAGNOSIS — E03.9 ACQUIRED HYPOTHYROIDISM: Primary | ICD-10-CM

## 2020-12-29 PROCEDURE — 99213 OFFICE O/P EST LOW 20 MIN: CPT | Performed by: NURSE PRACTITIONER

## 2020-12-29 RX ORDER — LEVOTHYROXINE SODIUM 0.12 MG/1
125 TABLET ORAL DAILY
Qty: 30 TABLET | Refills: 2 | Status: SHIPPED | OUTPATIENT
Start: 2020-12-29 | End: 2021-03-26 | Stop reason: SDUPTHER

## 2020-12-29 NOTE — PROGRESS NOTES
Patient ID: Jaz Gipson is a 45 y.o. female     Patient Care Team:  Head, LON Oswald as PCP - General (Nurse Practitioner)  Gus Orourke II, MD as Consulting Physician (Hematology and Oncology)  Head, LON Oswald as Referring Physician (Nurse Practitioner)  Eric Laughlin MD as Consulting Physician (Pulmonary Disease)  Laura Xie MD (Gastroenterology)  Zi Cruz MD (Gastroenterology)  Destiney Flores MD as Consulting Physician (Nephrology)    Subjective     Chief Complaint   Patient presents with   • Hypertension   • Hyperlipidemia   • Hypothyroidism       History of Present Illness    Jaz Gipson presents to the office today for continued management concerning hypertension, hyperlipidemia, and hypothyroidism.  Since she was last seen, her condition has greatly improved.  She underwent liver transplant in August.  She had complications after surgery due to bleeding which required going back to surgery.  Required multiple blood transfusions.  However was able to stop the bleeding.  Her condition has gradually improved since then.  She has had increase in appetite and energy level.  Her medications have completely changed due to having liver transplant however she left her medication list at home.  Hypothyroidism: Currently managed with levothyroxine 100 mcg daily.  She does admit to weight gain however unsure if related to feeling better.  She does know she is on antirejection medication due to liver transplant along with prednisone.    She denies any complaints of fever, chills, cough, chest pain, shortness of air, abdominal pain, nausea, or any other concerns.     The following portions of the patient's history were reviewed and updated as appropriate: allergies, current medications, past family history, past medical history, past social history, past surgical history and problem list.       Review of Systems   Constitution: Positive for weight gain. Negative for malaise/fatigue.    HENT: Negative.    Eyes: Negative.    Cardiovascular: Negative.    Respiratory: Negative.    Endocrine: Negative.    Hematologic/Lymphatic: Negative.    Skin: Negative.    Musculoskeletal: Negative.    Gastrointestinal: Negative.    Genitourinary: Negative.    Neurological: Negative.    Psychiatric/Behavioral: Negative.        Vitals:    12/29/20 1344   BP: 120/80   Pulse: 82   Resp: 16   Temp: 97.6 °F (36.4 °C)   SpO2: 99%       Documented weights    12/29/20 1344   Weight: 70.8 kg (156 lb)     Body mass index is 26.78 kg/m².    Results for orders placed or performed in visit on 12/21/20   UA / M With / Rflx Culture([facility] ONLY) - Urine, Clean Catch    Specimen: Urine, Clean Catch   Result Value Ref Range    Specific Gravity, UA 1.022 1.005 - 1.030    pH, UA 7.0 5.0 - 7.5    Color, UA Yellow Yellow    Appearance, UA Clear Clear    Leukocytes, UA Negative Negative    Protein Trace Negative/Trace    Glucose, UA Negative Negative    Ketones Negative Negative    Blood, UA Negative Negative    Bilirubin, UA Negative Negative    Urobilinogen, UA 0.2 0.2 - 1.0 mg/dL    Nitrite, UA Negative Negative    Microscopic Examination Comment     Microscopic Examination See below:     Urinalysis Reflex Comment    Lipid Panel    Specimen: Blood   Result Value Ref Range    Total Cholesterol 185 0 - 200 mg/dL    Triglycerides 89 0 - 150 mg/dL    HDL Cholesterol 51 40 - 60 mg/dL    VLDL Cholesterol Mario 16 5 - 40 mg/dL    LDL Chol Calc (NIH) 118 (H) 0 - 100 mg/dL   Comprehensive Metabolic Panel    Specimen: Blood   Result Value Ref Range    Glucose 86 65 - 99 mg/dL    BUN 29 (H) 6 - 20 mg/dL    Creatinine 1.19 (H) 0.57 - 1.00 mg/dL    eGFR Non African Am 50 (L) >60 mL/min/1.73    eGFR African Am 60 (L) >60 mL/min/1.73    BUN/Creatinine Ratio 24.4 7.0 - 25.0    Sodium 138 136 - 145 mmol/L    Potassium 4.6 3.5 - 5.2 mmol/L    Chloride 105 98 - 107 mmol/L    Total CO2 26.8 22.0 - 29.0 mmol/L    Calcium 8.8 8.6 - 10.5 mg/dL    Total  Protein 8.4 6.0 - 8.5 g/dL    Albumin 3.40 (L) 3.50 - 5.20 g/dL    Globulin 5.0 gm/dL    A/G Ratio 0.7 g/dL    Total Bilirubin 0.4 0.0 - 1.2 mg/dL    Alkaline Phosphatase 547 (H) 39 - 117 U/L    AST (SGOT) 35 (H) 1 - 32 U/L    ALT (SGPT) 76 (H) 1 - 33 U/L   Microscopic Examination -   Result Value Ref Range    WBC, UA 0-5 0 - 5 /hpf    RBC, UA 0-2 0 - 2 /hpf    Epithelial Cells (non renal) 0-10 0 - 10 /hpf    Mucus, UA Present Not Estab. /hpf    Bacteria, UA None seen None seen/Few /hpf   T4, Free   Result Value Ref Range    Free T4 0.74 (L) 0.93 - 1.70 ng/dL   TSH   Result Value Ref Range    TSH 30.600 (H) 0.270 - 4.200 uIU/mL   CBC & Differential    Specimen: Blood   Result Value Ref Range    WBC 6.00 3.40 - 10.80 10*3/mm3    RBC 3.55 (L) 3.77 - 5.28 10*6/mm3    Hemoglobin 10.7 (L) 12.0 - 15.9 g/dL    Hematocrit 32.4 (L) 34.0 - 46.6 %    MCV 91.3 79.0 - 97.0 fL    MCH 30.1 26.6 - 33.0 pg    MCHC 33.0 31.5 - 35.7 g/dL    RDW 12.2 (L) 12.3 - 15.4 %    Platelets 157 140 - 450 10*3/mm3    Neutrophil Rel % 76.9 (H) 42.7 - 76.0 %    Lymphocyte Rel % 11.8 (L) 19.6 - 45.3 %    Monocyte Rel % 9.2 5.0 - 12.0 %    Eosinophil Rel % 1.5 0.3 - 6.2 %    Basophil Rel % 0.3 0.0 - 1.5 %    Neutrophils Absolute 4.61 1.70 - 7.00 10*3/mm3    Lymphocytes Absolute 0.71 0.70 - 3.10 10*3/mm3    Monocytes Absolute 0.55 0.10 - 0.90 10*3/mm3    Eosinophils Absolute 0.09 0.00 - 0.40 10*3/mm3    Basophils Absolute 0.02 0.00 - 0.20 10*3/mm3    Immature Granulocyte Rel % 0.3 0.0 - 0.5 %    Immature Grans Absolute 0.02 0.00 - 0.05 10*3/mm3    nRBC 0.0 0.0 - 0.2 /100 WBC       Objective     Physical Exam  Vitals signs reviewed.   Constitutional:       General: She is not in acute distress.     Appearance: She is well-developed.   HENT:      Head: Normocephalic and atraumatic.      Comments: Moon face noted.  Currently taking prednisone.       Right Ear: Tympanic membrane normal.      Left Ear: Tympanic membrane normal.      Mouth/Throat:       Mouth: Mucous membranes are moist.   Eyes:      General: No scleral icterus.     Extraocular Movements: Extraocular movements intact.      Pupils: Pupils are equal, round, and reactive to light.   Neck:      Musculoskeletal: Normal range of motion and neck supple.   Cardiovascular:      Rate and Rhythm: Normal rate and regular rhythm.      Heart sounds: Normal heart sounds. No murmur.   Pulmonary:      Effort: Pulmonary effort is normal.      Breath sounds: Normal breath sounds. No wheezing, rhonchi or rales.   Abdominal:      General: Bowel sounds are normal.      Palpations: Abdomen is soft.      Tenderness: There is no abdominal tenderness.   Musculoskeletal: Normal range of motion.         General: No tenderness.   Skin:     General: Skin is warm and dry.      Coloration: Skin is not jaundiced.      Findings: No erythema or rash.      Comments: Skin color changes of brownish red to left flank area from internal bleeding complication from surgery.    Abdominal incision is healed without redness or drainage.     Neurological:      Mental Status: She is alert and oriented to person, place, and time.   Psychiatric:         Behavior: Behavior normal.            Assessment/Plan     Assessment/Plan     Diagnoses and all orders for this visit:    1. Acquired hypothyroidism (Primary)  -     levothyroxine (Synthroid) 125 MCG tablet; Take 1 tablet by mouth Daily.  Dispense: 30 tablet; Refill: 2  -     T3, Free; Future  -     T4, Free; Future  -     TSH; Future    2. History of liver transplant (CMS/HCC)          Summary:  Jaz Gipson condition has greatly improved since liver transplant.  Reviewed recent labs along with patient.  Noted elevated TSH level of 30.6.  Free T4 low at 0.74.  She has been taking levothyroxine 100 mcg daily as directed.  Likely related to recent events and liver transplant.  She also has had weight gain however unsure if related to feeling better.  Will increase levothyroxine to 125 mcg daily.   Take first thing in the morning on empty stomach.  We will repeat thyroid function test including free T3 and free T4 in approximately 6 to 8 weeks.  We will call her with results.  All other labs are stable.  Prior to transplant she had low platelets which are now normal at 157.  Hemoglobin stable at 10.7 compared to 7.5 previously.  Her kidney function shows slightly elevated BUN of 29, and creatinine of 1.19.  However she left new medication list at home.  States her medications were recently adjusted due to renal function test.  We will continue to monitor.  Also prior to surgery she had problems with hyponatremia which is since resolved.  Sodium is now 138.  Potassium is 4.6.  Instructed to send medication list through my chart when able.  Depending on repeat thyroid function test will determine further recommendations.    In the meantime, instructed to contact us sooner for any problems or concerns.    Patient Instructions   Hypothyroidism    Hypothyroidism is when the thyroid gland does not make enough of certain hormones (it is underactive). The thyroid gland is a small gland located in the lower front part of the neck, just in front of the windpipe (trachea). This gland makes hormones that help control how the body uses food for energy (metabolism) as well as how the heart and brain function. These hormones also play a role in keeping your bones strong. When the thyroid is underactive, it produces too little of the hormones thyroxine (T4) and triiodothyronine (T3).  What are the causes?  This condition may be caused by:  · Hashimoto's disease. This is a disease in which the body's disease-fighting system (immune system) attacks the thyroid gland. This is the most common cause.  · Viral infections.  · Pregnancy.  · Certain medicines.  · Birth defects.  · Past radiation treatments to the head or neck for cancer.  · Past treatment with radioactive iodine.  · Past exposure to radiation in the  environment.  · Past surgical removal of part or all of the thyroid.  · Problems with a gland in the center of the brain (pituitary gland).  · Lack of enough iodine in the diet.  What increases the risk?  You are more likely to develop this condition if:  · You are female.  · You have a family history of thyroid conditions.  · You use a medicine called lithium.  · You take medicines that affect the immune system (immunosuppressants).  What are the signs or symptoms?  Symptoms of this condition include:  · Feeling as though you have no energy (lethargy).  · Not being able to tolerate cold.  · Weight gain that is not explained by a change in diet or exercise habits.  · Lack of appetite.  · Dry skin.  · Coarse hair.  · Menstrual irregularity.  · Slowing of thought processes.  · Constipation.  · Sadness or depression.  How is this diagnosed?  This condition may be diagnosed based on:  · Your symptoms, your medical history, and a physical exam.  · Blood tests.  You may also have imaging tests, such as an ultrasound or MRI.  How is this treated?  This condition is treated with medicine that replaces the thyroid hormones that your body does not make. After you begin treatment, it may take several weeks for symptoms to go away.  Follow these instructions at home:  · Take over-the-counter and prescription medicines only as told by your health care provider.  · If you start taking any new medicines, tell your health care provider.  · Keep all follow-up visits as told by your health care provider. This is important.  ? As your condition improves, your dosage of thyroid hormone medicine may change.  ? You will need to have blood tests regularly so that your health care provider can monitor your condition.  Contact a health care provider if:  · Your symptoms do not get better with treatment.  · You are taking thyroid replacement medicine and you:  ? Sweat a lot.  ? Have tremors.  ? Feel anxious.  ? Lose weight rapidly.  ? Cannot  tolerate heat.  ? Have emotional swings.  ? Have diarrhea.  ? Feel weak.  Get help right away if you have:  · Chest pain.  · An irregular heartbeat.  · A rapid heartbeat.  · Difficulty breathing.  Summary  · Hypothyroidism is when the thyroid gland does not make enough of certain hormones (it is underactive).  · When the thyroid is underactive, it produces too little of the hormones thyroxine (T4) and triiodothyronine (T3).  · The most common cause is Hashimoto's disease, a disease in which the body's disease-fighting system (immune system) attacks the thyroid gland. The condition can also be caused by viral infections, medicine, pregnancy, or past radiation treatment to the head or neck.  · Symptoms may include weight gain, dry skin, constipation, feeling as though you do not have energy, and not being able to tolerate cold.  · This condition is treated with medicine to replace the thyroid hormones that your body does not make.  This information is not intended to replace advice given to you by your health care provider. Make sure you discuss any questions you have with your health care provider.  Document Revised: 11/30/2018 Document Reviewed: 11/28/2018  [employer] Patient Education © 2020 [employer]        Patient was wearing facemask when I entered the room and throughout our encounter. Protective equipment was worn throughout this patient encounter including a face mask, eye protection,  and gloves. Hand hygiene was performed before donning protective equipment and after removal when leaving the room.     LON Fitzpatrick  Family Medicine  [facility] [place]  12/30/20  13:10 EST

## 2020-12-30 PROBLEM — Z94.4 HISTORY OF LIVER TRANSPLANT (HCC): Status: ACTIVE | Noted: 2020-12-30

## 2021-03-26 ENCOUNTER — TELEPHONE (OUTPATIENT)
Dept: FAMILY MEDICINE CLINIC | Facility: CLINIC | Age: 46
End: 2021-03-26

## 2021-03-26 DIAGNOSIS — E03.9 ACQUIRED HYPOTHYROIDISM: ICD-10-CM

## 2021-03-26 RX ORDER — LEVOTHYROXINE SODIUM 0.12 MG/1
125 TABLET ORAL DAILY
Qty: 30 TABLET | Refills: 2 | Status: SHIPPED | OUTPATIENT
Start: 2021-03-26 | End: 2021-04-15 | Stop reason: DRUGHIGH

## 2021-03-26 RX ORDER — LEVOTHYROXINE SODIUM 0.12 MG/1
125 TABLET ORAL DAILY
Qty: 30 TABLET | Refills: 2 | Status: CANCELLED | OUTPATIENT
Start: 2021-03-26

## 2021-03-26 NOTE — TELEPHONE ENCOUNTER
Caller: [facility] - [address] - [phone] [facility] [phone] FX    Relationship: Pharmacy    Best call back number:[phone] OPT 3    Medication needed:   Requested Prescriptions     Pending Prescriptions Disp Refills   • levothyroxine (Synthroid) 125 MCG tablet 30 tablet 2     Sig: Take 1 tablet by mouth Daily.   PRE SAMAN VITAMIN W/ FOLIC ACID- 0.8 MG  SETROLINE 50 MG   METOPROLOL TART 25MG  BABY ASPRIN   (THESE WERE ORIGINALLY DONE BY TRANSPLANT DOCTOR, BUT TRANSPLANT DOCTOR STATED THEY NEEDED TO BE REFILLED BY PCP.)  When do you need the refill by: AS SOON AS POSSIBLE     What additional details did the patient provide when requesting the medication: PATIENT HAS A 3 A DAY SUPPLY OF MEDICATIONS  Does the patient have less than a 3 day supply:  [x] Yes  [] No    What is the patient's preferred pharmacy:   [facility] - [address] [phone] [facility] [phone] FX  [phone]

## 2021-03-31 ENCOUNTER — BULK ORDERING (OUTPATIENT)
Dept: CASE MANAGEMENT | Facility: OTHER | Age: 46
End: 2021-03-31

## 2021-03-31 DIAGNOSIS — Z23 IMMUNIZATION DUE: ICD-10-CM

## 2021-04-01 ENCOUNTER — TELEPHONE (OUTPATIENT)
Dept: FAMILY MEDICINE CLINIC | Facility: CLINIC | Age: 46
End: 2021-04-01

## 2021-04-01 RX ORDER — CEPHALEXIN 500 MG/1
2000 CAPSULE ORAL ONCE
Qty: 4 CAPSULE | Refills: 0 | Status: SHIPPED | OUTPATIENT
Start: 2021-04-01 | End: 2021-04-01

## 2021-04-01 NOTE — TELEPHONE ENCOUNTER
PATIENT STATES SHE HAS HAD A LIVER TRANSPLANT AND WAS ADVISED THAT BEFORE ALL DENTAL CLEANINGS SHE SHOULD BE PRESCRIBED AN ANTIBIOTIC. SHE STATES SHE IS SCHEDULED FOR A CLEANING TOMORROW. [place] [address].    PLEASE ADVISE: [phone]

## 2021-04-13 ENCOUNTER — OFFICE VISIT (OUTPATIENT)
Dept: FAMILY MEDICINE CLINIC | Facility: CLINIC | Age: 46
End: 2021-04-13

## 2021-04-13 VITALS
WEIGHT: 179 LBS | RESPIRATION RATE: 16 BRPM | HEIGHT: 64 IN | BODY MASS INDEX: 30.56 KG/M2 | OXYGEN SATURATION: 97 % | DIASTOLIC BLOOD PRESSURE: 80 MMHG | SYSTOLIC BLOOD PRESSURE: 110 MMHG | HEART RATE: 88 BPM | TEMPERATURE: 98.2 F

## 2021-04-13 DIAGNOSIS — E78.2 MIXED HYPERLIPIDEMIA: ICD-10-CM

## 2021-04-13 DIAGNOSIS — E03.9 ACQUIRED HYPOTHYROIDISM: Primary | ICD-10-CM

## 2021-04-13 DIAGNOSIS — Z94.4 HISTORY OF LIVER TRANSPLANT (HCC): ICD-10-CM

## 2021-04-13 PROCEDURE — 99213 OFFICE O/P EST LOW 20 MIN: CPT | Performed by: NURSE PRACTITIONER

## 2021-04-13 RX ORDER — ASPIRIN 81 MG/1
81 TABLET, CHEWABLE ORAL DAILY
Qty: 30 TABLET | Refills: 11 | Status: SHIPPED | OUTPATIENT
Start: 2021-04-13 | End: 2022-03-29

## 2021-04-13 RX ORDER — MAGNESIUM OXIDE 400 MG/1
400 TABLET ORAL 2 TIMES DAILY
COMMUNITY
End: 2022-05-13

## 2021-04-13 RX ORDER — ASPIRIN 81 MG/1
TABLET, CHEWABLE ORAL
COMMUNITY
Start: 2021-02-22 | End: 2021-04-13 | Stop reason: SDUPTHER

## 2021-04-13 RX ORDER — TACROLIMUS 1 MG/1
TABLET, EXTENDED RELEASE ORAL
COMMUNITY
Start: 2021-03-26

## 2021-04-13 RX ORDER — PNV NO.95/FERROUS FUM/FOLIC AC 28MG-0.8MG
1 TABLET ORAL DAILY
Qty: 30 TABLET | Refills: 11 | Status: SHIPPED | OUTPATIENT
Start: 2021-04-13 | End: 2022-03-29

## 2021-04-13 RX ORDER — PNV NO.95/FERROUS FUM/FOLIC AC 28MG-0.8MG
TABLET ORAL
COMMUNITY
Start: 2021-02-22 | End: 2021-04-13 | Stop reason: SDUPTHER

## 2021-04-13 RX ORDER — URSODIOL 300 MG/1
CAPSULE ORAL 2 TIMES DAILY
COMMUNITY
Start: 2021-03-26

## 2021-04-13 RX ORDER — MYCOPHENOLATE MOFETIL 250 MG/1
CAPSULE ORAL
COMMUNITY
Start: 2021-03-26

## 2021-04-13 NOTE — PROGRESS NOTES
Patient ID: Jaz Gipson is a 46 y.o. female     Patient Care Team:  Head, LON Oswald as PCP - General (Nurse Practitioner)  Gus Orourke II, MD as Consulting Physician (Hematology and Oncology)  Head, LON Oswald as Referring Physician (Nurse Practitioner)  Eric Laughlin MD as Consulting Physician (Pulmonary Disease)  Laura Xie MD (Gastroenterology)  Zi Cruz MD (Gastroenterology)  Destiney Flores MD as Consulting Physician (Nephrology)    Subjective     Chief Complaint   Patient presents with   • Hypertension   • Hyperlipidemia   • Hypothyroidism       History of Present Illness    Jaz Gipson presents to [facility] today for continued management concerning hypertension, hyperlipidemia, hypothyroidism.  Since she was last seen, she continues to do well.  Condition continues to improve since liver transplant last year.  She is followed closely by her surgeon at [facility].  Previous history of hypothyroidism in which her TSH level was elevated when last checked.  She is currently on levothyroxine 125 mcg daily.  She is also had problems with anxiety and depression and which is managed with sertraline 25 mg daily.  Hypertension managed with metoprolol 25 mg twice a day.  All other medications as managed by her surgeon.   She is currently not working due to liver transplant.    She denies any complaints of fever, chills, cough, chest pain, shortness of air, abdominal pain, nausea, or any other concerns.     The following portions of the patient's history were reviewed and updated as appropriate: allergies, current medications, past family history, past medical history, past social history, past surgical history and problem list.       ROS    Vitals:    04/13/21 1521   BP: 110/80   Pulse: 88   Resp: 16   Temp: 98.2 °F (36.8 °C)   SpO2: 97%       Documented weights    04/13/21 1521   Weight: 81.2 kg (179 lb)     Body mass index is 30.73  kg/m².    Results for orders placed or performed in visit on 12/21/20   UA / M With / Rflx Culture([facility] ONLY) - Urine, Clean Catch    Specimen: Urine, Clean Catch   Result Value Ref Range    Specific Gravity, UA 1.022 1.005 - 1.030    pH, UA 7.0 5.0 - 7.5    Color, UA Yellow Yellow    Appearance, UA Clear Clear    Leukocytes, UA Negative Negative    Protein Trace Negative/Trace    Glucose, UA Negative Negative    Ketones Negative Negative    Blood, UA Negative Negative    Bilirubin, UA Negative Negative    Urobilinogen, UA 0.2 0.2 - 1.0 mg/dL    Nitrite, UA Negative Negative    Microscopic Examination Comment     Microscopic Examination See below:     Urinalysis Reflex Comment    Lipid Panel    Specimen: Blood   Result Value Ref Range    Total Cholesterol 185 0 - 200 mg/dL    Triglycerides 89 0 - 150 mg/dL    HDL Cholesterol 51 40 - 60 mg/dL    VLDL Cholesterol Mario 16 5 - 40 mg/dL    LDL Chol Calc (NIH) 118 (H) 0 - 100 mg/dL   Comprehensive Metabolic Panel    Specimen: Blood   Result Value Ref Range    Glucose 86 65 - 99 mg/dL    BUN 29 (H) 6 - 20 mg/dL    Creatinine 1.19 (H) 0.57 - 1.00 mg/dL    eGFR Non African Am 50 (L) >60 mL/min/1.73    eGFR African Am 60 (L) >60 mL/min/1.73    BUN/Creatinine Ratio 24.4 7.0 - 25.0    Sodium 138 136 - 145 mmol/L    Potassium 4.6 3.5 - 5.2 mmol/L    Chloride 105 98 - 107 mmol/L    Total CO2 26.8 22.0 - 29.0 mmol/L    Calcium 8.8 8.6 - 10.5 mg/dL    Total Protein 8.4 6.0 - 8.5 g/dL    Albumin 3.40 (L) 3.50 - 5.20 g/dL    Globulin 5.0 gm/dL    A/G Ratio 0.7 g/dL    Total Bilirubin 0.4 0.0 - 1.2 mg/dL    Alkaline Phosphatase 547 (H) 39 - 117 U/L    AST (SGOT) 35 (H) 1 - 32 U/L    ALT (SGPT) 76 (H) 1 - 33 U/L   Microscopic Examination -   Result Value Ref Range    WBC, UA 0-5 0 - 5 /hpf    RBC, UA 0-2 0 - 2 /hpf    Epithelial Cells (non renal) 0-10 0 - 10 /hpf    Mucus, UA Present Not Estab. /hpf    Bacteria, UA None seen None seen/Few /hpf   T4, Free   Result Value Ref Range     Free T4 0.74 (L) 0.93 - 1.70 ng/dL   TSH   Result Value Ref Range    TSH 30.600 (H) 0.270 - 4.200 uIU/mL   CBC & Differential    Specimen: Blood   Result Value Ref Range    WBC 6.00 3.40 - 10.80 10*3/mm3    RBC 3.55 (L) 3.77 - 5.28 10*6/mm3    Hemoglobin 10.7 (L) 12.0 - 15.9 g/dL    Hematocrit 32.4 (L) 34.0 - 46.6 %    MCV 91.3 79.0 - 97.0 fL    MCH 30.1 26.6 - 33.0 pg    MCHC 33.0 31.5 - 35.7 g/dL    RDW 12.2 (L) 12.3 - 15.4 %    Platelets 157 140 - 450 10*3/mm3    Neutrophil Rel % 76.9 (H) 42.7 - 76.0 %    Lymphocyte Rel % 11.8 (L) 19.6 - 45.3 %    Monocyte Rel % 9.2 5.0 - 12.0 %    Eosinophil Rel % 1.5 0.3 - 6.2 %    Basophil Rel % 0.3 0.0 - 1.5 %    Neutrophils Absolute 4.61 1.70 - 7.00 10*3/mm3    Lymphocytes Absolute 0.71 0.70 - 3.10 10*3/mm3    Monocytes Absolute 0.55 0.10 - 0.90 10*3/mm3    Eosinophils Absolute 0.09 0.00 - 0.40 10*3/mm3    Basophils Absolute 0.02 0.00 - 0.20 10*3/mm3    Immature Granulocyte Rel % 0.3 0.0 - 0.5 %    Immature Grans Absolute 0.02 0.00 - 0.05 10*3/mm3    nRBC 0.0 0.0 - 0.2 /100 WBC           Objective     Physical Exam  Vitals reviewed.   Constitutional:       General: She is not in acute distress.  HENT:      Head: Normocephalic and atraumatic.   Cardiovascular:      Rate and Rhythm: Normal rate and regular rhythm.      Heart sounds: No murmur heard.     Pulmonary:      Effort: Pulmonary effort is normal.      Breath sounds: Normal breath sounds. No wheezing.   Abdominal:      General: There is no distension.      Palpations: Abdomen is soft.      Tenderness: There is no abdominal tenderness.   Musculoskeletal:      Right lower leg: No edema.      Left lower leg: No edema.   Skin:     General: Skin is warm and dry.   Neurological:      Mental Status: She is alert and oriented to person, place, and time.   Psychiatric:         Mood and Affect: Mood normal.         Behavior: Behavior normal.            Assessment/Plan     Assessment/Plan     Diagnoses and all orders for this  visit:    1. Acquired hypothyroidism (Primary)  -     T3, Free  -     T4, Free  -     TSH  -     CBC & Differential  -     Comprehensive Metabolic Panel    2. History of liver transplant (CMS/HCC)  -     CBC & Differential  -     Comprehensive Metabolic Panel    3. Mixed hyperlipidemia    Other orders  -     aspirin 81 MG chewable tablet; Chew 1 tablet Daily.  Dispense: 30 tablet; Refill: 11  -     sertraline (ZOLOFT) 50 MG tablet; Take 0.5 tablets by mouth Daily.  Dispense: 30 tablet; Refill: 5  -     Prenatal Vit-Fe Fumarate-FA (prenatal vitamin 28-0.8) 28-0.8 MG tablet tablet; Take 1 tablet by mouth Daily.  Dispense: 30 tablet; Refill: 11  -     metoprolol tartrate (LOPRESSOR) 25 MG tablet; Take 1 tablet by mouth 2 (Two) Times a Day.  Dispense: 30 tablet; Refill: 5          Summary:  Jaz Gipson condition remained stable since she was last seen.  She is doing well ever since her liver transplant.  Will check labs today including repeat thyroid function test.  This will determine further recommendations concerning her levothyroxine dosage.  Blood pressure currently stable at 110/80.  She feels her anxiety and depression is well controlled with current dose of sertraline.  Refills provided on needed medications.  Depending on lab results will also determine further recommendations.  If labs are stable, strict return to office in approximately 6 months for next recheck appointment which will be annual physical exam.    In the meantime, instructed to contact us sooner for any problems or concerns.    Follow Up:  Return in 6 months (on 10/13/2021), or if symptoms worsen or fail to improve, for Annual.    Patient was given instructions and counseling regarding condition or for health maintenance advice.  Please see specific information pulled into the AVS if appropriate.      Patient was wearing facemask when I entered the room and throughout our encounter. Protective equipment was worn throughout this patient  encounter including a face mask, eye protection,  and gloves. Hand hygiene was performed before donning protective equipment and after removal when leaving the room.     Cheli Noonan, APRN  Family Medicine  [facility] [place]  04/13/21  18:52 EDT

## 2021-04-15 DIAGNOSIS — E03.9 ACQUIRED HYPOTHYROIDISM: Primary | ICD-10-CM

## 2021-04-15 LAB
BASOPHILS # BLD AUTO: ABNORMAL 10*3/UL
DIFFERENTIAL COMMENT: ABNORMAL
EOSINOPHIL # BLD AUTO: ABNORMAL 10*3/UL
EOSINOPHIL # BLD MANUAL: 0.3 10*3/MM3 (ref 0–0.4)
EOSINOPHIL NFR BLD AUTO: ABNORMAL %
EOSINOPHIL NFR BLD MANUAL: 5.2 % (ref 0.3–6.2)
ERYTHROCYTE [DISTWIDTH] IN BLOOD BY AUTOMATED COUNT: 11.8 % (ref 12.3–15.4)
HCT VFR BLD AUTO: 31.2 % (ref 34–46.6)
HGB BLD-MCNC: 10.2 G/DL (ref 12–15.9)
LYMPHOCYTES # BLD AUTO: ABNORMAL 10*3/UL
LYMPHOCYTES # BLD MANUAL: 1.01 10*3/MM3 (ref 0.7–3.1)
LYMPHOCYTES NFR BLD AUTO: ABNORMAL %
LYMPHOCYTES NFR BLD MANUAL: 17.5 % (ref 19.6–45.3)
MCH RBC QN AUTO: 30.4 PG (ref 26.6–33)
MCHC RBC AUTO-ENTMCNC: 32.7 G/DL (ref 31.5–35.7)
MCV RBC AUTO: 93.1 FL (ref 79–97)
MONOCYTES # BLD MANUAL: 0.72 10*3/MM3 (ref 0.1–0.9)
MONOCYTES NFR BLD AUTO: ABNORMAL %
MONOCYTES NFR BLD MANUAL: 12.4 % (ref 5–12)
NEUTROPHILS # BLD MANUAL: 3.74 10*3/MM3 (ref 1.7–7)
NEUTROPHILS NFR BLD AUTO: ABNORMAL %
NEUTROPHILS NFR BLD MANUAL: 64.9 % (ref 42.7–76)
PLATELET # BLD AUTO: 148 10*3/MM3 (ref 140–450)
PLATELET BLD QL SMEAR: ABNORMAL
RBC # BLD AUTO: 3.35 10*6/MM3 (ref 3.77–5.28)
RBC MORPH BLD: ABNORMAL
T3FREE SERPL-MCNC: 1.5 PG/ML (ref 2–4.4)
T4 FREE SERPL-MCNC: 0.82 NG/DL (ref 0.93–1.7)
TSH SERPL DL<=0.005 MIU/L-ACNC: 24.5 UIU/ML (ref 0.27–4.2)
WBC # BLD AUTO: 5.77 10*3/MM3 (ref 3.4–10.8)

## 2021-04-15 RX ORDER — LEVOTHYROXINE SODIUM 0.1 MG/1
100 TABLET ORAL DAILY
Qty: 30 TABLET | Refills: 2 | Status: SHIPPED | OUTPATIENT
Start: 2021-04-15 | End: 2021-08-05 | Stop reason: SDUPTHER

## 2021-04-15 NOTE — PROGRESS NOTES
Notify Jaz Gipson her TSH level remains too high along with free T3 and free T4 being too low.  Need to decrease levothyroxine dose to 100 mcg daily.  Repeat thyroid function tests in 6-8 weeks.

## 2021-08-05 DIAGNOSIS — E03.9 ACQUIRED HYPOTHYROIDISM: ICD-10-CM

## 2021-08-05 RX ORDER — LEVOTHYROXINE SODIUM 0.1 MG/1
100 TABLET ORAL DAILY
Qty: 90 TABLET | Refills: 0 | Status: SHIPPED | OUTPATIENT
Start: 2021-08-05 | End: 2021-10-06 | Stop reason: SDUPTHER

## 2021-10-01 PROBLEM — T45.4X5S ADVERSE EFFECT OF IRON AND ITS COMPOUNDS, SEQUELA: Status: ACTIVE | Noted: 2019-03-19

## 2021-10-06 ENCOUNTER — TELEPHONE (OUTPATIENT)
Dept: FAMILY MEDICINE CLINIC | Facility: CLINIC | Age: 46
End: 2021-10-06

## 2021-10-06 DIAGNOSIS — E03.9 ACQUIRED HYPOTHYROIDISM: ICD-10-CM

## 2021-10-06 RX ORDER — LEVOTHYROXINE SODIUM 0.1 MG/1
100 TABLET ORAL DAILY
Qty: 90 TABLET | Refills: 1 | Status: SHIPPED | OUTPATIENT
Start: 2021-10-06 | End: 2022-04-13 | Stop reason: SDUPTHER

## 2021-10-06 NOTE — TELEPHONE ENCOUNTER
Caller: [facility] - [address] [phone] [facility] [phone] FX    Relationship: Pharmacy      Medication requested (name and dosage):  vitamin D (ERGOCALCIFEROL) 1.25 MG (07047 UT) capsule capsule   metoprolol tartrate (LOPRESSOR) 25 MG tabletlevothyroxine (Synthroid) 100 MCG tablet      Pharmacy where request should be sent: [facility] - [address] [phone] [facility] [phone] FX      Best call back number: [phone] OPT[facility] 3   [phone]    Does the patient have less than a 3 day supply:  [x] Yes  [] No    Yvette Caro Rep   10/06/21 10:44 EDT

## 2021-11-29 NOTE — TELEPHONE ENCOUNTER
Labs are stable - K slightly low (is she still taking K supplement?)   Information: Selecting Yes will display possible errors in your note based on the variables you have selected. This validation is only offered as a suggestion for you. PLEASE NOTE THAT THE VALIDATION TEXT WILL BE REMOVED WHEN YOU FINALIZE YOUR NOTE. IF YOU WANT TO FAX A PRELIMINARY NOTE YOU WILL NEED TO TOGGLE THIS TO 'NO' IF YOU DO NOT WANT IT IN YOUR FAXED NOTE.

## 2022-01-03 NOTE — TELEPHONE ENCOUNTER
DELETE AFTER REVIEWING: Send the encounter HIGH priority, If patient has less than a 3 day supply. If the patient will run out of medication over the weekend add that information to the additional details line. Send this encounter to the clinical pool.    Caller:  - [facility] [address] [phone] [facility] [phone] FX    Relationship: Pharmacy    Best call back number: [phone]    Requested Prescriptions:   Requested Prescriptions     Pending Prescriptions Disp Refills   • metoprolol tartrate (LOPRESSOR) 25 MG tablet 90 tablet 1     Sig: Take 1 tablet by mouth 2 (Two) Times a Day.        Pharmacy where request should be sent:  [facility] [address] [phone]  [phone] FX     Does the patient have less than a 3 day supply:  [] Yes  [x] No    Yvette Hairston Rep   01/03/22 12:17 EST         DELETE AFTER READING TO PATIENT: “Thank you for sharing this information with me. I will send a message to the clinical team. Please allow 48 hours for the clinical staff to follow up on this request.”

## 2022-01-03 NOTE — TELEPHONE ENCOUNTER
Per your note when she was seen in April, she was to return in October and did not.  Please advise on refill.

## 2022-03-29 DIAGNOSIS — E03.9 ACQUIRED HYPOTHYROIDISM: ICD-10-CM

## 2022-03-29 RX ORDER — LEVOTHYROXINE SODIUM 0.1 MG/1
TABLET ORAL
Qty: 90 TABLET | Refills: 1 | OUTPATIENT
Start: 2022-03-29

## 2022-03-29 RX ORDER — ASPIRIN 81 MG/1
TABLET, CHEWABLE ORAL
Qty: 90 TABLET | Refills: 0 | Status: SHIPPED | OUTPATIENT
Start: 2022-03-29 | End: 2022-07-11

## 2022-03-29 RX ORDER — SWAB
SWAB, NON-MEDICATED MISCELLANEOUS
Qty: 90 EACH | Refills: 0 | Status: SHIPPED | OUTPATIENT
Start: 2022-03-29 | End: 2022-07-11

## 2022-04-13 DIAGNOSIS — E03.9 ACQUIRED HYPOTHYROIDISM: ICD-10-CM

## 2022-04-13 NOTE — TELEPHONE ENCOUNTER
Medication requested (name and dose): levothyroxine (Synthroid) 100 MCG tablet     Pharmacy where request should be sent:  - [facility] - [address] - [phone] [facility] [phone]     Additional details provided by patient: PT HAS 4 DAYS LEFT     Best call back number: [phone]    Does the patient have less than a 3 day supply:  [] Yes  [x] No    Subha Reagan  04/13/22, 12:11 EDT

## 2022-04-14 RX ORDER — LEVOTHYROXINE SODIUM 0.1 MG/1
100 TABLET ORAL DAILY
Qty: 30 TABLET | Refills: 0 | Status: SHIPPED | OUTPATIENT
Start: 2022-04-14 | End: 2022-05-16

## 2022-05-13 ENCOUNTER — OFFICE VISIT (OUTPATIENT)
Dept: FAMILY MEDICINE CLINIC | Facility: CLINIC | Age: 47
End: 2022-05-13

## 2022-05-13 VITALS
BODY MASS INDEX: 34.66 KG/M2 | WEIGHT: 203 LBS | RESPIRATION RATE: 16 BRPM | HEIGHT: 64 IN | DIASTOLIC BLOOD PRESSURE: 80 MMHG | HEART RATE: 96 BPM | OXYGEN SATURATION: 98 % | TEMPERATURE: 98.2 F | SYSTOLIC BLOOD PRESSURE: 130 MMHG

## 2022-05-13 DIAGNOSIS — E78.2 MIXED HYPERLIPIDEMIA: ICD-10-CM

## 2022-05-13 DIAGNOSIS — R63.5 WEIGHT GAIN: ICD-10-CM

## 2022-05-13 DIAGNOSIS — E03.9 ACQUIRED HYPOTHYROIDISM: Primary | ICD-10-CM

## 2022-05-13 DIAGNOSIS — R53.83 FATIGUE, UNSPECIFIED TYPE: ICD-10-CM

## 2022-05-13 DIAGNOSIS — Z94.4 HISTORY OF LIVER TRANSPLANT: ICD-10-CM

## 2022-05-13 PROCEDURE — 99213 OFFICE O/P EST LOW 20 MIN: CPT | Performed by: NURSE PRACTITIONER

## 2022-05-13 NOTE — PROGRESS NOTES
Answers for HPI/ROS submitted by the patient on 5/6/2022  Please describe your symptoms.: Just need to update my bloodwork for my Thyroid medication.  Have you had these symptoms before?: Yes  How long have you been having these symptoms?: Greater than 2 weeks  Please list any medications you are currently taking for this condition.: Envarsus XR 1mg once/day, Ursodiol 300mg twice/day, Prenatal Vitamin once/day, Mycophenolate 250mg 2 pills twice/day, Aspirin 81mg once/day, Sertraline 50mg 1/2 tab/day, metoprolol Tart 25mg 2/day, Levothyroxine 100mcg once/day.  What is the primary reason for your visit?: Other    Patient ID: Jaz Gipson is a 47 y.o. female     Patient Care Team:  Head, LON Oswald as PCP - General (Nurse Practitioner)  Gus Orourke II, MD as Consulting Physician (Hematology and Oncology)  Head, LON Oswald as Referring Physician (Nurse Practitioner)  Eric Laughlin MD as Consulting Physician (Pulmonary Disease)  Laura Xie MD (Gastroenterology)  Zi Cruz MD (Gastroenterology)  Destiney Flores MD as Consulting Physician (Nephrology)    Subjective     Chief Complaint   Patient presents with   • Hypertension   • Hypothyroidism       History of Present Illness    Jaz Gipson presents to [facility] today for continued management concerning hyperlipidemia, hypertension, hypothyroidism.  Since she was last seen, she feels she has been doing well except for unintentional weight gain.  Has noticed over the past couple of months her weight has been increasing.  Has been a while since she had her thyroid levels checked.  No other changes in medications..     She denies any complaints of fever, chills, cough, chest pain, shortness of air, abdominal pain, nausea, or any other concerns.     The following portions of the patient's history were reviewed and updated as appropriate: allergies, current medications, past family history, past medical  history, past social history, past surgical history and problem list.       ROS    Vitals:    05/13/22 1138   BP: 130/80   Pulse: 96   Resp: 16   Temp: 98.2 °F (36.8 °C)   SpO2: 98%       Documented weights    05/13/22 1138   Weight: 92.1 kg (203 lb)     Body mass index is 34.84 kg/m².    Results for orders placed or performed in visit on 04/13/21   T3, Free    Specimen: Blood   Result Value Ref Range    T3, Free 1.5 (L) 2.0 - 4.4 pg/mL   T4, Free    Specimen: Blood   Result Value Ref Range    Free T4 0.82 (L) 0.93 - 1.70 ng/dL   TSH    Specimen: Blood   Result Value Ref Range    TSH 24.500 (H) 0.270 - 4.200 uIU/mL   Manual Differential   Result Value Ref Range    Neutrophil Rel % 64.9 42.7 - 76.0 %    Lymphocyte Rel % 17.5 (L) 19.6 - 45.3 %    Monocyte Rel % 12.4 (H) 5.0 - 12.0 %    Eosinophil Rel % 5.2 0.3 - 6.2 %    Neutrophils Absolute 3.74 1.70 - 7.00 10*3/mm3    Lymphocytes Absolute 1.01 0.70 - 3.10 10*3/mm3    Monocytes Absolute 0.72 0.10 - 0.90 10*3/mm3    Eosinophil Abs 0.30 0.00 - 0.40 10*3/mm3    Differential Comment Comment     Comment Comment     Plt Comment Comment    CBC & Differential   Result Value Ref Range    WBC 5.77 3.40 - 10.80 10*3/mm3    RBC 3.35 (L) 3.77 - 5.28 10*6/mm3    Hemoglobin 10.2 (L) 12.0 - 15.9 g/dL    Hematocrit 31.2 (L) 34.0 - 46.6 %    MCV 93.1 79.0 - 97.0 fL    MCH 30.4 26.6 - 33.0 pg    MCHC 32.7 31.5 - 35.7 g/dL    RDW 11.8 (L) 12.3 - 15.4 %    Platelets 148 140 - 450 10*3/mm3    Neutrophil Rel % CANCELED     Lymphocyte Rel % CANCELED     Monocyte Rel % CANCELED     Eosinophil Rel % CANCELED     Lymphocytes Absolute CANCELED     Eosinophils Absolute CANCELED     Basophils Absolute CANCELED            Objective     Physical Exam  Vitals reviewed.   Constitutional:       General: She is not in acute distress.  Cardiovascular:      Rate and Rhythm: Normal rate and regular rhythm.      Heart sounds: No murmur heard.  Pulmonary:      Effort: Pulmonary effort is normal.      Breath  sounds: Normal breath sounds. No wheezing.   Musculoskeletal:      Right lower leg: No edema.      Left lower leg: No edema.   Skin:     General: Skin is warm and dry.   Neurological:      Mental Status: She is alert and oriented to person, place, and time.   Psychiatric:         Mood and Affect: Mood normal.         Behavior: Behavior normal.         Assessment & Plan     Assessment/Plan     Diagnoses and all orders for this visit:    1. Acquired hypothyroidism (Primary)  -     CBC (No Diff)  -     TSH Rfx On Abnormal To Free T4    2. History of liver transplant (HCC)  -     Comprehensive Metabolic Panel  -     Lipid Panel With / Chol / HDL Ratio  -     Magnesium  -     Gamma GT  -     Tacrolimus Level - Need results faxed to .      3. Mixed hyperlipidemia  -     Comprehensive Metabolic Panel  -     Lipid Panel With / Chol / HDL Ratio    4. Weight gain  -     TSH Rfx On Abnormal To Free T4    5. Fatigue, unspecified type  -     Vitamin D 25 Hydroxy  -     Vitamin B12 & Folate      Follow Up:  Return if symptoms worsen or fail to improve.    In the meantime, instructed to contact us sooner for any problems or concerns.    Patient was given instructions and counseling regarding condition or for health maintenance advice.  Please see specific information pulled into the AVS if appropriate.      Patient was wearing facemask when I entered the room and throughout our encounter. Protective equipment was worn throughout this patient encounter including a face mask, eye protection,  and gloves. Hand hygiene was performed before donning protective equipment and after removal when leaving the room.     Cheli Noonan, APRN  Family Medicine  [facility]  05/16/22  08:17 EDT

## 2022-05-16 ENCOUNTER — TELEPHONE (OUTPATIENT)
Dept: FAMILY MEDICINE CLINIC | Facility: CLINIC | Age: 47
End: 2022-05-16

## 2022-05-16 DIAGNOSIS — E03.9 ACQUIRED HYPOTHYROIDISM: ICD-10-CM

## 2022-05-16 LAB
25(OH)D3+25(OH)D2 SERPL-MCNC: 43.8 NG/ML (ref 30–100)
ALBUMIN SERPL-MCNC: 4 G/DL (ref 3.8–4.8)
ALBUMIN/GLOB SERPL: 1 {RATIO} (ref 1.2–2.2)
ALP SERPL-CCNC: 191 IU/L (ref 44–121)
ALT SERPL-CCNC: 40 IU/L (ref 0–32)
AST SERPL-CCNC: 36 IU/L (ref 0–40)
BILIRUB SERPL-MCNC: 0.6 MG/DL (ref 0–1.2)
BUN SERPL-MCNC: 11 MG/DL (ref 6–24)
BUN/CREAT SERPL: 9 (ref 9–23)
CALCIUM SERPL-MCNC: 8.8 MG/DL (ref 8.7–10.2)
CHLORIDE SERPL-SCNC: 104 MMOL/L (ref 96–106)
CHOLEST SERPL-MCNC: 176 MG/DL (ref 100–199)
CHOLEST/HDLC SERPL: 5.7 RATIO (ref 0–4.4)
CO2 SERPL-SCNC: 20 MMOL/L (ref 20–29)
CREAT SERPL-MCNC: 1.19 MG/DL (ref 0.57–1)
EGFRCR SERPLBLD CKD-EPI 2021: 57 ML/MIN/1.73
ERYTHROCYTE [DISTWIDTH] IN BLOOD BY AUTOMATED COUNT: 12.7 % (ref 11.7–15.4)
FOLATE SERPL-MCNC: 18.9 NG/ML
GGT SERPL-CCNC: 37 IU/L (ref 0–60)
GLOBULIN SER CALC-MCNC: 3.9 G/DL (ref 1.5–4.5)
GLUCOSE SERPL-MCNC: 108 MG/DL (ref 65–99)
HCT VFR BLD AUTO: 42.5 % (ref 34–46.6)
HDLC SERPL-MCNC: 31 MG/DL
HGB BLD-MCNC: 13.8 G/DL (ref 11.1–15.9)
LDLC SERPL CALC-MCNC: 125 MG/DL (ref 0–99)
MAGNESIUM SERPL-MCNC: 1.8 MG/DL (ref 1.6–2.3)
MCH RBC QN AUTO: 29.4 PG (ref 26.6–33)
MCHC RBC AUTO-ENTMCNC: 32.5 G/DL (ref 31.5–35.7)
MCV RBC AUTO: 91 FL (ref 79–97)
PLATELET # BLD AUTO: 135 X10E3/UL (ref 150–450)
POTASSIUM SERPL-SCNC: 3.8 MMOL/L (ref 3.5–5.2)
PROT SERPL-MCNC: 7.9 G/DL (ref 6–8.5)
RBC # BLD AUTO: 4.69 X10E6/UL (ref 3.77–5.28)
SODIUM SERPL-SCNC: 140 MMOL/L (ref 134–144)
T4 FREE SERPL-MCNC: 0.8 NG/DL (ref 0.82–1.77)
TACROLIMUS BLD LC/MS/MS-MCNC: 4.9 NG/ML (ref 2–20)
TRIGL SERPL-MCNC: 109 MG/DL (ref 0–149)
TSH SERPL DL<=0.005 MIU/L-ACNC: 36.4 UIU/ML (ref 0.45–4.5)
VIT B12 SERPL-MCNC: 782 PG/ML (ref 232–1245)
VLDLC SERPL CALC-MCNC: 20 MG/DL (ref 5–40)
WBC # BLD AUTO: 7.9 X10E3/UL (ref 3.4–10.8)

## 2022-05-16 RX ORDER — LEVOTHYROXINE SODIUM 0.12 MG/1
125 TABLET ORAL DAILY
Qty: 30 TABLET | Refills: 2 | Status: SHIPPED | OUTPATIENT
Start: 2022-05-16 | End: 2022-07-01 | Stop reason: DRUGHIGH

## 2022-05-16 NOTE — TELEPHONE ENCOUNTER
Please contact Jaz Gipson and let her know her TSH is 36.4. (Normal is 0.45 to 4.5)  Indicating not taking enough thyroid medication.  I believe she is taking 100 mcg daily of levothyroxine. Need to increase levothyroxine to 125 mcg daily.  Need to make sure she schedules for repeat thyroid function test in 4-6 weeks so we can adjust dose as needed.    All other labs stable.    Vitamin B12 normal.  Magnesium normal.  Cholesterol levels stable.  Kidney function indicates she needs to drink more fluids.  Liver function tests are stable    The tacrolimus level pending. Will let her know once received and will fax to [facility].

## 2022-06-09 NOTE — TELEPHONE ENCOUNTER
Have Your Skin Lesions Been Treated?: not been treated PATIENT CALLED AGAIN IN REGARDS TO HER REFILL    levothyroxine (SYNTHROID, LEVOTHROID) 100 MCG tablet    PHARMACY HAS NOT RECEIVED IT.     SHE IS OUT OF IT.    SHE ALSO WANTS TO SEE IF A MEDICATION FROM ANOTHER DR. CAN BE CALLED IN. SHE DOESN'T SEE THE OTHER DR.   SHE IS REQUESTING     potassium chloride (K-DUR,KLOR-CON) 20 MEQ CR tablet  ONE A DAY.       [id number] [place] [address] AT [facility] [employer] [email or web address] [email or web address] - [phone] [facility] [phone]   [phone]    PATIENT CALL BACK NUMBER  [phone]    PLEASE CALL PATIENT WHEN CALLED INTO PHARMACY SO SHE CAN HAVE SOMEONE PICK IT UP. SHE CANNOT DRIVE AT THIS TIME.   Is This A New Presentation, Or A Follow-Up?: Skin Lesions How Severe Is Your Skin Lesion?: mild

## 2022-07-01 DIAGNOSIS — E03.9 ACQUIRED HYPOTHYROIDISM: Primary | ICD-10-CM

## 2022-07-01 RX ORDER — LEVOTHYROXINE SODIUM 0.15 MG/1
150 TABLET ORAL DAILY
Qty: 30 TABLET | Refills: 2 | Status: SHIPPED | OUTPATIENT
Start: 2022-07-01 | End: 2022-07-14 | Stop reason: SDUPTHER

## 2022-07-11 RX ORDER — LEVOTHYROXINE SODIUM 0.12 MG/1
TABLET ORAL
Qty: 60 TABLET | OUTPATIENT
Start: 2022-07-11

## 2022-07-11 RX ORDER — ASPIRIN 81 MG
TABLET,CHEWABLE ORAL
Qty: 30 TABLET | Refills: 2 | Status: SHIPPED | OUTPATIENT
Start: 2022-07-11 | End: 2022-10-19

## 2022-07-11 RX ORDER — SWAB
SWAB, NON-MEDICATED MISCELLANEOUS
Qty: 90 EACH | Refills: 0 | Status: SHIPPED | OUTPATIENT
Start: 2022-07-11 | End: 2022-10-19

## 2022-07-11 NOTE — ANESTHESIA PREPROCEDURE EVALUATION
Anesthesia Evaluation     Patient summary reviewed and Nursing notes reviewed                Airway   Mallampati: II  TM distance: <3 FB  Neck ROM: full  Possible difficult intubation  Dental - normal exam     Pulmonary - normal exam   (+) a smoker Current Abstained day of surgery, asthma, sleep apnea,   Cardiovascular - normal exam    (+) hyperlipidemia,       Neuro/Psych    ROS Comment: Hepatic encephalopathy  GI/Hepatic/Renal/Endo    (+) obesity,  GI bleeding, hepatitis, liver disease fatty liver disease, hypothyroidism, hyperthyroidism    ROS Comment: PIEDRA/cirrhosis/varices    Musculoskeletal     Abdominal   (+) obese,    Substance History      OB/GYN          Other                        Anesthesia Plan    ASA 3     MAC     intravenous induction   Anesthetic plan, all risks, benefits, and alternatives have been provided, discussed and informed consent has been obtained with: patient.       No

## 2022-07-14 DIAGNOSIS — E03.9 ACQUIRED HYPOTHYROIDISM: ICD-10-CM

## 2022-07-14 RX ORDER — LEVOTHYROXINE SODIUM 0.15 MG/1
150 TABLET ORAL DAILY
Qty: 30 TABLET | Refills: 2 | Status: SHIPPED | OUTPATIENT
Start: 2022-07-14 | End: 2022-10-19

## 2022-07-14 NOTE — TELEPHONE ENCOUNTER
Caller:  - [facility] - [facility] [address] [facility] - [phone] [facility] [phone] FX    Relationship: Pharmacy  [facility]    Best call back number: [phone]    Requested Prescriptions:   Requested Prescriptions     Pending Prescriptions Disp Refills   • levothyroxine (Synthroid) 150 MCG tablet 30 tablet 2     Sig: Take 1 tablet by mouth Daily.        Pharmacy where request should be sent: [facility] - [address] - [phone] [facility] [phone] FX     Additional details provided by patient: [facility] CALLING REQUESTING A REFILL TO BE ABLE TO SHIP THE PATIENT'S MEDICATION TOMORROW    Does the patient have less than a 3 day supply:  [x] Yes  [] No    Jaleesa Harris   07/14/22 12:03 EDT

## 2022-10-19 DIAGNOSIS — E03.9 ACQUIRED HYPOTHYROIDISM: ICD-10-CM

## 2022-10-19 RX ORDER — LEVOTHYROXINE SODIUM 0.15 MG/1
TABLET ORAL
Qty: 90 TABLET | Refills: 0 | Status: SHIPPED | OUTPATIENT
Start: 2022-10-19 | End: 2023-01-17 | Stop reason: SDUPTHER

## 2022-10-19 RX ORDER — SWAB
SWAB, NON-MEDICATED MISCELLANEOUS
Qty: 90 EACH | Refills: 0 | Status: SHIPPED | OUTPATIENT
Start: 2022-10-19 | End: 2023-01-17 | Stop reason: SDUPTHER

## 2022-10-19 RX ORDER — ASPIRIN 81 MG
TABLET,CHEWABLE ORAL
Qty: 90 TABLET | Refills: 0 | Status: SHIPPED | OUTPATIENT
Start: 2022-10-19 | End: 2023-01-17 | Stop reason: SDUPTHER

## 2022-11-05 NOTE — TELEPHONE ENCOUNTER
----- Message from LON Rojas sent at 1/25/2019  8:39 AM EST -----  Please call patient and let her know the labs are ok. Hgb is stable at 11.1. Platelets are lower but higher than usual for her. LFTs are about the same as previous. Ammonia is elevated at 108. She needs to be doing more of the lactulose when she can and starting the Xifaxan BID. AFP is still pending. Thanks.  
Called pt and advised per Stephy NP that her labs are ok.  Hgb is stable at 11.1.  Platelets are lower but high thanusual for her.  Lft's are about the same as previous.  Ammonia is elevated at 108.  She needs to be doing more of the lacutlose when she can and starting the xifaxan bid.  Afp is still pending.     Pt verb understanding and states the xifaxan needs a pa.  Advised pt we will check and see if we have received the pa .    
8

## 2023-01-17 DIAGNOSIS — E03.9 ACQUIRED HYPOTHYROIDISM: ICD-10-CM

## 2023-01-17 RX ORDER — LEVOTHYROXINE SODIUM 0.15 MG/1
150 TABLET ORAL DAILY
Qty: 90 TABLET | Refills: 0 | Status: SHIPPED | OUTPATIENT
Start: 2023-01-17 | End: 2023-03-20

## 2023-01-17 RX ORDER — ASPIRIN 81 MG/1
81 TABLET, CHEWABLE ORAL DAILY
Qty: 90 TABLET | Refills: 0 | Status: SHIPPED | OUTPATIENT
Start: 2023-01-17 | End: 2023-03-20

## 2023-01-17 RX ORDER — SWAB
1 SWAB, NON-MEDICATED MISCELLANEOUS DAILY
Qty: 90 EACH | Refills: 0 | Status: SHIPPED | OUTPATIENT
Start: 2023-01-17 | End: 2023-03-20

## 2023-03-17 DIAGNOSIS — E03.9 ACQUIRED HYPOTHYROIDISM: ICD-10-CM

## 2023-03-20 RX ORDER — ASPIRIN 81 MG
TABLET,CHEWABLE ORAL
Qty: 90 TABLET | Refills: 0 | Status: SHIPPED | OUTPATIENT
Start: 2023-03-20

## 2023-03-20 RX ORDER — SWAB
SWAB, NON-MEDICATED MISCELLANEOUS
Qty: 90 EACH | Refills: 0 | Status: SHIPPED | OUTPATIENT
Start: 2023-03-20

## 2023-03-20 RX ORDER — LEVOTHYROXINE SODIUM 0.15 MG/1
TABLET ORAL
Qty: 90 TABLET | Refills: 0 | Status: SHIPPED | OUTPATIENT
Start: 2023-03-20

## 2023-05-10 DIAGNOSIS — E03.9 ACQUIRED HYPOTHYROIDISM: ICD-10-CM

## 2023-05-11 RX ORDER — LEVOTHYROXINE SODIUM 0.15 MG/1
TABLET ORAL
Qty: 90 TABLET | Refills: 0 | Status: SHIPPED | OUTPATIENT
Start: 2023-05-11

## 2023-05-11 RX ORDER — ASPIRIN 81 MG
TABLET,CHEWABLE ORAL
Qty: 90 TABLET | Refills: 0 | Status: SHIPPED | OUTPATIENT
Start: 2023-05-11

## 2023-05-11 RX ORDER — SWAB
SWAB, NON-MEDICATED MISCELLANEOUS
Qty: 90 EACH | Refills: 0 | Status: SHIPPED | OUTPATIENT
Start: 2023-05-11

## 2023-05-16 ENCOUNTER — OFFICE VISIT (OUTPATIENT)
Dept: FAMILY MEDICINE CLINIC | Facility: CLINIC | Age: 48
End: 2023-05-16
Payer: MEDICARE

## 2023-05-16 VITALS
SYSTOLIC BLOOD PRESSURE: 110 MMHG | HEART RATE: 79 BPM | OXYGEN SATURATION: 99 % | WEIGHT: 190 LBS | DIASTOLIC BLOOD PRESSURE: 74 MMHG | BODY MASS INDEX: 33.66 KG/M2 | HEIGHT: 63 IN

## 2023-05-16 DIAGNOSIS — F32.A ANXIETY AND DEPRESSION: ICD-10-CM

## 2023-05-16 DIAGNOSIS — E03.9 ACQUIRED HYPOTHYROIDISM: ICD-10-CM

## 2023-05-16 DIAGNOSIS — Z12.31 ENCOUNTER FOR SCREENING MAMMOGRAM FOR MALIGNANT NEOPLASM OF BREAST: ICD-10-CM

## 2023-05-16 DIAGNOSIS — E78.2 MIXED HYPERLIPIDEMIA: ICD-10-CM

## 2023-05-16 DIAGNOSIS — F41.9 ANXIETY AND DEPRESSION: ICD-10-CM

## 2023-05-16 DIAGNOSIS — Z94.4 STATUS POST LIVER TRANSPLANT: ICD-10-CM

## 2023-05-16 DIAGNOSIS — Z00.00 MEDICARE ANNUAL WELLNESS VISIT, SUBSEQUENT: Primary | ICD-10-CM

## 2023-05-16 PROBLEM — D69.6 THROMBOCYTOPENIA: Status: RESOLVED | Noted: 2019-03-19 | Resolved: 2023-05-16

## 2023-05-16 PROBLEM — K74.69 OTHER CIRRHOSIS OF LIVER: Status: RESOLVED | Noted: 2017-11-01 | Resolved: 2023-05-16

## 2023-05-16 PROBLEM — I85.10 SECONDARY ESOPHAGEAL VARICES WITHOUT BLEEDING: Status: RESOLVED | Noted: 2018-04-02 | Resolved: 2023-05-16

## 2023-05-16 PROBLEM — K76.82 HEPATIC ENCEPHALOPATHY: Status: RESOLVED | Noted: 2020-01-26 | Resolved: 2023-05-16

## 2023-05-16 RX ORDER — CETIRIZINE HYDROCHLORIDE 10 MG/1
10 TABLET ORAL DAILY
COMMUNITY

## 2023-05-16 RX ORDER — LEVOTHYROXINE SODIUM 0.12 MG/1
125 TABLET ORAL
COMMUNITY

## 2023-05-16 NOTE — PROGRESS NOTES
The ABCs of the Annual Wellness Visit  Subsequent Medicare Wellness Visit    Chief Complaint   Patient presents with   • Annual Exam     S  SUB AWV         • Hypothyroidism       Subjective    Jaz Gipson is a 48 y.o. female who presents for a Subsequent Medicare Wellness Visit.    The following portions of the patient's history were reviewed and   updated as appropriate: allergies, current medications, past family history, past medical history, past social history, past surgical history and problem list.    Compared to one year ago, the patient feels her physical   health is better.    Compared to one year ago, the patient feels her mental   health is better. New relationship with man In [place]. Met online. Has spent a lot of time there. Goes at least once a month.  Involved in more social activities.     Recent Hospitalizations:  She was not admitted to the hospital during the last year.       Current Medical Providers:  Patient Care Team:  Head, LON Oswald as PCP - General (Nurse Practitioner)  Gus Orourke II, MD as Consulting Physician (Hematology and Oncology)  Head, LON Oswald as Referring Physician (Nurse Practitioner)  Eric Laughlin MD as Consulting Physician (Pulmonary Disease)  Laura Xie MD (Gastroenterology)  Zi Cruz MD (Gastroenterology)  Destiney Flores MD as Consulting Physician (Nephrology)    Outpatient Medications Prior to Visit   Medication Sig Dispense Refill   • Aspirin Low Dose 81 MG chewable tablet CHEW 1 TABLET BY MOUTH ONCE A DAY 90 tablet 0   • cetirizine (zyrTEC) 10 MG tablet Take 1 tablet by mouth Daily.     • Envarsus XR 1 MG tablet sustained-release 24 hour 4 every morning     • levothyroxine (SYNTHROID, LEVOTHROID) 125 MCG tablet Take 1 tablet by mouth.     • metoprolol tartrate (LOPRESSOR) 25 MG tablet TAKE 1 TABLET BY MOUTH TWO TIMES A  tablet 0   • mycophenolate (CELLCEPT) 250 MG capsule 2 am and 2 pm     • Prenatal 28-0.8 MG tablet TAKE 1  "TABLET BY MOUTH ONCE A DAY 90 each 0   • sertraline (ZOLOFT) 50 MG tablet TAKE  1/2 TABLET BY MOUTH ONCE A DAY 45 tablet 0   • ursodiol (ACTIGALL) 300 MG capsule 2 (two) times a day.     • levothyroxine (SYNTHROID, LEVOTHROID) 150 MCG tablet TAKE 1 TABLET BY MOUTH ONCE A DAY 90 tablet 0     No facility-administered medications prior to visit.       No opioid medication identified on active medication list. I have reviewed chart for other potential  high risk medication/s and harmful drug interactions in the elderly.          Aspirin is on active medication list. Aspirin use is indicated based on review of current medical condition/s. Pros and cons of this therapy have been discussed today. Benefits of this medication outweigh potential harm.  Patient has been encouraged to continue taking this medication.  .      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  ACP discussion was held with the patient during this visit. Patient does not have an advance directive, declines further assistance.     Objective    Vitals:    05/16/23 1034   BP: 110/74   Pulse: 79   SpO2: 99%   Weight: 86.2 kg (190 lb)   Height: 160 cm (63\")     Estimated body mass index is 33.66 kg/m² as calculated from the following:    Height as of this encounter: 160 cm (63\").    Weight as of this encounter: 86.2 kg (190 lb).    BMI is >= 30 and <35. (Class 1 Obesity). The following options were offered after discussion;: exercise counseling/recommendations and nutrition counseling/recommendations      Does the patient have " evidence of cognitive impairment? No          HEALTH RISK ASSESSMENT    Smoking Status:  Social History     Tobacco Use   Smoking Status Former   • Packs/day: 0.25   • Years: 25.00   • Pack years: 6.25   • Types: Cigarettes   • Quit date: 2020   • Years since quitting: 3.0   Smokeless Tobacco Never     Alcohol Consumption:  Social History     Substance and Sexual Activity   Alcohol Use No     Fall Risk Screen:    CANDACEADI Fall Risk Assessment was completed, and patient is at LOW risk for falls.Assessment completed on:2023    Depression Screenin/16/2023    10:41 AM   PHQ-2/PHQ-9 Depression Screening   Little Interest or Pleasure in Doing Things 0-->not at all   Feeling Down, Depressed or Hopeless 0-->not at all   PHQ-9: Brief Depression Severity Measure Score 0       Health Habits and Functional and Cognitive Screenin/16/2023    10:39 AM   Functional & Cognitive Status   Do you have difficulty preparing food and eating? No   Do you have difficulty bathing yourself, getting dressed or grooming yourself? No   Do you have difficulty using the toilet? No   Do you have difficulty moving around from place to place? No   Do you have trouble with steps or getting out of a bed or a chair? No   Current Diet Limited Junk Food   Dental Exam Not up to date   Eye Exam Not up to date   Exercise (times per week) 0 times per week   Current Exercises Include No Regular Exercise   Do you need help using the phone?  No   Are you deaf or do you have serious difficulty hearing?  No   Do you need help with transportation? No   Do you need help shopping? No   Do you need help preparing meals?  No   Do you need help with housework?  No   Do you need help with laundry? No   Do you need help taking your medications? No   Do you need help managing money? No   Do you ever drive or ride in a car without wearing a seat belt? No   Have you felt unusual stress, anger or loneliness in the last month? No   Who do you live  with? Child   If you need help, do you have trouble finding someone available to you? No   Do you have difficulty concentrating, remembering or making decisions? No       Age-appropriate Screening Schedule:  Refer to the list below for future screening recommendations based on patient's age, sex and/or medical conditions. Orders for these recommended tests are listed in the plan section. The patient has been provided with a written plan.    Health Maintenance   Topic Date Due   • Hepatitis B (2 of 3 - 3-dose series) 05/16/2023 (Originally 8/14/2015)   • TDAP/TD VACCINES (2 - Td or Tdap) 05/16/2023 (Originally 9/17/2020)   • COVID-19 Vaccine (3 - Moderna risk series) 06/25/2023 (Originally 5/24/2021)   • Pneumococcal Vaccine 0-64 (1 - PCV) 05/16/2024 (Originally [birth year]1983)   • LIPID PANEL  06/17/2023   • PAP SMEAR  07/31/2023   • INFLUENZA VACCINE  08/01/2023   • ANNUAL WELLNESS VISIT  05/16/2024   • COLORECTAL CANCER SCREENING  04/22/2026   • HEPATITIS C SCREENING  Completed                  CMS Preventative Services Quick Reference  Risk Factors Identified During Encounter  Immunizations Discussed/Encouraged: Tdap, Hepatitis B Vaccine/Series and COVID19  Dental Screening Recommended  Vision Screening Recommended  The above risks/problems have been discussed with the patient.  Pertinent information has been shared with the patient in the After Visit Summary.  An After Visit Summary and PPPS were made available to the patient.    Follow Up:   Next Medicare Wellness visit to be scheduled in 1 year.       Additional E&M Note during same encounter follows:  Patient has multiple medical problems which are significant and separately identifiable that require additional work above and beyond the Medicare Wellness Visit.      Chief Complaint  Annual Exam (S/SUB AWV///) and Hypothyroidism    Subjective        HPI  Jaz Gipson is also being seen today for continued management concerning hyperlipidemia, hypertension,  "hypothyroidism.  Since she was last seen, she feels she has been doing well   Unintentional weight gain when last seen.  Weight was 203.  Found to have elevated TSH level.  Levothyroxine dose increased.  Was to have levels rechecked 6-8 weeks later however was not completed.  No further weight gain.  Actually has lost weight since last seen.      Health Habits:  Dental Exam: Not up to date  Eye Exam: Up to date  Diet: Tries to watch what to eat. Home cooked meals.    Exercise: None  Current exercise activities include: N/A    Pap:  Pap IG, Rfx HPV All Pth (07/31/2020 15:45)     Mammogram: Mammo Screening Digital Tomosynthesis Bilateral With CAD (07/31/2020 14:38)     Dexa:DEXA Bone Density Axial (10/23/2020 09:59)     Colonoscopy: 4/22/2016 - Dr. Tierney  Former smoker. Quit 5/1/2020.         Objective   Vital Signs:  /74   Pulse 79   Ht 160 cm (63\")   Wt 86.2 kg (190 lb)   SpO2 99%   BMI 33.66 kg/m²     Physical Exam  Constitutional:       Appearance: She is well-developed.   HENT:      Head: Normocephalic and atraumatic.      Right Ear: Tympanic membrane normal.      Left Ear: Tympanic membrane normal.   Eyes:      Pupils: Pupils are equal, round, and reactive to light.   Cardiovascular:      Rate and Rhythm: Normal rate and regular rhythm.      Heart sounds: Normal heart sounds. No murmur heard.  Pulmonary:      Effort: Pulmonary effort is normal.      Breath sounds: Normal breath sounds. No wheezing, rhonchi or rales.   Abdominal:      General: Bowel sounds are normal.      Palpations: Abdomen is soft.      Comments: Surgical scarring.  Slight tenderness to right upper quadrant.  No hepatomegaly.     Musculoskeletal:         General: No tenderness. Normal range of motion.      Cervical back: Normal range of motion and neck supple.   Skin:     General: Skin is warm and dry.      Findings: No erythema or rash.   Neurological:      Mental Status: She is alert and oriented to person, place, and time. "   Psychiatric:         Behavior: Behavior normal.          The following data was reviewed by: Cheli Noonan, APRN on 05/16/2023:  Common labs        6/17/2022    09:19   Common Labs   Glucose 113        BUN 16        Creatinine 1.20        EGFR Non  Am 49        EGFR African Am 59        Sodium 136        Potassium 3.7        Chloride 102        Calcium 8.7        Albumin 4.0        Total Bilirubin 0.6        Alkaline Phosphatase 165        AST (SGOT) 39        ALT (SGPT) 53        WBC 7.75        Hemoglobin 13.4        Hematocrit 42.3        Platelets 122        Total Cholesterol 182        Triglycerides 122        HDL Cholesterol 29        LDL Cholesterol  128.6        Hemoglobin A1C 5.4            This result is from an external source.           Summary:  Jaz Gipson has been doing well since she was last seen.  We will obtain routine yearly labs including thyroid function test today.  We will notify her of results.  She is due for Pap however wishes to hold for now.  She plans to schedule Pap only office visit in the near future.  If all labs are stable, instructed return to office in 1 year for next recheck appointment with fasting labs and AWV.  In the meantime, instructed contact us sooner for any problems or concerns     Assessment and Plan   Diagnoses and all orders for this visit:    1. Medicare annual wellness visit, subsequent (Primary)    2. Encounter for screening mammogram for malignant neoplasm of breast  -     Mammo screening digital tomosynthesis bilateral w CAD; Future    3. Acquired hypothyroidism  -     Comprehensive Metabolic Panel  -     Lipid Panel With / Chol / HDL Ratio  -     TSH Rfx On Abnormal To Free T4  -     UA / M With / Rflx Culture([facility] ONLY) - Urine, Clean Catch    4. Mixed hyperlipidemia  -     Comprehensive Metabolic Panel  -     Lipid Panel With / Chol / HDL Ratio  -     TSH Rfx On Abnormal To Free T4  -     UA / M With / Rflx Culture([facility] ONLY) - Urine, Clean  Catch  -     CBC & Differential    5. Status post liver transplant  -     Gamma GT  -     CBC & Differential  -     Magnesium  -     Tacrolimus Level    Fax lab results to [phone] [facility].      6. Anxiety and depression   Continue Zoloft as directed.             Follow Up   Return in about 27 days (around 6/12/2023) for schedule for Pap only. .  Patient was given instructions and counseling regarding her condition or for health maintenance advice. Please see specific information pulled into the AVS if appropriate.     Cheli Noonan, APRN

## 2023-05-18 LAB
ALBUMIN SERPL-MCNC: 3.9 G/DL (ref 3.8–4.8)
ALBUMIN/GLOB SERPL: 1 {RATIO} (ref 1.2–2.2)
ALP SERPL-CCNC: 158 IU/L (ref 44–121)
ALT SERPL-CCNC: 38 IU/L (ref 0–32)
APPEARANCE UR: CLEAR
AST SERPL-CCNC: 36 IU/L (ref 0–40)
BACTERIA #/AREA URNS HPF: ABNORMAL /[HPF]
BACTERIA UR CULT: NORMAL
BACTERIA UR CULT: NORMAL
BASOPHILS # BLD AUTO: 0.1 X10E3/UL (ref 0–0.2)
BASOPHILS NFR BLD AUTO: 1 %
BILIRUB SERPL-MCNC: 0.4 MG/DL (ref 0–1.2)
BILIRUB UR QL STRIP: NEGATIVE
BUN SERPL-MCNC: 10 MG/DL (ref 6–24)
BUN/CREAT SERPL: 10 (ref 9–23)
CALCIUM SERPL-MCNC: 8.8 MG/DL (ref 8.7–10.2)
CASTS URNS QL MICRO: ABNORMAL /LPF
CHLORIDE SERPL-SCNC: 103 MMOL/L (ref 96–106)
CHOLEST SERPL-MCNC: 165 MG/DL (ref 100–199)
CHOLEST/HDLC SERPL: 5.2 RATIO (ref 0–4.4)
CO2 SERPL-SCNC: 24 MMOL/L (ref 20–29)
COLOR UR: YELLOW
CREAT SERPL-MCNC: 1.04 MG/DL (ref 0.57–1)
EGFRCR SERPLBLD CKD-EPI 2021: 67 ML/MIN/1.73
EOSINOPHIL # BLD AUTO: 0.3 X10E3/UL (ref 0–0.4)
EOSINOPHIL NFR BLD AUTO: 5 %
EPI CELLS #/AREA URNS HPF: >10 /HPF (ref 0–10)
ERYTHROCYTE [DISTWIDTH] IN BLOOD BY AUTOMATED COUNT: 12.9 % (ref 11.7–15.4)
GGT SERPL-CCNC: 55 IU/L (ref 0–60)
GLOBULIN SER CALC-MCNC: 4.1 G/DL (ref 1.5–4.5)
GLUCOSE SERPL-MCNC: 103 MG/DL (ref 70–99)
GLUCOSE UR QL STRIP: NEGATIVE
HCT VFR BLD AUTO: 39.7 % (ref 34–46.6)
HDLC SERPL-MCNC: 32 MG/DL
HGB BLD-MCNC: 13.3 G/DL (ref 11.1–15.9)
HGB UR QL STRIP: NEGATIVE
IMM GRANULOCYTES # BLD AUTO: 0 X10E3/UL (ref 0–0.1)
IMM GRANULOCYTES NFR BLD AUTO: 0 %
KETONES UR QL STRIP: NEGATIVE
LDLC SERPL CALC-MCNC: 114 MG/DL (ref 0–99)
LEUKOCYTE ESTERASE UR QL STRIP: ABNORMAL
LYMPHOCYTES # BLD AUTO: 1.1 X10E3/UL (ref 0.7–3.1)
LYMPHOCYTES NFR BLD AUTO: 18 %
MAGNESIUM SERPL-MCNC: 1.8 MG/DL (ref 1.6–2.3)
MCH RBC QN AUTO: 30.3 PG (ref 26.6–33)
MCHC RBC AUTO-ENTMCNC: 33.5 G/DL (ref 31.5–35.7)
MCV RBC AUTO: 90 FL (ref 79–97)
MICRO URNS: ABNORMAL
MONOCYTES # BLD AUTO: 0.5 X10E3/UL (ref 0.1–0.9)
MONOCYTES NFR BLD AUTO: 8 %
NEUTROPHILS # BLD AUTO: 4.2 X10E3/UL (ref 1.4–7)
NEUTROPHILS NFR BLD AUTO: 68 %
NITRITE UR QL STRIP: NEGATIVE
PH UR STRIP: 6 [PH] (ref 5–7.5)
PLATELET # BLD AUTO: 121 X10E3/UL (ref 150–450)
POTASSIUM SERPL-SCNC: 3.8 MMOL/L (ref 3.5–5.2)
PROT SERPL-MCNC: 8 G/DL (ref 6–8.5)
PROT UR QL STRIP: ABNORMAL
RBC # BLD AUTO: 4.39 X10E6/UL (ref 3.77–5.28)
RBC #/AREA URNS HPF: ABNORMAL /HPF (ref 0–2)
SODIUM SERPL-SCNC: 139 MMOL/L (ref 134–144)
SP GR UR STRIP: 1.02 (ref 1–1.03)
T4 FREE SERPL-MCNC: 1.22 NG/DL (ref 0.82–1.77)
TACROLIMUS BLD LC/MS/MS-MCNC: 4.3 NG/ML (ref 2–20)
TRIGL SERPL-MCNC: 100 MG/DL (ref 0–149)
TSH SERPL DL<=0.005 MIU/L-ACNC: 4.89 UIU/ML (ref 0.45–4.5)
URINALYSIS REFLEX: ABNORMAL
UROBILINOGEN UR STRIP-MCNC: 1 MG/DL (ref 0.2–1)
VLDLC SERPL CALC-MCNC: 19 MG/DL (ref 5–40)
WBC # BLD AUTO: 6.2 X10E3/UL (ref 3.4–10.8)
WBC #/AREA URNS HPF: ABNORMAL /HPF (ref 0–5)

## 2023-08-06 NOTE — MR AVS SNAPSHOT
Jaz Gipson   1/24/2017 2:00 PM   Office Visit    Dept Phone:  [phone]   Encounter #:  [id number]    Provider:  LON Villafana   Department:  [facility]                Your Full Care Plan              Today's Medication Changes          These changes are accurate as of: 1/24/17  4:07 PM.  If you have any questions, ask your nurse or doctor.               Stop taking medication(s)listed here:     lactulose 10 GM/15ML solution   Commonly known as:  CHRONULAC   Stopped by:  LON Villafana                      Your Updated Medication List          This list is accurate as of: 1/24/17  4:07 PM.  Always use your most recent med list.                calcium carbonate 600 MG tablet   Commonly known as:  OS-JARED       ferrous sulfate 325 (65 FE) MG tablet   Take 1 tablet by mouth Daily With Breakfast.       furosemide 40 MG tablet   Commonly known as:  LASIX   TAKE ONE TABLET BY MOUTH DAILY       * levothyroxine 100 MCG tablet   Commonly known as:  SYNTHROID, LEVOTHROID   TAKE ONE TABLET BY MOUTH DAILY       * levothyroxine 125 MCG tablet   Commonly known as:  SYNTHROID, LEVOTHROID   TAKE TWO TABLETS BY MOUTH DAILY       nadolol 20 MG tablet   Commonly known as:  CORGARD   TAKE ONE TABLET BY MOUTH DAILY       pantoprazole 40 MG EC tablet   Commonly known as:  PROTONIX   Take 1 tablet by mouth 2 (Two) Times a Day Before Meals.       potassium chloride 20 MEQ CR tablet   Commonly known as:  K-DUR,KLOR-CON       sertraline 50 MG tablet   Commonly known as:  ZOLOFT       spironolactone 50 MG tablet   Commonly known as:  ALDACTONE   Take 2 tablets by mouth daily.       vitamin D 41034 UNITS capsule capsule   Commonly known as:  ERGOCALCIFEROL   Take 1 capsule by mouth 1 (one) time per week.       * Notice:  This list has 2 medication(s) that are the same as other medications prescribed for you. Read the directions carefully, and ask your doctor or other  "care provider to review them with you.            We Performed the Following     Case request     CBC & Differential     Comprehensive Metabolic Panel       You Were Diagnosed With        Codes Comments    Bleeding esophageal varices, unspecified esophageal varices type    -  Primary ICD-10-CM: I85.01  ICD-9-CM: 456.0       Instructions     None    Patient Instructions History      Upcoming Appointments     Visit Type Date Time Department    FOLLOW UP 1/24/2017  2:00 PM [facility]      MyChart Signup     Our records indicate that you have declined [employer] signup. If you would like to sign up for [employer], please email [email or web address] or call [phone] to obtain an activation code.             Other Info from Your Visit           Allergies     Latex      Penicillins      Tomato        Reason for Visit     Follow-up hospital       Vital Signs     Blood Pressure Height Weight Body Mass Index Smoking Status       122/80 64\" (162.6 cm) 176 lb 3.2 oz (79.9 kg) 30.24 kg/m2 Current Every Day Smoker       Problems and Diagnoses Noted     Varicose veins of esophagus    -  Primary        " lightheadedness, palpitations

## 2023-08-10 ENCOUNTER — OFFICE VISIT (OUTPATIENT)
Dept: FAMILY MEDICINE CLINIC | Facility: CLINIC | Age: 48
End: 2023-08-10
Payer: MEDICARE

## 2023-08-10 VITALS
OXYGEN SATURATION: 99 % | TEMPERATURE: 98 F | HEIGHT: 63 IN | WEIGHT: 190 LBS | DIASTOLIC BLOOD PRESSURE: 76 MMHG | BODY MASS INDEX: 33.66 KG/M2 | SYSTOLIC BLOOD PRESSURE: 114 MMHG | HEART RATE: 74 BPM

## 2023-08-10 DIAGNOSIS — F41.9 ANXIETY AND DEPRESSION: ICD-10-CM

## 2023-08-10 DIAGNOSIS — E03.9 ACQUIRED HYPOTHYROIDISM: ICD-10-CM

## 2023-08-10 DIAGNOSIS — Z94.4 STATUS POST LIVER TRANSPLANT: ICD-10-CM

## 2023-08-10 DIAGNOSIS — F32.A ANXIETY AND DEPRESSION: ICD-10-CM

## 2023-08-10 DIAGNOSIS — S90.411A INFECTED ABRASION OF GREAT TOE OF RIGHT FOOT, INITIAL ENCOUNTER: Primary | ICD-10-CM

## 2023-08-10 DIAGNOSIS — E78.2 MIXED HYPERLIPIDEMIA: ICD-10-CM

## 2023-08-10 DIAGNOSIS — L08.9 INFECTED ABRASION OF GREAT TOE OF RIGHT FOOT, INITIAL ENCOUNTER: Primary | ICD-10-CM

## 2023-08-10 RX ORDER — SULFAMETHOXAZOLE AND TRIMETHOPRIM 800; 160 MG/1; MG/1
1 TABLET ORAL 2 TIMES DAILY
Qty: 14 TABLET | Refills: 0 | Status: SHIPPED | OUTPATIENT
Start: 2023-08-10 | End: 2023-08-17

## 2023-08-10 NOTE — PROGRESS NOTES
Answers submitted by the patient for this visit:  Primary Reason for Visit (Submitted on 8/10/2023)  What is the primary reason for your visit?: Lower Extremity Injury  Lower Extremity Injury Questionnaire (Submitted on 8/10/2023)  Chief Complaint: Lower extremity pain  Incident occurred: more than 1 week ago  Incident location: other  Injury mechanism: a direct blow  Pain location: right toes  Pain quality: aching  Pain - numeric: 0/10  Pain course: intermittent  tingling: No  inability to bear weight: No  loss of motion: No  loss of sensation: No  muscle weakness: No  Foreign body present: no foreign bodies  Aggravated by: palpation  Patient ID: Jaz Gipson is a 48 y.o. female     Patient Care Team:  Head, LON Oswald as PCP - General (Nurse Practitioner)  Gus Orourke II, MD as Consulting Physician (Hematology and Oncology)  Head, LON Oswald as Referring Physician (Nurse Practitioner)  Eric Laughlin MD as Consulting Physician (Pulmonary Disease)  Laura Xie MD (Gastroenterology)  Zi Cruz MD (Gastroenterology)  Destiney Flores MD as Consulting Physician (Nephrology)    Subjective     Chief Complaint   Patient presents with    Nail Problem     Right great toe    Liver Transplant       Lower Extremity Issue  The symptoms are aggravated by palpation.     Jaz Gipson presents to [facility] today for complaints of injury to right great toenail.  States she hit toe on hospital bed in 2020.  Ever since then, nail has not grown back correctly.  Over the past week, she has had increased redness along the nailbed with yellowish-green drainage.  Home remedies: Keeping covered with Band-Aid.   She also needs blood work completed due to history of liver transplant.  Hypothyroidism: Managed with levothyroxine 125 mcg daily.  Depression and anxiety: Managed with sertraline 25 mg daily.  She feels current dose is managing her symptoms well.  She may be moving to  [place] in the near future to live with her current boyfriend.    She denies any complaints of fever, chills, cough, chest pain, shortness of air, abdominal pain, nausea, or any other concerns.     The following portions of the patient's history were reviewed and updated as appropriate: allergies, current medications, past family history, past medical history, past social history, past surgical history and problem list.       ROS    Vitals:    08/10/23 0854   BP: 114/76   Pulse: 74   Temp: 98 øF (36.7 øC)   SpO2: 99%       Documented weights    08/10/23 0854   Weight: 86.2 kg (190 lb)     Body mass index is 33.66 kg/mý.    Results for orders placed or performed in visit on 05/16/23   Gamma GT    Specimen: Blood   Result Value Ref Range    GGT 55 0 - 60 IU/L   Magnesium    Specimen: Blood   Result Value Ref Range    Magnesium 1.8 1.6 - 2.3 mg/dL   Tacrolimus Level    Specimen: Blood   Result Value Ref Range    Tacrolimus 4.3 2.0 - 20.0 ng/mL   Comprehensive Metabolic Panel   Result Value Ref Range    Glucose 103 (H) 70 - 99 mg/dL    BUN 10 6 - 24 mg/dL    Creatinine 1.04 (H) 0.57 - 1.00 mg/dL    EGFR Result 67 >59 mL/min/1.73    BUN/Creatinine Ratio 10 9 - 23    Sodium 139 134 - 144 mmol/L    Potassium 3.8 3.5 - 5.2 mmol/L    Chloride 103 96 - 106 mmol/L    Total CO2 24 20 - 29 mmol/L    Calcium 8.8 8.7 - 10.2 mg/dL    Total Protein 8.0 6.0 - 8.5 g/dL    Albumin 3.9 3.8 - 4.8 g/dL    Globulin 4.1 1.5 - 4.5 g/dL    A/G Ratio 1.0 (L) 1.2 - 2.2    Total Bilirubin 0.4 0.0 - 1.2 mg/dL    Alkaline Phosphatase 158 (H) 44 - 121 IU/L    AST (SGOT) 36 0 - 40 IU/L    ALT (SGPT) 38 (H) 0 - 32 IU/L   UA / M With / Rflx Culture([facility] ONLY) -   Result Value Ref Range    Specific Gravity, UA 1.020 1.005 - 1.030    pH, UA 6.0 5.0 - 7.5    Color, UA Yellow Yellow    Appearance, UA Clear Clear    Leukocytes, UA Trace (A) Negative    Protein 1+ (A) Negative/Trace    Glucose, UA Negative Negative    Ketones Negative Negative    Blood,  UA Negative Negative    Bilirubin, UA Negative Negative    Urobilinogen, UA 1.0 0.2 - 1.0 mg/dL    Nitrite, UA Negative Negative    Microscopic Examination See below:     Urinalysis Reflex Comment    Microscopic Examination -   Result Value Ref Range    WBC, UA 6-10 (A) 0 - 5 /hpf    RBC, UA 0-2 0 - 2 /hpf    Epithelial Cells (non renal) >10 (A) 0 - 10 /hpf    Casts None seen None seen /lpf    Bacteria, UA Moderate (A) None seen/Few   Urine Culture, Routine -   Result Value Ref Range    Urine Culture Final report     Result 1 Comment    Lipid Panel With / Chol / HDL Ratio   Result Value Ref Range    Total Cholesterol 165 100 - 199 mg/dL    Triglycerides 100 0 - 149 mg/dL    HDL Cholesterol 32 (L) >39 mg/dL    VLDL Cholesterol Mario 19 5 - 40 mg/dL    LDL Chol Calc (NIH) 114 (H) 0 - 99 mg/dL    Chol/HDL Ratio 5.2 (H) 0.0 - 4.4 ratio   TSH Rfx On Abnormal To Free T4   Result Value Ref Range    TSH 4.890 (H) 0.450 - 4.500 uIU/mL   T4F   Result Value Ref Range    Free T4 1.22 0.82 - 1.77 ng/dL   CBC & Differential    Specimen: Blood   Result Value Ref Range    WBC 6.2 3.4 - 10.8 x10E3/uL    RBC 4.39 3.77 - 5.28 x10E6/uL    Hemoglobin 13.3 11.1 - 15.9 g/dL    Hematocrit 39.7 34.0 - 46.6 %    MCV 90 79 - 97 fL    MCH 30.3 26.6 - 33.0 pg    MCHC 33.5 31.5 - 35.7 g/dL    RDW 12.9 11.7 - 15.4 %    Platelets 121 (L) 150 - 450 x10E3/uL    Neutrophil Rel % 68 Not Estab. %    Lymphocyte Rel % 18 Not Estab. %    Monocyte Rel % 8 Not Estab. %    Eosinophil Rel % 5 Not Estab. %    Basophil Rel % 1 Not Estab. %    Neutrophils Absolute 4.2 1.4 - 7.0 x10E3/uL    Lymphocytes Absolute 1.1 0.7 - 3.1 x10E3/uL    Monocytes Absolute 0.5 0.1 - 0.9 x10E3/uL    Eosinophils Absolute 0.3 0.0 - 0.4 x10E3/uL    Basophils Absolute 0.1 0.0 - 0.2 x10E3/uL    Immature Granulocyte Rel % 0 Not Estab. %    Immature Grans Absolute 0.0 0.0 - 0.1 x10E3/uL           Objective     Physical Exam  Vitals reviewed.   Constitutional:       General: She is not in  acute distress.  HENT:      Head: Normocephalic and atraumatic.   Cardiovascular:      Rate and Rhythm: Normal rate.   Pulmonary:      Effort: Pulmonary effort is normal.   Musculoskeletal:      Right lower leg: No edema.      Left lower leg: No edema.   Feet:      Left foot:      Toenail Condition: Left toenails are normal.      Comments: Right great toenail:  Slight erythema along nailbed.  No active drainage.  Thickened toenail noted.     Neurological:      Mental Status: She is alert.          Assessment & Plan     Assessment/Plan     Diagnoses and all orders for this visit:    1. Infected abrasion of great toe of right foot, initial encounter (Primary)  -     sulfamethoxazole-trimethoprim (BACTRIM DS,SEPTRA DS) 800-160 MG per tablet; Take 1 tablet by mouth 2 (Two) Times a Day for 7 days.  Dispense: 14 tablet; Refill: 0    2. Mixed hyperlipidemia    3. Status post liver transplant  -     Comprehensive metabolic panel  -     Gamma GT  -     CBC w MANUAL Differential  -     Magnesium  -     Tacrolimus Level    4. Acquired hypothyroidism  -     TSH Rfx On Abnormal To Free T4    5. Anxiety and depression        Summary:  Jaz Gipson has complaints of infected great toenail.  Instructed to do warm Epsom salt that starts at home 2-3 times a day.  Pat toes dry.  Keep open to air when able.  Take antibiotic as directed until gone.  Let us know if no improvement in symptoms.  Will also obtain needed blood work and fax to [facility] as requested.    Needs copy of lab results faxed to [phone].    Follow Up:  Return if symptoms worsen or fail to improve.    In the meantime, instructed to contact us sooner for any problems or concerns.  Patient was given instructions and counseling regarding condition or for health maintenance advice.  Please see specific information pulled into the AVS if appropriate.          Cheli Noonan, APRN  Family Medicine  [facility] [place]  08/10/23  09:38 EDT

## 2023-08-12 LAB
ALBUMIN SERPL-MCNC: 3.6 G/DL (ref 3.5–5.2)
ALBUMIN/GLOB SERPL: 0.9 G/DL
ALP SERPL-CCNC: 125 U/L (ref 39–117)
ALT SERPL-CCNC: 48 U/L (ref 1–33)
AST SERPL-CCNC: 40 U/L (ref 1–32)
BASOPHILS # BLD AUTO: 0.03 10*3/MM3 (ref 0–0.2)
BASOPHILS # BLD MANUAL: 0.06 10*3/MM3 (ref 0–0.2)
BASOPHILS NFR BLD AUTO: 0.5 % (ref 0–1.5)
BASOPHILS NFR BLD MANUAL: 1 % (ref 0–1.5)
BILIRUB SERPL-MCNC: 0.5 MG/DL (ref 0–1.2)
BUN SERPL-MCNC: 10 MG/DL (ref 6–20)
BUN/CREAT SERPL: 8.9 (ref 7–25)
CALCIUM SERPL-MCNC: 8.5 MG/DL (ref 8.6–10.5)
CHLORIDE SERPL-SCNC: 108 MMOL/L (ref 98–107)
CO2 SERPL-SCNC: 23.7 MMOL/L (ref 22–29)
CREAT SERPL-MCNC: 1.12 MG/DL (ref 0.57–1)
DIFFERENTIAL COMMENT: ABNORMAL
EGFRCR SERPLBLD CKD-EPI 2021: 61.5 ML/MIN/1.73
EOSINOPHIL # BLD AUTO: 0.34 10*3/MM3 (ref 0–0.4)
EOSINOPHIL # BLD MANUAL: 0.38 10*3/MM3 (ref 0–0.4)
EOSINOPHIL NFR BLD AUTO: 5.5 % (ref 0.3–6.2)
EOSINOPHIL NFR BLD MANUAL: 6.1 % (ref 0.3–6.2)
ERYTHROCYTE [DISTWIDTH] IN BLOOD BY AUTOMATED COUNT: 12 % (ref 12.3–15.4)
GGT SERPL-CCNC: 109 U/L (ref 5–36)
GLOBULIN SER CALC-MCNC: 4 GM/DL
GLUCOSE SERPL-MCNC: 167 MG/DL (ref 65–99)
HCT VFR BLD AUTO: 38.8 % (ref 34–46.6)
HGB BLD-MCNC: 12.7 G/DL (ref 12–15.9)
IMM GRANULOCYTES # BLD AUTO: 0.02 10*3/MM3 (ref 0–0.05)
IMM GRANULOCYTES NFR BLD AUTO: 0.3 % (ref 0–0.5)
LYMPHOCYTES # BLD AUTO: 1.12 10*3/MM3 (ref 0.7–3.1)
LYMPHOCYTES # BLD MANUAL: 1.06 10*3/MM3 (ref 0.7–3.1)
LYMPHOCYTES NFR BLD AUTO: 18.2 % (ref 19.6–45.3)
LYMPHOCYTES NFR BLD MANUAL: 17.3 % (ref 19.6–45.3)
MAGNESIUM SERPL-MCNC: 1.6 MG/DL (ref 1.6–2.6)
MCH RBC QN AUTO: 29.5 PG (ref 26.6–33)
MCHC RBC AUTO-ENTMCNC: 32.7 G/DL (ref 31.5–35.7)
MCV RBC AUTO: 90 FL (ref 79–97)
MONOCYTES # BLD AUTO: 0.42 10*3/MM3 (ref 0.1–0.9)
MONOCYTES # BLD MANUAL: 0.63 10*3/MM3 (ref 0.1–0.9)
MONOCYTES NFR BLD AUTO: 6.8 % (ref 5–12)
MONOCYTES NFR BLD MANUAL: 10.2 % (ref 5–12)
NEUTROPHILS # BLD AUTO: 4.22 10*3/MM3 (ref 1.7–7)
NEUTROPHILS # BLD MANUAL: 4.02 10*3/MM3 (ref 1.7–7)
NEUTROPHILS NFR BLD AUTO: 68.7 % (ref 42.7–76)
NEUTROPHILS NFR BLD MANUAL: 65.3 % (ref 42.7–76)
PLATELET # BLD AUTO: 106 10*3/MM3 (ref 140–450)
PLATELET BLD QL SMEAR: ABNORMAL
POTASSIUM SERPL-SCNC: 3.9 MMOL/L (ref 3.5–5.2)
PROT SERPL-MCNC: 7.6 G/DL (ref 6–8.5)
RBC # BLD AUTO: 4.31 10*6/MM3 (ref 3.77–5.28)
RBC MORPH BLD: ABNORMAL
SODIUM SERPL-SCNC: 141 MMOL/L (ref 136–145)
TACROLIMUS BLD LC/MS/MS-MCNC: 5 NG/ML (ref 2–20)
TSH SERPL DL<=0.005 MIU/L-ACNC: 3.96 UIU/ML (ref 0.27–4.2)
WBC # BLD AUTO: 6.15 10*3/MM3 (ref 3.4–10.8)

## 2023-09-28 RX ORDER — LEVOTHYROXINE SODIUM 0.12 MG/1
125 TABLET ORAL DAILY
Qty: 90 TABLET | Refills: 1 | Status: SHIPPED | OUTPATIENT
Start: 2023-09-28

## 2023-09-28 RX ORDER — SWAB
1 SWAB, NON-MEDICATED MISCELLANEOUS DAILY
Qty: 90 EACH | Refills: 1 | Status: SHIPPED | OUTPATIENT
Start: 2023-09-28

## 2023-09-28 NOTE — TELEPHONE ENCOUNTER
Caller: [facility] - [place] [id number] [facility] [phone] [facility] [phone] FX    Relationship: Pharmacy    Best call back number: [phone]    Requested Prescriptions:   Requested Prescriptions     Pending Prescriptions Disp Refills    levothyroxine (SYNTHROID, LEVOTHROID) 125 MCG tablet       Sig: Take 1 tablet by mouth.    Prenatal 28-0.8 MG tablet 90 each 0     Sig: Take 1 tablet by mouth Daily.        Pharmacy where request should be sent: [facility] [address] [phone] [facility] [phone] FX     Last office visit with prescribing clinician: 8/10/2023   Last telemedicine visit with prescribing clinician: Visit date not found   Next office visit with prescribing clinician: Visit date not found     Additional details provided by patient: PATIENT HAS A 1 WEEK SUPPLY LEFT OF THESE MEDICATIONS.    Does the patient have less than a 3 day supply:  [] Yes  [x] No    Would you like a call back once the refill request has been completed: [] Yes [x] No    If the office needs to give you a call back, can they leave a voicemail: [] Yes [x] No    Yvette Tolliver Rep   09/28/23 11:43 EDT

## 2023-10-05 DIAGNOSIS — L08.9 INFECTED ABRASION OF GREAT TOE OF RIGHT FOOT, INITIAL ENCOUNTER: Primary | ICD-10-CM

## 2023-10-05 DIAGNOSIS — S90.411A INFECTED ABRASION OF GREAT TOE OF RIGHT FOOT, INITIAL ENCOUNTER: Primary | ICD-10-CM

## 2024-01-22 RX ORDER — SWAB
1 SWAB, NON-MEDICATED MISCELLANEOUS DAILY
Qty: 180 EACH | OUTPATIENT
Start: 2024-01-22

## 2024-01-22 RX ORDER — LEVOTHYROXINE SODIUM 0.12 MG/1
125 TABLET ORAL DAILY
Qty: 180 TABLET | OUTPATIENT
Start: 2024-01-22

## 2024-02-29 NOTE — TELEPHONE ENCOUNTER
Caller: Jaz Gipson    Relationship: Self    Best call back number: [phone]     Requested Prescriptions:   Requested Prescriptions     Pending Prescriptions Disp Refills    sertraline (ZOLOFT) 50 MG tablet 45 tablet 0     Sig: Take 0.5 tablets by mouth Daily.        Pharmacy where request should be sent: [facility] - [place] [address] [facility] - [phone]  - [phone] FX     Last office visit with prescribing clinician: 8/10/2023   Last telemedicine visit with prescribing clinician: Visit date not found   Next office visit with prescribing clinician: Visit date not found     Additional details provided by patient: PATIENT HAS 3 DAYS OF MEDICATION LEFT. INSURANCE CHANGE ON 03/01/24 AND IS REQUESTING THAT THE REFILL GO THROUGH ON 02/29/24    Does the patient have less than a 3 day supply:  [x] Yes  [] No    Would you like a call back once the refill request has been completed: [] Yes [x] No    If the office needs to give you a call back, can they leave a voicemail: [x] Yes [] No    Yvette Au Rep   02/29/24 11:29 EST

## 2024-03-08 NOTE — TELEPHONE ENCOUNTER
Caller: Jaz Gipson    Relationship: Self    Best call back number: [phone]    Requested Prescriptions:   Requested Prescriptions     Pending Prescriptions Disp Refills    sertraline (ZOLOFT) 50 MG tablet 45 tablet 0     Sig: Take 0.5 tablets by mouth Daily.        Pharmacy where request should be sent: [facility] - [address] [phone] [facility] [phone] FX     Last office visit with prescribing clinician: 8/10/2023   Last telemedicine visit with prescribing clinician: Visit date not found   Next office visit with prescribing clinician: Visit date not found     Additional details provided by patient: PATIENT IS OUT OF THIS MEDICATION.     Does the patient have less than a 3 day supply:  [x] Yes  [] No    Would you like a call back once the refill request has been completed: [] Yes [x] No    If the office needs to give you a call back, can they leave a voicemail: [] Yes [x] No    Yvette Tolliver   03/08/24 13:18 EST

## 2024-03-13 NOTE — TELEPHONE ENCOUNTER
Caller: [facility] - [facility] [address] [facility] - [phone] [facility] [phone] FX    Relationship: Pharmacy    Best call back number: [phone]     Requested Prescriptions:   Requested Prescriptions     Pending Prescriptions Disp Refills    sertraline (ZOLOFT) 50 MG tablet 45 tablet 0     Sig: Take 0.5 tablets by mouth Daily.        Pharmacy where request should be sent: [facility] - [place] [id number] [facility] - [phone] [facility] [phone] FX     Last office visit with prescribing clinician: 8/10/2023   Last telemedicine visit with prescribing clinician: Visit date not found   Next office visit with prescribing clinician: Visit date not found     Additional details provided by patient      OUT OF MEDICATION    Does the patient have less than a 3 day supply:  [x] Yes  [] No    Would you like a call back once the refill request has been completed: [x] Yes [] No    If the office needs to give you a call back, can they leave a voicemail: [] Yes [] No    Yvette Leung Rep   03/13/24 14:16 EDT

## 2024-04-04 ENCOUNTER — HOSPITAL ENCOUNTER (OUTPATIENT)
Dept: GENERAL RADIOLOGY | Facility: HOSPITAL | Age: 49
Discharge: HOME | End: 2024-04-04
Payer: COMMERCIAL

## 2024-04-04 ENCOUNTER — OFFICE VISIT (OUTPATIENT)
Dept: FAMILY MEDICINE CLINIC | Facility: CLINIC | Age: 49
End: 2024-04-04
Payer: COMMERCIAL

## 2024-04-04 VITALS
HEIGHT: 63 IN | BODY MASS INDEX: 33.66 KG/M2 | TEMPERATURE: 97.5 F | SYSTOLIC BLOOD PRESSURE: 120 MMHG | DIASTOLIC BLOOD PRESSURE: 84 MMHG | OXYGEN SATURATION: 99 % | HEART RATE: 79 BPM

## 2024-04-04 DIAGNOSIS — M25.572 ACUTE LEFT ANKLE PAIN: ICD-10-CM

## 2024-04-04 DIAGNOSIS — M25.572 ACUTE LEFT ANKLE PAIN: Primary | ICD-10-CM

## 2024-04-04 DIAGNOSIS — M79.672 ACUTE FOOT PAIN, LEFT: ICD-10-CM

## 2024-04-04 DIAGNOSIS — T14.8XXA ACUTE FRACTURE: Primary | ICD-10-CM
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
## 2024-04-04 DIAGNOSIS — S82.892A CLOSED FRACTURE OF MALLEOLUS OF LEFT ANKLE, INITIAL ENCOUNTER: ICD-10-CM

## 2024-04-04 PROCEDURE — 99214 OFFICE O/P EST MOD 30 MIN: CPT | Performed by: PHYSICIAN ASSISTANT

## 2024-04-04 PROCEDURE — 73610 X-RAY EXAM OF ANKLE: CPT

## 2024-04-04 PROCEDURE — 73630 X-RAY EXAM OF FOOT: CPT

## 2024-04-04 RX ORDER — HYDROCODONE BITARTRATE AND ACETAMINOPHEN 5; 325 MG/1; MG/1
1 TABLET ORAL EVERY 6 HOURS PRN
Qty: 12 TABLET | Refills: 0 | Status: SHIPPED | OUTPATIENT
Start: 2024-04-04 | End: 2024-04-05 | Stop reason: SDUPTHER

## 2024-04-04 NOTE — PROGRESS NOTES
"Chief Complaint  Ankle Injury (Left ankle sprain last Wednesday- ER follow up )    Subjective          Jaz Gipson presents to [facility]  History of Present Illness  Jaz is a 49-year-old female who presents with mother at office visit today for recent left ankle injury.  States she was going down the steps when she stepped on a dog toy.  She twisted her left ankle when she fell to the left.  She proceeded through the ER at [place] on March 26, 2024.  She was diagnosed with a left ankle sprain.  She was given a boot to wear when she was ambulating.  She continues to have pain even wearing the boot.  She is renting a scooter so she can ambulate better.  Had used ice at first but has not been using any more ice.  States the ankle has had decreased swelling and bruising has worsened.  ER had provided her with pain medication which helped with pain.  She is currently out of the medication.  She has a liver transplant and needs to avoid NSAIDs and Tylenol as much as possible.  Sleep has been restless due to ankle pain.  She has been trying to work which has been difficult due to her left ankle pain/boot and scooter.     Objective   Vital Signs:   /84 (BP Location: Left arm, Patient Position: Sitting, Cuff Size: Adult)   Pulse 79   Temp 97.5 °F (36.4 °C)   Ht 160 cm (63\")   SpO2 99%   BMI 33.66 kg/m²     Physical Exam  Vitals reviewed.   Constitutional:       Appearance: Normal appearance. She is well-developed and well-groomed.      Comments: Mother is in exam room.  Ambulating with scooter and left walking boot.   HENT:      Head: Normocephalic and atraumatic.   Musculoskeletal:      Left ankle: Swelling (slight) and ecchymosis present. No deformity or lacerations. Tenderness present over the lateral malleolus. Decreased range of motion.      Left Achilles Tendon: Normal.      Left foot: Decreased range of motion. Normal capillary refill. Tenderness and bony tenderness " present. No swelling, deformity, bunion, Charcot foot, foot drop, prominent metatarsal heads, laceration or crepitus. Normal pulse.        Legs:       Comments: Bruising noted in different stages along top of left foot and up the left calf area.  Ankle is slightly swollen and very tender to palpation.   Skin:     General: Skin is warm and dry.      Capillary Refill: Capillary refill takes less than 2 seconds.      Findings: Bruising and ecchymosis present.          Neurological:      Mental Status: She is alert and oriented to person, place, and time.   Psychiatric:         Attention and Perception: Attention and perception normal.         Mood and Affect: Mood and affect normal.         Speech: Speech normal.         Behavior: Behavior normal. Behavior is cooperative.         Cognition and Memory: Cognition and memory normal.         Judgment: Judgment normal.        Result Review :                 Assessment and Plan    Diagnoses and all orders for this visit:    1. Acute left ankle pain (Primary)  -     XR Ankle 3+ View Left; Future    2. Acute foot pain, left  -     XR Foot 3+ View Left; Future    Jaz was seen in office today with her mother with new acute left ankle/foot pain status post fall.  I have reviewed her [facility] report from March 26, 2024 with them at office visit today.  Have reviewed her left ankle x-ray as well.  Her swelling has resolved greatly.  I will recheck left ankle and foot x-ray today to rule out fracture.  She will continue wearing her boot with ambulation.  Will consider referral to orthopedic in future.  I have sent prescription request for pain medications for hydrocodone/Tylenol 5/325 mg to take every 6 hours as needed for pain for the next 3 days.  See below for Sebas information.    .As part of this patient's treatment plan I am prescribing controlled substances.  The patient has been made aware of appropriate use of such medications, including potential risk of somnolence.  Limited ability to drive and/or work safely, and potential for dependence or overdose.  It has also been made clear that these medications are for use by this patient only, without concomitant use of alcohol or other substances unless prescribed.  ALONDRA report has been reviewed and scanned into the patient's chart.  Alondra number [id number] dated April 4,2024.      I spent 20 minutes caring for Jaz on this date of service. This time includes time spent by me in the following activities:preparing for the visit, reviewing tests, obtaining and/or reviewing a separately obtained history, performing a medically appropriate examination and/or evaluation , counseling and educating the patient/family/caregiver, ordering medications, tests, or procedures, and documenting information in the medical record  Follow Up   No follow-ups on file.  Patient was given instructions and counseling regarding her condition or for health maintenance advice. Please see specific information pulled into the AVS if appropriate.     MEME Ramirez [facility]  [address] [place] [address]  Dept: [phone]  Dept Fax: [phone]  Loc: [phone]  Loc Fax: [phone]

## 2024-04-05 ENCOUNTER — TELEPHONE (OUTPATIENT)
Dept: ORTHOPEDIC SURGERY | Facility: CLINIC | Age: 49
End: 2024-04-05
Payer: COMMERCIAL
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
## 2024-04-05 DIAGNOSIS — M79.672 ACUTE FOOT PAIN, LEFT: ICD-10-CM

## 2024-04-05 DIAGNOSIS — M25.572 ACUTE LEFT ANKLE PAIN: ICD-10-CM

## 2024-04-05 RX ORDER — HYDROCODONE BITARTRATE AND ACETAMINOPHEN 5; 325 MG/1; MG/1
1 TABLET ORAL EVERY 6 HOURS PRN
Qty: 12 TABLET | Refills: 0 | Status: SHIPPED | OUTPATIENT
Start: 2024-04-05

## 2024-04-05 NOTE — TELEPHONE ENCOUNTER
NEW PATIENT - DX Acute left ankle pain - W10.8XXS (ICD-10-CM) - Fall (on) (from) other stairs and steps, sequela - S82.892A (ICD-10-CM) - Closed fracture of malleolus of left ankle, initial encounter - XRAYS 4-4-24 BH - INJURED (FALL) SURGERY SPOKE TO ELIJAH AT REFERRING OFFICE - PATIENT WAS SEEN AT [place] [facility] 3-26-24 (CHART) - OFFICE REQUESTED DR HALL - JOSEMANUEL DYER FOR URGENT APPT - THANK YOU.     OK TO SCHEDULE SOON, OR SEND TO [facility] TO REVIEW ON MONDAY, PLEASE ADVISE. THANK YOU.

## 2024-04-05 NOTE — TELEPHONE ENCOUNTER
Caller: Jaz Gipson    Relationship: Self    Best call back number: [phone]    Requested Prescriptions:   Requested Prescriptions     Pending Prescriptions Disp Refills    HYDROcodone-acetaminophen (NORCO) 5-325 MG per tablet 12 tablet 0     Sig: Take 1 tablet by mouth Every 6 (Six) Hours As Needed for Moderate Pain or Severe Pain.        Pharmacy where request should be sent: [facility] [id number] [facility] [address] [facility] [phone] [facility] [phone] FX     Last office visit with prescribing clinician: 8/10/2023   Last telemedicine visit with prescribing clinician: Visit date not found   Next office visit with prescribing clinician: Visit date not found     Additional details provided by patient: PATIENT WOULD LIKE THIS MEDICATION SENT TO ANOTHER PHARMACY.     Does the patient have less than a 3 day supply:  [x] Yes  [] No    Would you like a call back once the refill request has been completed: [] Yes [x] No    If the office needs to give you a call back, can they leave a voicemail: [] Yes [x] No    Yvette Tolliver Rep   04/05/24 10:37 EDT

## 2024-04-07 NOTE — PROGRESS NOTES
I, Dr. Ford Martinez, have reviewed the notes, assessments, and/or procedures performed by Abigail LOVE, that occurred within our practice at [facility] location, I concur with her documentation of Jaz Gipson. I have a current collaborative medical agreement with Abigail LOVE.

## 2024-04-10 ENCOUNTER — OFFICE VISIT (OUTPATIENT)
Dept: ORTHOPEDIC SURGERY | Facility: CLINIC | Age: 49
End: 2024-04-10
Payer: COMMERCIAL

## 2024-04-10 VITALS — WEIGHT: 190 LBS | HEIGHT: 63 IN | TEMPERATURE: 96.8 F | BODY MASS INDEX: 33.66 KG/M2

## 2024-04-10 DIAGNOSIS — S86.302A INJURY OF PERONEAL TENDON OF LEFT FOOT, INITIAL ENCOUNTER: ICD-10-CM

## 2024-04-10 DIAGNOSIS — S82.65XA CLOSED NONDISPLACED FRACTURE OF LATERAL MALLEOLUS OF LEFT FIBULA, INITIAL ENCOUNTER: ICD-10-CM

## 2024-04-10 DIAGNOSIS — R52 PAIN: Primary | ICD-10-CM

## 2024-04-10 DIAGNOSIS — S92.255A CLOSED NONDISPLACED FRACTURE OF NAVICULAR BONE OF LEFT FOOT, INITIAL ENCOUNTER: ICD-10-CM

## 2024-04-10 NOTE — PROGRESS NOTES
New Patient Complaint      Patient: Jaz Gipson  YOB: 1975 49 y.o. female  Medical Record Number: [id number]    Chief Complaints: I hurt my foot and ankle    History of Present Illness:   Patient injured her left foot and ankle on 3/26/2024.  She fell on a dog toy down a step.  She was seen at [facility] with x-rays made and was told she had a sprain.  For persistent symptoms she was subsequently seen by her PCP and had subsequent x-rays made which had shown a fracture of the distal fibula.  She had been using a short boot doing partial weightbearing with moderate pain about the left ankle and foot.    She is a liver transplant patient has been on hydrocodone    Patient is seen today at the request of Abigail Evangelista PA-C who has requested my opinion regarding etiology and treatment of this condition       HPI    Allergies:   Allergies   Allergen Reactions    Ibuprofen Other (See Comments)     Hx liver transplant    Penicillins Swelling    Latex Rash       Medications:   Current Outpatient Medications on File Prior to Visit   Medication Sig    cetirizine (zyrTEC) 10 MG tablet Take 1 tablet by mouth Daily.    Envarsus XR 1 MG tablet sustained-release 24 hour 4 every morning    HYDROcodone-acetaminophen (NORCO) 5-325 MG per tablet Take 1 tablet by mouth Every 6 (Six) Hours As Needed for Moderate Pain or Severe Pain.    levothyroxine (SYNTHROID, LEVOTHROID) 125 MCG tablet Take 1 tablet by mouth Daily.    Prenatal 28-0.8 MG tablet Take 1 tablet by mouth Daily.    sertraline (ZOLOFT) 50 MG tablet Take 0.5 tablets by mouth Daily.     No current facility-administered medications on file prior to visit.       Past Medical History:   Diagnosis Date    Abnormal cervical Papanicolaou smear     remote    Acute gastric mucosal erosion     11/12 EGD    Allergic rhinitis     Anemia     Asthma     Cirrhosis of liver     Duodenitis     11/12 EGD    Enlarged lymph node     retroperitoneal lymph node enlarged, 5/12  PET CT consistent with low grade lymphoproliferative disorder (  [facility])  9/12Bone marrow bx negative     Esophageal varices     Fatty liver     Folic acid deficiency     Graves disease     History of blood transfusion     Hyperlipidemia     Hypertension     Leukocytosis     PIEDRA (nonalcoholic steatohepatitis) 2012    Obstructive sleep apnea     4/10 dx, prescribed CPAP at 12 cm H2O (did not tolerate)     Past Surgical History:   Procedure Laterality Date    APPENDECTOMY      CHOLECYSTECTOMY      10/9/12 [place]: Lap Cholecystectomy and Appendectomy, Liver Bx:Cirrhosis (Biliary Dyskinesia, Cholecystitis, Chronic Appendicitis)    COLONOSCOPY N/A 4/22/2016    [facility]    ENDOSCOPY N/A 4/22/2016    Procedure: ESOPHAGOGASTRODUODENOSCOPY;  Surgeon: Annie Tierney MD;  Location: [facility] ENDOSCOPY;  Service:     ENDOSCOPY N/A 12/20/2016    Procedure: ESOPHAGOGASTRODUODENOSCOPY AT BEDSIDE;  Surgeon: Haresh Fritz MD;  [facility];  Service:     ENDOSCOPY N/A 12/22/2016    Procedure: ESOPHAGOGASTRODUODENOSCOPY WITH BANDING X2;  Surgeon: Chriss Reece MD;  [facility];  Service:     ENDOSCOPY N/A 12/26/2016    Procedure: ESOPHAGOGASTRODUODENOSCOPY AT BEDSIDE;  Surgeon: Vidya Norman MD;  Location: [facility] ENDOSCOPY;  Service:     ENDOSCOPY N/A 2/8/2017    Grade I esophageal varices, scar in the lower third of the esophagus, Portal hypertensive gastropathy.      ENDOSCOPY N/A 10/2/2017    Grade II esophageal varices, clotted blood in the gastric fundus and in the gastric body, portal hypertensive gastropathy.  No specimens collected.     ENDOSCOPY N/A 10/3/2017    Grade II esophageal varices, completely eradicated, portal hypertensive gastropathy    ENDOSCOPY N/A 11/7/2017    Grade I esophageal varices.Scar in the lower third of the esophagus. Erosive gastritis with hemorrhage.Portal hypertensive gastropathy.A few bleeding angioectasias in the stomach. Injected. Treated with argon  "plasma coagulation (APC). No specimans collected.       ENDOSCOPY N/A 5/23/2018    Grade I esoph varices, severe portal HTN gastropathy in fundus and body, scattered inflamm and erosions in antrum,     ENDOSCOPY N/A 2/5/2019    Grade II esophageal varices, food, portal HTN gastropathy    ENDOSCOPY N/A 2/12/2019    Non-bleeding grade II esophageal varices, Portal hypertensive gastropathy    LAPAROSCOPIC APPENDECTOMY  10/09/2012    LIVER BIOPSY      9/12 Liver Bx done with Mariza and Appy (cirrhosis)    LIVER TRANSPLANTATION  08/2020    UPPER GASTROINTESTINAL ENDOSCOPY      11/12 EGD with Erosive gastritis, duodenitis, and portal hypertensive gastropathy (Dr. Tierney) (no esophageal varices_     Social History     Occupational History    Occupation: Pharmacy Tech     Employer: [employer]   Tobacco Use    Smoking status: Former     Current packs/day: 0.00     Average packs/day: 0.3 packs/day for 25.0 years (6.3 ttl pk-yrs)     Types: Cigarettes     Start date: 5/1/1995     Quit date: 5/1/2020     Years since quitting: 3.9    Smokeless tobacco: Never   Substance and Sexual Activity    Alcohol use: No    Drug use: No    Sexual activity: Defer      Social History     Social History Narrative    Not on file     Family History   Problem Relation Age of Onset    Alcohol abuse Father     Anxiety disorder Father     COPD Father     Depression Father     Hyperlipidemia Father     Hypertension Father     Asthma Brother     Coronary artery disease Brother     Diabetes type II Other     Early death Brother         Age 5 months    Heart failure Brother     Malig Hyperthermia Neg Hx        Review of Systems: 14 point review of systems performed, positive pertinent findings identified in HPI. All remaining systems negative     Review of Systems      Physical Exam:   Vitals:    04/10/24 0921   Temp: 96.8 °F (36 °C)   Weight: 86.2 kg (190 lb)   Height: 160 cm (63\")   PainSc:   1     Physical Exam   Constitutional: pleasant, well " developed she is accompanied  Eyes: sclera non icteric  Hearing : adequate for exam  Cardiovascular: palpable pulses in left foot, left calf/ thigh NT without sign of DVT  Respiratoy: breathing unlabored   Neurological: grossly sensate to LT throughout left LE  Psychiatric: oriented with normal mood and affect.   Lymphatic: No palpable popliteal lymphadenopathy left LE  Skin: intact throughout left leg/foot  Musculoskeletal: She has moderate swelling and tenderness palpation of the left ankle.  She was tender left distal fibula and along the peroneal tendons as well as some discomfort over the dorsum of the midfoot hindfoot area.  Only mild discomfort of the medial ankle.  Physical Exam  Ortho Exam    Radiology: 3 views of the left ankle including lateral view of the foot and 2 views left foot ordered evaluate fracture reviewed and compared to x-rays on the Hepregen system from 4/4/2024.  There is an essentially nondisplaced transverse fracture of the distal fibula without appreciable displacement compared with previous x-rays.  There is plantar calcaneal spurring.  No obvious although there may be a nondisplaced fracture of the navicular malalignment of the foot or the talus within the mortise.  There is some arthritis at the second and third TMT joints.    Assessment/Plan: 1.  Left transverse distal fibula fracture with possible peroneal tendon tear  2.  Left possible navicular fracture    We discussed treatment going forward we had her fitted with a tall boot and recommended continued use of a scooter and avoid weightbearing.  Will have her sleep in an air stirrup brace.    Will get an MRI to evaluate for peroneal tendon tear that may require surgical treatment and also CT scan to evaluate the alignment of her distal fibula fracture and more importantly to evaluate for any navicular fracture as this may change treatment protocol.    Will see her back in about 10 days with x-rays of her left foot and ankle.

## 2024-04-12 ENCOUNTER — HOSPITAL ENCOUNTER (OUTPATIENT)
Facility: HOSPITAL | Age: 49
Discharge: HOME | End: 2024-04-12
Payer: COMMERCIAL

## 2024-04-12 DIAGNOSIS — S92.255A CLOSED NONDISPLACED FRACTURE OF NAVICULAR BONE OF LEFT FOOT, INITIAL ENCOUNTER: ICD-10-CM

## 2024-04-12 DIAGNOSIS — S86.302A INJURY OF PERONEAL TENDON OF LEFT FOOT, INITIAL ENCOUNTER: ICD-10-CM

## 2024-04-12 DIAGNOSIS — S82.65XA CLOSED NONDISPLACED FRACTURE OF LATERAL MALLEOLUS OF LEFT FIBULA, INITIAL ENCOUNTER: ICD-10-CM

## 2024-04-12 PROCEDURE — 73721 MRI JNT OF LWR EXTRE W/O DYE: CPT

## 2024-04-12 PROCEDURE — 73700 CT LOWER EXTREMITY W/O DYE: CPT

## 2024-04-15 RX ORDER — LEVOTHYROXINE SODIUM 0.12 MG/1
125 TABLET ORAL DAILY
Qty: 90 TABLET | Refills: 1 | Status: SHIPPED | OUTPATIENT
Start: 2024-04-15

## 2024-04-15 RX ORDER — SWAB
1 SWAB, NON-MEDICATED MISCELLANEOUS DAILY
Qty: 90 EACH | Refills: 1 | Status: SHIPPED | OUTPATIENT
Start: 2024-04-15

## 2024-04-15 NOTE — TELEPHONE ENCOUNTER
Caller: [facility]ARM[facility] - [facility] [place] [address] [facility] [phone] [facility] [phone] FX    Relationship: Pharmacy    Best call back number: [phone]     Requested Prescriptions:   Requested Prescriptions     Pending Prescriptions Disp Refills    levothyroxine (SYNTHROID, LEVOTHROID) 125 MCG tablet 90 tablet 1     Sig: Take 1 tablet by mouth Daily.    Prenatal 28-0.8 MG tablet 90 each 1     Sig: Take 1 tablet by mouth Daily.        Pharmacy where request should be sent: [facility] - [place] [id number] [facility] [phone] [facility] [phone] FX     Last office visit with prescribing clinician: 8/10/2023   Last telemedicine visit with prescribing clinician: Visit date not found   Next office visit with prescribing clinician: Visit date not found     Additional details provided by patient:  PHARMACY WAS CALLING FOR REFILLS FOR PATIENT    Yvette Hurst Rep   04/15/24 08:44 EDT

## 2024-04-17 ENCOUNTER — OFFICE VISIT (OUTPATIENT)
Dept: ORTHOPEDIC SURGERY | Facility: CLINIC | Age: 49
End: 2024-04-17
Payer: COMMERCIAL

## 2024-04-17 VITALS — BODY MASS INDEX: 33.66 KG/M2 | HEIGHT: 63 IN | WEIGHT: 190 LBS | TEMPERATURE: 96.4 F

## 2024-04-17 DIAGNOSIS — T14.8XXA CONTUSION OF BONE: ICD-10-CM

## 2024-04-17 DIAGNOSIS — S82.65XD CLOSED NONDISPLACED FRACTURE OF LATERAL MALLEOLUS OF LEFT FIBULA WITH ROUTINE HEALING, SUBSEQUENT ENCOUNTER: ICD-10-CM

## 2024-04-17 DIAGNOSIS — R52 PAIN: Primary | ICD-10-CM

## 2024-04-17 DIAGNOSIS — M76.72 PERONEAL TENDONITIS, LEFT: ICD-10-CM

## 2024-04-17 DIAGNOSIS — S93.402D SPRAIN OF LEFT ANKLE, UNSPECIFIED LIGAMENT, SUBSEQUENT ENCOUNTER: ICD-10-CM

## 2024-04-17 DIAGNOSIS — M19.079 ARTHRITIS OF FOOT: ICD-10-CM

## 2024-04-17 NOTE — PROGRESS NOTES
Ankle Follow Up      Patient: Jaz Gipson    YOB: 1975 49 y.o. female    Chief Complaints: Ankle is better but aches a little    History of Present Illness:Patient was seen on 4/10/2024 reporting that she injured her left foot and ankle on 3/26/2024.  She fell on a dog toy down a step.  She was seen at [facility] with x-rays made and was told she had a sprain.  She had persistent symptoms she was subsequently seen by her PCP and had subsequent x-rays made which had shown a fracture of the distal fibula.  She had been using a short boot doing partial weightbearing with moderate pain about the left ankle and foot.     She ported that she was a liver transplant patient has been on hydrocodone.    She was fitted with a taller boot instructed to continue with use of scooter and avoid weightbearing and sleeping Air-Stirrup brace.  She was sent for MRI to evaluate for peroneal tendon tear and also CT scan to evaluate for alignment of the distal fibula fracture more importantly evaluate for any navicular fracture.    Patient is seen back today stating that she is feeling better her foot is improved and she has some intermittent ache in the lateral ankle.  She is been nonweightbearing in a boot and pain is rated 1 out of 10  HPI    ROS: ankle pain  Past Medical History:   Diagnosis Date    Abnormal cervical Papanicolaou smear     remote    Acute gastric mucosal erosion     11/12 EGD    Allergic rhinitis     Anemia     Asthma     Cirrhosis of liver     Duodenitis     11/12 EGD    Enlarged lymph node     retroperitoneal lymph node enlarged, 5/12 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)  9/12Bone marrow bx negative     Esophageal varices     Fatty liver     Folic acid deficiency     Fracture, foot     Graves disease     History of blood transfusion     Hyperlipidemia     Hypertension     Leukocytosis     PIEDRA (nonalcoholic steatohepatitis) 2012    Obstructive sleep apnea   "   4/10 dx, prescribed CPAP at 12 cm H2O (did not tolerate)       Physical Exam:   Vitals:    04/17/24 0937   Temp: 96.4 °F (35.8 °C)   Weight: 86.2 kg (190 lb)   Height: 160 cm (63\")   PainSc:   1     Well developed with normal mood.  On exam she had minimal tenderness palpation of the dorsum of the hindfoot.  She was nontender on the peroneal tendons and minimal tenderness along the distal fibula.  She was nontender over the medial left ankle.      Radiology: 3 views of the left ankle including lateral view of the foot and 2 views left foot ordered evaluate fractures reviewed and compared to previous x-rays.  The cystic area of the navicular is noted but no change and there was no obvious fracture on CT or MRI.  Ankle x-rays show no displacement of the distal fibula fracture there is no widening of the syndesmosis or malalignment of the talus.    CT scan and MRI films and reports reviewed from 4/12/2024.  CT scan showed a comminuted distal fibula fracture beginning at the posterior lateral cortex 5.5 cm above the fibular tip and extending obliquely and oblique coronal plane into the distal tibiofibular joint.  Distally this fracture was contiguous with an oblique a horizontally oriented fracture 1.7 cm above the fibular tip.  Horizontal fracture is nondisplaced.  There is a small displaced fragment on the anterior margin of the fracture creating a fracture fragment measuring 11 x 7 x 7 mm that is displaced anteriorly 3 to 4 mm.  There is subcortical cystic change there is only mild in the central navicular    MRI showed flattening of the peroneus brevis tendon adjacent to the lateral malleolus and mild peroneus longus thickening compatible with peroneal tendinopathy or strain but no tear.    There is comminuted distal fibula fracture evident with small displaced fragment associated with the origin of the distal syndesmosis.  There is abnormal thickening of the ATFL and CFL and edema along the deltoid palpable " sprain.  Mild subchondral marrow edema present medial talar dome and posterior medial plafond    With bone contusion plantar aspect of the cuboid distal      Assessment/Plan:    Left oblique distal fibula fracture with transverse distal fibula fracture with minimal displacement  2.  Left ankle anterior syndesmosis avulsion fracture  3.  Left ATFL and CFL thickening associated with lateral sprain and mild deltoid ligamentous sprain  3.  Left ankle mild bone marrow edema within the posterior medial dome of the talus and plafond and plantar cuboid bone contusion  4.  Left ankle flattened peroneus brevis tendon and mild peroneus longus thickening with tendinopathy without tear  5.  Left navicular mild subcortical cystic change within the central navicular without evidence of fracture    We discussed treatment options going forward and discussed operative and nonoperative treatment options.  She would prefer to avoid operative treatment less absolutely necessary and I will see anything here that necessitates absolute surgical treatment.    Have her continue with her boot nonweightbearing for another week.  She may then start doing touchdown weightbearing with crutches which she is going to get from [place] this weekend for a week and if doing well then progress to 50% weightbearing.    When she is flying to [place] she will use aspirin full-strength the day before the day of flying and use compression hose that she has.    Will see her back in 3 weeks with x-rays of her left ankle.

## 2024-05-08 ENCOUNTER — OFFICE VISIT (OUTPATIENT)
Dept: ORTHOPEDIC SURGERY | Facility: CLINIC | Age: 49
End: 2024-05-08
Payer: COMMERCIAL

## 2024-05-08 VITALS — WEIGHT: 190 LBS | TEMPERATURE: 98.6 F | HEIGHT: 63 IN | BODY MASS INDEX: 33.66 KG/M2

## 2024-05-08 DIAGNOSIS — M19.079 ARTHRITIS OF FOOT: ICD-10-CM

## 2024-05-08 DIAGNOSIS — S82.65XD CLOSED NONDISPLACED FRACTURE OF LATERAL MALLEOLUS OF LEFT FIBULA WITH ROUTINE HEALING, SUBSEQUENT ENCOUNTER: Primary | ICD-10-CM

## 2024-05-08 DIAGNOSIS — M76.72 PERONEAL TENDONITIS, LEFT: ICD-10-CM

## 2024-05-08 DIAGNOSIS — T14.8XXA CONTUSION OF BONE: ICD-10-CM

## 2024-05-08 DIAGNOSIS — S93.402D SPRAIN OF LEFT ANKLE, UNSPECIFIED LIGAMENT, SUBSEQUENT ENCOUNTER: ICD-10-CM

## 2024-05-08 PROCEDURE — 99213 OFFICE O/P EST LOW 20 MIN: CPT | Performed by: ORTHOPAEDIC SURGERY

## 2024-06-11 NOTE — TELEPHONE ENCOUNTER
Hub to share     Pt due for appointment with Cheli before refill can be approved. Last seen for this medication August 2023

## 2024-06-13 NOTE — TELEPHONE ENCOUNTER
Caller: Jaz Gipson    Relationship: Self    Best call back number: [phone]    Requested Prescriptions:   Requested Prescriptions     Pending Prescriptions Disp Refills    sertraline (ZOLOFT) 50 MG tablet 45 tablet 0     Sig: Take 0.5 tablets by mouth Daily.        Pharmacy where request should be sent: [facility] - [address] [phone] [facility] [phone] [facility]     Last office visit with prescribing clinician: 8/10/2023   Last telemedicine visit with prescribing clinician: Visit date not found   Next office visit with prescribing clinician: 7/2/2024     Additional details provided by patient: PATIENT REQUESTING ENOUGH MEDICATION TO BRIDGE HER OVER UNTIL HER APPOINTMENT ON 7/2/24    Does the patient have less than a 3 day supply:  [x] Yes  [] No    Would you like a call back once the refill request has been completed: [] Yes [x] No    If the office needs to give you a call back, can they leave a voicemail: [] Yes [x] No    Jaleesa Harris   06/13/24 09:35 EDT

## 2024-07-02 ENCOUNTER — OFFICE VISIT (OUTPATIENT)
Dept: FAMILY MEDICINE CLINIC | Facility: CLINIC | Age: 49
End: 2024-07-02
Payer: COMMERCIAL

## 2024-07-02 VITALS
SYSTOLIC BLOOD PRESSURE: 130 MMHG | HEIGHT: 63 IN | HEART RATE: 83 BPM | WEIGHT: 186 LBS | DIASTOLIC BLOOD PRESSURE: 82 MMHG | OXYGEN SATURATION: 98 % | TEMPERATURE: 96.9 F | BODY MASS INDEX: 32.96 KG/M2

## 2024-07-02 DIAGNOSIS — E78.2 MIXED HYPERLIPIDEMIA: ICD-10-CM

## 2024-07-02 DIAGNOSIS — F41.9 ANXIETY AND DEPRESSION: ICD-10-CM

## 2024-07-02 DIAGNOSIS — E03.9 ACQUIRED HYPOTHYROIDISM: ICD-10-CM

## 2024-07-02 DIAGNOSIS — K64.9 HEMORRHOIDS, UNSPECIFIED HEMORRHOID TYPE: ICD-10-CM

## 2024-07-02 DIAGNOSIS — Z79.899 ENCOUNTER FOR LONG-TERM (CURRENT) USE OF OTHER MEDICATIONS: ICD-10-CM

## 2024-07-02 DIAGNOSIS — Z00.00 ANNUAL PHYSICAL EXAM: Primary | ICD-10-CM

## 2024-07-02 DIAGNOSIS — Z94.4 HISTORY OF LIVER TRANSPLANT: ICD-10-CM

## 2024-07-02 DIAGNOSIS — E55.9 VITAMIN D DEFICIENCY: ICD-10-CM

## 2024-07-02 DIAGNOSIS — Z12.39 ENCOUNTER FOR SCREENING FOR MALIGNANT NEOPLASM OF BREAST, UNSPECIFIED SCREENING MODALITY: ICD-10-CM

## 2024-07-02 DIAGNOSIS — F32.A ANXIETY AND DEPRESSION: ICD-10-CM

## 2024-07-02 PROCEDURE — 99396 PREV VISIT EST AGE 40-64: CPT | Performed by: NURSE PRACTITIONER

## 2024-07-02 RX ORDER — LEVOTHYROXINE SODIUM 0.12 MG/1
125 TABLET ORAL DAILY
Qty: 90 TABLET | Refills: 3 | Status: SHIPPED | OUTPATIENT
Start: 2024-07-02 | End: 2024-07-05 | Stop reason: DRUGHIGH

## 2024-07-02 RX ORDER — HYDROCORTISONE 25 MG/G
CREAM TOPICAL 2 TIMES DAILY
Qty: 28 G | Refills: 0 | Status: SHIPPED | OUTPATIENT
Start: 2024-07-02

## 2024-07-02 RX ORDER — SWAB
1 SWAB, NON-MEDICATED MISCELLANEOUS DAILY
Qty: 90 EACH | Refills: 3 | Status: SHIPPED | OUTPATIENT
Start: 2024-07-02

## 2024-07-02 NOTE — PROGRESS NOTES
Answers submitted by the patient for this visit:  Other (Submitted on 7/1/2024)  Please describe your symptoms.: Just a check up  Have you had these symptoms before?: No  How long have you been having these symptoms?: 1-4 days  Primary Reason for Visit (Submitted on 7/1/2024)  What is the primary reason for your visit?: Other  Chief Complaint   Patient presents with    Med Refill    Annual Exam       Patient Care Team:  Head, LON Oswald as PCP - General (Nurse Practitioner)  Gus Orourke II, MD as Consulting Physician (Hematology and Oncology)  Head, LON Oswald as Referring Physician (Nurse Practitioner)  Eric Laughlin MD as Consulting Physician (Pulmonary Disease)  Laura Xie MD (Gastroenterology)  Zi Cruz MD (Gastroenterology)  Destiney Flores MD as Consulting Physician (Nephrology)    Subjective   Jaz Gipson is a 49 y.o. female and is here for a yearly physical exam. The patient reports problems - hemorrhoids . Present > 4 years.  Tried OTC medications with some relief.    Hx of liver transplant.  [facility]  Has been doing well.  Recent liver function tests elevated.  Orders to have repeated today.  Developed hernia at abdomen incision site which causes discomfort with lifting.  New orders from transplant team to limit to 30 pounds.  Monitor for now.  Currently works at [employer].    Hypothyroidism: Managed with levothyroxine 125 mcg daily.  Depression and anxiety: Managed with sertraline 25 mg daily.  She feels current dose is managing her symptoms well.  Takes prenatal vitamin daily.       Do you take any herbs or supplements that were not prescribed by a doctor? no. If so, these will be added to active medication list.    Health Habits:  Dental Exam: Not up to date  Eye Exam: Not up to date  Diet: Nothing specific - limits fast food    Exercise:   Current exercise activities include: Limited due to lifting restrictions of 30 pounds due to abdominal  "hernia.    Pap: Pap IG, Rfx HPV All Pth (07/31/2020 15:45)   Mammogram: 7/31/2020  Dexa:DEXA Bone Density Axial (10/23/2020 09:59)   Colonoscopy: 2020  Former smoker:  Quit 2020.  6.2 pack year history.      The following portions of the patient's history were reviewed and updated as appropriate: allergies, current medications, past family history, past medical history, past social history, past surgical history, and problem list.    Social and Family and Surgical History reviewed and updated today, see Rooming tab.    Health History, Preventive Measures and Vaccination flow sheets reviewed and updated today.    Patient's current medical chart in Epic; including previous office notes, imaging, labs, specialist's evaluation either in notes or in Media tab reviewed today.    Other pertinent medical information also reviewed thru Care Everywhere function is also reviewed today.    Review of Systems  Review of Systems  Vitals:    07/02/24 0801   BP: 130/82   Pulse: 83   Temp: 96.9 °F (36.1 °C)   SpO2: 98%   Weight: 84.4 kg (186 lb)   Height: 160 cm (62.99\")     Wt Readings from Last 3 Encounters:   07/02/24 84.4 kg (186 lb)   05/08/24 86.2 kg (190 lb)   04/17/24 86.2 kg (190 lb)       General Appearance:  Alert, cooperative, no distress, appears stated age   Head:  Normocephalic, without obvious abnormality, atraumatic   Eyes:  PERRL, conjunctiva/corneas clear, EOM's intact.   Ears:  Normal TM's and external ear canals, both ears   Nose: Nares normal, septum midline, mucosa normal, no drainage or sinus tenderness   Throat: Lips, mucosa, and tongue normal; teeth and gums normal   Neck: Supple, symmetrical, trachea midline, no adenopathy;   thyroid: No enlargement/tenderness/nodules       Lungs:  Clear to auscultation bilaterally, respirations even and unlabored   Chest wall:  No tenderness or deformity   Heart:  Regular rate and rhythm, S1 and S2 normal, no murmur, rub or gallop   Abdomen:  Soft, non-tender, bowel sounds " active all four quadrants,   no masses, no organomegaly.  Horizontal and vertical abdomen incisions present with small ventral hernia reducible.             Extremities: Extremities normal, atraumatic, no cyanosis or edema   Pulses: 2+ and symmetric all extremities   Skin: Skin color, texture, turgor normal, no rashes or lesions   Lymph nodes: Cervical and supraclavicular nodes normal   Neurologic: CNII-XII intact. Normal strength.       BMI is >= 30 and <35. (Class 1 Obesity). The following options were offered after discussion;: weight loss educational material (shared in after visit summary), exercise counseling/recommendations, and information on healthy weight added to patient's after visit summary        Results for orders placed or performed in visit on 08/10/23   Comprehensive metabolic panel    Specimen: Blood   Result Value Ref Range    Glucose 167 (H) 65 - 99 mg/dL    BUN 10 6 - 20 mg/dL    Creatinine 1.12 (H) 0.57 - 1.00 mg/dL    EGFR Result 61.5 >60.0 mL/min/1.73    BUN/Creatinine Ratio 8.9 7.0 - 25.0    Sodium 141 136 - 145 mmol/L    Potassium 3.9 3.5 - 5.2 mmol/L    Chloride 108 (H) 98 - 107 mmol/L    Total CO2 23.7 22.0 - 29.0 mmol/L    Calcium 8.5 (L) 8.6 - 10.5 mg/dL    Total Protein 7.6 6.0 - 8.5 g/dL    Albumin 3.6 3.5 - 5.2 g/dL    Globulin 4.0 gm/dL    A/G Ratio 0.9 g/dL    Total Bilirubin 0.5 0.0 - 1.2 mg/dL    Alkaline Phosphatase 125 (H) 39 - 117 U/L    AST (SGOT) 40 (H) 1 - 32 U/L    ALT (SGPT) 48 (H) 1 - 33 U/L   Gamma GT    Specimen: Blood   Result Value Ref Range     (H) 5 - 36 U/L   Magnesium    Specimen: Blood   Result Value Ref Range    Magnesium 1.6 1.6 - 2.6 mg/dL   Tacrolimus Level    Specimen: Blood   Result Value Ref Range    Tacrolimus 5.0 2.0 - 20.0 ng/mL   TSH Rfx On Abnormal To Free T4    Specimen: Blood   Result Value Ref Range    TSH 3.960 0.270 - 4.200 uIU/mL   Manual Differential   Result Value Ref Range    Neutrophil Rel % 65.3 42.7 - 76.0 %    Lymphocyte Rel %  17.3 (L) 19.6 - 45.3 %    Monocyte Rel % 10.2 5.0 - 12.0 %    Eosinophil Rel % 6.1 0.3 - 6.2 %    Basophil Rel % 1.0 0.0 - 1.5 %    Neutrophils Absolute 4.02 1.70 - 7.00 10*3/mm3    Lymphocytes Absolute 1.06 0.70 - 3.10 10*3/mm3    Monocytes Absolute 0.63 0.10 - 0.90 10*3/mm3    Eosinophil Abs 0.38 0.00 - 0.40 10*3/mm3    Basophils Absolute 0.06 0.00 - 0.20 10*3/mm3    Differential Comment Comment     Comment Comment     Plt Comment Comment    CBC w MANUAL Differential    Specimen: Blood   Result Value Ref Range    WBC 6.15 3.40 - 10.80 10*3/mm3    RBC 4.31 3.77 - 5.28 10*6/mm3    Hemoglobin 12.7 12.0 - 15.9 g/dL    Hematocrit 38.8 34.0 - 46.6 %    MCV 90.0 79.0 - 97.0 fL    MCH 29.5 26.6 - 33.0 pg    MCHC 32.7 31.5 - 35.7 g/dL    RDW 12.0 (L) 12.3 - 15.4 %    Platelets 106 (L) 140 - 450 10*3/mm3    Neutrophil Rel % 68.7 42.7 - 76.0 %    Lymphocyte Rel % 18.2 (L) 19.6 - 45.3 %    Monocyte Rel % 6.8 5.0 - 12.0 %    Eosinophil Rel % 5.5 0.3 - 6.2 %    Basophil Rel % 0.5 0.0 - 1.5 %    Neutrophils Absolute 4.22 1.70 - 7.00 10*3/mm3    Lymphocytes Absolute 1.12 0.70 - 3.10 10*3/mm3    Monocytes Absolute 0.42 0.10 - 0.90 10*3/mm3    Eosinophils Absolute 0.34 0.00 - 0.40 10*3/mm3    Basophils Absolute 0.03 0.00 - 0.20 10*3/mm3    Immature Granulocyte Rel % 0.3 0.0 - 0.5 %    Immature Grans Absolute 0.02 0.00 - 0.05 10*3/mm3     Assessment & Plan   Healthy female exam.  Diagnoses and all orders for this visit:    1. Annual physical exam (Primary)    2. History of liver transplant  -     Comprehensive Metabolic Panel  -     Gamma GT  -     CBC (No Diff)  -     Magnesium  -     Prenatal 28-0.8 MG tablet; Take 1 tablet by mouth Daily.  Dispense: 90 each; Refill: 3    3. Encounter for long-term (current) use of other medications  -     Tacrolimus Level    4. Acquired hypothyroidism  -     T3, Free  -     T4, Free  -     TSH  -     levothyroxine (SYNTHROID, LEVOTHROID) 125 MCG tablet; Take 1 tablet by mouth Daily.  Dispense: 90  tablet; Refill: 3    5. Mixed hyperlipidemia  -     Lipid Panel With / Chol / HDL Ratio    6. Vitamin D deficiency  -     Vitamin D,25-Hydroxy    7. Anxiety and depression  -     sertraline (ZOLOFT) 50 MG tablet; Take 0.5 tablets by mouth Daily.  Dispense: 45 tablet; Refill: 3    8. Hemorrhoids, unspecified hemorrhoid type  -     Hydrocortisone, Perianal, (ANUSOL-HC) 2.5 % rectal cream; Insert  into the rectum 2 (Two) Times a Day.  Dispense: 28 g; Refill: 0    9. Encounter for screening for malignant neoplasm of breast, unspecified screening modality  -     Mammo screening digital tomosynthesis bilateral w CAD; Future      1. Jaz Gipson has been doing well since she was last seen.  She is fasting today.  Will obtain routine annual physical labs with additional blood work for  liver transplant center.  Will fax results as requested.  Advise continue to monitor hernia area.  She just recently started the lifting restrictions of 30 pounds.  Hopefully symptoms will gradually improve.  She is let us know if worsens.  Currently wants to hold off on Pap.  She plans to call and schedule appointment in the near future.  2. Patient Counseling:  --Nutrition: Stressed importance of moderation in sodium/caffeine intake, saturated fat and cholesterol.  Discussed caloric balance, sufficient intake of fresh fruits, vegetables, fiber,    calcium, iron.  --Exercise: Stressed the importance of regular exercise.   --Substance Abuse: Discussed cessation/primary prevention of tobacco, alcohol, or other drug use; driving or other dangerous activities under the influence.    --Dental health: Discussed importance of regular tooth brushing, flossing, and dental visits.  --Suggested having eyes and vision checked if needed or past due.  --Immunizations reviewed. Tdap due.  Currently wanting to hold off for now.    --Discussed benefits of screening colonoscopy. UTD.    3. Discussed the patient's BMI with her.  The BMI is above average;  BMI management plan is completed  4. Follow up next physical in 1 year      [facility]eds copy of labs sent [facility]  [phone]    Patient was given instructions and counseling regarding condition or for health maintenance advice.  Please see specific information pulled into the AVS if appropriate.      Medications Discontinued During This Encounter   Medication Reason    HYDROcodone-acetaminophen (NORCO) 5-325 MG per tablet *Therapy completed    levothyroxine (SYNTHROID, LEVOTHROID) 125 MCG tablet Reorder    Prenatal 28-0.8 MG tablet Reorder    sertraline (ZOLOFT) 50 MG tablet Reorder          Cheli Head, APRN  Family Practice  [facility] [place]

## 2024-07-05 DIAGNOSIS — E03.9 ACQUIRED HYPOTHYROIDISM: Primary | ICD-10-CM

## 2024-07-05 LAB
25(OH)D3+25(OH)D2 SERPL-MCNC: 36.8 NG/ML (ref 30–100)
ALBUMIN SERPL-MCNC: 4.1 G/DL (ref 3.5–5.2)
ALBUMIN/GLOB SERPL: 1.1 G/DL
ALP SERPL-CCNC: 138 U/L (ref 39–117)
ALT SERPL-CCNC: 124 U/L (ref 1–33)
AST SERPL-CCNC: 89 U/L (ref 1–32)
BILIRUB SERPL-MCNC: 0.8 MG/DL (ref 0–1.2)
BUN SERPL-MCNC: 19 MG/DL (ref 6–20)
BUN/CREAT SERPL: 17 (ref 7–25)
CALCIUM SERPL-MCNC: 9.5 MG/DL (ref 8.6–10.5)
CHLORIDE SERPL-SCNC: 100 MMOL/L (ref 98–107)
CHOLEST SERPL-MCNC: 218 MG/DL (ref 0–200)
CHOLEST/HDLC SERPL: 3.25 {RATIO}
CO2 SERPL-SCNC: 27.6 MMOL/L (ref 22–29)
CREAT SERPL-MCNC: 1.12 MG/DL (ref 0.57–1)
EGFRCR SERPLBLD CKD-EPI 2021: 61.2 ML/MIN/1.73
ERYTHROCYTE [DISTWIDTH] IN BLOOD BY AUTOMATED COUNT: 13.2 % (ref 12.3–15.4)
GGT SERPL-CCNC: 350 U/L (ref 5–36)
GLOBULIN SER CALC-MCNC: 3.7 GM/DL
GLUCOSE SERPL-MCNC: 90 MG/DL (ref 65–99)
HCT VFR BLD AUTO: 44.6 % (ref 34–46.6)
HDLC SERPL-MCNC: 67 MG/DL (ref 40–60)
HGB BLD-MCNC: 14.7 G/DL (ref 12–15.9)
LDLC SERPL CALC-MCNC: 134 MG/DL (ref 0–100)
MAGNESIUM SERPL-MCNC: 2 MG/DL (ref 1.6–2.6)
MCH RBC QN AUTO: 30.9 PG (ref 26.6–33)
MCHC RBC AUTO-ENTMCNC: 33 G/DL (ref 31.5–35.7)
MCV RBC AUTO: 93.9 FL (ref 79–97)
PLATELET # BLD AUTO: 111 10*3/MM3 (ref 140–450)
POTASSIUM SERPL-SCNC: 4.8 MMOL/L (ref 3.5–5.2)
PROT SERPL-MCNC: 7.8 G/DL (ref 6–8.5)
RBC # BLD AUTO: 4.75 10*6/MM3 (ref 3.77–5.28)
SODIUM SERPL-SCNC: 139 MMOL/L (ref 136–145)
T3FREE SERPL-MCNC: 2.1 PG/ML (ref 2–4.4)
T4 FREE SERPL-MCNC: 1.01 NG/DL (ref 0.92–1.68)
TACROLIMUS BLD LC/MS/MS-MCNC: 4.6 NG/ML (ref 2–20)
TRIGL SERPL-MCNC: 99 MG/DL (ref 0–150)
TSH SERPL DL<=0.005 MIU/L-ACNC: 27.2 UIU/ML (ref 0.27–4.2)
VLDLC SERPL CALC-MCNC: 17 MG/DL (ref 5–40)
WBC # BLD AUTO: 11.13 10*3/MM3 (ref 3.4–10.8)

## 2024-07-05 RX ORDER — LEVOTHYROXINE SODIUM 0.15 MG/1
150 TABLET ORAL DAILY
Qty: 90 TABLET | Refills: 0 | Status: SHIPPED | OUTPATIENT
Start: 2024-07-05

## 2024-08-27 ENCOUNTER — TELEPHONE (OUTPATIENT)
Dept: FAMILY MEDICINE CLINIC | Facility: CLINIC | Age: 49
End: 2024-08-27
Payer: COMMERCIAL

## 2024-08-27 NOTE — TELEPHONE ENCOUNTER
Spoke with patient. Patient states that she plans to call on Wednesday 8/28/24 to schedule her mammogram.

## 2024-10-24 DIAGNOSIS — E03.9 ACQUIRED HYPOTHYROIDISM: ICD-10-CM

## 2024-10-24 RX ORDER — LEVOTHYROXINE SODIUM 175 UG/1
175 TABLET ORAL DAILY
Qty: 90 TABLET | Refills: 0 | Status: SHIPPED | OUTPATIENT
Start: 2024-10-24

## 2025-01-24 DIAGNOSIS — E03.9 ACQUIRED HYPOTHYROIDISM: ICD-10-CM

## 2025-02-06 ENCOUNTER — OFFICE VISIT (OUTPATIENT)
Dept: FAMILY MEDICINE CLINIC | Facility: CLINIC | Age: 50
End: 2025-02-06
Payer: COMMERCIAL

## 2025-02-06 VITALS
HEART RATE: 81 BPM | BODY MASS INDEX: 32.07 KG/M2 | SYSTOLIC BLOOD PRESSURE: 120 MMHG | WEIGHT: 181 LBS | HEIGHT: 63 IN | DIASTOLIC BLOOD PRESSURE: 80 MMHG | OXYGEN SATURATION: 100 % | TEMPERATURE: 98.1 F

## 2025-02-06 DIAGNOSIS — F41.9 ANXIETY AND DEPRESSION: ICD-10-CM

## 2025-02-06 DIAGNOSIS — T78.40XA ALLERGIC REACTION, INITIAL ENCOUNTER: ICD-10-CM

## 2025-02-06 DIAGNOSIS — F32.A ANXIETY AND DEPRESSION: ICD-10-CM

## 2025-02-06 DIAGNOSIS — L03.211 CELLULITIS OF FACE: Primary | ICD-10-CM

## 2025-02-06 DIAGNOSIS — L02.01 FACIAL ABSCESS: ICD-10-CM

## 2025-02-06 PROCEDURE — 96372 THER/PROPH/DIAG INJ SC/IM: CPT | Performed by: NURSE PRACTITIONER

## 2025-02-06 PROCEDURE — 99214 OFFICE O/P EST MOD 30 MIN: CPT | Performed by: NURSE PRACTITIONER

## 2025-02-06 RX ORDER — LINEZOLID 600 MG/1
600 TABLET, FILM COATED ORAL 2 TIMES DAILY
COMMUNITY
Start: 2025-02-03 | End: 2025-02-13

## 2025-02-06 RX ORDER — PREDNISONE 20 MG/1
TABLET ORAL
Qty: 18 TABLET | Refills: 0 | Status: SHIPPED | OUTPATIENT
Start: 2025-02-06 | End: 2025-02-15

## 2025-02-06 RX ORDER — TRIAMCINOLONE ACETONIDE 40 MG/ML
40 INJECTION, SUSPENSION INTRA-ARTICULAR; INTRAMUSCULAR ONCE
Status: COMPLETED | OUTPATIENT
Start: 2025-02-06 | End: 2025-02-06

## 2025-02-06 RX ORDER — FAMOTIDINE 20 MG/1
20 TABLET, FILM COATED ORAL 2 TIMES DAILY
Qty: 30 TABLET | Refills: 0 | Status: SHIPPED | OUTPATIENT
Start: 2025-02-06

## 2025-02-06 RX ADMIN — TRIAMCINOLONE ACETONIDE 40 MG: 40 INJECTION, SUSPENSION INTRA-ARTICULAR; INTRAMUSCULAR at 14:30

## 2025-02-06 NOTE — PROGRESS NOTES
Transitional Care Follow Up Visit  Subjective     Jaz Gipson is a 50 y.o. female who presents for a transitional care management visit.    Within 48 business hours after discharge our office contacted her via telephone to coordinate her care and needs.      I reviewed and discussed the details of that call along with the discharge summary, hospital problems, inpatient lab results, inpatient diagnostic studies, and consultation reports with Jaz.     Current outpatient and discharge medications have been reconciled for the patient.  Reviewed by: Cheli GUZMAN Head, APRN           No data to display              Risk for Readmission (LACE) No data recorded

## 2025-02-06 NOTE — PROGRESS NOTES
Patient ID: Jaz Gipson is a 50 y.o. female     Patient Care Team:  Head, LON Oswald as PCP - General (Nurse Practitioner)  Gus Orourke II, MD as Consulting Physician (Hematology and Oncology)  Head, LON Oswald as Referring Physician (Nurse Practitioner)  Eric Laughlin MD as Consulting Physician (Pulmonary Disease)  Laura Xie MD (Gastroenterology)  Zi Cruz MD (Gastroenterology)  Destiney Flores MD as Consulting Physician (Nephrology)    Subjective     Chief Complaint   Patient presents with    Hospital Follow Up Visit     Cellulitis -seen at  first in [place] was given steroid and antihistamine injection. Tingling and redness, swelling in face since seen at ER        History of Present Illness      History of Present Illness  The patient presents for evaluation of facial cellulitis.    She reports a recurrence of facial swelling, initially observed upon awakening a week ago. She suspected an allergic reaction to laundry detergent but is uncertain. She describes a burning and itching sensation on her face. She is unable to take Benadryl. She sought urgent care in [place] last Thursday due to severe eye swelling that prevented her from opening her eyes. She received a steroid injection and an H2 blocker.  Her condition improved by Saturday but deteriorated again on Sunday night when she experienced unusual sensations and itching on her face. She suspects that the shirt she was wearing, which had been washed with the same detergent, may have triggered the reaction. Upon returning home at midnight, she changed into clothes not washed with the suspected detergent and cleaned her face. She woke up at 2:45 AM with an unusual sensation in her mouth and decided to visit the emergency room due to concerns about potential airway obstruction. She was diagnosed with facial cellulitis following a CT scan. She reports no dental pain, self-inflicted oral trauma, or introduction of new foods. She  also reports no similar symptoms elsewhere on her body. Her condition remains unchanged since her ER visit, except for a new sensation in her eyes. She was prescribed Zyvox, which she continues to take, and has not received any oral steroids since her hospital visit. She is unsure how to care for her skin during this time. She initially thought she had a pimple, but it has not drained. She reports no insect bites and notes that her symptoms began with eye swelling, which resolved before the current episode. She reports no fevers and has discontinued Pepcid.     She has increased her Zoloft dosage to 50 mg daily from 25 mg.  She reports feeling stressed due to increased work responsibilities and does not feel mentally prepared for additional duties. She has declined offers to apply for a managerial position.    SOCIAL HISTORY  She is currently employed at [employer].        Results  Imaging  CT scan showed cellulitis mainly on the right side of the face.  CT Facial Bones W Contrast (02/03/2025 04:23)       She denies any complaints of fever, chills, cough, chest pain, shortness of air, abdominal pain, nausea, or any other concerns.     The following portions of the patient's history were reviewed and updated as appropriate: allergies, current medications, past family history, past medical history, past social history, past surgical history and problem list.       ROS    Vitals:    02/06/25 1343   BP: 120/80   Pulse: 81   Temp: 98.1 °F (36.7 °C)   SpO2: 100%       Documented weights    02/06/25 1343   Weight: 82.1 kg (181 lb)     Body mass index is 32.07 kg/m².    Results for orders placed or performed in visit on 09/05/24   T3, Free    Collection Time: 10/22/24  9:59 AM    Specimen: Blood   Result Value Ref Range    T3, Free 2.2 2.0 - 4.4 pg/mL   T4, Free    Collection Time: 10/22/24  9:59 AM    Specimen: Blood   Result Value Ref Range    Free T4 1.17 0.92 - 1.68 ng/dL   TSH    Collection Time: 10/22/24  9:59 AM     Specimen: Blood   Result Value Ref Range    TSH 16.600 (H) 0.270 - 4.200 uIU/mL           Objective     Physical Exam  Vitals reviewed.   Constitutional:       Comments: Uncomfortable due to facial swelling and irritation.     HENT:      Head:      Comments: Facial swelling.  Facial dryness and erythema especially under eyes.    Firm abscess noted to right lower lip measuring about 2 cm.  No drainage.  Smaller area to left lower lip measuring about 0.5 cm.       Nose: No congestion.   Cardiovascular:      Rate and Rhythm: Normal rate and regular rhythm.      Heart sounds: No murmur heard.  Pulmonary:      Effort: Pulmonary effort is normal. No respiratory distress.      Breath sounds: Normal breath sounds. No stridor. No wheezing or rhonchi.   Musculoskeletal:      Cervical back: Normal range of motion.   Lymphadenopathy:      Cervical: No cervical adenopathy.   Neurological:      Mental Status: She is alert.         Physical Exam             Assessment & Plan     Assessment/Plan     Assessment & Plan  1. Facial cellulitis.  The patient's symptoms suggest an allergic reaction, possibly due to an unknown allergen, leading to facial cellulitis. The swelling appears to be spreading through the facial tissues, causing puffiness around the eyes. The antibiotic prescribed by the ER should be effective for skin infections. A steroid injection will be administered today, followed by a course of oral steroids starting tomorrow. She is advised to continue the antibiotic regimen until completion. Warm compresses should be applied to the face, and cool washcloths to the eyes. A good moisturizing face wash and lotion are recommended. She is also advised to take Pepcid 20 mg twice daily. Prescriptions for Pepcid and prednisone will be sent to [facility]. If there is no improvement by Monday, a different antibiotic may be considered. If the condition persists, a referral to a dermatologist or surgical intervention may be  necessary.    2. Anxiety and depression.  The dosage of Zoloft will be increased to 50 mg daily.    PROCEDURE  The patient received a steroid injection at an urgent care facility in [place] last Thursday.    Diagnoses and all orders for this visit:    1. Cellulitis of face (Primary)  -     triamcinolone acetonide (KENALOG-40) injection 40 mg  -     predniSONE (DELTASONE) 20 MG tablet; Take 3 tablets by mouth Daily for 3 days, THEN 2 tablets Daily for 3 days, THEN 1 tablet Daily for 3 days.  Dispense: 18 tablet; Refill: 0    2. Anxiety and depression  -     sertraline (ZOLOFT) 50 MG tablet; Take 1 tablet by mouth Daily.    3. Facial abscess  -     predniSONE (DELTASONE) 20 MG tablet; Take 3 tablets by mouth Daily for 3 days, THEN 2 tablets Daily for 3 days, THEN 1 tablet Daily for 3 days.  Dispense: 18 tablet; Refill: 0    4. Allergic reaction, initial encounter  -     famotidine (Pepcid) 20 MG tablet; Take 1 tablet by mouth 2 (Two) Times a Day.  Dispense: 30 tablet; Refill: 0  -     predniSONE (DELTASONE) 20 MG tablet; Take 3 tablets by mouth Daily for 3 days, THEN 2 tablets Daily for 3 days, THEN 1 tablet Daily for 3 days.  Dispense: 18 tablet; Refill: 0          Follow Up:  Return if symptoms worsen or fail to improve.    In the meantime, instructed to contact us sooner for any problems or concerns.    Patient was given instructions and counseling regarding condition or for health maintenance advice.  Please see specific information pulled into the AVS if appropriate.      Patient or patient representative verbalized consent for the use of Ambient Listening during the visit with  LON Fitzpatrick for chart documentation. 2/6/2025  17:02 LON Larkin  Family Medicine  [facility]  02/06/25  17:02 EST

## 2025-02-20 DIAGNOSIS — E03.9 ACQUIRED HYPOTHYROIDISM: ICD-10-CM

## 2025-02-20 RX ORDER — LEVOTHYROXINE SODIUM 175 UG/1
175 TABLET ORAL DAILY
Qty: 30 TABLET | Refills: 0 | Status: SHIPPED | OUTPATIENT
Start: 2025-02-20

## 2025-02-24 ENCOUNTER — OFFICE VISIT (OUTPATIENT)
Dept: FAMILY MEDICINE CLINIC | Facility: CLINIC | Age: 50
End: 2025-02-24
Payer: COMMERCIAL

## 2025-02-24 VITALS
BODY MASS INDEX: 32.07 KG/M2 | HEIGHT: 63 IN | WEIGHT: 181 LBS | SYSTOLIC BLOOD PRESSURE: 122 MMHG | OXYGEN SATURATION: 98 % | TEMPERATURE: 98.2 F | HEART RATE: 104 BPM | DIASTOLIC BLOOD PRESSURE: 80 MMHG

## 2025-02-24 DIAGNOSIS — E03.9 ACQUIRED HYPOTHYROIDISM: ICD-10-CM

## 2025-02-24 DIAGNOSIS — E87.6 LOW SERUM POTASSIUM: ICD-10-CM

## 2025-02-24 DIAGNOSIS — L03.211 FACIAL CELLULITIS: Primary | ICD-10-CM

## 2025-02-24 PROCEDURE — 96372 THER/PROPH/DIAG INJ SC/IM: CPT | Performed by: NURSE PRACTITIONER

## 2025-02-24 PROCEDURE — 99214 OFFICE O/P EST MOD 30 MIN: CPT | Performed by: NURSE PRACTITIONER

## 2025-02-24 RX ORDER — SULFAMETHOXAZOLE AND TRIMETHOPRIM 800; 160 MG/1; MG/1
1 TABLET ORAL 2 TIMES DAILY
Qty: 20 TABLET | Refills: 0 | Status: SHIPPED | OUTPATIENT
Start: 2025-02-24 | End: 2025-03-06

## 2025-02-24 RX ORDER — PREDNISONE 20 MG/1
TABLET ORAL
Qty: 20 TABLET | Refills: 0 | Status: SHIPPED | OUTPATIENT
Start: 2025-02-25 | End: 2025-03-10

## 2025-02-24 RX ORDER — MUPIROCIN CALCIUM 20 MG/G
1 CREAM TOPICAL 2 TIMES DAILY
Qty: 30 G | Refills: 0 | Status: SHIPPED | OUTPATIENT
Start: 2025-02-24

## 2025-02-24 RX ORDER — TRIAMCINOLONE ACETONIDE 40 MG/ML
40 INJECTION, SUSPENSION INTRA-ARTICULAR; INTRAMUSCULAR ONCE
Status: COMPLETED | OUTPATIENT
Start: 2025-02-24 | End: 2025-02-24

## 2025-02-24 RX ADMIN — TRIAMCINOLONE ACETONIDE 40 MG: 40 INJECTION, SUSPENSION INTRA-ARTICULAR; INTRAMUSCULAR at 10:11

## 2025-02-24 NOTE — PROGRESS NOTES
Patient ID: Jaz Gipson is a 50 y.o. female     Patient Care Team:  Head, LON Oswald as PCP - General (Nurse Practitioner)  Gus Orourke II, MD as Consulting Physician (Hematology and Oncology)  Head, LON Oswald as Referring Physician (Nurse Practitioner)  Eric Laughlin MD as Consulting Physician (Pulmonary Disease)  Laura Xie MD (Gastroenterology)  iZ Cruz MD (Gastroenterology)  Destiney Flores MD as Consulting Physician (Nephrology)    Subjective     Chief Complaint   Patient presents with    Skin Problem     Top of nose, redness and swelling. Is draining, open area.        History of Present Illness      History of Present Illness  The patient presents for evaluation of a facial rash.    She reports the onset of a pimple last week, which has been progressively worsening daily. The lesion is described as draining bloody pus. She also notes a sensation of facial tightness, causing discomfort during facial movements and when wearing glasses. She has been using cold packs for relief and took Tylenol yesterday due to a severe headache. She has not applied any topical antibiotics to the affected area due to an inability to use Neosporin, but she can use Polysporin over the counter. She has previously used Bactroban. She reports no fevers, itching, or burning sensations. She recalls a previous episode of facial cellulitis, for which she sought urgent care in [place] and was treated with steroids and antihistamines. She was also prescribed Zyvox at that time.    She mentions experiencing swollen eyes today, although she is uncertain if this is related to the current condition or indicative of an impending allergic reaction. She has a history of winter allergies, including a known allergy to [place] trees. She experienced a rash on her neck last Thursday, which she attributes to caffeine intake. Despite diligent hygiene practices, including showering immediately after work and using  face wash, the condition has not improved.    She requests a thyroid function test during this visit, as she had planned to schedule one for the following week.     ALLERGIES  The patient is allergic to NEOSPORIN.    MEDICATIONS  Current: Tylenol  Past: Zyvox    Results         She denies any complaints of fever, chills, cough, chest pain, shortness of air, abdominal pain, nausea, or any other concerns.     The following portions of the patient's history were reviewed and updated as appropriate: allergies, current medications, past family history, past medical history, past social history, past surgical history and problem list.       ROS    Vitals:    02/24/25 0948   BP: 122/80   Pulse: 104   Temp: 98.2 °F (36.8 °C)   SpO2: 98%       Documented weights    02/24/25 0948   Weight: 82.1 kg (181 lb)     Body mass index is 32.07 kg/m².    Results for orders placed or performed in visit on 09/05/24   T3, Free    Collection Time: 10/22/24  9:59 AM    Specimen: Blood   Result Value Ref Range    T3, Free 2.2 2.0 - 4.4 pg/mL   T4, Free    Collection Time: 10/22/24  9:59 AM    Specimen: Blood   Result Value Ref Range    Free T4 1.17 0.92 - 1.68 ng/dL   TSH    Collection Time: 10/22/24  9:59 AM    Specimen: Blood   Result Value Ref Range    TSH 16.600 (H) 0.270 - 4.200 uIU/mL           Objective     Physical Exam  Vitals reviewed.   Constitutional:       General: She is not in acute distress.  Cardiovascular:      Rate and Rhythm: Normal rate.   Pulmonary:      Effort: Pulmonary effort is normal.   Skin:     Comments: Erythema rash to nose and cheeks.  Soft tissue swelling and firmness along bridge of nose.  Scabbed area to middle of nose without any drainage at this time.  Increased tenderness.  Slight swelling to lower eyelids.  Small pimple area to right side of chin which is where previous facial cellulitis earlier this month.     Neurological:      Mental Status: She is alert.         Physical Exam      Assessment & Plan      Assessment/Plan     Assessment & Plan  1. Facial rash.  The patient's symptoms suggest a possible MRSA infection. A steroid injection will be administered today, followed by a course of oral steroids starting tomorrow. An antibiotic regimen will be initiated, along with the application of Bactroban ointment to the affected area. A referral to dermatology has been made for further evaluation and management.    2. Swollen eyes.  She reports that her eyes feel swollen, which may be related to her facial rash or another allergic reaction. She is advised to monitor her symptoms and report any worsening or new symptoms.    3. Thyroid function test.  A thyroid function test will be conducted today to recheck her levels, as she mentioned needing to get her thyroid checked.        Diagnoses and all orders for this visit:    1. Facial cellulitis (Primary)  -     sulfamethoxazole-trimethoprim (BACTRIM DS,SEPTRA DS) 800-160 MG per tablet; Take 1 tablet by mouth 2 (Two) Times a Day for 10 days.  Dispense: 20 tablet; Refill: 0  -     mupirocin (BACTROBAN) 2 % cream; Apply 1 Application topically to the appropriate area as directed 2 (Two) Times a Day.  Dispense: 30 g; Refill: 0  -     triamcinolone acetonide (KENALOG-40) injection 40 mg  -     Start tomorrow:  predniSONE (DELTASONE) 20 MG tablet; Take 3 tablets by mouth Daily for 3 days, THEN 2 tablets Daily for 3 days, THEN 1 tablet Daily for 3 days, THEN 0.5 tablets Daily for 4 days.  Dispense: 20 tablet; Refill: 0  -     Ambulatory Referral to Dermatology  -     CBC (No Diff)    2. Acquired hypothyroidism  -     TSH  -     T3, Free  -     T4, Free    3. Low serum potassium  -     Comprehensive Metabolic Panel          Follow Up:  Return if symptoms worsen or fail to improve, for Labs today .    In the meantime, instructed to contact us sooner for any problems or concerns.    Patient was given instructions and counseling regarding condition or for health maintenance advice.   Please see specific information pulled into the AVS if appropriate.      Patient or patient representative verbalized consent for the use of Ambient Listening during the visit with  LON Fitzpatrick for chart documentation. 2/24/2025  11:43 EST    LON Fitzpatrick  Family Medicine  [facility]  02/24/25  11:43 EST

## 2025-02-25 DIAGNOSIS — Z94.4 HISTORY OF LIVER TRANSPLANT: ICD-10-CM

## 2025-02-25 DIAGNOSIS — D69.6 LOW PLATELET COUNT: Primary | ICD-10-CM

## 2025-02-25 DIAGNOSIS — E03.9 ACQUIRED HYPOTHYROIDISM: ICD-10-CM

## 2025-02-25 LAB
ALBUMIN SERPL-MCNC: 3.5 G/DL (ref 3.5–5.2)
ALBUMIN/GLOB SERPL: 0.8 G/DL
ALP SERPL-CCNC: 162 U/L (ref 39–117)
ALT SERPL-CCNC: 53 U/L (ref 1–33)
AST SERPL-CCNC: 36 U/L (ref 1–32)
BILIRUB SERPL-MCNC: 0.8 MG/DL (ref 0–1.2)
BUN SERPL-MCNC: 14 MG/DL (ref 6–20)
BUN/CREAT SERPL: 11.9 (ref 7–25)
CALCIUM SERPL-MCNC: 8.9 MG/DL (ref 8.6–10.5)
CHLORIDE SERPL-SCNC: 101 MMOL/L (ref 98–107)
CO2 SERPL-SCNC: 25.7 MMOL/L (ref 22–29)
CREAT SERPL-MCNC: 1.18 MG/DL (ref 0.57–1)
EGFRCR SERPLBLD CKD-EPI 2021: 57.1 ML/MIN/1.73
ERYTHROCYTE [DISTWIDTH] IN BLOOD BY AUTOMATED COUNT: 13.2 % (ref 12.3–15.4)
GLOBULIN SER CALC-MCNC: 4.2 GM/DL
GLUCOSE SERPL-MCNC: 152 MG/DL (ref 65–99)
HCT VFR BLD AUTO: 42 % (ref 34–46.6)
HGB BLD-MCNC: 13.6 G/DL (ref 12–15.9)
MCH RBC QN AUTO: 30.2 PG (ref 26.6–33)
MCHC RBC AUTO-ENTMCNC: 32.4 G/DL (ref 31.5–35.7)
MCV RBC AUTO: 93.3 FL (ref 79–97)
PLATELET # BLD AUTO: 87 10*3/MM3 (ref 140–450)
POTASSIUM SERPL-SCNC: 3.8 MMOL/L (ref 3.5–5.2)
PROT SERPL-MCNC: 7.7 G/DL (ref 6–8.5)
RBC # BLD AUTO: 4.5 10*6/MM3 (ref 3.77–5.28)
SODIUM SERPL-SCNC: 138 MMOL/L (ref 136–145)
T3FREE SERPL-MCNC: 1.8 PG/ML (ref 2–4.4)
T4 FREE SERPL-MCNC: 0.59 NG/DL (ref 0.92–1.68)
TSH SERPL DL<=0.005 MIU/L-ACNC: 36.2 UIU/ML (ref 0.27–4.2)
WBC # BLD AUTO: 6.8 10*3/MM3 (ref 3.4–10.8)

## 2025-02-25 RX ORDER — LEVOTHYROXINE SODIUM 200 UG/1
200 TABLET ORAL DAILY
Qty: 90 TABLET | Refills: 0 | Status: SHIPPED | OUTPATIENT
Start: 2025-02-25

## 2025-05-08 ENCOUNTER — OFFICE VISIT (OUTPATIENT)
Dept: FAMILY MEDICINE CLINIC | Facility: CLINIC | Age: 50
End: 2025-05-08
Payer: COMMERCIAL

## 2025-05-08 VITALS
BODY MASS INDEX: 32.6 KG/M2 | WEIGHT: 184 LBS | DIASTOLIC BLOOD PRESSURE: 80 MMHG | TEMPERATURE: 98.4 F | HEART RATE: 87 BPM | SYSTOLIC BLOOD PRESSURE: 120 MMHG | HEIGHT: 63 IN | OXYGEN SATURATION: 99 %

## 2025-05-08 DIAGNOSIS — L30.9 DERMATITIS: ICD-10-CM

## 2025-05-08 DIAGNOSIS — E03.9 ACQUIRED HYPOTHYROIDISM: ICD-10-CM

## 2025-05-08 DIAGNOSIS — L03.211 FACIAL CELLULITIS: Primary | ICD-10-CM

## 2025-05-08 PROCEDURE — 99213 OFFICE O/P EST LOW 20 MIN: CPT | Performed by: NURSE PRACTITIONER

## 2025-05-08 PROCEDURE — 96372 THER/PROPH/DIAG INJ SC/IM: CPT | Performed by: NURSE PRACTITIONER

## 2025-05-08 RX ORDER — HYDROXYZINE HYDROCHLORIDE 25 MG/1
TABLET, FILM COATED ORAL
Qty: 30 TABLET | Refills: 0 | Status: SHIPPED | OUTPATIENT
Start: 2025-05-08

## 2025-05-08 RX ORDER — PREDNISONE 20 MG/1
TABLET ORAL
Qty: 20 TABLET | Refills: 0 | Status: SHIPPED | OUTPATIENT
Start: 2025-05-09 | End: 2025-05-21

## 2025-05-08 RX ORDER — TRIAMCINOLONE ACETONIDE 40 MG/ML
40 INJECTION, SUSPENSION INTRA-ARTICULAR; INTRAMUSCULAR ONCE
Status: COMPLETED | OUTPATIENT
Start: 2025-05-08 | End: 2025-05-08

## 2025-05-08 RX ADMIN — TRIAMCINOLONE ACETONIDE 40 MG: 40 INJECTION, SUSPENSION INTRA-ARTICULAR; INTRAMUSCULAR at 09:49

## 2025-05-08 NOTE — PROGRESS NOTES
Patient ID: Jaz Gipson is a 50 y.o. female     Patient Care Team:  Head, LON Oswald as PCP - General (Nurse Practitioner)  Gus Orourke II, MD as Consulting Physician (Hematology and Oncology)  Head, LON Oswald as Referring Physician (Nurse Practitioner)  Eric Laughlin MD as Consulting Physician (Pulmonary Disease)  Laura Xie MD (Gastroenterology)  Zi Cruz MD (Gastroenterology)  Destiney Flores MD as Consulting Physician (Nephrology)    Subjective     Chief Complaint   Patient presents with    Facial Swelling     Face down to chest, inside of elbows. Still not seen dermatology due to appointment cancelled. Is moving  to [place] next few weeks         History of Present Illness      History of Present Illness  The patient presents for evaluation of eczema.    She reports experiencing a flare-up of her eczema, which she attributes to stress related to an upcoming move. She has a long-standing history of eczema dating back to childhood, with previous episodes being more severe than the current one. Symptoms include blisters filled with pus on her palms and soles, and rashes on her elbows, the backs of her knees, chest, and torso. Additionally, she reports the development of styes in her eyes or pimples, which were filled with pus yesterday but have since drained.    She has been unable to secure an appointment with dermatology due to scheduling conflicts. Currently, she takes Zyrtec daily and avoids over-the-counter Benadryl due to an allergic reaction, although it is tolerated well when administered intravenously in a hospital setting. Recently, she took two Atarax tablets, which provided some relief. There is a family history of eczema, with both her parents and daughters affected.        Results         She denies any complaints of fever, chills, cough, chest pain, shortness of air, abdominal pain, nausea, or any other concerns.     The following portions of the patient's history  were reviewed and updated as appropriate: allergies, current medications, past family history, past medical history, past social history, past surgical history and problem list.       ROS    Vitals:    05/08/25 0921   BP: 120/80   Pulse: 87   Temp: 98.4 °F (36.9 °C)   SpO2: 99%       Documented weights    05/08/25 0921   Weight: 83.5 kg (184 lb)     Body mass index is 32.6 kg/m².    Results for orders placed or performed in visit on 02/24/25   TSH    Collection Time: 02/24/25 10:43 AM    Specimen: Blood   Result Value Ref Range    TSH 36.200 (H) 0.270 - 4.200 uIU/mL   T3, Free    Collection Time: 02/24/25 10:43 AM    Specimen: Blood   Result Value Ref Range    T3, Free 1.8 (L) 2.0 - 4.4 pg/mL   T4, Free    Collection Time: 02/24/25 10:43 AM    Specimen: Blood   Result Value Ref Range    Free T4 0.59 (L) 0.92 - 1.68 ng/dL   Comprehensive Metabolic Panel    Collection Time: 02/24/25 10:43 AM   Result Value Ref Range    Glucose 152 (H) 65 - 99 mg/dL    BUN 14 6 - 20 mg/dL    Creatinine 1.18 (H) 0.57 - 1.00 mg/dL    EGFR Result 57.1 (L) >60.0 mL/min/1.73    BUN/Creatinine Ratio 11.9 7.0 - 25.0    Sodium 138 136 - 145 mmol/L    Potassium 3.8 3.5 - 5.2 mmol/L    Chloride 101 98 - 107 mmol/L    Total CO2 25.7 22.0 - 29.0 mmol/L    Calcium 8.9 8.6 - 10.5 mg/dL    Total Protein 7.7 6.0 - 8.5 g/dL    Albumin 3.5 3.5 - 5.2 g/dL    Globulin 4.2 gm/dL    A/G Ratio 0.8 g/dL    Total Bilirubin 0.8 0.0 - 1.2 mg/dL    Alkaline Phosphatase 162 (H) 39 - 117 U/L    AST (SGOT) 36 (H) 1 - 32 U/L    ALT (SGPT) 53 (H) 1 - 33 U/L   CBC (No Diff)    Collection Time: 02/24/25 10:43 AM   Result Value Ref Range    WBC 6.80 3.40 - 10.80 10*3/mm3    RBC 4.50 3.77 - 5.28 10*6/mm3    Hemoglobin 13.6 12.0 - 15.9 g/dL    Hematocrit 42.0 34.0 - 46.6 %    MCV 93.3 79.0 - 97.0 fL    MCH 30.2 26.6 - 33.0 pg    MCHC 32.4 31.5 - 35.7 g/dL    RDW 13.2 12.3 - 15.4 %    Platelets 87 (L) 140 - 450 10*3/mm3           Objective     Physical Exam  Vitals  reviewed.   Constitutional:       Comments: Uncomfortable due to rash   Eyes:      Comments: Eyelid swelling and redness from rash   Cardiovascular:      Rate and Rhythm: Normal rate.   Pulmonary:      Effort: Pulmonary effort is normal.   Skin:     Findings: Erythema present.      Comments: Facial swelling with erythema rash.  Rash to chest, abdomen, anterior elbows.     Neurological:      Mental Status: She is alert and oriented to person, place, and time.         Physical Exam  Skin: Erythematous patches on the elbows, backs of knees, chest, and torso. Presence of a large scab on the lip.    Assessment & Plan     Assessment/Plan     Assessment & Plan  1. Eczema.  - Reports a flare-up of eczema, attributed to stress related to her upcoming move.  - Eczema is present on her elbows, backs of her knees, chest, and torso. History of severe eczema with blisters filled with pus.  - Currently taking Zyrtec daily and has used hydroxyzine (Atarax) with some relief.  - Kenalog injection will be administered today. Prednisone prescription will be sent to pharmacy, with instructions to start tomorrow: take three tablets for three days, two tablets for three days, and one tablet for three days. Hydroxyzine 1-2 tablets every 8 hours as needed for itching or rash is recommended. Advised to establish care with a dermatologist after her move.    2. Thyroid management.  - Will return on Wednesday for blood work to check thyroid levels.  - Physical exam findings and test results will be reviewed to ensure proper thyroid function.  - Discussed the need for continuity of care and the importance of monitoring thyroid levels.  - A 90-day supply of thyroid medication will be provided to ensure continuity of care until she establishes with a new provider after her move.    Diagnoses and all orders for this visit:    1. Facial cellulitis (Primary)  -     triamcinolone acetonide (KENALOG-40) injection 40 mg  -     hydrOXYzine (ATARAX) 25 MG  tablet; Take 1-2 tablets every 8 hours prn itching  Dispense: 30 tablet; Refill: 0  -     predniSONE (DELTASONE) 20 MG tablet; Take 3 tablets by mouth Daily for 3 days, THEN 2 tablets Daily for 3 days, THEN 1 tablet Daily for 3 days, THEN 0.5 tablets Daily for 3 days.  Dispense: 20 tablet; Refill: 0    2. Dermatitis  -     hydrOXYzine (ATARAX) 25 MG tablet; Take 1-2 tablets every 8 hours prn itching  Dispense: 30 tablet; Refill: 0  -     predniSONE (DELTASONE) 20 MG tablet; Take 3 tablets by mouth Daily for 3 days, THEN 2 tablets Daily for 3 days, THEN 1 tablet Daily for 3 days, THEN 0.5 tablets Daily for 3 days.  Dispense: 20 tablet; Refill: 0    3. Acquired hypothyroidism  -     T3, Free; Future  -     T4, Free; Future  -     TSH; Future          Follow Up:  Return if symptoms worsen or fail to improve.    In the meantime, instructed to contact us sooner for any problems or concerns.    Patient was given instructions and counseling regarding condition or for health maintenance advice.  Please see specific information pulled into the AVS if appropriate.      Patient or patient representative verbalized consent for the use of Ambient Listening during the visit with  LON Fitzpatrick for chart documentation. 5/8/2025  13:10 EDT    LON Fitzpatrick  Family Medicine  [facility]  05/08/25  13:11 EDT

## 2025-07-07 DIAGNOSIS — E03.9 ACQUIRED HYPOTHYROIDISM: ICD-10-CM

## 2025-07-07 RX ORDER — LEVOTHYROXINE SODIUM 200 UG/1
200 TABLET ORAL DAILY
Qty: 30 TABLET | Refills: 0 | Status: SHIPPED | OUTPATIENT
Start: 2025-07-07

## 2025-07-24 DIAGNOSIS — F41.9 ANXIETY AND DEPRESSION: ICD-10-CM

## 2025-07-24 DIAGNOSIS — F32.A ANXIETY AND DEPRESSION: ICD-10-CM

## 2025-08-14 DIAGNOSIS — E03.9 ACQUIRED HYPOTHYROIDISM: ICD-10-CM

## 2025-08-14 RX ORDER — LEVOTHYROXINE SODIUM 200 UG/1
200 TABLET ORAL DAILY
Qty: 30 TABLET | Refills: 0 | Status: SHIPPED | OUTPATIENT
Start: 2025-08-14

## (undated) DEVICE — TUBING, SUCTION, 1/4" X 10', STRAIGHT: Brand: MEDLINE

## (undated) DEVICE — TBG 02 CRUSH RESIST LF CLR 7FT

## (undated) DEVICE — Device: Brand: DEFENDO AIR/WATER/SUCTION AND BIOPSY VALVE

## (undated) DEVICE — CANN NASL CO2 TRULINK W/O2 A/

## (undated) DEVICE — APC PROBE 2200 A, SINGLE USE OD 2.3MM/6.9FR; L. 2.2M/7.2FT: Brand: ERBE

## (undated) DEVICE — LIGATOR MULTIBAND SUPERVIEW 7 BX4

## (undated) DEVICE — BITEBLOCK OMNI BLOC

## (undated) DEVICE — CANNULA,ADULT,SOFT-TOUCH,7'TUBE,UC: Brand: PENDING

## (undated) DEVICE — THE CARR-LOCKE INJECTION NEEDLE IS A SINGLE USE, DISPOSABLE, FLEXIBLE SHEATH INJECTION NEEDLE USED FOR THE INJECTION OF VARIOUS TYPES OF MEDIA THROUGH FLEXIBLE ENDOSCOPES.